# Patient Record
Sex: FEMALE | Race: WHITE | NOT HISPANIC OR LATINO | Employment: OTHER | URBAN - METROPOLITAN AREA
[De-identification: names, ages, dates, MRNs, and addresses within clinical notes are randomized per-mention and may not be internally consistent; named-entity substitution may affect disease eponyms.]

---

## 2017-01-31 ENCOUNTER — OFFICE VISIT - RIVER FALLS (OUTPATIENT)
Dept: FAMILY MEDICINE | Facility: CLINIC | Age: 62
End: 2017-01-31

## 2017-02-14 ENCOUNTER — OFFICE VISIT - RIVER FALLS (OUTPATIENT)
Dept: FAMILY MEDICINE | Facility: CLINIC | Age: 62
End: 2017-02-14

## 2017-04-12 ENCOUNTER — OFFICE VISIT - RIVER FALLS (OUTPATIENT)
Dept: FAMILY MEDICINE | Facility: CLINIC | Age: 62
End: 2017-04-12

## 2017-08-03 ENCOUNTER — OFFICE VISIT - RIVER FALLS (OUTPATIENT)
Dept: FAMILY MEDICINE | Facility: CLINIC | Age: 62
End: 2017-08-03

## 2017-08-03 ASSESSMENT — MIFFLIN-ST. JEOR: SCORE: 1408.18

## 2017-08-04 LAB — HBA1C MFR BLD: 5.7 %

## 2017-08-11 ENCOUNTER — OFFICE VISIT - RIVER FALLS (OUTPATIENT)
Dept: FAMILY MEDICINE | Facility: CLINIC | Age: 62
End: 2017-08-11

## 2017-08-30 ENCOUNTER — OFFICE VISIT - RIVER FALLS (OUTPATIENT)
Dept: FAMILY MEDICINE | Facility: CLINIC | Age: 62
End: 2017-08-30

## 2017-10-12 ENCOUNTER — OFFICE VISIT - RIVER FALLS (OUTPATIENT)
Dept: FAMILY MEDICINE | Facility: CLINIC | Age: 62
End: 2017-10-12

## 2017-10-12 ASSESSMENT — MIFFLIN-ST. JEOR: SCORE: 1394.57

## 2018-03-23 ENCOUNTER — OFFICE VISIT - RIVER FALLS (OUTPATIENT)
Dept: FAMILY MEDICINE | Facility: CLINIC | Age: 63
End: 2018-03-23

## 2018-03-23 ASSESSMENT — MIFFLIN-ST. JEOR: SCORE: 1344.67

## 2018-07-02 ENCOUNTER — OFFICE VISIT - RIVER FALLS (OUTPATIENT)
Dept: FAMILY MEDICINE | Facility: CLINIC | Age: 63
End: 2018-07-02

## 2018-07-09 ENCOUNTER — AMBULATORY - RIVER FALLS (OUTPATIENT)
Dept: FAMILY MEDICINE | Facility: CLINIC | Age: 63
End: 2018-07-09

## 2018-07-10 LAB
CHOLEST SERPL-MCNC: 155 MG/DL
CHOLEST/HDLC SERPL: 4 {RATIO}
CREAT SERPL-MCNC: 0.81 MG/DL (ref 0.5–0.99)
GLUCOSE BLD-MCNC: 110 MG/DL (ref 65–99)
HBA1C MFR BLD: 5.1 %
HDLC SERPL-MCNC: 39 MG/DL
LDLC SERPL CALC-MCNC: 94 MG/DL
NONHDLC SERPL-MCNC: 116 MG/DL
TRIGL SERPL-MCNC: 119 MG/DL

## 2018-08-15 ENCOUNTER — AMBULATORY - RIVER FALLS (OUTPATIENT)
Dept: FAMILY MEDICINE | Facility: CLINIC | Age: 63
End: 2018-08-15

## 2018-08-15 ENCOUNTER — OFFICE VISIT - RIVER FALLS (OUTPATIENT)
Dept: FAMILY MEDICINE | Facility: CLINIC | Age: 63
End: 2018-08-15

## 2018-08-15 ASSESSMENT — MIFFLIN-ST. JEOR: SCORE: 1312.92

## 2018-08-20 ENCOUNTER — TRANSFERRED RECORDS (OUTPATIENT)
Dept: HEALTH INFORMATION MANAGEMENT | Facility: CLINIC | Age: 63
End: 2018-08-20

## 2018-08-20 LAB — HPV ABSTRACT: NORMAL

## 2018-08-23 ENCOUNTER — OFFICE VISIT - RIVER FALLS (OUTPATIENT)
Dept: FAMILY MEDICINE | Facility: CLINIC | Age: 63
End: 2018-08-23

## 2018-10-22 ENCOUNTER — OFFICE VISIT - RIVER FALLS (OUTPATIENT)
Dept: FAMILY MEDICINE | Facility: CLINIC | Age: 63
End: 2018-10-22

## 2019-03-26 ENCOUNTER — AMBULATORY - RIVER FALLS (OUTPATIENT)
Dept: FAMILY MEDICINE | Facility: CLINIC | Age: 64
End: 2019-03-26

## 2019-08-06 ENCOUNTER — AMBULATORY - RIVER FALLS (OUTPATIENT)
Dept: FAMILY MEDICINE | Facility: CLINIC | Age: 64
End: 2019-08-06

## 2019-08-22 ENCOUNTER — OFFICE VISIT - RIVER FALLS (OUTPATIENT)
Dept: FAMILY MEDICINE | Facility: CLINIC | Age: 64
End: 2019-08-22

## 2019-08-22 ASSESSMENT — MIFFLIN-ST. JEOR: SCORE: 1360.1

## 2019-08-23 ENCOUNTER — COMMUNICATION - RIVER FALLS (OUTPATIENT)
Dept: FAMILY MEDICINE | Facility: CLINIC | Age: 64
End: 2019-08-23

## 2019-08-23 LAB
BUN SERPL-MCNC: 18 MG/DL (ref 7–25)
BUN/CREAT RATIO - HISTORICAL: ABNORMAL (ref 6–22)
CALCIUM SERPL-MCNC: 9.8 MG/DL (ref 8.6–10.4)
CHLORIDE BLD-SCNC: 103 MMOL/L (ref 98–110)
CHOLEST SERPL-MCNC: 179 MG/DL
CHOLEST/HDLC SERPL: 4.1 {RATIO}
CO2 SERPL-SCNC: 27 MMOL/L (ref 20–32)
CREAT SERPL-MCNC: 0.87 MG/DL (ref 0.5–0.99)
EGFRCR SERPLBLD CKD-EPI 2021: 72 ML/MIN/1.73M2
GLUCOSE BLD-MCNC: 106 MG/DL (ref 65–99)
HBA1C MFR BLD: 5.2 %
HDLC SERPL-MCNC: 44 MG/DL
LDLC SERPL CALC-MCNC: 105 MG/DL
NONHDLC SERPL-MCNC: 135 MG/DL
POTASSIUM BLD-SCNC: 4.5 MMOL/L (ref 3.5–5.3)
SODIUM SERPL-SCNC: 138 MMOL/L (ref 135–146)
TRIGL SERPL-MCNC: 180 MG/DL
TSH SERPL DL<=0.005 MIU/L-ACNC: 1.56 MIU/L (ref 0.4–4.5)

## 2019-08-27 ENCOUNTER — OFFICE VISIT - RIVER FALLS (OUTPATIENT)
Dept: FAMILY MEDICINE | Facility: CLINIC | Age: 64
End: 2019-08-27

## 2019-09-03 ENCOUNTER — OFFICE VISIT - RIVER FALLS (OUTPATIENT)
Dept: FAMILY MEDICINE | Facility: CLINIC | Age: 64
End: 2019-09-03

## 2019-10-19 ENCOUNTER — HOSPITAL ENCOUNTER (EMERGENCY)
Dept: HOSPITAL 39 - ER | Age: 64
Discharge: HOME | End: 2019-10-19
Payer: COMMERCIAL

## 2019-10-19 VITALS — OXYGEN SATURATION: 95 % | DIASTOLIC BLOOD PRESSURE: 79 MMHG | SYSTOLIC BLOOD PRESSURE: 111 MMHG

## 2019-10-19 VITALS — TEMPERATURE: 99.1 F

## 2019-10-19 DIAGNOSIS — I34.1: ICD-10-CM

## 2019-10-19 DIAGNOSIS — I10: ICD-10-CM

## 2019-10-19 DIAGNOSIS — J02.9: Primary | ICD-10-CM

## 2019-10-19 DIAGNOSIS — E07.9: ICD-10-CM

## 2019-10-19 NOTE — ED.PDOC
History of Present Illness





- General


Chief Complaint: ENT Problem


Stated Complaint: Sore throat


Time Seen by Provider: 10/19/19 20:23


Source: RN notes reviewed, Vital Signs reviewed





- History of Present Illness


Initial Comments: 





pt is a 62 yo F who presents to ED with daughter for 3 day h/o sore throat.  

Denies cough, fever, congestion or ear ache.  States she has sharp pain to 

throat when she tries to swallow liquids or food.  Has used OTC pain meds and 

decongestants w/o relief.


Allergies/Adverse Reactions: 


Allergies





NO KNOWN ALLERGY Allergy (Verified 10/19/19 20:18)


   





Home Medications: 


Ambulatory Orders





Acetamin W/Cod Elix 120/12 [Tylenol/Codiene Elixir 120/12] 10 ml PO Q6H PRN #150

ml 10/19/19 


Amoxicillin [Amoxil] 500 mg PO TID 10 Days  cap 10/19/19 











Review of Systems





- Review of Systems


Constitutional: Denies: chills, fever, malaise


EENTM: States: throat pain.  Denies: eye pain, blurred vision, ear pain, nose 

pain, nose congestion


Respiratory: Denies: cough, short of breath


Cardiology: Denies: chest pain, palpitations, syncope


Gastrointestinal/Abdominal: Denies: diarrhea, nausea, vomiting


Musculoskeletal: Denies: back pain


All other Systems: Reviewed and Negative





Past Medical History (General)





- Patient Medical History


Hx Asthma: No


Hx Cardiac Disorders: Yes - mitral valve prolapse


Hx Hypertension: Yes


Hx Thyroid Disease: Yes


Surgical History: cholecystectomy, Hysterectomy





- Vaccination History


Hx Tetanus, Diphtheria Vaccination: Yes


Hx Influenza Vaccination: Yes





- Social History


Hx Tobacco Use: No


Hx Alcohol Use: No





Family Medical History





- Family History


  ** Mother


Family History: Unknown


Hx Cardiac Disease: Yes





Physical Exam





- Physical Exam


General Appearance: Alert, Comfortable, No apparent distress


Ear Exam: bilateral ear: canal normal, TM normal


Nasal Exam: normal inspection


Throat Exam: other - There is pharyngeal erythema with exudates.  No trismus or 

uvular shift.  no peritonsilar mass


Cardiovascular/Respiratory: regular rate, rhythm, normal peripheral pulses, 

normal breath sounds, no respiratory distress


Abdominal Exam: non-tender


Neurologic: alert, normal mood/affect


Skin Exam: normal color





Progress





- Progress


Progress: 





10/19/19 20:30


Pt presents with 3 day h/o painful swallowing and sore throat.  exam shows 

pharyngeal erythema with exudates and anterior cervical lymphadenopathy.  No 

improvement with OTC meds.  Will treat with Decadron IM and Amoxil at home and 

f/u with pcp in 1-2 days for recheck.  srp given.





Departure





- Departure


Clinical Impression: 


Pharyngitis


Qualifiers:


 Pharyngitis/tonsillitis etiology: unspecified etiology Qualified Code(s): J02.9

 - Acute pharyngitis, unspecified





Time of Disposition: 20:32


Disposition: Discharge to Home or Self Care


Condition: Good


Departure Forms:  ED Discharge - Pt. Copy, Patient Portal Self Enrollment


Instructions:  Sore Throat in Adults


Diet: other - advance diet as tolerated


Activity: increase activity as tolerated


Prescriptions: 


Acetamin W/Cod Elix 120/12 [Tylenol/Codiene Elixir 120/12] 10 ml PO Q6H PRN #150

ml


 PRN Reason: Pain


Amoxicillin [Amoxil] 500 mg PO TID 10 Days  cap


Home Medications: 


Ambulatory Orders





Acetamin W/Cod Elix 120/12 [Tylenol/Codiene Elixir 120/12] 10 ml PO Q6H PRN #150

ml 10/19/19 


Amoxicillin [Amoxil] 500 mg PO TID 10 Days  cap 10/19/19

## 2019-11-18 ENCOUNTER — COMMUNICATION - RIVER FALLS (OUTPATIENT)
Dept: FAMILY MEDICINE | Facility: CLINIC | Age: 64
End: 2019-11-18

## 2019-11-20 ENCOUNTER — OFFICE VISIT - RIVER FALLS (OUTPATIENT)
Dept: FAMILY MEDICINE | Facility: CLINIC | Age: 64
End: 2019-11-20

## 2019-11-20 ASSESSMENT — MIFFLIN-ST. JEOR: SCORE: 1369.17

## 2020-08-11 ENCOUNTER — COMMUNICATION - RIVER FALLS (OUTPATIENT)
Dept: FAMILY MEDICINE | Facility: CLINIC | Age: 65
End: 2020-08-11

## 2020-08-14 ENCOUNTER — AMBULATORY - RIVER FALLS (OUTPATIENT)
Dept: FAMILY MEDICINE | Facility: CLINIC | Age: 65
End: 2020-08-14

## 2020-08-15 LAB
CHOLEST SERPL-MCNC: 193 MG/DL
CHOLEST/HDLC SERPL: 5.5 {RATIO}
GLUCOSE BLD-MCNC: 118 MG/DL (ref 65–99)
HDLC SERPL-MCNC: 35 MG/DL
LDLC SERPL CALC-MCNC: 111 MG/DL
NONHDLC SERPL-MCNC: 158 MG/DL
TRIGL SERPL-MCNC: 350 MG/DL
TSH SERPL DL<=0.005 MIU/L-ACNC: 1.73 MIU/L (ref 0.4–4.5)

## 2020-08-20 ENCOUNTER — COMMUNICATION - RIVER FALLS (OUTPATIENT)
Dept: FAMILY MEDICINE | Facility: CLINIC | Age: 65
End: 2020-08-20

## 2020-08-25 ENCOUNTER — OFFICE VISIT - RIVER FALLS (OUTPATIENT)
Dept: FAMILY MEDICINE | Facility: CLINIC | Age: 65
End: 2020-08-25

## 2020-08-25 ENCOUNTER — AMBULATORY - RIVER FALLS (OUTPATIENT)
Dept: FAMILY MEDICINE | Facility: CLINIC | Age: 65
End: 2020-08-25

## 2020-08-25 ASSESSMENT — MIFFLIN-ST. JEOR: SCORE: 1407.38

## 2020-08-26 ENCOUNTER — COMMUNICATION - RIVER FALLS (OUTPATIENT)
Dept: FAMILY MEDICINE | Facility: CLINIC | Age: 65
End: 2020-08-26

## 2020-08-26 LAB
25(OH)D3 SERPL-MCNC: 36 NG/ML (ref 30–100)
HAV AB SER QL IA: REACTIVE
HBV CORE AB SERPL QL IA: ABNORMAL
HBV SURFACE AB SERPL IA-ACNC: REACTIVE M[IU]/ML
HBV SURFACE AG SERPL QL IA: ABNORMAL
HCV AB SERPL QL IA: ABNORMAL
HEP C SIGNAL TO CUTOFF(HISTORICAL): 0.03

## 2020-12-29 ENCOUNTER — COMMUNICATION - RIVER FALLS (OUTPATIENT)
Dept: FAMILY MEDICINE | Facility: CLINIC | Age: 65
End: 2020-12-29

## 2021-04-01 ENCOUNTER — AMBULATORY - RIVER FALLS (OUTPATIENT)
Dept: FAMILY MEDICINE | Facility: CLINIC | Age: 66
End: 2021-04-01

## 2021-04-29 ENCOUNTER — COMMUNICATION - RIVER FALLS (OUTPATIENT)
Dept: FAMILY MEDICINE | Facility: CLINIC | Age: 66
End: 2021-04-29

## 2021-04-29 ENCOUNTER — AMBULATORY - RIVER FALLS (OUTPATIENT)
Dept: FAMILY MEDICINE | Facility: CLINIC | Age: 66
End: 2021-04-29

## 2021-06-29 ENCOUNTER — OFFICE VISIT - RIVER FALLS (OUTPATIENT)
Dept: FAMILY MEDICINE | Facility: CLINIC | Age: 66
End: 2021-06-29

## 2021-06-29 LAB
BUN SERPL-MCNC: 20 MG/DL (ref 7–25)
BUN/CREAT RATIO - HISTORICAL: ABNORMAL (ref 6–22)
CALCIUM SERPL-MCNC: 9.6 MG/DL (ref 8.6–10.4)
CHLORIDE BLD-SCNC: 101 MMOL/L (ref 98–110)
CO2 SERPL-SCNC: 26 MMOL/L (ref 20–32)
CREAT SERPL-MCNC: 0.93 MG/DL (ref 0.5–0.99)
EGFRCR SERPLBLD CKD-EPI 2021: 65 ML/MIN/1.73M2
ERYTHROCYTE [DISTWIDTH] IN BLOOD BY AUTOMATED COUNT: 14.3 % (ref 11–15)
GLUCOSE BLD-MCNC: 212 MG/DL (ref 65–99)
HCT VFR BLD AUTO: 36.3 % (ref 35–45)
HGB BLD-MCNC: 11.9 GM/DL (ref 11.7–15.5)
MAGNESIUM SERPL-MCNC: 2.1 MG/DL (ref 1.5–2.5)
MCH RBC QN AUTO: 30.1 PG (ref 27–33)
MCHC RBC AUTO-ENTMCNC: 32.8 GM/DL (ref 32–36)
MCV RBC AUTO: 91.7 FL (ref 80–100)
PLATELET # BLD AUTO: 207 10*3/UL (ref 140–400)
PMV BLD: 10.2 FL (ref 7.5–12.5)
POTASSIUM BLD-SCNC: 4.8 MMOL/L (ref 3.5–5.3)
RBC # BLD AUTO: 3.96 10*6/UL (ref 3.8–5.1)
SODIUM SERPL-SCNC: 135 MMOL/L (ref 135–146)
WBC # BLD AUTO: 5.3 10*3/UL (ref 3.8–10.8)

## 2021-06-29 ASSESSMENT — MIFFLIN-ST. JEOR: SCORE: 1434.49

## 2021-06-30 ENCOUNTER — COMMUNICATION - RIVER FALLS (OUTPATIENT)
Dept: FAMILY MEDICINE | Facility: CLINIC | Age: 66
End: 2021-06-30

## 2021-07-06 ENCOUNTER — AMBULATORY - RIVER FALLS (OUTPATIENT)
Dept: FAMILY MEDICINE | Facility: CLINIC | Age: 66
End: 2021-07-06

## 2021-07-07 ENCOUNTER — COMMUNICATION - RIVER FALLS (OUTPATIENT)
Dept: FAMILY MEDICINE | Facility: CLINIC | Age: 66
End: 2021-07-07

## 2021-07-07 LAB
CHOLEST SERPL-MCNC: 184 MG/DL
CHOLEST/HDLC SERPL: 5.3 {RATIO}
CREAT UR-MCNC: 83 MG/DL (ref 20–275)
GLUCOSE BLD-MCNC: 136 MG/DL (ref 65–99)
HDLC SERPL-MCNC: 35 MG/DL
LDLC SERPL CALC-MCNC: 110 MG/DL
MICROALBUMIN UR-MCNC: 0.2 MG/DL
MICROALBUMIN/CREAT UR: 2 MG/G{CREAT}
NONHDLC SERPL-MCNC: 149 MG/DL
TRIGL SERPL-MCNC: 271 MG/DL
TSH SERPL DL<=0.005 MIU/L-ACNC: 3.12 MIU/L (ref 0.4–4.5)

## 2021-07-08 ENCOUNTER — COMMUNICATION - RIVER FALLS (OUTPATIENT)
Dept: FAMILY MEDICINE | Facility: CLINIC | Age: 66
End: 2021-07-08

## 2021-07-16 ENCOUNTER — OFFICE VISIT - RIVER FALLS (OUTPATIENT)
Dept: FAMILY MEDICINE | Facility: CLINIC | Age: 66
End: 2021-07-16

## 2021-07-17 LAB — HBA1C MFR BLD: 6.4 %

## 2021-07-21 ENCOUNTER — COMMUNICATION - RIVER FALLS (OUTPATIENT)
Dept: FAMILY MEDICINE | Facility: CLINIC | Age: 66
End: 2021-07-21

## 2021-07-27 ENCOUNTER — COMMUNICATION - RIVER FALLS (OUTPATIENT)
Dept: FAMILY MEDICINE | Facility: CLINIC | Age: 66
End: 2021-07-27

## 2021-07-28 ENCOUNTER — COMMUNICATION - RIVER FALLS (OUTPATIENT)
Dept: FAMILY MEDICINE | Facility: CLINIC | Age: 66
End: 2021-07-28

## 2021-08-16 ENCOUNTER — OFFICE VISIT - RIVER FALLS (OUTPATIENT)
Dept: FAMILY MEDICINE | Facility: CLINIC | Age: 66
End: 2021-08-16

## 2021-08-16 ASSESSMENT — MIFFLIN-ST. JEOR: SCORE: 1435.85

## 2021-08-19 ENCOUNTER — OFFICE VISIT - RIVER FALLS (OUTPATIENT)
Dept: FAMILY MEDICINE | Facility: CLINIC | Age: 66
End: 2021-08-19

## 2021-08-24 ENCOUNTER — COMMUNICATION - RIVER FALLS (OUTPATIENT)
Dept: FAMILY MEDICINE | Facility: CLINIC | Age: 66
End: 2021-08-24

## 2021-09-03 ENCOUNTER — COMMUNICATION - RIVER FALLS (OUTPATIENT)
Dept: FAMILY MEDICINE | Facility: CLINIC | Age: 66
End: 2021-09-03

## 2021-09-03 ENCOUNTER — OFFICE VISIT - RIVER FALLS (OUTPATIENT)
Dept: FAMILY MEDICINE | Facility: CLINIC | Age: 66
End: 2021-09-03

## 2021-09-03 LAB
A/G RATIO - HISTORICAL: 1.7 (ref 1–2.5)
ALBUMIN SERPL-MCNC: 4.7 GM/DL (ref 3.6–5.1)
ALP SERPL-CCNC: 55 UNIT/L (ref 37–153)
ALT SERPL W P-5'-P-CCNC: 40 UNIT/L (ref 6–29)
AST SERPL W P-5'-P-CCNC: 53 UNIT/L (ref 10–35)
BILIRUB SERPL-MCNC: 0.6 MG/DL (ref 0.2–1.2)
BUN SERPL-MCNC: 21 MG/DL (ref 7–25)
BUN/CREAT RATIO - HISTORICAL: ABNORMAL (ref 6–22)
CALCIUM SERPL-MCNC: 9.8 MG/DL (ref 8.6–10.4)
CHLORIDE BLD-SCNC: 102 MMOL/L (ref 98–110)
CO2 SERPL-SCNC: 27 MMOL/L (ref 20–32)
CREAT SERPL-MCNC: 0.93 MG/DL (ref 0.5–0.99)
EGFRCR SERPLBLD CKD-EPI 2021: 65 ML/MIN/1.73M2
GLOBULIN: 2.8 (ref 1.9–3.7)
GLUCOSE BLD-MCNC: 120 MG/DL (ref 65–99)
POTASSIUM BLD-SCNC: 4.4 MMOL/L (ref 3.5–5.3)
PROT SERPL-MCNC: 7.5 GM/DL (ref 6.1–8.1)
SODIUM SERPL-SCNC: 138 MMOL/L (ref 135–146)

## 2021-09-03 ASSESSMENT — MIFFLIN-ST. JEOR: SCORE: 1397.29

## 2021-09-21 ENCOUNTER — OFFICE VISIT - RIVER FALLS (OUTPATIENT)
Dept: FAMILY MEDICINE | Facility: CLINIC | Age: 66
End: 2021-09-21

## 2021-09-21 ASSESSMENT — MIFFLIN-ST. JEOR: SCORE: 1398.65

## 2021-11-02 ENCOUNTER — OFFICE VISIT - RIVER FALLS (OUTPATIENT)
Dept: FAMILY MEDICINE | Facility: CLINIC | Age: 66
End: 2021-11-02

## 2021-11-02 ASSESSMENT — MIFFLIN-ST. JEOR: SCORE: 1387.31

## 2021-11-03 ENCOUNTER — COMMUNICATION - RIVER FALLS (OUTPATIENT)
Dept: FAMILY MEDICINE | Facility: CLINIC | Age: 66
End: 2021-11-03

## 2021-11-03 LAB
% RETIC: 1.8 %
A/G RATIO - HISTORICAL: 1.6 (ref 1–2.5)
ALBUMIN SERPL-MCNC: 4.5 GM/DL (ref 3.6–5.1)
ALP SERPL-CCNC: 72 UNIT/L (ref 37–153)
ALT SERPL W P-5'-P-CCNC: 62 UNIT/L (ref 6–29)
AST SERPL W P-5'-P-CCNC: 74 UNIT/L (ref 10–35)
BASOPHILS # BLD MANUAL: 73 10*3/UL (ref 0–200)
BASOPHILS NFR BLD MANUAL: 1 %
BILIRUB SERPL-MCNC: 0.6 MG/DL (ref 0.2–1.2)
BUN SERPL-MCNC: 24 MG/DL (ref 7–25)
BUN/CREAT RATIO - HISTORICAL: ABNORMAL (ref 6–22)
CALCIUM SERPL-MCNC: 9.9 MG/DL (ref 8.6–10.4)
CHLORIDE BLD-SCNC: 104 MMOL/L (ref 98–110)
CO2 SERPL-SCNC: 25 MMOL/L (ref 20–32)
CREAT SERPL-MCNC: 0.88 MG/DL (ref 0.5–0.99)
DAT POLY-SP REAG RBC QL: NEGATIVE
EGFRCR SERPLBLD CKD-EPI 2021: 69 ML/MIN/1.73M2
EOSINOPHIL # BLD MANUAL: 73 10*3/UL (ref 15–500)
EOSINOPHIL NFR BLD MANUAL: 1 %
ERYTHROCYTE [DISTWIDTH] IN BLOOD BY AUTOMATED COUNT: 14.4 % (ref 11–15)
FERRITIN SERPL-MCNC: 30 NG/ML (ref 16–288)
GLOBULIN: 2.8 (ref 1.9–3.7)
GLUCOSE BLD-MCNC: 149 MG/DL (ref 65–99)
HCT VFR BLD AUTO: 35.8 % (ref 35–45)
HGB BLD-MCNC: 11.9 GM/DL (ref 11.7–15.5)
IRON SATURATION: 19 (ref 16–45)
IRON SERPL-MCNC: 89 MCG/DL (ref 45–160)
LDH SERPL L TO P-CCNC: 176 UNIT/L (ref 120–250)
LYMPHOCYTES # BLD MANUAL: 1548 10*3/UL (ref 850–3900)
LYMPHOCYTES NFR BLD MANUAL: 21.2 %
MCH RBC QN AUTO: 30.4 PG (ref 27–33)
MCHC RBC AUTO-ENTMCNC: 33.2 GM/DL (ref 32–36)
MCV RBC AUTO: 91.3 FL (ref 80–100)
MONOCYTES # BLD MANUAL: 146 10*3/UL (ref 200–950)
MONOCYTES NFR BLD MANUAL: 2 %
NEUTROPHILS # BLD MANUAL: 5460 10*3/UL (ref 1500–7800)
NEUTROPHILS NFR BLD MANUAL: 74.8 %
PLATELET # BLD AUTO: 214 10*3/UL (ref 140–400)
PMV BLD: 11.1 FL (ref 7.5–12.5)
POTASSIUM BLD-SCNC: 4 MMOL/L (ref 3.5–5.3)
PROT SERPL-MCNC: 7.3 GM/DL (ref 6.1–8.1)
RBC # BLD AUTO: 3.92 10*6/UL (ref 3.8–5.1)
RETIC (ABSOLUTE) - HISTORICAL: NORMAL (ref 20000–80000)
SODIUM SERPL-SCNC: 140 MMOL/L (ref 135–146)
TIBC - QUEST: 466 (ref 250–450)
WBC # BLD AUTO: 7.3 10*3/UL (ref 3.8–10.8)

## 2021-11-05 ENCOUNTER — OFFICE VISIT - RIVER FALLS (OUTPATIENT)
Dept: FAMILY MEDICINE | Facility: CLINIC | Age: 66
End: 2021-11-05

## 2021-12-21 ENCOUNTER — OFFICE VISIT - RIVER FALLS (OUTPATIENT)
Dept: FAMILY MEDICINE | Facility: CLINIC | Age: 66
End: 2021-12-21

## 2021-12-21 ENCOUNTER — MEDICAL CORRESPONDENCE (OUTPATIENT)
Dept: HEALTH INFORMATION MANAGEMENT | Facility: CLINIC | Age: 66
End: 2021-12-21

## 2021-12-21 ASSESSMENT — MIFFLIN-ST. JEOR: SCORE: 1358.28

## 2022-01-07 ENCOUNTER — AMBULATORY - RIVER FALLS (OUTPATIENT)
Dept: FAMILY MEDICINE | Facility: CLINIC | Age: 67
End: 2022-01-07

## 2022-02-11 VITALS
BODY MASS INDEX: 29.92 KG/M2 | OXYGEN SATURATION: 98 % | HEIGHT: 66 IN | DIASTOLIC BLOOD PRESSURE: 60 MMHG | HEART RATE: 88 BPM | SYSTOLIC BLOOD PRESSURE: 110 MMHG | TEMPERATURE: 98.3 F | WEIGHT: 186.2 LBS

## 2022-02-11 VITALS
OXYGEN SATURATION: 98 % | TEMPERATURE: 98.1 F | HEART RATE: 81 BPM | BODY MASS INDEX: 29.72 KG/M2 | HEIGHT: 65 IN | WEIGHT: 178.4 LBS | DIASTOLIC BLOOD PRESSURE: 70 MMHG | SYSTOLIC BLOOD PRESSURE: 112 MMHG

## 2022-02-11 VITALS
DIASTOLIC BLOOD PRESSURE: 62 MMHG | OXYGEN SATURATION: 99 % | WEIGHT: 184.4 LBS | BODY MASS INDEX: 31.09 KG/M2 | BODY MASS INDEX: 32.51 KG/M2 | HEIGHT: 65 IN | TEMPERATURE: 99 F | WEIGHT: 186.9 LBS | HEIGHT: 65 IN | TEMPERATURE: 98.2 F | HEIGHT: 65 IN | BODY MASS INDEX: 30.69 KG/M2 | WEIGHT: 195.1 LBS | OXYGEN SATURATION: 97 % | DIASTOLIC BLOOD PRESSURE: 66 MMHG | HEIGHT: 65 IN | SYSTOLIC BLOOD PRESSURE: 120 MMHG | WEIGHT: 186.6 LBS | HEIGHT: 65 IN | BODY MASS INDEX: 30.72 KG/M2 | SYSTOLIC BLOOD PRESSURE: 118 MMHG | SYSTOLIC BLOOD PRESSURE: 114 MMHG | DIASTOLIC BLOOD PRESSURE: 62 MMHG | HEART RATE: 73 BPM | HEART RATE: 88 BPM | HEART RATE: 86 BPM | OXYGEN SATURATION: 98 % | TEMPERATURE: 97.4 F | BODY MASS INDEX: 31.14 KG/M2

## 2022-02-11 VITALS
BODY MASS INDEX: 32.46 KG/M2 | HEIGHT: 65 IN | WEIGHT: 197.3 LBS | DIASTOLIC BLOOD PRESSURE: 64 MMHG | OXYGEN SATURATION: 97 % | HEART RATE: 76 BPM | WEIGHT: 194.8 LBS | TEMPERATURE: 97.2 F | RESPIRATION RATE: 16 BRPM | OXYGEN SATURATION: 97 % | BODY MASS INDEX: 32.83 KG/M2 | HEART RATE: 65 BPM | SYSTOLIC BLOOD PRESSURE: 114 MMHG | DIASTOLIC BLOOD PRESSURE: 72 MMHG | SYSTOLIC BLOOD PRESSURE: 100 MMHG

## 2022-02-11 VITALS
SYSTOLIC BLOOD PRESSURE: 112 MMHG | OXYGEN SATURATION: 99 % | DIASTOLIC BLOOD PRESSURE: 64 MMHG | HEIGHT: 65 IN | HEART RATE: 76 BPM | HEART RATE: 66 BPM | TEMPERATURE: 98.1 F | DIASTOLIC BLOOD PRESSURE: 66 MMHG | SYSTOLIC BLOOD PRESSURE: 118 MMHG | WEIGHT: 180.4 LBS | BODY MASS INDEX: 30.06 KG/M2

## 2022-02-11 VITALS
DIASTOLIC BLOOD PRESSURE: 72 MMHG | TEMPERATURE: 98.5 F | SYSTOLIC BLOOD PRESSURE: 126 MMHG | HEART RATE: 69 BPM | TEMPERATURE: 98.6 F | HEART RATE: 78 BPM | BODY MASS INDEX: 32.7 KG/M2 | SYSTOLIC BLOOD PRESSURE: 118 MMHG | HEIGHT: 65 IN | DIASTOLIC BLOOD PRESSURE: 68 MMHG | OXYGEN SATURATION: 97 %

## 2022-02-11 VITALS
TEMPERATURE: 98.3 F | SYSTOLIC BLOOD PRESSURE: 124 MMHG | WEIGHT: 186 LBS | BODY MASS INDEX: 30.99 KG/M2 | HEART RATE: 64 BPM | RESPIRATION RATE: 16 BRPM | OXYGEN SATURATION: 99 % | HEIGHT: 65 IN | DIASTOLIC BLOOD PRESSURE: 72 MMHG

## 2022-02-11 VITALS
OXYGEN SATURATION: 97 % | SYSTOLIC BLOOD PRESSURE: 124 MMHG | DIASTOLIC BLOOD PRESSURE: 64 MMHG | RESPIRATION RATE: 16 BRPM | BODY MASS INDEX: 29.66 KG/M2 | WEIGHT: 178 LBS | HEART RATE: 88 BPM | HEIGHT: 65 IN

## 2022-02-11 VITALS
TEMPERATURE: 98.6 F | WEIGHT: 175 LBS | HEART RATE: 76 BPM | BODY MASS INDEX: 29.16 KG/M2 | DIASTOLIC BLOOD PRESSURE: 70 MMHG | HEIGHT: 65 IN | SYSTOLIC BLOOD PRESSURE: 108 MMHG

## 2022-02-11 VITALS
HEIGHT: 65 IN | HEART RATE: 72 BPM | DIASTOLIC BLOOD PRESSURE: 74 MMHG | SYSTOLIC BLOOD PRESSURE: 128 MMHG | TEMPERATURE: 98.5 F | DIASTOLIC BLOOD PRESSURE: 72 MMHG | WEIGHT: 189 LBS | SYSTOLIC BLOOD PRESSURE: 116 MMHG | BODY MASS INDEX: 31.49 KG/M2 | HEART RATE: 72 BPM

## 2022-02-11 VITALS
OXYGEN SATURATION: 98 % | TEMPERATURE: 98.4 F | BODY MASS INDEX: 28.46 KG/M2 | DIASTOLIC BLOOD PRESSURE: 74 MMHG | WEIGHT: 171 LBS | SYSTOLIC BLOOD PRESSURE: 120 MMHG | HEART RATE: 64 BPM

## 2022-02-12 VITALS
DIASTOLIC BLOOD PRESSURE: 64 MMHG | BODY MASS INDEX: 27.96 KG/M2 | WEIGHT: 168 LBS | HEART RATE: 72 BPM | SYSTOLIC BLOOD PRESSURE: 108 MMHG | WEIGHT: 168 LBS | BODY MASS INDEX: 27.99 KG/M2 | DIASTOLIC BLOOD PRESSURE: 70 MMHG | TEMPERATURE: 98.9 F | TEMPERATURE: 98.5 F | SYSTOLIC BLOOD PRESSURE: 112 MMHG | HEIGHT: 65 IN | HEART RATE: 72 BPM

## 2022-02-15 ENCOUNTER — OFFICE VISIT - RIVER FALLS (OUTPATIENT)
Dept: FAMILY MEDICINE | Facility: CLINIC | Age: 67
End: 2022-02-15

## 2022-02-15 ENCOUNTER — AMBULATORY - RIVER FALLS (OUTPATIENT)
Dept: FAMILY MEDICINE | Facility: CLINIC | Age: 67
End: 2022-02-15

## 2022-02-16 LAB — HBA1C MFR BLD: 5.4 %

## 2022-02-16 NOTE — LETTER
(Inserted Image. Unable to display)       August 23, 2019      ZAK EVERETT  [address], [place] [id number]        Dear ZAK,     Thank you for selecting [facility] for your healthcare needs. Below you will find the results of your recent test(s) done at our clinic.      Your diabetes screening shows you are developing some prediabetes.  We know this because your hemoglobin A1c  was higher than normal. Both your fasting blood sugar and your hemoglobin A1c show that you do not have diabetes at this time.  This means that if you can make some healthy lifestyle changes you could prevent yourself from getting diabetes!  The American Diabetes Association website has some great information on healthy lifestyle changes.  I also think the [place] My Plate is a great resource.    Your cholesterol is also higher than it should be.  Please take a look at the American Heart Association's website for some information on dietary and lifestyle changes your can do to improve your cholesterol.   You are also welcome to meet with our dietitian, or come in to discuss your cardiovascular disease risk with me.  We should recheck a fasting lipid panel in 6 months, then get together afterwards to revisit your cardiovascular disease risk and decide if you need to treat your high cholesterol.       Result Name Current Result Previous Result Reference Range   Sodium Level (mmol/L)  138 8/22/2019  141 7/9/2018 135 - 146   Potassium Level (mmol/L)  4.5 8/22/2019  4.4 7/9/2018 3.5 - 5.3   Chloride Level (mmol/L)  103 8/22/2019  108 7/9/2018 98 - 110   CO2 Level (mmol/L)  27 8/22/2019  26 7/9/2018 20 - 32   Glucose Level (mg/dL) ((H)) 106 8/22/2019 ((H)) 110 7/9/2018 65 - 99   BUN (mg/dL)  18 8/22/2019  24 7/9/2018 7 - 25   Creatinine Level (mg/dL)  0.87 8/22/2019  0.81 7/9/2018 0.50 - 0.99   BUN/Creat Ratio  NOT APPLICABLE 8/22/2019  NOT APPLICABLE 7/9/2018 6 - 22   eGFR (mL/min/1.73m2)  72 8/22/2019  79  7/9/2018 > OR = 60 -    eGFR  (mL/min/1.73m2)  83 8/22/2019  91 7/9/2018 > OR = 60 -    Calcium Level (mg/dL)  9.8 8/22/2019  9.3 7/9/2018 8.6 - 10.4   Hgb A1c  5.2 8/22/2019  5.1 7/9/2018  - <5.7   Cholesterol (mg/dL)  179 8/22/2019  155 7/9/2018  - <200   Non-HDL Cholesterol ((H)) 135 8/22/2019  116 7/9/2018  - <130   HDL (mg/dL) ((L)) 44 8/22/2019 ((L)) 39 7/9/2018 >50 -    Cholesterol/HDL Ratio  4.1 8/22/2019  4.0 7/9/2018  - <5.0   LDL ((H)) 105 8/22/2019  94 7/9/2018    Triglyceride (mg/dL) ((H)) 180 8/22/2019  119 7/9/2018  - <150   TSH (mIU/L)  1.56 8/22/2019  0.57 7/9/2018 0.40 - 4.50     Please contact my practice at [phone] if you have any questions or concerns.     Sincerely,        Irena Levi MD      What do your labs mean?  Below is a glossary of commonly ordered labs:  LDL   Bad Cholesterol   HDL   Good Cholesterol  AST/ALT   Liver Function   Cr/Creatinine   Kidney Function  Microalbumin   Kidney Function  BUN   Kidney Function  PSA   Prostate    TSH   Thyroid Hormone  HgbA1c   Diabetes Test   Hgb (Hemoglobin)   Red Blood Cells

## 2022-02-16 NOTE — NURSING NOTE
Comprehensive Intake Entered On:  6/29/2021 11:11 AM CDT    Performed On:  6/29/2021 11:06 AM CDT by Jocelyn Hutchins LPN               Summary   Chief Complaint :   pre-op exam; left eye surgery [facility] with [facility], fax# [phone]   Menstrual Status :   Postmenopausal   Weight Measured :   194.8 lb(Converted to: 194 lb 13 oz, 88.360 kg)    Height Measured :   65 in(Converted to: 5 ft 5 in, 165.10 cm)    Body Mass Index :   32.41 kg/m2 (HI)    Body Surface Area :   2.01 m2   Systolic Blood Pressure :   114 mmHg   Diastolic Blood Pressure :   72 mmHg   Mean Arterial Pressure :   86 mmHg   Peripheral Pulse Rate :   76 bpm   BP Site :   Right arm   BP Method :   Manual   Temperature Tympanic :   97.2 DegF(Converted to: 36.2 DegC)  (LOW)    Oxygen Saturation :   97 %   Languages :   English   Jocelyn Hutchins LPN - 6/29/2021 11:06 AM CDT   Health Status   Allergies Verified? :   Yes   Medication History Verified? :   Yes   Medical History Verified? :   No   Pre-Visit Planning Status :   Completed   Tobacco Use? :   Never smoker   Jocelyn Hutchins LPN - 6/29/2021 11:06 AM CDT   Meds / Allergies   (As Of: 6/29/2021 11:11:37 AM CDT)   Allergies (Active)   No Known Medication Allergies  Estimated Onset Date:   Unspecified ; Created By:   Yumiko Anderson CMA; Reaction Status:   Active ; Category:   Drug ; Substance:   No Known Medication Allergies ; Type:   Allergy ; Updated By:   Yumiko Anderson CMA; Reviewed Date:   8/22/2019 9:09 AM CDT        Medication List   (As Of: 6/29/2021 11:11:37 AM CDT)   Prescription/Discharge Order    estradiol topical  :   estradiol topical ; Status:   Prescribed ; Ordered As Mnemonic:   Estrace Vaginal 0.1 mg/g vaginal cream ; Simple Display Line:   See Instructions, INSERT 1 GRAM VAGINALLY THREE TIMES PER WEEK, 42.5 gm, 3 Refill(s) ; Ordering Provider:   Irena Levi MD; Catalog Code:   estradiol topical ; Order Dt/Tm:   8/22/2019  10:02:25 AM CDT          amitriptyline  :   amitriptyline ; Status:   Prescribed ; Ordered As Mnemonic:   amitriptyline 50 mg oral tablet ; Simple Display Line:   50 mg, 1 tab(s), Oral, hs, 90 tab(s), 3 Refill(s) ; Ordering Provider:   Irena Levi MD; Catalog Code:   amitriptyline ; Order Dt/Tm:   8/25/2020 1:51:53 PM CDT          levothyroxine  :   levothyroxine ; Status:   Prescribed ; Ordered As Mnemonic:   levothyroxine 150 mcg (0.15 mg) oral tablet ; Simple Display Line:   150 mcg, 1 tab(s), po, daily, 90 tab(s), 3 Refill(s) ; Ordering Provider:   Irena Levi MD; Catalog Code:   levothyroxine ; Order Dt/Tm:   8/25/2020 1:52:14 PM CDT          esomeprazole  :   esomeprazole ; Status:   Prescribed ; Ordered As Mnemonic:   esomeprazole 20 mg oral delayed release capsule ; Simple Display Line:   20 mg, 1 cap(s), Oral, daily, 90 cap(s), 3 Refill(s) ; Ordering Provider:   Irena Levi MD; Catalog Code:   esomeprazole ; Order Dt/Tm:   8/25/2020 1:51:08 PM CDT          celecoxib  :   celecoxib ; Status:   Prescribed ; Ordered As Mnemonic:   CeleBREX 100 mg oral capsule ; Simple Display Line:   100 mg, 1 cap(s), Oral, bid, PRN: pain, 180 cap(s), 0 Refill(s) ; Ordering Provider:   Irena Levi MD; Catalog Code:   celecoxib ; Order Dt/Tm:   12/29/2020 5:49:29 PM CST          metoprolol  :   metoprolol ; Status:   Prescribed ; Ordered As Mnemonic:   Metoprolol Tartrate 50 mg oral tablet ; Simple Display Line:   0.5 tab(s), Oral, daily, 45 tab(s), 2 Refill(s) ; Ordering Provider:   Irena Levi MD; Catalog Code:   metoprolol ; Order Dt/Tm:   1/20/2021 9:13:57 AM CST            Home Meds    biotin  :   biotin ; Status:   Documented ; Ordered As Mnemonic:   Hair, Skin & Nails ; Simple Display Line:   2 Gummies, 1x per day, 0 Refill(s) ; Catalog Code:   biotin ; Order Dt/Tm:   8/25/2020 11:45:22 AM CDT          omega-3 polyunsaturated fatty acids  :   omega-3 polyunsaturated fatty acids ; Status:    Documented ; Ordered As Mnemonic:   Fish Oil ; Simple Display Line:   1,000 mg, po, daily, 0 Refill(s) ; Catalog Code:   omega-3 polyunsaturated fatty acids ; Order Dt/Tm:   1/31/2017 7:02:34 PM CST            Social History   Social History   (As Of: 6/29/2021 11:11:37 AM CDT)   Alcohol:        Current, Wine (5 oz), 1-2 times per week, 1 drinks/episode average.  2 drinks/episode maximum.  Ready to change: No.   (Last Updated: 8/26/2019 10:27:17 AM CDT by Alyssa West)          Tobacco:        Never (less than 100 in lifetime)   (Last Updated: 6/29/2021 11:09:22 AM CDT by Jocelyn Hutchins LPN)          Electronic Cigarette/Vaping:        Electronic Cigarette Use: Never.   (Last Updated: 6/29/2021 11:09:28 AM CDT by Jocelyn Hutchins LPN)          Substance Abuse:        Never   (Last Updated: 3/2/2012 8:25:32 AM CST by Aniyah Almaguer CMA)          Employment/School:        Retired   (Last Updated: 8/4/2017 11:03:38 AM CDT by Therese Hein)          Home/Environment:        Marital status: .  Spouse/Partner name: Ricardo.  Risks in environment: Does not wear helmet, owns secured gun.   (Last Updated: 8/4/2017 11:03:55 AM CDT by Therese Hein)          Nutrition/Health:        Type of diet: Low carbohydrate.   (Last Updated: 8/25/2020 11:46:38 AM CDT by Yumiko Denis CMA)          Exercise:        Exercise frequency: 3-4 times/week.  Exercise type: garden in summer, health club.   (Last Updated: 8/26/2019 10:28:14 AM CDT by Alyssa West)          Sexual:        Sexually active: Yes.  Sexual orientation: Heterosexual.  Contraceptive Use Details: None.   (Last Updated: 8/4/2017 11:04:25 AM CDT by Therese Hein)

## 2022-02-16 NOTE — NURSING NOTE
Depression Screening Entered On:  6/29/2021 12:02 PM CDT    Performed On:  6/29/2021 12:02 PM CDT by Jocelyn Hutchins LPN               Depression Screening   Little Interest - Pleasure in Activities :   Not at all   Feeling Down, Depressed, Hopeless :   Not at all   Initial Depression Screen Score :   0 Score   Poor Appetite or Overeating :   Not at all   Trouble Falling or Staying Asleep :   Not at all   Feeling Tired or Little Energy :   Not at all   Feeling Bad About Yourself :   Not at all   Trouble Concentrating :   Not at all   Moving or Speaking Slowly :   Not at all   Thoughts Better Off Dead or Hurting Self :   Not at all   Detailed Depression Screen Score :   0    Total Depression Screen Score :   0    Jocelyn Hutchins LPN - 6/29/2021 12:02 PM CDT

## 2022-02-16 NOTE — TELEPHONE ENCOUNTER
---------------------  From: Judith Emery CMA (Phone Messages Pool ([id number]_[facility]))   To: [facility] Message Pool ([id number]_[facility]);     Sent: 2/18/2019 4:45:30 PM CST  Subject: FW: Recommendation           ---------------------  From: ZAK HITCHCOCK  To: [facility]  Sent: 02/18/2019 04:43 p.m. CST  Subject: Recommendation  Coleman,  Congratulations on your new position, but I m very sad to see you leave.  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  Can you give any recommendations as to who might handle Cheryl?  I m at a loss as to whom to choose for our next doctor.  Any advice you have would be most helpful.    Thank you,    Annabelle Hitchcock---------------------  From: Jodi Fiore CMA ([facility] Message Pool ([id number]))   To: Bessy Medina;     Sent: 2/18/2019 4:49:37 PM CST  Subject: FW: Recommendation---------------------  From: Bessy Medina   To: [facility] Message Pool ([id number]);     Sent: 2/21/2019 9:07:04 AM CST  Subject: RE: Recommendation     Dr Irena Levi would be a good choice for her.---------------------  From: Jodi Fiore CMA ([facility] Message Pool ([id number]))   To: ZAK HITCHCOCK    Sent: 2/21/2019 9:35:38 AM CST  Subject: FW: Recommendation     Klever Alanis,    I'll still be here :)    Coleman is recommending Dr. Levi for Cheryl.     Kind Regards,    GLENN Zapata

## 2022-02-16 NOTE — NURSING NOTE
Quick Intake Entered On:  8/16/2021 2:25 PM CDT    Performed On:  8/16/2021 2:25 PM CDT by Kamini Ramirez               Summary   Menstrual Status :   Postmenopausal   Weight Measured :   195.1 lb(Converted to: 195 lb 2 oz, 88.496 kg)    Height Measured :   65 in(Converted to: 5 ft 5 in, 165.10 cm)    Body Mass Index :   32.46 kg/m2 (HI)    Body Surface Area :   2.01 m2   Languages :   English   Kamini Ramirez - 8/16/2021 2:25 PM CDT

## 2022-02-16 NOTE — TELEPHONE ENCOUNTER
---------------------  From: ZAK HITCHCOCK  To: [facility]  Sent: 2021 05:07 p.m. CDT  Subject: RE: Donation  Thank you for the prompt reply!!  ---------------------  From: Yuli DUNCAN, Sandhya STORM (Phone Messages Pool ([id number]_[facility]))  To: ZAK HITCHCCOK  Cc: Chuy YBARRA, Abby;  Sent: 2021 5:05:44 PM CDT  Subject: RE: Donation  ???  Demetri Salas,  ???  According to [employer] [facility], as long as you are in relatively healthy condition, having diabetes would NOT stop you from donating blood.  ???  It would be best to ask them the next time you go to donate.  ???  Have a great day,  ???  Sandhya Roldan RN  ???  ???  ???  ---------------------  From: ZAK HITCHCOCK  To: [facility]  Sent: 2021 04:57 p.m. CDT  Subject: Donation  Dr Mora,  I have been donating blood. Now since I?ve been diagnosed diabetic, will that stop me from donating blood?  Thanks,  Zak Hitchcock   10/26/1855

## 2022-02-16 NOTE — LETTER
(Inserted Image. Unable to display)       August 26, 2020      ZAK EVERETT  [address], [place] [id number]        Dear ZAK,     Thank you for selecting [facility] for your healthcare needs. Below you will find the results of your recent test(s) done at our clinic.      You have immunity to Hepatitis A and B.  No evidence of infection with Hepatitis A, B or C.  Yor Vitamin D level looks ok.      Result Name Current Result Reference Range   Vitamin D 25 OH (ng/mL)  36 8/25/2020 30 - 100   Hep A Ab ((A)) REACTIVE 8/25/2020 NONREACTIVE - NONREACTIVE   Hep B Core Ab  NON-REACTIVE 8/25/2020 NONREACTIVE - NONREACTIVE   Hep Bs Ag  NON-REACTIVE 8/25/2020 NONREACTIVE - NONREACTIVE   Hep Bs Ab ((A)) REACTIVE 8/25/2020 NONREACTIVE - NONREACTIVE   Hep C Ab  NON-REACTIVE 8/25/2020 NONREACTIVE - NONREACTIVE   Hep C Signal to Cutoff  0.03 8/25/2020  - <1.00     Please contact my practice at [phone] if you have any questions or concerns.     Sincerely,        Irena Levi MD      What do your labs mean?  Below is a glossary of commonly ordered labs:  LDL   Bad Cholesterol   HDL   Good Cholesterol  AST/ALT   Liver Function   Cr/Creatinine   Kidney Function  Microalbumin   Kidney Function  BUN   Kidney Function  PSA   Prostate    TSH   Thyroid Hormone  HgbA1c   Diabetes Test   Hgb (Hemoglobin)   Red Blood Cells

## 2022-02-16 NOTE — TELEPHONE ENCOUNTER
Entered by Erin Benoit LPN on September 01, 2021 10:58:51 AM CDT  ---------------------  From: Erin Benoit LPN   To: [place] [address]    Sent: 9/1/2021 10:58:51 AM CDT  Subject: Medication Management     ** Submitted: **  Order:esomeprazole (esomeprazole 20 mg oral delayed release capsule)  1 cap(s)  Oral  daily  Qty:  30 cap(s)        Refills:  0          Substitutions Allowed     Route To Pharmacy - [place] [address]    Signed by Erin Benoit LPN  9/1/2021 3:58:00 PM [facility]    ** Submitted: **  Complete:esomeprazole (esomeprazole 20 mg oral delayed release capsule)   Signed by Erin Benoit LPN  9/1/2021 3:58:00 PM [facility]    ** Not Approved:  **  esomeprazole (Esomeprazole Magnesium 20 MG Oral Capsule Delayed Release)  Take 1 capsule by mouth once daily  Qty:  90 cap(s)        Days Supply:  90        Refills:  0          Substitutions Allowed     Route To Pharmacy - [place] [address]   Signed by Erin Benoit LPN          Med Refill      Date of last office visit and reason:  7/16/21 New onset DM with NCB      Date of last Med Check / Px:   8/25/20  Date of last labs pertaining to med:  n/a    RTC order in chart:  yes- reminder letter sent out 8/25- due for physical    For Protocol refill, has patient been contacted:  no- reminder letter sent and will send message to pharmacy        ------------------------------------------  From: [place] [id number]  To: Irena Levi MD  Sent: August 28, 2021 11:47:34 PM CDT  Subject: Medication Management  Due: August 20, 2021 11:16:59 AM CDT     ** On Hold Pending Signature **     Dispensed Drug: esomeprazole (esomeprazole 20 mg oral delayed release capsule), Take 1 capsule by mouth once daily  Quantity: 90 cap(s)  Days Supply: 90  Refills: 0  Substitutions Allowed  Notes from Pharmacy:  ------------------------------------------

## 2022-02-16 NOTE — TELEPHONE ENCOUNTER
---------------------  From: Erin Benoit LPN (Phone Messages Pool ([id number]_[facility]))   To: [facility] Message Pool ([facility]);     Sent: 2019 6:43:06 PM CST  Subject: CONSUMER MESSAGE FW: Re: Add medical information           ---------------------  From: ZAK HITCHCOCK  To: [facility]  Sent: 2019 06:34 p.m. CST  Subject: Re: Add medical information  To Dr Levi,  I wasn t sure if you received my first text. I would like to have some medical information added to my records. Can I do that?    Thank you,  Annabelle Hitchcock  : 1955---------------------  From: Yumiko Anderson CMA ([facility] Message Pool ([id number]))   To: Irena Levi MD;     Sent: 2019 9:09:49 AM CST  Subject: FW: CONSUMER MESSAGE FW: Re: Add medical information     Do you know what patient is referring to?---------------------  From: Irena Levi MD   To: [facility] Message Pool ([id number]);     Sent: 2019 4:15:40 PM CST  Subject: RE: CONSUMER MESSAGE FW: Re: Add medical information     no, can you get some more info?  It will be saved to her chart so then it would be recorded.Spoke to patient at 0838. She will bring records with her today at her 1000 appt.

## 2022-02-16 NOTE — NURSING NOTE
Comprehensive Intake Entered On:  12/21/2021 2:00 PM CST    Performed On:  12/21/2021 1:49 PM CST by Faviola Arreola               Summary   Chief Complaint :   Pre op, [facility], FELICIANO Meehan. 1/5/22   Menstrual Status :   Postmenopausal   Weight Measured :   178 lb(Converted to: 178 lb 0 oz, 80.739 kg)    Height Measured :   65 in(Converted to: 5 ft 5 in, 165.10 cm)    Body Mass Index :   29.62 kg/m2 (HI)    Body Surface Area :   1.92 m2   Systolic Blood Pressure :   124 mmHg   Diastolic Blood Pressure :   64 mmHg   Mean Arterial Pressure :   84 mmHg   Peripheral Pulse Rate :   88 bpm   BP Site :   Right arm   BP Method :   Manual   Respiratory Rate :   16 br/min   Oxygen Saturation :   97 %   Languages :   English   Faviola Arreola - 12/21/2021 1:49 PM CST   Health Status   Allergies Verified? :   Yes   Medication History Verified? :   Yes   Faviola Arreola - 12/21/2021 1:49 PM CST   Consents   Consent for Immunization Exchange :   Consent Granted   Consent for Immunizations to Providers :   Consent Granted   Faviola Arreola - 12/21/2021 1:49 PM CST   Meds / Allergies   (As Of: 12/21/2021 2:00:10 PM CST)   Allergies (Active)   No Known Medication Allergies  Estimated Onset Date:   Unspecified ; Created By:   Ymuiko Anderson CMA; Reaction Status:   Active ; Category:   Drug ; Substance:   No Known Medication Allergies ; Type:   Allergy ; Updated By:   Yumiko Anderson CMA; Reviewed Date:   12/21/2021 2:00 PM CST        Medication List   (As Of: 12/21/2021 2:00:10 PM CST)   Prescription/Discharge Order    levothyroxine  :   levothyroxine ; Status:   Prescribed ; Ordered As Mnemonic:   levothyroxine 150 mcg (0.15 mg) oral tablet ; Simple Display Line:   150 mcg, 1 tab(s), po, daily, 90 tab(s), 3 Refill(s) ; Ordering Provider:   Irena Levi MD; Catalog Code:   levothyroxine ; Order Dt/Tm:   9/3/2021 11:07:38 AM CDT          metFORMIN  :   metFORMIN ; Status:   Prescribed ; Ordered As Mnemonic:   metFORMIN 500 mg oral  tablet, extended release ; Simple Display Line:   1,000 mg, 2 tab(s), Oral, daily, with evening meal, 180 EA, 1 Refill(s) ; Ordering Provider:   Irena Levi MD; Catalog Code:   metFORMIN ; Order Dt/Tm:   9/3/2021 11:07:45 AM CDT          metoprolol  :   metoprolol ; Status:   Prescribed ; Ordered As Mnemonic:   Metoprolol Tartrate 50 mg oral tablet ; Simple Display Line:   0.5 tab(s), Oral, daily, 45 tab(s), 3 Refill(s) ; Ordering Provider:   Irena Levi MD; Catalog Code:   metoprolol ; Order Dt/Tm:   9/3/2021 11:07:48 AM CDT          esomeprazole  :   esomeprazole ; Status:   Prescribed ; Ordered As Mnemonic:   esomeprazole 20 mg oral delayed release capsule ; Simple Display Line:   1 cap(s), Oral, daily, 90 cap(s), 3 Refill(s) ; Ordering Provider:   Irena Levi MD; Catalog Code:   esomeprazole ; Order Dt/Tm:   9/3/2021 11:06:49 AM CDT          atorvastatin  :   atorvastatin ; Status:   Prescribed ; Ordered As Mnemonic:   Lipitor 40 mg oral tablet ; Simple Display Line:   40 mg, 1 tab(s), Oral, daily, 90 tab(s), 0 Refill(s) ; Ordering Provider:   Irena Levi MD; Catalog Code:   atorvastatin ; Order Dt/Tm:   9/3/2021 11:04:08 AM CDT          amitriptyline  :   amitriptyline ; Status:   Prescribed ; Ordered As Mnemonic:   amitriptyline 50 mg oral tablet ; Simple Display Line:   50 mg, 1 tab(s), Oral, hs, 90 tab(s), 3 Refill(s) ; Ordering Provider:   Irena Levi MD; Catalog Code:   amitriptyline ; Order Dt/Tm:   9/3/2021 11:03:59 AM CDT          Miscellaneous Rx Supply  :   Miscellaneous Rx Supply ; Status:   Prescribed ; Ordered As Mnemonic:   accu chek guide test strips ; Simple Display Line:   See Instructions, test 2x/ day, 200 EA, 1 Refill(s) ; Ordering Provider:   Abby Laws; Catalog Code:   Miscellaneous Rx Supply ; Order Dt/Tm:   8/16/2021 2:27:40 PM CDT          Miscellaneous Rx Supply  :   Miscellaneous Rx Supply ; Status:   Prescribed ; Ordered As Mnemonic:   Accu check  soft clix lancets ; Simple Display Line:   See Instructions, testing 2x/ day, 200 EA, 1 Refill(s) ; Ordering Provider:   Abby Laws; Catalog Code:   Miscellaneous Rx Supply ; Order Dt/Tm:   8/16/2021 2:25:50 PM CDT            Home Meds    ferrous sulfate  :   ferrous sulfate ; Status:   Documented ; Ordered As Mnemonic:   ferrous sulfate ; Simple Display Line:   Oral, 0 Refill(s) ; Catalog Code:   ferrous sulfate ; Order Dt/Tm:   12/21/2021 1:59:42 PM CST          calcium-vitamin D  :   calcium-vitamin D ; Status:   Documented ; Ordered As Mnemonic:   Citracal Maximum + D ; Simple Display Line:   Oral, daily, 0 Refill(s) ; Catalog Code:   calcium-vitamin D ; Order Dt/Tm:   9/3/2021 10:14:01 AM CDT          Miscellaneous Prescription  :   Miscellaneous Prescription ; Status:   Documented ; Ordered As Mnemonic:   OTC - Magnesium ; Simple Display Line:   Oral, daily, 0 Refill(s) ; Catalog Code:   Miscellaneous Prescription ; Order Dt/Tm:   6/29/2021 12:00:58 PM CDT          Miscellaneous Prescription  :   Miscellaneous Prescription ; Status:   Documented ; Ordered As Mnemonic:   OTC - Potassium ; Simple Display Line:   Oral, daily, 0 Refill(s) ; Catalog Code:   Miscellaneous Prescription ; Order Dt/Tm:   6/29/2021 12:00:19 PM CDT          multivitamin  :   multivitamin ; Status:   Documented ; Ordered As Mnemonic:   Multiple Vitamins oral tablet ; Simple Display Line:   Oral, daily, 0 Refill(s) ; Catalog Code:   multivitamin ; Order Dt/Tm:   6/29/2021 11:59:46 AM CDT          biotin  :   biotin ; Status:   Documented ; Ordered As Mnemonic:   Hair, Skin & Nails ; Simple Display Line:   2 Gummies, 1x per day, 0 Refill(s) ; Catalog Code:   biotin ; Order Dt/Tm:   8/25/2020 11:45:22 AM CDT          omega-3 polyunsaturated fatty acids  :   omega-3 polyunsaturated fatty acids ; Status:   Documented ; Ordered As Mnemonic:   Fish Oil ; Simple Display Line:   1,000 mg, po, daily, 0 Refill(s) ; Catalog Code:   omega-3  polyunsaturated fatty acids ; Order Dt/Tm:   1/31/2017 7:02:34 PM CST

## 2022-02-16 NOTE — PROGRESS NOTES
Chief Complaint    Pre op, [facility], Dr. Zhang, DOS. 1/5/22  History of Present Illness      patient present to clinic today for Preop,      recently was in [place], developed URI Sx, tested neg for Covid, flu, strep, given steroids and antibiotics and feeling much better.      no personal or family history of coagulopathy or problems with anesthesia      Review of systems 12 point review of systems negative except as per HPI      Exam:      General: alert and oriented ×3 no acute distress.      HEENT: pupils are equal round and reactive to light extraocular motion is intact. Normocephalic and atraumatic.       Hearing is grossly normal and there is no otorrhea.       Nares are patent there is no rhinorrhea.       Mucous membranes are moist and pink.      Chest: has bilateral rise with no increased work of breathing.  ctab no wheezes,  rhonchi or rales      Cardiovascular: normal perfusion and brisk capillary refill.  nml s1s2, no m/g/r      Musculoskeletal: no gross focal abnormalities and normal gait.      Neuro: no gross focal abnormalities and memory seems intact.      Psychiatric: speech is clear and coherent and fluent. Patient dressed appropriately for the weather. Mood is appropriate and affect is full.                     Discussed  ASA category 2                         no contraindication for procedure  Physical Exam   Vitals & Measurements    HR: 88 (Peripheral)  RR: 16  BP: 124/64  SpO2: 97%     HT: 65 in  WT: 178 lb  BMI: 29.62   Assessment/Plan       Cataract, left (H26.9)         no contraindication to surgery         Ordered:          70036 office o/p est mod 30-39 min (Charge), Quantity: 1, Diabetes mellitus, type II  Fatty liver  Cataract, left  Preop testing                Diabetes mellitus, type II (E11.9)         cont TLC, f/u with dietitian 02/2022, cont metformin, rechecking lipoids in 2 months         Ordered:          42167 office o/p est mod 30-39 min (Charge), Quantity: 1, Diabetes mellitus,  type II  Fatty liver  Cataract, left  Preop testing                Fatty liver (K76.0)         will recheck labs in 2 months, cont TLC         Ordered:          40920 office o/p est mod 30-39 min (Charge), Quantity: 1, Diabetes mellitus, type II  Fatty liver  Cataract, left  Preop testing          Return to Clinic (Request), RFV: lab only cmp, HgBA1c, lipid panel, Return in 8 weeks                Preop testing (Z01.818)         no contraindication to surgery         Ordered:          95771 office o/p est mod 30-39 min (Charge), Quantity: 1, Diabetes mellitus, type II  Fatty liver  Cataract, left  Preop testing                Orders:         atorvastatin, = 1 tab(s) ( 40 mg ), Oral, daily, # 90 tab(s), 1 Refill(s), Type: Maintenance, Pharmacy: [id number], 1 tab(s) Oral daily, 65, in, 12/21/21 13:49:00 CST, Height Measured, 178, lb, 12/21/21 13:49:00 CST, Weight Measured, (Ordered)         metFORMIN, = 2 tab(s) ( 1,000 mg ), Oral, daily, Instructions: with evening meal, # 180 EA, 1 Refill(s), Type: Maintenance, Pharmacy: [id number], 2 tab(s) Oral daily,Instr:with evening meal, 65, in, 12/21/21 13:49:00 CST, Height Measured, 178, lb,..., (Ordered)  Patient Information     Name:ZAK EVERETT      Address:      [address] [place] [phone]     Sex:Female     YOB: 1955     [phone]     Emergency Contact:JALEN MATA     MRN:[id number]     FIN:[id number]     Location:[facility]     Date of Service:12/21/2021      Primary Care Physician:       Irena Levi MD, [phone]      Attending Physician:       Irena Levi MD, [phone]  Problem List/Past Medical History    Ongoing     Dysfunctional uterine bleeding     Fatty liver     GERD (gastroesophageal reflux disease)     History of NSAID-associated gastropathy     Hypertriglyceridemia     Hypothyroid     MVP (mitral valve prolapse)     ROMERO (nonalcoholic  steatohepatitis)     New onset type 2 diabetes mellitus     Obese     Personal History of DVT (Deep Vein Thrombosis)       Comments: provoked from ankle fracture and wearing cam walking boot     S/P meniscectomy    Historical     Anticoagulated       Comments: heparin injections approx 2 weeks after DVT from immobility of ankle injury     Pregnancy     Pregnancy     Pregnancy  Procedure/Surgical History     Colonoscopy (08/30/2017)      Comments: Indication: screening      Sedation: F&V      Normal      Repeat in 10 years.     Arthroscopic partial medial meniscectomy (09/27/2016)      Comments: Right knee      Dr. Amin at [facility].     Laparoscopic supracervical hysterectomy (11/19/2010)      Comments: Pre- and post-op diagnosis is menorrhagia.     Hysteroscopy, D & C (04/23/2010)      Comments: Large pelvic mass, most consistent with uterine fibroids.     Colonoscopy (07/26/2007)     TL - Tubal ligation (04/26/1988)     Childbirth (04/25/1988)      Comments: Patient Comment: Male.     gall bladder (02/11/1987)      Comments: Patient Comment: Removed gall bladder stones , opened the clogged bile duct, wore an outward bag for six weeks to catch bile till everything was healed..     Childbirth (03/28/1986)      Comments: Patient Comment: In [place].     Childbirth (03/27/1986)      Comments: Female. G3, P2-0-1-2..     Cholecystectomy      Comments: Open.  Medications    Accu check soft clix lancets, See Instructions, 1 refills    accu chek guide test strips, See Instructions, 1 refills    amitriptyline 50 mg oral tablet, 50 mg= 1 tab(s), Oral, hs, 3 refills    Citracal Maximum + D, Oral, daily    esomeprazole 20 mg oral delayed release capsule, 1 cap(s), Oral, daily, 3 refills    ferrous sulfate, Oral    Fish Oil, 1000 mg, Oral, daily    Hair, Skin & Nails    levothyroxine 150 mcg (0.15 mg) oral tablet, 150 mcg= 1 tab(s), Oral, daily, 3 refills    Lipitor 40 mg oral tablet, 40 mg= 1 tab(s), Oral, daily    metFORMIN 500  mg oral tablet, extended release, 1000 mg= 2 tab(s), Oral, daily, 1 refills    Metoprolol Tartrate 50 mg oral tablet, 0.5 tab(s), Oral, daily, 3 refills    Multiple Vitamins oral tablet, Oral, daily    OTC - Magnesium, Oral, daily    OTC - Potassium, Oral, daily  Allergies    No Known Medication Allergies  Social History    Smoking Status     Former smoker     Alcohol      Current, Wine (5 oz), 3-5 times a month, 1 drinks/episode average. 2 drinks/episode maximum. Ready to change: No.     Electronic Cigarette/Vaping      Electronic Cigarette Use: Never.     Employment/School      Retired     Exercise      Exercise frequency: 3-4 times/week. Exercise type: garden in summer, health club.     Home/Environment      Marital status: . Spouse/Partner name: Ricardo. Living situation: Home/Independent. Feels unsafe at home: No. Family/Friends available for support: Yes. Risks in environment: Does not wear helmet, owns secured gun.     Nutrition/Health      Type of diet: Low carbohydrate.     Sexual      Sexually active: Yes. Sexual orientation: Heterosexual. Contraceptive Use Details: None.     Substance Abuse      Never     Tobacco      Never (less than 100 in lifetime)  Family History    Anxiety: Daughter and Son.    Arthritis: Mother.    Cataracts: Mother.    Chicken pox: Daughter and Son.    Goiter: Mother.    HBP - High blood pressure: Father.    Hearing loss: Mother.    Heart disease: Father and Grandmother (P).    Hypertension: Mother.    Obesity: Father.    Overweight: Father.    Rheumatoid arthritis: Mother.    Snoring: Father.    Wears glasses: Mother, Father, Daughter, Son and Grandmother (P).  Lab Results       Lab Results (Last 4 results within 90 days)        Sodium Level: 140 mmol/L [135 mmol/L - 146 mmol/L] (11/02/21 13:50:00)       Potassium Level: 4 mmol/L [3.5 mmol/L - 5.3 mmol/L] (11/02/21 13:50:00)       Chloride Level: 104 mmol/L [98 mmol/L - 110 mmol/L] (11/02/21 13:50:00)       CO2 Level: 25  mmol/L [20 mmol/L - 32 mmol/L] (11/02/21 13:50:00)       Glucose Level: 149 mg/dL High [65 mg/dL - 99 mg/dL] (11/02/21 13:50:00)       BUN: 24 mg/dL [7 mg/dL - 25 mg/dL] (11/02/21 13:50:00)       Creatinine Level: 0.88 mg/dL [0.5 mg/dL - 0.99 mg/dL] (11/02/21 13:50:00)       BUN/Creat Ratio: NOT APPLICABLE [6  - 22] (11/02/21 13:50:00)       eGFR: 69 mL/min/1.73m2 (11/02/21 13:50:00)       eGFR : 80 mL/min/1.73m2 (11/02/21 13:50:00)       Calcium Level: 9.9 mg/dL [8.6 mg/dL - 10.4 mg/dL] (11/02/21 13:50:00)       Bilirubin Total: 0.6 mg/dL [0.2 mg/dL - 1.2 mg/dL] (11/02/21 13:50:00)       Alkaline Phosphatase: 72 unit/L [37 unit/L - 153 unit/L] (11/02/21 13:50:00)       AST/SGOT: 74 unit/L High [10 unit/L - 35 unit/L] (11/02/21 13:50:00)       ALT/SGPT: 62 unit/L High [6 unit/L - 29 unit/L] (11/02/21 13:50:00)       LDH: 176 unit/L [120 unit/L - 250 unit/L] (11/02/21 13:43:00)       Protein Total: 7.3 gm/dL [6.1 gm/dL - 8.1 gm/dL] (11/02/21 13:50:00)       Albumin Level: 4.5 gm/dL [3.6 gm/dL - 5.1 gm/dL] (11/02/21 13:50:00)       Globulin: 2.8 [1.9  - 3.7] (11/02/21 13:50:00)       A/G Ratio: 1.6 [1  - 2.5] (11/02/21 13:50:00)       Iron Level: 89 mcg/dL [45 mcg/dL - 160 mcg/dL] (11/02/21 13:43:00)       TIBC: 466 High [250  - 450] (11/02/21 13:43:00)       Iron Saturation: 19 [16  - 45] (11/02/21 13:43:00)       Ferritin: 30 ng/mL [16 ng/mL - 288 ng/mL] (11/02/21 13:43:00)       WBC: 7.3 [3.8  - 10.8] (11/02/21 13:43:00)       RBC: 3.92 [3.8  - 5.1] (11/02/21 13:43:00)       Hgb: 11.9 gm/dL [11.7 gm/dL - 15.5 gm/dL] (11/02/21 13:43:00)       Hct: 35.8 % [35 % - 45 %] (11/02/21 13:43:00)       MCV: 91.3 fL [80 fL - 100 fL] (11/02/21 13:43:00)       MCH: 30.4 pg [27 pg - 33 pg] (11/02/21 13:43:00)       MCHC: 33.2 gm/dL [32 gm/dL - 36 gm/dL] (11/02/21 13:43:00)       RDW: 14.4 % [11 % - 15 %] (11/02/21 13:43:00)       Platelet: 214 [140  - 400] (11/02/21 13:43:00)       MPV: 11.1 fL [7.5 fL - 12.5 fL]  (11/02/21 13:43:00)       Lymphocytes: 21.2 % (11/02/21 13:43:00)       Abs Lymphocytes: 1548 [850  - 3900] (11/02/21 13:43:00)       Neutrophils: 74.8 % (11/02/21 13:43:00)       Abs Neutrophils: 5460 [1500  - 7800] (11/02/21 13:43:00)       Monocytes: 2 % (11/02/21 13:43:00)       Abs Monocytes: 146 Low [200  - 950] (11/02/21 13:43:00)       Eosinophils: 1 % (11/02/21 13:43:00)       Abs Eosinophils: 73 [15  - 500] (11/02/21 13:43:00)       Basophils: 1 % (11/02/21 13:43:00)       Abs Basophils: 73 [0  - 200] (11/02/21 13:43:00)       Platelet Estimate: ADEQUATE [ADEQUATE  - ADEQUATE] (11/02/21 13:43:00)       CBC Morphology: See comment (11/02/21 13:43:00)       Reticulocyte: 1.8 % (11/02/21 13:43:00)       Retic Abs#: [id number] [[id number]  - 63588] (11/02/21 13:43:00)       Direct Anya: NEGATIVE [NEGATIVE  - NEGATIVE] (11/02/21 13:43:00)  Immunizations       Scheduled Immunizations       Dose Date(s)       influenza virus vaccine, inactivated       11/19/2013, 10/23/2015, 10/14/2011, 10/10/2012, 12/01/2014, 11/03/2016, 10/03/2018, 10/08/2020, 09/20/2021       Other Immunizations               influenza virus vaccine, inactivated       02/16/2011, 09/20/2021       hepatitis A adult vaccine       08/25/2020, 11/05/2021       pneumococcal (PPSV23)       11/01/2021       Td       12/01/2003       tetanus/diphth/pertuss (Tdap) adult/adol       03/01/2013       influenza, H1N1, inactivated       02/12/2010       hepatitis B adult vaccine       08/25/2020, 11/05/2021       zoster vaccine, inactivated       03/26/2019, 08/06/2019       SARS-CoV-2 (COVID-19) Moderna-1273       04/01/2021, 04/29/2021

## 2022-02-16 NOTE — NURSING NOTE
Comprehensive Intake Entered On:  8/25/2020 1:12 PM CDT    Performed On:  8/25/2020 12:58 PM CDT by Yumiko Denis CMA               Summary   Chief Complaint :   Physical    Menstrual Status :   Postmenopausal   Weight Measured :   186.2 lb(Converted to: 186 lb 3 oz, 84.46 kg)    Height Measured :   65.75 in(Converted to: 5 ft 6 in, 167.00 cm)    Body Mass Index :   30.28 kg/m2 (HI)    Body Surface Area :   1.98 m2   Systolic Blood Pressure :   110 mmHg   Diastolic Blood Pressure :   60 mmHg   Mean Arterial Pressure :   77 mmHg   Peripheral Pulse Rate :   88 bpm   BP Site :   Right arm   BP Method :   Manual   HR Method :   Electronic   Temperature Tympanic :   98.3 DegF(Converted to: 36.8 DegC)    Oxygen Saturation :   98 %   Languages :   English   Yumiko Denis CMA - 8/25/2020 12:58 PM CDT   Health Status   Allergies Verified? :   Yes   Medication History Verified? :   Yes   Pre-Visit Planning Status :   N/A   Tobacco Use? :   Never smoker   Yumiko Denis CMA - 8/25/2020 12:58 PM CDT   Consents   Consent for Immunization Exchange :   Consent Granted   Consent for Immunizations to Providers :   Consent Granted   Yumiko Denis CMA - 8/25/2020 12:58 PM CDT   Meds / Allergies   (As Of: 8/25/2020 1:12:10 PM CDT)   Allergies (Active)   No Known Medication Allergies  Estimated Onset Date:   Unspecified ; Created By:   Yumiko Anderson CMA; Reaction Status:   Active ; Category:   Drug ; Substance:   No Known Medication Allergies ; Type:   Allergy ; Updated By:   Yumiko Anderson CMA; Reviewed Date:   8/22/2019 9:09 AM CDT        Medication List   (As Of: 8/25/2020 1:12:10 PM CDT)   Prescription/Discharge Order    amitriptyline  :   amitriptyline ; Status:   Prescribed ; Ordered As Mnemonic:   amitriptyline 50 mg oral tablet ; Simple Display Line:   50 mg, 1 tab(s), Oral, hs, 90 tab(s), 3 Refill(s) ; Ordering Provider:   Irena Levi MD; Catalog Code:   amitriptyline ; Order Dt/Tm:   8/22/2019 10:03:07 AM CDT           celecoxib  :   celecoxib ; Status:   Prescribed ; Ordered As Mnemonic:   CeleBREX 100 mg oral capsule ; Simple Display Line:   100 mg, 1 cap(s), Oral, bid, PRN: pain, 60 cap(s), 3 Refill(s) ; Ordering Provider:   Irena Levi MD; Catalog Code:   celecoxib ; Order Dt/Tm:   11/20/2019 11:20:46 AM CST          estradiol topical  :   estradiol topical ; Status:   Prescribed ; Ordered As Mnemonic:   Estrace Vaginal 0.1 mg/g vaginal cream ; Simple Display Line:   See Instructions, INSERT 1 GRAM VAGINALLY THREE TIMES PER WEEK, 42.5 gm, 3 Refill(s) ; Ordering Provider:   Irena Levi MD; Catalog Code:   estradiol topical ; Order Dt/Tm:   8/22/2019 10:02:25 AM CDT          levothyroxine  :   levothyroxine ; Status:   Prescribed ; Ordered As Mnemonic:   levothyroxine 150 mcg (0.15 mg) oral tablet ; Simple Display Line:   150 mcg, 1 tab(s), po, daily, 90 tab(s), 3 Refill(s) ; Ordering Provider:   Irena Levi MD; Catalog Code:   levothyroxine ; Order Dt/Tm:   8/27/2019 12:16:05 PM CDT          metoprolol  :   metoprolol ; Status:   Prescribed ; Ordered As Mnemonic:   Metoprolol Tartrate 50 mg oral tablet ; Simple Display Line:   25 mg, 0.5 tab(s), po, daily, 45 tab(s), 3 Refill(s) ; Ordering Provider:   Irena Levi MD; Catalog Code:   metoprolol ; Order Dt/Tm:   8/22/2019 10:03:50 AM CDT          raNITIdine  :   raNITIdine ; Status:   Prescribed ; Ordered As Mnemonic:   raNITIdine 300 mg oral tablet ; Simple Display Line:   300 mg, 1 tab(s), Oral, hs, 90 tab(s), 3 Refill(s) ; Ordering Provider:   Irena Levi MD; Catalog Code:   raNITIdine ; Order Dt/Tm:   8/22/2019 10:07:06 AM CDT          ranitidine  :   ranitidine ; Status:   Prescribed ; Ordered As Mnemonic:   Zantac 150 oral tablet ; Simple Display Line:   150 mg, 1 tab(s), PO, BID, 60 tab(s) ; Ordering Provider:   Sharon Arevalo MD; Catalog Code:   raNITIdine ; Order Dt/Tm:   11/8/2013 4:16:30 PM CST          triamcinolone topical  :    triamcinolone topical ; Status:   Prescribed ; Ordered As Mnemonic:   triamcinolone 0.5% topical cream ; Simple Display Line:   1 michelle, Topical, bid, under left ear tid x14 days, 15 gm, 0 Refill(s) ; Ordering Provider:   Abby Laws; Catalog Code:   triamcinolone topical ; Order Dt/Tm:   8/27/2019 3:03:18 PM CDT            Home Meds    biotin  :   biotin ; Status:   Documented ; Ordered As Mnemonic:   Hair, Skin & Nails ; Simple Display Line:   2 Gummies, 1x per day, 0 Refill(s) ; Catalog Code:   biotin ; Order Dt/Tm:   8/25/2020 11:45:22 AM CDT          esomeprazole  :   esomeprazole ; Status:   Documented ; Ordered As Mnemonic:   esomeprazole 20 mg oral delayed release capsule ; Simple Display Line:   20 mg, 1 cap(s), Oral, daily, 0 Refill(s) ; Catalog Code:   esomeprazole ; Order Dt/Tm:   8/25/2020 1:10:19 PM CDT          omega-3 polyunsaturated fatty acids  :   omega-3 polyunsaturated fatty acids ; Status:   Documented ; Ordered As Mnemonic:   Fish Oil ; Simple Display Line:   1,000 mg, po, daily, 0 Refill(s) ; Catalog Code:   omega-3 polyunsaturated fatty acids ; Order Dt/Tm:   1/31/2017 7:02:34 PM CST            ID Risk Screen   Recent Travel History :   No recent travel   Family Member Travel History :   No recent travel   Other Exposure to Infectious Disease :   Unknown   Yumiko Denis CMA - 8/25/2020 12:58 PM CDT

## 2022-02-16 NOTE — TELEPHONE ENCOUNTER
---------------------  From: Abby Laws   To: [employer] Message Pool ([id number]_[facility]);     Sent: 7/7/2021 2:44:23 PM CDT  Subject: General Message     pt had fasting labs yesterday, there should have been an A1C ordered. Please see if lab can add it on, she had DM, new dx, thanks.---------------------  From: Jocelyn Hutchins LPN ([employer] Message Pool ([id number]))   To: Abby Laws;     Sent: 7/7/2021 3:03:17 PM CDT  Subject: FW: General Message     Per Lavon, with lab this can NOT be added. Pt will need to be redrawn if still needed.noted

## 2022-02-16 NOTE — TELEPHONE ENCOUNTER
---------------------  From: Jodi Fiore CMA (Phone Messages Pool ([id number]_[facility]))   To: ZAK EVERETT    Sent: 2020 12:38:12 PM CDT  Subject: RE: appointment?     Klever Alanis!    It does look like it's that time of year. Please feel free to schedule your mammogram at a location convenient to you and have result sent to Dr. Levi. Also, it looks like you need some fasting lab work done (cholesterol and glucose). Most people like to review labs during their appointment so they will have their labs done about a week prior to their exam. Your other option would be to come to your annual exam fasting so the lab work could be completed before you left. It is totally up to you. Please schedule an appointment when you are ready.    Kind RegardsJodi CMA      ---------------------  From: ZAK EVERETT  To: [facility]  Sent: 2020 12:29 p.m. CDT  Subject: appointment?  Received a letter in the mail saying it was time for mammogram.  Do I need to schedule one?  Do I need to schedule a general visit appointment?    Please adviseAnnabelle  :  1955

## 2022-02-16 NOTE — NURSING NOTE
CAGE Assessment Entered On:  8/26/2019 10:28 AM CDT    Performed On:  8/22/2019 10:28 AM CDT by Alyssa West               Assessment   Have you ever felt you should cut down on your drinking :   No   Have people annoyed you by criticizing your drinking :   No   Have you ever felt bad or guilty about your drinking :   No   Have you ever taken a drink first thing in the morning to steady your nerves or get rid of a hangover (Eye-opener) :   No   CAGE Score :   0    Alyssa West - 8/26/2019 10:28 AM CDT

## 2022-02-16 NOTE — TELEPHONE ENCOUNTER
---------------------  From: Judith Emery CMA (Phone Messages Pool ([id number]_[facility]))   To: [facility] Message Pool ([id number]_[place]);     Sent: 2020 5:17:45 PM CST  Subject: FW: Addendum to prior email           ---------------------  From: ZAK EVERETT  To: [facility]  Sent: 2020 05:16 p.m. CST  Subject: Addendum to prior email  Dr Levi,  Is it possible to get a higher dose? I m still struggling some with pain.  Thanks, Annabelle BYRD-1955---------------------  From: Grace Galicia CMA ([facility] Message Pool ([id number]_[facility]))   To: Irena Levi MD;     Sent: 2020 5:39:05 PM CST  Subject: FW: Addendum to prior email---------------------  From: Irena Levi MD   To: [facility] Message Pool ([id number]_[place]);     Sent: 2020 5:48:07 PM CST  Subject: RE: Addendum to prior email     pl invite pt to schedule an appointment, thanksSee previous portal message.

## 2022-02-16 NOTE — NURSING NOTE
Comprehensive Intake Entered On:  8/27/2019 12:04 PM CDT    Performed On:  8/27/2019 12:01 PM CDT by Judith Sinha               Summary   Chief Complaint :   Skin check. Mole on back she needs checked.    Menstrual Status :   Postmenopausal   Ht/Wt Measurement Refused by Patient? :   Yes   Systolic Blood Pressure :   112 mmHg   Diastolic Blood Pressure :   66 mmHg   Mean Arterial Pressure :   81 mmHg   Peripheral Pulse Rate :   66 bpm   Languages :   English   Espino ATIFJudith - 8/27/2019 12:01 PM CDT   Health Status   Allergies Verified? :   Yes   Medication History Verified? :   Yes   Medical History Verified? :   Yes   Pre-Visit Planning Status :   Completed   Tobacco Use? :   Never smoker   Espino MARIEJudith GILL - 8/27/2019 12:01 PM CDT   Consents   Consent for Immunization Exchange :   Consent Granted   Consent for Immunizations to Providers :   Consent Granted   Judith Sinha - 8/27/2019 12:01 PM CDT   Meds / Allergies   (As Of: 8/27/2019 12:04:47 PM CDT)   Allergies (Active)   No Known Medication Allergies  Estimated Onset Date:   Unspecified ; Created By:   Yumiko Anderson CMA; Reaction Status:   Active ; Category:   Drug ; Substance:   No Known Medication Allergies ; Type:   Allergy ; Updated By:   Yumiko Anderson CMA; Reviewed Date:   8/22/2019 9:09 AM CDT        Medication List   (As Of: 8/27/2019 12:04:47 PM CDT)   Prescription/Discharge Order    ranitidine  :   ranitidine ; Status:   Prescribed ; Ordered As Mnemonic:   Zantac 150 oral tablet ; Simple Display Line:   150 mg, 1 tab(s), PO, BID, 60 tab(s) ; Ordering Provider:   Sharon Arevalo MD; Catalog Code:   raNITIdine ; Order Dt/Tm:   11/8/2013 4:16:30 PM          levothyroxine  :   levothyroxine ; Status:   Prescribed ; Ordered As Mnemonic:   levothyroxine 150 mcg (0.15 mg) oral tablet ; Simple Display Line:   150 mcg, 1 tab(s), po, daily, 90 tab(s), 3 Refill(s) ; Ordering Provider:   Bessy Medina; Catalog Code:    levothyroxine ; Order Dt/Tm:   8/15/2018 10:24:22 AM          estradiol topical  :   estradiol topical ; Status:   Prescribed ; Ordered As Mnemonic:   Estrace Vaginal 0.1 mg/g vaginal cream ; Simple Display Line:   See Instructions, INSERT 1 GRAM VAGINALLY THREE TIMES PER WEEK, 42.5 gm, 3 Refill(s) ; Ordering Provider:   Bessy Medina; Catalog Code:   estradiol topical ; Order Dt/Tm:   8/15/2018 10:34:13 AM          metoprolol  :   metoprolol ; Status:   Prescribed ; Ordered As Mnemonic:   Metoprolol Tartrate 50 mg oral tablet ; Simple Display Line:   25 mg, 0.5 tab(s), po, daily, 45 tab(s), 3 Refill(s) ; Ordering Provider:   Bessy Medina; Catalog Code:   metoprolol ; Order Dt/Tm:   10/22/2018 11:27:24 AM          amitriptyline  :   amitriptyline ; Status:   Prescribed ; Ordered As Mnemonic:   amitriptyline 25 mg oral tablet ; Simple Display Line:   50 mg, 2 tab(s), po, hs, 180 tab(s), 0 Refill(s) ; [facility]   [facility]; Catalog Code:   amitriptyline ; Order Dt/Tm:   8/13/2019 9:44:47 AM          amitriptyline  :   amitriptyline ; Status:   Prescribed ; Ordered As Mnemonic:   amitriptyline 50 mg oral tablet ; Simple Display Line:   50 mg, 1 tab(s), Oral, hs, 90 tab(s), 3 Refill(s) ; Ordering Provider:   Irena Levi MD; Catalog Code:   amitriptyline ; Order Dt/Tm:   8/22/2019 10:03:07 AM          estradiol topical  :   estradiol topical ; Status:   Prescribed ; Ordered As Mnemonic:   Estrace Vaginal 0.1 mg/g vaginal cream ; Simple Display Line:   See Instructions, INSERT 1 GRAM VAGINALLY THREE TIMES PER WEEK, 42.5 gm, 3 Refill(s) ; Ordering Provider:   Irena Levi MD; Catalog Code:   estradiol topical ; Order Dt/Tm:   8/22/2019 10:02:25 AM          metoprolol  :   metoprolol ; Status:   Prescribed ; Ordered As Mnemonic:   Metoprolol Tartrate 50 mg oral tablet ; Simple Display Line:   25 mg, 0.5 tab(s), po, daily, 45 tab(s), 3 Refill(s) ; Ordering Provider:   Gurmeet  Irena WISE; Catalog Code:   metoprolol ; Order Dt/Tm:   8/22/2019 10:03:50 AM          raNITIdine  :   raNITIdine ; Status:   Prescribed ; Ordered As Mnemonic:   raNITIdine 300 mg oral tablet ; Simple Display Line:   300 mg, 1 tab(s), Oral, hs, 90 tab(s), 3 Refill(s) ; Ordering Provider:   Irena Levi MD; Catalog Code:   raNITIdine ; Order Dt/Tm:   8/22/2019 10:07:06 AM            Home Meds    omega-3 polyunsaturated fatty acids  :   omega-3 polyunsaturated fatty acids ; Status:   Documented ; Ordered As Mnemonic:   Fish Oil ; Simple Display Line:   1,000 mg, po, daily, 0 Refill(s) ; Catalog Code:   omega-3 polyunsaturated fatty acids ; Order Dt/Tm:   1/31/2017 7:02:34 PM

## 2022-02-16 NOTE — NURSING NOTE
Comprehensive Intake Entered On:  11/2/2021 1:07 PM CDT    Performed On:  11/2/2021 1:05 PM CDT by Rosetta Mccormick CMA               Summary   Chief Complaint :   Pt here for orders for lab iron levels   Menstrual Status :   Postmenopausal   Weight Measured :   184.4 lb(Converted to: 184 lb 6 oz, 83.642 kg)    Height Measured :   65 in(Converted to: 5 ft 5 in, 165.10 cm)    Body Mass Index :   30.68 kg/m2 (HI)    Body Surface Area :   1.96 m2   Systolic Blood Pressure :   118 mmHg   Diastolic Blood Pressure :   62 mmHg   Mean Arterial Pressure :   81 mmHg   Peripheral Pulse Rate :   86 bpm   Temperature Tympanic :   98.2 DegF(Converted to: 36.8 DegC)    Oxygen Saturation :   98 %   Languages :   English   Rosetta Mccormick CMA - 11/2/2021 1:05 PM CDT   Health Status   Allergies Verified? :   Yes   Medication History Verified? :   Yes   Medical History Verified? :   Yes   Tobacco Use? :   Former smoker   Rosetta Mccormick CMA - 11/2/2021 1:05 PM CDT   Consents   Consent for Immunization Exchange :   Consent Granted   Consent for Immunizations to Providers :   Consent Granted   Rosetta Mccormick CMA - 11/2/2021 1:05 PM CDT   Meds / Allergies   (As Of: 11/2/2021 1:07:50 PM CDT)   Allergies (Active)   No Known Medication Allergies  Estimated Onset Date:   Unspecified ; Created By:   Yumiko Anderson CMA; Reaction Status:   Active ; Category:   Drug ; Substance:   No Known Medication Allergies ; Type:   Allergy ; Updated By:   Yumiko Anderson CMA; Reviewed Date:   11/2/2021 1:06 PM CDT        Medication List   (As Of: 11/2/2021 1:07:50 PM CDT)   Prescription/Discharge Order    amitriptyline  :   amitriptyline ; Status:   Prescribed ; Ordered As Mnemonic:   amitriptyline 50 mg oral tablet ; Simple Display Line:   50 mg, 1 tab(s), Oral, hs, 90 tab(s), 3 Refill(s) ; Ordering Provider:   Irena Levi MD; Catalog Code:   amitriptyline ; Order Dt/Tm:   9/3/2021 11:03:59 AM CDT          atorvastatin  :   atorvastatin ; Status:    Prescribed ; Ordered As Mnemonic:   Lipitor 40 mg oral tablet ; Simple Display Line:   40 mg, 1 tab(s), Oral, daily, 90 tab(s), 0 Refill(s) ; Ordering Provider:   Irena Levi MD; Catalog Code:   atorvastatin ; Order Dt/Tm:   9/3/2021 11:04:08 AM CDT          esomeprazole  :   esomeprazole ; Status:   Prescribed ; Ordered As Mnemonic:   esomeprazole 20 mg oral delayed release capsule ; Simple Display Line:   1 cap(s), Oral, daily, 90 cap(s), 3 Refill(s) ; Ordering Provider:   Irena Levi MD; Catalog Code:   esomeprazole ; Order Dt/Tm:   9/3/2021 11:06:49 AM CDT          levothyroxine  :   levothyroxine ; Status:   Prescribed ; Ordered As Mnemonic:   levothyroxine 150 mcg (0.15 mg) oral tablet ; Simple Display Line:   150 mcg, 1 tab(s), po, daily, 90 tab(s), 3 Refill(s) ; Ordering Provider:   Irena Levi MD; Catalog Code:   levothyroxine ; Order Dt/Tm:   9/3/2021 11:07:38 AM CDT          metFORMIN  :   metFORMIN ; Status:   Prescribed ; Ordered As Mnemonic:   metFORMIN 500 mg oral tablet, extended release ; Simple Display Line:   1,000 mg, 2 tab(s), Oral, daily, with evening meal, 180 EA, 1 Refill(s) ; Ordering Provider:   Irena Levi MD; Catalog Code:   metFORMIN ; Order Dt/Tm:   9/3/2021 11:07:45 AM CDT          metoprolol  :   metoprolol ; Status:   Prescribed ; Ordered As Mnemonic:   Metoprolol Tartrate 50 mg oral tablet ; Simple Display Line:   0.5 tab(s), Oral, daily, 45 tab(s), 3 Refill(s) ; Ordering Provider:   Irena Levi MD; Catalog Code:   metoprolol ; Order Dt/Tm:   9/3/2021 11:07:48 AM CDT          Miscellaneous Rx Supply  :   Miscellaneous Rx Supply ; Status:   Prescribed ; Ordered As Mnemonic:   Accu check soft clix lancets ; Simple Display Line:   See Instructions, testing 2x/ day, 200 EA, 1 Refill(s) ; Ordering Provider:   Abby Laws; Catalog Code:   Miscellaneous Rx Supply ; Order Dt/Tm:   8/16/2021 2:25:50 PM CDT          Miscellaneous Rx Supply  :    Miscellaneous Rx Supply ; Status:   Prescribed ; Ordered As Mnemonic:   accu chek guide test strips ; Simple Display Line:   See Instructions, test 2x/ day, 200 EA, 1 Refill(s) ; Ordering Provider:   Abby Laws; Catalog Code:   Miscellaneous Rx Supply ; Order Dt/Tm:   8/16/2021 2:27:40 PM CDT            Home Meds    biotin  :   biotin ; Status:   Documented ; Ordered As Mnemonic:   Hair, Skin & Nails ; Simple Display Line:   2 Gummies, 1x per day, 0 Refill(s) ; Catalog Code:   biotin ; Order Dt/Tm:   8/25/2020 11:45:22 AM CDT          calcium-vitamin D  :   calcium-vitamin D ; Status:   Documented ; Ordered As Mnemonic:   Citracal Maximum + D ; Simple Display Line:   Oral, daily, 0 Refill(s) ; Catalog Code:   calcium-vitamin D ; Order Dt/Tm:   9/3/2021 10:14:01 AM CDT          Miscellaneous Prescription  :   Miscellaneous Prescription ; Status:   Documented ; Ordered As Mnemonic:   OTC - Magnesium ; Simple Display Line:   Oral, daily, 0 Refill(s) ; Catalog Code:   Miscellaneous Prescription ; Order Dt/Tm:   6/29/2021 12:00:58 PM CDT          Miscellaneous Prescription  :   Miscellaneous Prescription ; Status:   Documented ; Ordered As Mnemonic:   OTC - Potassium ; Simple Display Line:   Oral, daily, 0 Refill(s) ; Catalog Code:   Miscellaneous Prescription ; Order Dt/Tm:   6/29/2021 12:00:19 PM CDT          multivitamin  :   multivitamin ; Status:   Documented ; Ordered As Mnemonic:   Multiple Vitamins oral tablet ; Simple Display Line:   Oral, daily, 0 Refill(s) ; Catalog Code:   multivitamin ; Order Dt/Tm:   6/29/2021 11:59:46 AM CDT          omega-3 polyunsaturated fatty acids  :   omega-3 polyunsaturated fatty acids ; Status:   Documented ; Ordered As Mnemonic:   Fish Oil ; Simple Display Line:   1,000 mg, po, daily, 0 Refill(s) ; Catalog Code:   omega-3 polyunsaturated fatty acids ; Order Dt/Tm:   1/31/2017 7:02:34 PM CST

## 2022-02-16 NOTE — PROGRESS NOTES
Patient:   ZAK EVERETT            MRN: [id number]            FIN: [id number]               Age:   61 years     Sex:  Female     :  1955   Associated Diagnoses:   None   Author:   Gamal Tolentino MD      Visit Information      Date of Service: 2017 05:49 pm  Performing Location: [facility]  Encounter#: [id number]      Primary Care Provider (PCP):  Sharon Arevalo MD    NPI# [id number]      Referring Provider:  No referring provider recorded for selected visit.      Chief Complaint   2017 6:06 PM CST    Patient presents with a swollen left eye going on 1 day. Patient states there is a burning feeling. No complaints of vision loss.  (Modified)       History of Present Illness   CC as above and reviewed w  patient   Pt with left eyelid upper lid swollen and painful on the medial aspect. Just started today. No vision loss, pain with eye movement, or fever.       Review of Systems            Health Status   Allergies:    Allergic Reactions (Selected)  No known allergies   Medications:  (Selected)   Prescriptions  Prescribed  Estrace Vaginal 0.1 mg/g vaginal cream: See Instructions, Instructions: INSERT 1 GRAM VAGINALLY THREE TIMES PER WEEK, # 42 gm, 2 Refill(s), Type: Maintenance, Pharmacy: [id number], INSERT 1 GRAM VAGINALLY THREE TIMES PER WEEK  Metoprolol Tartrate 50 mg oral tablet: 0.5 tab(s) ( 25 mg ), po, daily, # 45 tab(s), 3 Refill(s), Type: Maintenance, Pharmacy: [id number], 0.5 tab(s) po daily,x90 day(s)  Zantac 150 oral tablet: 1 tab(s) ( 150 mg ), PO, BID, # 60 tab(s), 2 Refill(s), Type: Maintenance, Pharmacy: [facility], 1 tab(s) po bid  amitriptyline 25 mg oral tablet: 2 tab(s) ( 50 mg ), po, hs, # 180 tab(s), 3 Refill(s), Type: Maintenance, Pharmacy: [facility], 2 tab(s) po hs,x90 day(s)  erythromycin 0.5% ophthalmic ointment: 0.5 in, left eye, QID, # 3.5 g, 0 Refill(s), Type: Maintenance, Pharmacy: [facility], 0.5 in left  eye qid  levothyroxine 150 mcg (0.15 mg) oral tablet: 1 tab(s) ( 150 mcg ), po, daily, # 90 tab(s), 1 Refill(s), Type: Maintenance, Pharmacy: [id number], 1 tab(s) po daily  Documented Medications  Documented  Fish Oil: po, daily, 0 Refill(s), Type: Maintenance   Problem list:    All Problems (Selected)  GERD (Gastroesophageal Reflux Disease) / ICD-9-.81 / Confirmed  S/P meniscectomy / SNOMED CT 7146208584 / Confirmed  Personal History of DVT (Deep Vein Thrombosis) / SNOMED CT 4201386035 / Confirmed  Hypertriglyceridemia / ICD-9-.1 / Confirmed  Hypothyroid / ICD-9-.9 / Confirmed  MVP (Mitral Valve Prolapse) / ICD-9-.0 / Confirmed  Obese / ICD-9-.00 / Probable  Anticoagulated / SNOMED CT 298393625 / Confirmed      Histories   Past Medical History:    Active  Hypothyroid (244.9)  GERD (Gastroesophageal Reflux Disease) (530.81)  MVP (Mitral Valve Prolapse) (424.0)  Resolved  Pregnancy (710617358):  Resolved in 1982 at 26 years.  Pregnancy (789539392):  Resolved in 1986 at 30 years.  Pregnancy (207555754):  Resolved in 1988 at 32 years.   Family History:    Hypertension  Mother  Heart disease  Father  Grandmother (P)  Goiter  Mother     Procedure history:    Arthroscopic partial medial meniscectomy ([id number]) on 9/27/2016 at 60 Years.  Comments:  11/10/2016 3:38 PM - Jodi Fiore CMA  Right knee  Dr. Amin at [facility]  Laparoscopic supracervical hysterectomy ([id number]) on 11/19/2010 at 55 Years.  Comments:  12/12/2010 2:39 PM - Janice Myers  Pre- and post-op diagnosis is menorrhagia  Hysteroscopy, D & C on 4/23/2010 at 54 Years.  Comments:  12/31/2010 1:46 PM - Janice Myers  Large pelvic mass, most consistent with uterine fibroids  Colonoscopy ([id number]) on 7/26/2007 at 51 Years.  Cholecystectomy (13544689).  Comments:  4/21/2010 4:33 PM - Irina WISE, Sharon  Open    Childbirth ([id number]).  Comments:  11/1/2011 7:27 PM - Jen Robertson, P2-0-1-2.   Social  History:        Alcohol Assessment            Current, 1-2 times per week, 1 drinks/episode average.      Tobacco Assessment            Never      Substance Abuse Assessment            Never      Employment and Education Assessment            Work/School description: homemaker.      Home and Environment Assessment            Marital status: .                     Comments:                      11/01/2011 - Jen Robertson                      - Ricardo.      Exercise and Physical Activity Assessment            Exercise type: gardening.        Physical Examination   Vital Signs   1/31/2017 6:06 PM CST Temperature Tympanic 98.5 DegF    Peripheral Pulse Rate 69 bpm    Systolic Blood Pressure 118 mmHg    Diastolic Blood Pressure 68 mmHg    Mean Arterial Pressure 85 mmHg    BP Site Right arm    BP Method Manual    Oxygen Saturation 97 %      Measurements from flowsheet : Measurements   1/31/2017 6:06 PM CST    Height Measured - Standard                64.75 in       L eye - tenderness and swelling and some firmness over medial aspect. PERRL. No proptosis. No pain with eye mvmt.       Impression and Plan   Stye - feel this is most likely, discussed signs of pre-orbital/orbital cellulitis that would reuqire further care  Warm compresses several x per day, erythromycin ointment. f/u if not improving 1 wk

## 2022-02-16 NOTE — TELEPHONE ENCOUNTER
---------------------  From: Irena Levi MD   To: [facility] Message Pool ([id number]_[place]); Appointment Pool ([id number]_[place]); ZAK EVERETT    Sent: 11/3/2021 3:45:10 PM CDT  Subject: General Message     Klever Alanis,    I'm going to include my  to reach out to you to schedule an appointment for us to review your results.  I think your iron is low and we need to follow up on your fatty liver.    [facility]      Results:  Date Result Name Ind Value Ref Range   11/2/2021 1:43 PM Reticulocyte  1.8 %    11/2/2021 1:43 PM Retic Abs#  [id number] (20,000 - 80,000)   11/2/2021 1:43 PM Iron Level  89 mcg/dL (45 - 160)   11/2/2021 1:43 PM TIBC ((H)) 466 (250 - 450)   11/2/2021 1:43 PM Iron Saturation  19 (16 - 45)   11/2/2021 1:43 PM Ferritin  30 ng/mL (16 - 288)   11/2/2021 1:43 PM LDH  176 unit/L (120 - 250)   11/2/2021 1:43 PM WBC  7.3 (3.8 - 10.8)   11/2/2021 1:43 PM RBC  3.92 (3.80 - 5.10)   11/2/2021 1:43 PM Hgb  11.9 gm/dL (11.7 - 15.5)   11/2/2021 1:43 PM Hct  35.8 % (35.0 - 45.0)   11/2/2021 1:43 PM MCV  91.3 fL (80.0 - 100.0)   11/2/2021 1:43 PM MCH  30.4 pg (27.0 - 33.0)   11/2/2021 1:43 PM MCHC  33.2 gm/dL (32.0 - 36.0)   11/2/2021 1:43 PM RDW  14.4 % (11.0 - 15.0)   11/2/2021 1:43 PM Platelet  214 (140 - 400)   11/2/2021 1:43 PM MPV  11.1 fL (7.5 - 12.5)   11/2/2021 1:43 PM Abs Neutrophils  5,460 (1,500 - 7,800)   11/2/2021 1:43 PM Abs Lymphocytes  1,548 (850 - 3,900)   11/2/2021 1:43 PM Abs Monocytes ((L)) 146 (200 - 950)   11/2/2021 1:43 PM Abs Eosinophils  73 (15 - 500)   11/2/2021 1:43 PM Abs Basophils  73 (0 - 200)   11/2/2021 1:43 PM Neutrophils  74.8 %    11/2/2021 1:43 PM Lymphocytes  21.2 %    11/2/2021 1:43 PM Monocytes  2.0 %    11/2/2021 1:43 PM Eosinophils  1.0 %    11/2/2021 1:43 PM Basophils  1.0 %    11/2/2021 1:43 PM Platelet Estimate  ADEQUATE (ADEQUATE - ADEQUATE)   11/2/2021 1:43 PM CBC Morphology  See comment (NORMAL - )   11/2/2021 1:43 PM Direct Anya  NEGATIVE (NEGATIVE - NEGATIVE)    11/2/2021 1:50 PM Glucose Level ((H)) 149 mg/dL (65 - 99)   11/2/2021 1:50 PM BUN  24 mg/dL (7 - 25)   11/2/2021 1:50 PM Creatinine Level  0.88 mg/dL (0.50 - 0.99)   11/2/2021 1:50 PM eGFR  69 mL/min/1.73m2 (> OR = 60 - )   11/2/2021 1:50 PM eGFR African American  80 mL/min/1.73m2 (> OR = 60 - )   11/2/2021 1:50 PM BUN/Creat Ratio  NOT APPLICABLE (6 - 22)   11/2/2021 1:50 PM Sodium Level  140 mmol/L (135 - 146)   11/2/2021 1:50 PM Potassium Level  4.0 mmol/L (3.5 - 5.3)   11/2/2021 1:50 PM Chloride Level  104 mmol/L (98 - 110)   11/2/2021 1:50 PM CO2 Level  25 mmol/L (20 - 32)   11/2/2021 1:50 PM Calcium Level  9.9 mg/dL (8.6 - 10.4)   11/2/2021 1:50 PM Protein Total  7.3 gm/dL (6.1 - 8.1)   11/2/2021 1:50 PM Albumin Level  4.5 gm/dL (3.6 - 5.1)   11/2/2021 1:50 PM Globulin  2.8 (1.9 - 3.7)   11/2/2021 1:50 PM A/G Ratio  1.6 (1.0 - 2.5)   11/2/2021 1:50 PM Bilirubin Total  0.6 mg/dL (0.2 - 1.2)   11/2/2021 1:50 PM Alkaline Phosphatase  72 unit/L (37 - 153)   11/2/2021 1:50 PM AST/SGOT ((H)) 74 unit/L (10 - 35)   11/2/2021 1:50 PM ALT/SGPT ((H)) 62 unit/L (6 - 29)---------------------  From: Judith Espino ([facility] Message Pool ([id number]_[facility]))   To: ZAK EVERETT    Sent: 11/4/2021 8:44:57 AM CDT  Subject: FW: General MessagePT SCHEDULED

## 2022-02-16 NOTE — TELEPHONE ENCOUNTER
---------------------  From: Christopher/Elvia CONDE (Phone Messages Pool ([id number]_[facility]))   To: [facility] Message Pool ([id number]_[place]);     Sent: 10/26/2021 3:54:20 PM CDT  Subject: General Message     Phone Message    PCP: [facility]    Time of Call: [id number]    Phone Number: [phone]    Returned call at: n/a    Note: Call from pt wondering if she can get an order from [facility] to get her Iron checked.    Please advise on order and call pt to schedule lab---------------------  From: Faviola Arreola ([facility] Message Pool ([id number]_[facility]))   To: Irena Levi MD;     Sent: 10/26/2021 5:14:32 PM CDT  Subject: FW: General Message---------------------  From: Irena Levi MD   To: [facility] Message Pool ([id number]);     Sent: 10/26/2021 7:45:55 PM CDT  Subject: RE: General Message     pls invite pt to schedule an appt telemed or in clinic is okPt notified and states understanding. Transferred to scheduling.

## 2022-02-16 NOTE — NURSING NOTE
Comprehensive Intake Entered On:  7/16/2021 9:48 AM CDT    Performed On:  7/16/2021 9:42 AM CDT by Faviola Arreola               Summary   Chief Complaint :   DM follow up   Menstrual Status :   Postmenopausal   Weight Measured :   197.3 lb(Converted to: 197 lb 5 oz, 89.494 kg)    Systolic Blood Pressure :   100 mmHg   Diastolic Blood Pressure :   64 mmHg   Mean Arterial Pressure :   76 mmHg   Peripheral Pulse Rate :   65 bpm   BP Site :   Right arm   BP Method :   Manual   Respiratory Rate :   16 br/min   Oxygen Saturation :   97 %   Languages :   English   Faviola Arreola - 7/16/2021 9:42 AM CDT   Health Status   Allergies Verified? :   Yes   Medication History Verified? :   Yes   Pre-Visit Planning Status :   Completed   Tobacco Use? :   Never smoker   Faviola Arreola - 7/16/2021 9:42 AM CDT   Consents   Consent for Immunization Exchange :   Consent Granted   Consent for Immunizations to Providers :   Consent Granted   Faviola Arreola - 7/16/2021 9:42 AM CDT   Meds / Allergies   (As Of: 7/16/2021 9:48:19 AM CDT)   Allergies (Active)   No Known Medication Allergies  Estimated Onset Date:   Unspecified ; Created By:   Yumiko Anderson CMA; Reaction Status:   Active ; Category:   Drug ; Substance:   No Known Medication Allergies ; Type:   Allergy ; Updated By:   Yumiko Anderson CMA; Reviewed Date:   7/16/2021 9:45 AM CDT        Medication List   (As Of: 7/16/2021 9:48:19 AM CDT)   Prescription/Discharge Order    amitriptyline  :   amitriptyline ; Status:   Prescribed ; Ordered As Mnemonic:   amitriptyline 50 mg oral tablet ; Simple Display Line:   50 mg, 1 tab(s), Oral, hs, 90 tab(s), 3 Refill(s) ; Ordering Provider:   Irena Levi MD; Catalog Code:   amitriptyline ; Order Dt/Tm:   8/25/2020 1:51:53 PM CDT          esomeprazole  :   esomeprazole ; Status:   Prescribed ; Ordered As Mnemonic:   esomeprazole 20 mg oral delayed release capsule ; Simple Display Line:   20 mg, 1 cap(s), Oral, daily, 90 cap(s), 3 Refill(s) ;  Ordering Provider:   Irena Levi MD; Catalog Code:   esomeprazole ; Order Dt/Tm:   8/25/2020 1:51:08 PM CDT          levothyroxine  :   levothyroxine ; Status:   Prescribed ; Ordered As Mnemonic:   levothyroxine 150 mcg (0.15 mg) oral tablet ; Simple Display Line:   150 mcg, 1 tab(s), po, daily, 90 tab(s), 3 Refill(s) ; Ordering Provider:   Irena Levi MD; Catalog Code:   levothyroxine ; Order Dt/Tm:   8/25/2020 1:52:14 PM CDT          metoprolol  :   metoprolol ; Status:   Prescribed ; Ordered As Mnemonic:   Metoprolol Tartrate 50 mg oral tablet ; Simple Display Line:   0.5 tab(s), Oral, daily, 45 tab(s), 2 Refill(s) ; Ordering Provider:   Irena Levi MD; Catalog Code:   metoprolol ; Order Dt/Tm:   1/20/2021 9:13:57 AM [facility]    biotin  :   biotin ; Status:   Documented ; Ordered As Mnemonic:   Hair, Skin & Nails ; Simple Display Line:   2 Gummies, 1x per day, 0 Refill(s) ; Catalog Code:   biotin ; Order Dt/Tm:   8/25/2020 11:45:22 AM CDT          Miscellaneous Prescription  :   Miscellaneous Prescription ; Status:   Documented ; Ordered As Mnemonic:   OTC - Magnesium ; Simple Display Line:   Oral, daily, 0 Refill(s) ; Catalog Code:   Miscellaneous Prescription ; Order Dt/Tm:   6/29/2021 12:00:58 PM CDT          Miscellaneous Prescription  :   Miscellaneous Prescription ; Status:   Documented ; Ordered As Mnemonic:   OTC - Potassium ; Simple Display Line:   Oral, daily, 0 Refill(s) ; Catalog Code:   Miscellaneous Prescription ; Order Dt/Tm:   6/29/2021 12:00:19 PM CDT          multivitamin  :   multivitamin ; Status:   Documented ; Ordered As Mnemonic:   Multiple Vitamins oral tablet ; Simple Display Line:   Oral, daily, 0 Refill(s) ; Catalog Code:   multivitamin ; Order Dt/Tm:   6/29/2021 11:59:46 AM CDT          omega-3 polyunsaturated fatty acids  :   omega-3 polyunsaturated fatty acids ; Status:   Documented ; Ordered As Mnemonic:   Fish Oil ; Simple Display Line:   1,000 mg,  po, daily, 0 Refill(s) ; Catalog Code:   omega-3 polyunsaturated fatty acids ; Order Dt/Tm:   1/31/2017 7:02:34 PM CST            Social History   Social History   (As Of: 7/16/2021 9:48:19 AM CDT)   Alcohol:        Current, Wine (5 oz), 1-2 times per week, 1 drinks/episode average.  2 drinks/episode maximum.  Ready to change: No.   (Last Updated: 8/26/2019 10:27:17 AM CDT by Alyssa West)          Tobacco:        Never (less than 100 in lifetime)   (Last Updated: 6/29/2021 11:09:22 AM CDT by Jocelyn Hutchins LPN)          Electronic Cigarette/Vaping:        Electronic Cigarette Use: Never.   (Last Updated: 6/29/2021 11:09:28 AM CDT by Jocelyn Hutchins LPN)          Substance Abuse:        Never   (Last Updated: 3/2/2012 8:25:32 AM CST by Aniyah Almaguer CMA)          Employment/School:        Retired   (Last Updated: 8/4/2017 11:03:38 AM CDT by Therese Hein)          Home/Environment:        Marital status: .  Spouse/Partner name: Ricardo.  Risks in environment: Does not wear helmet, owns secured gun.   (Last Updated: 8/4/2017 11:03:55 AM CDT by Therese Hein)          Nutrition/Health:        Type of diet: Low carbohydrate.   (Last Updated: 8/25/2020 11:46:38 AM CDT by Yumiko Denis CMA)          Exercise:        Exercise frequency: 3-4 times/week.  Exercise type: garden in summer, health club.   (Last Updated: 8/26/2019 10:28:14 AM CDT by Alyssa West)          Sexual:        Sexually active: Yes.  Sexual orientation: Heterosexual.  Contraceptive Use Details: None.   (Last Updated: 8/4/2017 11:04:25 AM CDT by Therese Hein)

## 2022-02-16 NOTE — PROGRESS NOTES
Chief Complaint    here for annual exam  History of Present Illness      Pt here today for annual exam      Review of systems is negative except as per HPI including no fevers, chills, sore throat, runny nose, nausea, vomiting, constipation, diarrhea, rash or new skin lesions, chest pain, palpitations, slurred speech, new paresthesia, shortness of breath or wheeze.             Exam:      see vitals listed below      General: alert and oriented ×3 no acute distress.      HEENT: Normocephalic and atraumatic.       Eyes pupils are equal round and reactive to light extraocular motion is intact. normal conjunctiva      Hearing is grossly normal and there is no otorrhea. Tympanic membranes are pearly grey with a normal light reflex.      Nares are patent there is no rhinorrhea.       Mucous membranes are moist and pink.      Chest: has bilateral rise with no increased work of breathing. clear to auscultation without wheezes, rhonchi, or rales.      Cardiovascular: normal perfusion and brisk capillary refill. S1S2 with regular rate and rhythm and no murmurs, gallops or rubs.      Musculoskeletal: no gross focal abnormalities and normal gait.      Neuro: no gross focal abnormalities and memory seems intact.  CN 2-12 are grossly intact.      Psychiatric: speech is clear and coherent and fluent. Patient dressed appropriately for the weather. Mood is appropriate and affect is full.      Abd: no rebound or guarding, normal BS      Skin: no rash or lesion identified      Discussed:      using sunscreen, protecting from sunburn,      taking folic acid 400 mcg daily      refer to usdachooseplate.gov, AHA and ADA for diet and exercise recommendations      consume 5303-6753 mg calcium daily      regular self skin checks      get 150min /week of aerobic exercise  Physical Exam   Vitals & Measurements    T: 99.0  F (Tympanic)  HR: 73 (Peripheral)  BP: 114/62  SpO2: 99%     HT: 65 in  WT: 186.6 lb  BMI: 31.05   Assessment/Plan        Diabetes mellitus (E11.9)         Ordered:          45894 office o/p est mod 30-39 min (Charge), Quantity: 1, Fatty liver  Diabetes mellitus  Hypothyroid                Fatty liver (K76.0)         Ordered:          38110 office o/p est mod 30-39 min (Charge), Quantity: 1, Fatty liver  Diabetes mellitus  Hypothyroid          Comprehensive Metabolic Panel* ([facility]), Specimen Type: Serum, Collection Date: 09/03/21 10:57:00 CDT                Hypothyroid (E03.9)         Ordered:          86532 office o/p est mod 30-39 min (Charge), Quantity: 1, Fatty liver  Diabetes mellitus  Hypothyroid                Well adult exam (Z00.00)         Ordered:          70684 periodic preventive med est patient 40-64yrs (Charge), Quantity: 1, Well adult exam                Orders:         amitriptyline, = 1 tab(s) ( 50 mg ), Oral, hs, # 90 tab(s), 3 Refill(s), Type: Maintenance, Pharmacy: [facility], 1 tab(s) Oral hs, 65, in, 09/03/21 10:11:00 CDT, Height Measured, 186.6, lb, 09/03/21 10:11:00 CDT, Weight Measured, (Ordered)         atorvastatin, = 1 tab(s) ( 40 mg ), Oral, daily, # 90 tab(s), 0 Refill(s), Type: Maintenance, Pharmacy: [facility], 1 tab(s) Oral daily, 65, in, 09/03/21 10:11:00 CDT, Height Measured, 186.6, lb, 09/03/21 10:11:00 CDT, Weight Measured, (Ordered)         esomeprazole, = 1 cap(s), Oral, daily, # 90 cap(s), 3 Refill(s), Type: Maintenance, Pharmacy: [facility], Due for appt, 1 cap(s) Oral daily, 65, in, 09/03/21 10:11:00 CDT, Height Measured, 186.6, lb, 09/03/21 10:11:00 CDT, Weight Measured, (Ordered)         levothyroxine, = 1 tab(s) ( 150 mcg ), po, daily, # 90 tab(s), 3 Refill(s), Type: Maintenance, Pharmacy: [id number], 1 tab(s) Oral daily, 65, in, 09/03/21 10:11:00 CDT, Height Measured, 186.6, lb, 09/03/21 10:11:00 CDT, Weight Measured, (Ordered)         metFORMIN, = 2 tab(s) ( 1,000 mg ), Oral, daily, Instructions: with evening meal, # 180 EA, 1  Refill(s), Type: Maintenance, Pharmacy: [facility], 2 tab(s) Oral daily,Instr:with evening meal, 65, in, 09/03/21 10:11:00 CDT, Height Measured, 186.6, lb..., (Ordered)         metoprolol, = 0.5 tab(s), Oral, daily, # 45 tab(s), 3 Refill(s), Type: Maintenance, Pharmacy: [facility], 0.5 tab(s) Oral daily, 65, in, 09/03/21 10:11:00 CDT, Height Measured, 186.6, lb, 09/03/21 10:11:00 CDT, Weight Measured, (Ordered)  Patient Information     Name:ZAK EVERETT      Address:      [address] [place] [id number]     Sex:Female     YOB: 1955     [phone]     Emergency Contact:JALEN MATA JUAN     MRN:[id number]     FIN:[id number]     Location:[facility]     Date of Service:09/03/2021      Primary Care Physician:       Irena Levi MD, [phone]      Attending Physician:       Irena Levi MD, [phone]  Problem List/Past Medical History    Ongoing     Dysfunctional uterine bleeding     Fatty liver     GERD (gastroesophageal reflux disease)     History of NSAID-associated gastropathy     Hypertriglyceridemia     Hypothyroid     MVP (mitral valve prolapse)     New onset type 2 diabetes mellitus     Obese     Personal History of DVT (Deep Vein Thrombosis)       Comments: provoked from ankle fracture and wearing cam walking boot     S/P meniscectomy    Historical     Anticoagulated       Comments: heparin injections approx 2 weeks after DVT from immobility of ankle injury     Pregnancy     Pregnancy     Pregnancy  Procedure/Surgical History     Colonoscopy (08/30/2017)            Comments: Indication: screening      Sedation: F&V      Normal      Repeat in 10 years.     Arthroscopic partial medial meniscectomy (09/27/2016)            Comments: Right knee      Dr. Amin at [facility].     Laparoscopic supracervical hysterectomy (11/19/2010)            Comments: Pre- and post-op diagnosis is menorrhagia.     Hysteroscopy, D & C (04/23/2010)             Comments: Large pelvic mass, most consistent with uterine fibroids.     Colonoscopy (07/26/2007)           TL - Tubal ligation (04/26/1988)           Childbirth (04/25/1988)            Comments: Patient Comment: Male.     gall bladder (02/11/1987)            Comments: Patient Comment: Removed gall bladder stones , opened the clogged bile duct, wore an outward bag for six weeks to catch bile till everything was healed..     Childbirth (03/28/1986)            Comments: Patient Comment: In [place].     Childbirth (03/27/1986)            Comments: Female. G3, P2-0-1-2..     Cholecystectomy            Comments: Open.  Medications    Accu check soft clix lancets, See Instructions, 1 refills    accu chek guide test strips, See Instructions, 1 refills    amitriptyline 50 mg oral tablet, 50 mg= 1 tab(s), Oral, hs, 3 refills    Citracal Maximum + D, Oral, daily    esomeprazole 20 mg oral delayed release capsule, 1 cap(s), Oral, daily, 3 refills    Fish Oil, 1000 mg, Oral, daily    Hair, Skin & Nails    levothyroxine 150 mcg (0.15 mg) oral tablet, 150 mcg= 1 tab(s), Oral, daily, 3 refills    Lipitor 40 mg oral tablet, 40 mg= 1 tab(s), Oral, daily    metFORMIN 500 mg oral tablet, extended release, 1000 mg= 2 tab(s), Oral, daily, 1 refills    Metoprolol Tartrate 50 mg oral tablet, 0.5 tab(s), Oral, daily, 3 refills    Multiple Vitamins oral tablet, Oral, daily    OTC - Magnesium, Oral, daily    OTC - Potassium, Oral, daily  Allergies    No Known Medication Allergies  Social History    Smoking Status     Former smoker     Alcohol      Current, Wine (5 oz), 1-2 times per week, 1 drinks/episode average. 2 drinks/episode maximum. Ready to change: No.     Electronic Cigarette/Vaping      Electronic Cigarette Use: Never.     Employment/School      Retired     Exercise      Exercise frequency: 3-4 times/week. Exercise type: garden in summer, health club.     Home/Environment      Marital status: . Spouse/Partner name:  Ricardo. Risks in environment: Does not wear helmet, owns secured gun.     Nutrition/Health      Type of diet: Low carbohydrate.     Sexual      Sexually active: Yes. Sexual orientation: Heterosexual. Contraceptive Use Details: None.     Substance Abuse      Never     Tobacco      Never (less than 100 in lifetime)  Family History    Anxiety: Daughter and Son.    Arthritis: Mother.    Cataracts: Mother.    Chicken pox: Daughter and Son.    Goiter: Mother.    HBP - High blood pressure: Father.    Hearing loss: Mother.    Heart disease: Father and Grandmother (P).    Hypertension: Mother.    Obesity: Father.    Overweight: Father.    Rheumatoid arthritis: Mother.    Snoring: Father.    Wears glasses: Mother, Father, Daughter, Son and Grandmother (P).  Lab Results       Lab Results (Last 4 results within 90 days)        Sodium Level: 138 mmol/L [135 mmol/L - 146 mmol/L] (09/03/21 11:19:00)       Sodium Level: 135 mmol/L [135 mmol/L - 146 mmol/L] (06/29/21 11:45:00)       Potassium Level: 4.4 mmol/L [3.5 mmol/L - 5.3 mmol/L] (09/03/21 11:19:00)       Potassium Level: 4.8 mmol/L [3.5 mmol/L - 5.3 mmol/L] (06/29/21 11:45:00)       Chloride Level: 102 mmol/L [98 mmol/L - 110 mmol/L] (09/03/21 11:19:00)       Chloride Level: 101 mmol/L [98 mmol/L - 110 mmol/L] (06/29/21 11:45:00)       CO2 Level: 27 mmol/L [20 mmol/L - 32 mmol/L] (09/03/21 11:19:00)       CO2 Level: 26 mmol/L [20 mmol/L - 32 mmol/L] (06/29/21 11:45:00)       Glucose Level: 120 mg/dL High [65 mg/dL - 99 mg/dL] (09/03/21 11:19:00)       Glucose Level: 136 mg/dL High [65 mg/dL - 99 mg/dL] (07/06/21 08:50:00)       Glucose Level: 212 mg/dL High [65 mg/dL - 99 mg/dL] (06/29/21 11:45:00)       BUN: 21 mg/dL [7 mg/dL - 25 mg/dL] (09/03/21 11:19:00)       BUN: 20 mg/dL [7 mg/dL - 25 mg/dL] (06/29/21 11:45:00)       Creatinine Level: 0.93 mg/dL [0.5 mg/dL - 0.99 mg/dL] (09/03/21 11:19:00)       Creatinine Level: 0.93 mg/dL [0.5 mg/dL - 0.99 mg/dL] (06/29/21  11:45:00)       BUN/Creat Ratio: NOT APPLICABLE [6  - 22] (09/03/21 11:19:00)       BUN/Creat Ratio: NOT APPLICABLE [6  - 22] (06/29/21 11:45:00)       eGFR: 65 mL/min/1.73m2 (09/03/21 11:19:00)       eGFR: 65 mL/min/1.73m2 (06/29/21 11:45:00)       eGFR : 76 mL/min/1.73m2 (09/03/21 11:19:00)       eGFR : 76 mL/min/1.73m2 (06/29/21 11:45:00)       Calcium Level: 9.8 mg/dL [8.6 mg/dL - 10.4 mg/dL] (09/03/21 11:19:00)       Calcium Level: 9.6 mg/dL [8.6 mg/dL - 10.4 mg/dL] (06/29/21 11:45:00)       Magnesium Level: 2.1 mg/dL [1.5 mg/dL - 2.5 mg/dL] (06/29/21 11:45:00)       Bilirubin Total: 0.6 mg/dL [0.2 mg/dL - 1.2 mg/dL] (09/03/21 11:19:00)       Alkaline Phosphatase: 55 unit/L [37 unit/L - 153 unit/L] (09/03/21 11:19:00)       AST/SGOT: 53 unit/L High [10 unit/L - 35 unit/L] (09/03/21 11:19:00)       ALT/SGPT: 40 unit/L High [6 unit/L - 29 unit/L] (09/03/21 11:19:00)       Protein Total: 7.5 gm/dL [6.1 gm/dL - 8.1 gm/dL] (09/03/21 11:19:00)       Albumin Level: 4.7 gm/dL [3.6 gm/dL - 5.1 gm/dL] (09/03/21 11:19:00)       Globulin: 2.8 [1.9  - 3.7] (09/03/21 11:19:00)       A/G Ratio: 1.7 [1  - 2.5] (09/03/21 11:19:00)       Hgb A1c: 6.4 High (07/16/21 10:23:00)       Cholesterol: 184 mg/dL (07/06/21 08:50:00)       Non-HDL Cholesterol: 149 High (07/06/21 08:50:00)       HDL: 35 mg/dL Low (07/06/21 08:50:00)       Cholesterol/HDL Ratio: 5.3 High (07/06/21 08:50:00)       LDL: 110 High (07/06/21 08:50:00)       Triglyceride: 271 mg/dL High (07/06/21 08:50:00)       TSH: 3.12 mIU/L [0.4 mIU/L - 4.5 mIU/L] (07/06/21 08:50:00)       U Creatinine: 83 mg/dL [20 mg/dL - 275 mg/dL] (07/06/21 08:50:00)       U Microalbumin: 0.2 mg/dL (07/06/21 08:50:00)       Ur Microalbumin/Creatinine Ratio: 2 (07/06/21 08:50:00)       WBC: 5.3 [3.8  - 10.8] (06/29/21 11:45:00)       RBC: 3.96 [3.8  - 5.1] (06/29/21 11:45:00)       Hgb: 11.9 gm/dL [11.7 gm/dL - 15.5 gm/dL] (06/29/21 11:45:00)       Hct: 36.3  % [35 % - 45 %] (06/29/21 11:45:00)       MCV: 91.7 fL [80 fL - 100 fL] (06/29/21 11:45:00)       MCH: 30.1 pg [27 pg - 33 pg] (06/29/21 11:45:00)       MCHC: 32.8 gm/dL [32 gm/dL - 36 gm/dL] (06/29/21 11:45:00)       RDW: 14.3 % [11 % - 15 %] (06/29/21 11:45:00)       Platelet: 207 [140  - 400] (06/29/21 11:45:00)       MPV: 10.2 fL [7.5 fL - 12.5 fL] (06/29/21 11:45:00)  Immunizations       Scheduled Immunizations       Dose Date(s)       influenza virus vaccine, inactivated       11/19/2013, 10/23/2015, 10/14/2011, 10/10/2012, 12/01/2014, 11/03/2016, 10/03/2018, 10/08/2020       Other Immunizations               influenza virus vaccine, inactivated       02/16/2011       hepatitis A adult vaccine       08/25/2020       Td       12/01/2003       tetanus/diphth/pertuss (Tdap) adult/adol       03/01/2013       influenza, H1N1, inactivated       02/12/2010       hepatitis B adult vaccine       08/25/2020       zoster vaccine, inactivated       03/26/2019, 08/06/2019       SARS-CoV-2 (COVID-19) Moderna-1273       04/01/2021, 04/29/2021

## 2022-02-16 NOTE — LETTER
(Inserted Image. Unable to display)     July 29, 2019      ZAK EVERETT  [address] [place], [place] [id number]          Dear ZAK,      Thank you for selecting [facility] (previously [facility]) for your healthcare needs.      Our records indicate you are due for the following services:     Immunizations: Shingrix #2.      To schedule an appointment or if you have further questions, please contact your primary clinic:   [facility]       [phone]   [facility]       [phone]              [facility]     [phone]      Powered by [employer] and [employer]    Sincerely,    Gerry Crooks MD

## 2022-02-16 NOTE — TELEPHONE ENCOUNTER
---------------------  From: Erin Benoit LPN (Phone Messages Pool ([id number]_[facility]))   To: Advanced Practice Provider [place] ([id number]_[facility]);     Sent: 7/23/2021 4:42:12 PM CDT  Subject: metformin concern     Phone Message    PCP:   none      Time of Call:  4:38pm       Person Calling:  pt  Phone number:  [phone]    Note:   Pt calling stating she was recently started on metformin 500mg 2 tabs daily with evening meal.    Pt says she was doing some reading online about metformin and it says do not take 2 500mg tabs at one time.    Pt wanting some reassurance that this does is correct since this is so new to her.     Pt has been doing 2 tabs in evening and tolerating well. She says the only thing she has noticed is that her urine flow has increased. She also has had a couple looser stools but no diarrhea.    Last office visit and reason:  7/16/21 new onset DM with NCB---------------------  From: Kobe Meneses PA-C (Advanced Practice Provider Pool ([id number]_[facility]))   To: Phone Messages Pool ([id number]_[place] [facility] [place]);     Sent: 7/26/2021 11:28:33 AM CDT  Subject: RE: metformin concern     It is certainly fine to take 2 tabs of 500mg metformin at the same time, just monitor for increased GI sxspt notified and message left via secure voicemail

## 2022-02-16 NOTE — TELEPHONE ENCOUNTER
---------------------  From: Jessi Rodriguez RN   To: Abby Laws;     Sent: 6/30/2021 2:11:08 PM CDT  Subject: General Message     Patient states she received call from [facility]  asking for call back.  Do you have time to call her or would you like message center to relay any information?---------------------  From: Abby Laws   To: Jessi Rodriguez RN;     Sent: 6/30/2021 3:11:19 PM CDT  Subject: RE: General Message     I spoke with her and informed her of elevated glucose and likely DM and I put orders in for fasting labs and transfered her to scheduling and spoke with her surgeon. Thanks.

## 2022-02-16 NOTE — TELEPHONE ENCOUNTER
---------------------  From: ZAK EVERETT  To: [facility]  Sent: 2020 11:15 p.m. CDT  Subject: RE: appointment?  ThanksJodi!!  ---------------------  From: Jodi Fiore CMA (Phone Messages Pool ([id number]_[facility]))  To: ZAK EVERETT  Sent: 2020 12:38:12 PM CDT  Subject: RE: appointment?  ???  Klever Alanis!  ???  It does look like it's that time of year. Please feel free to schedule your mammogram at a location convenient to you and have result sent to Dr. Levi. Also, it looks like you need some fasting lab work done (cholesterol and glucose). Most people like to review labs during their appointment so they will have their labs done about a week prior to their exam. Your other option would be to come to your annual exam fasting so the lab work could be completed before you left. It is totally up to you. Please schedule an appointment when you are ready.  ???  Kind Regards,  ???  GLENN Zapata  ???  ???  ---------------------  From: ZAK EVERETT  To: [facility]  Sent: 2020 12:29 p.m. CDT  Subject: appointment?  Received a letter in the mail saying it was time for mammogram.  Do I need to schedule one?  Do I need to schedule a general visit appointment?  Please adviseAnnabelle  :  [phone]

## 2022-02-16 NOTE — PROGRESS NOTES
Patient:   ZAK EVERETT            MRN: [id number]            FIN: [id number]               Age:   62 years     Sex:  Female     :  1955   Associated Diagnoses:   Hypothyroid; MVP (mitral valve prolapse); Well woman exam   Author:   Bessy Medina      Visit Information      Date of Service: 08/15/2018 10:00 am  Performing Location: [facility]  Encounter#: [id number]      Primary Care Provider (PCP):  Bessy Medina    NPI# [id number]      Chief Complaint   8/15/2018 9:54 AM CDT    Annual exam.      Well Adult History   PPC for her annual exam, last annual 2017  MVP diagnosed about 20 yrs ago, has been on metoprolol since then for rate control and has worked well  hx of hypothyroidism, normal TSH 2018  hx of hypertriglyceridemia, using fish oil, good success, has associated AST /ALT elevation prior to fish oil but this was normal on repeat hepatics  past 5-6 fasting glucose levels have been elevated: will check hgba1c,   mammogram done 2018  NILM pap 2015: monogamous relationship  normal colonoscopy 2017 due again   has been under stress while caring for aging mother, discussed using SSRI but did not want to proceed with this (18)  estrace cream for vaginal dryness  PHQ9=not filled out  amitriptyline used for on hx abdominal pain without noen etiology      Review of Systems   Constitutional:  Negative.    Eye:  Negative.    Ear/Nose/Mouth/Throat:  Negative.    Respiratory:  Negative.    Cardiovascular:  Negative except as documented in history of present illness.    Breast:  Negative.    Gastrointestinal:  Negative except as documented in history of present illness.    Genitourinary:  Negative.    Gynecologic:  Negative except as documented in history of present illness.    Hematology/Lymphatics:  Negative.    Endocrine:  Negative except as documented in history of present illness.    Immunologic:  Negative.    Musculoskeletal:  Negative.    Integumentary:   Negative.    Neurologic:  Negative.    Psychiatric:  Negative.       Health Status   Allergies:    Allergic Reactions (Selected)  No known allergies   Medications:  (Selected)   Prescriptions  Prescribed  Estrace Vaginal 0.1 mg/g vaginal cream: See Instructions, Instructions: INSERT 1 GRAM VAGINALLY THREE TIMES PER WEEK, # 42 gm, 2 Refill(s), Type: Maintenance, Pharmacy: [id number], INSERT 1 GRAM VAGINALLY THREE TIMES PER WEEK  Metoprolol Tartrate 50 mg oral tablet: 0.5 tab(s) ( 25 mg ), po, daily, # 45 tab(s), 3 Refill(s), Type: Maintenance, Pharmacy: [facility], 0.5 tab(s) po daily  Zantac 150 oral tablet: 1 tab(s) ( 150 mg ), PO, BID, # 60 tab(s), 2 Refill(s), Type: Maintenance, Pharmacy: [facility], 1 tab(s) po bid  amitriptyline 25 mg oral tablet: 2 tab(s) ( 50 mg ), po, hs, # 180 tab(s), 3 Refill(s), Type: Maintenance, Pharmacy: [facility], 2 tab(s) po hs,x90 day(s)  levothyroxine 150 mcg (0.15 mg) oral tablet: 1 tab(s) ( 150 mcg ), po, daily, # 90 tab(s), 3 Refill(s), Type: Maintenance, Pharmacy: [id number], 1 tab(s) Oral daily  Documented Medications  Documented  Fish Oil: ( 1,000 mg ), po, daily, 0 Refill(s), Type: Maintenance   Problem list:    All Problems (Selected)  S/P meniscectomy / SNOMED CT 2319070681 / Confirmed  Personal History of DVT (Deep Vein Thrombosis) / SNOMED CT 5805473899 / Confirmed  Obese / SNOMED CT 6495180771 / Probable  MVP (mitral valve prolapse) / SNOMED CT 9260478188 / Confirmed  Hypothyroid / SNOMED CT 23063772 / Confirmed  Hypertriglyceridemia / SNOMED CT 601540022 / Confirmed  GERD (gastroesophageal reflux disease) / SNOMED CT 422943641 / Confirmed  Anticoagulated / SNOMED CT 723905029 / Confirmed      Histories   Family History:    Anxiety  Daughter (Jessi)  Son (Clarke)  Hypertension  Mother  Heart disease  Father  Grandmother (P)  Goiter  Mother     Procedure history:    Colonoscopy ([id number]) on 8/30/2017 at 61  Years.  Comments:  8/13/2018 7:26 PM - Jodi Fiore CMA  Indication: screening  Sedation: F&V  Normal   Repeat in 10 years  Arthroscopic partial medial meniscectomy ([id number]) on 9/27/2016 at 60 Years.  Comments:  11/10/2016 3:38 PM - Jodi Fiore CMA  Right knee  Dr. Amin at [facility]  Laparoscopic supracervical hysterectomy ([id number]) on 11/19/2010 at 55 Years.  Comments:  12/12/2010 2:39 PM - Janice Myers  Pre- and post-op diagnosis is menorrhagia  Hysteroscopy, D & C on 4/23/2010 at 54 Years.  Comments:  12/31/2010 1:46 PM - Janice Myers  Large pelvic mass, most consistent with uterine fibroids  Colonoscopy ([id number]) on 7/26/2007 at 51 Years.  Cholecystectomy ([id number]).  Comments:  4/21/2010 4:33 PM - Irina WISE, Sharon  Open    Childbirth ([id number]).  Comments:  11/1/2011 7:27 PM - Jen Robertson  G3, P2-0-1-2.   Social History:        Alcohol Assessment            Current, 1-2 times per week, 1 drinks/episode average.      Tobacco Assessment            Never      Substance Abuse Assessment            Never      Employment and Education Assessment            Retired      Home and Environment Assessment            Marital status: .  Spouse/Partner name: Ricardo.  Risks in environment: Does not wear helmet, owns               secured gun.      Nutrition and Health Assessment            Type of diet: Regular.      Exercise and Physical Activity Assessment            Exercise frequency: 1-2 times/week.  Exercise type: back, general.      Sexual Assessment            Sexually active: Yes.  Sexual orientation: Heterosexual.  Contraceptive Use Details: None.        Physical Examination   Vital Signs   8/15/2018 9:54 AM CDT Temperature Tympanic 98.5 DegF    Peripheral Pulse Rate 72 bpm    Pulse Site Radial artery    HR Method Manual    Systolic Blood Pressure 108 mmHg    Diastolic Blood Pressure 64 mmHg    Mean Arterial Pressure 79 mmHg    BP Site Right arm    BP Method Manual      General:   Alert and oriented, No acute distress.    Eye:  Pupils are equal, round and reactive to light, Intact accommodation, Normal conjunctiva, Vision unchanged.         Periorbital area: Within normal limits.    HENT:  Normocephalic, Tympanic membranes are clear, Normal hearing, Oral mucosa is moist, No pharyngeal erythema, No sinus tenderness.    Neck:  Supple, Non-tender, No lymphadenopathy, No thyromegaly.    Respiratory:  Lungs are clear to auscultation, Respirations are non-labored, No chest wall tenderness.    Cardiovascular:  Normal rate, Regular rhythm, No murmur, No edema.    Breast:  No mass, No tenderness.    Gastrointestinal:  Soft, Non-tender, Non-distended, Normal bowel sounds, No organomegaly.    Genitourinary:  No costovertebral angle tenderness, vaginal atrophy.         Labia: Bilateral, Within normal limits.         Mons pubis: WNL.         Perineum: Within normal limits.         Vulva: Within normal limits.         Urethra: Within normal limits.         Cervix: Within normal limits.         Uterus: Within normal limits.         Adnexa: Bilateral, Within normal limits.    Lymphatics:  No lymphadenopathy neck, axilla, groin.    Musculoskeletal:  Normal range of motion, Normal strength, No swelling.    Integumentary:  Warm, Dry, Pink.    Neurologic:  Alert, Oriented, Normal sensory, Normal motor function.    Psychiatric:  Cooperative, Appropriate mood & affect, Normal judgment.       Review / Management   Results review:  Lab results   7/9/2018 9:09 AM CDT Sodium Level 141 mmol/L    Potassium Level 4.4 mmol/L    Chloride Level 108 mmol/L    CO2 Level 26 mmol/L    Glucose Level 110 mg/dL  HI    BUN 24 mg/dL    Creatinine Level 0.81 mg/dL    BUN/Creat Ratio NOT APPLICABLE    eGFR 79 mL/min/1.73m2    eGFR African American 91 mL/min/1.73m2    Calcium Level 9.3 mg/dL    Hgb A1c 5.1    Cholesterol 155 mg/dL    Non-HDL Cholesterol 116    HDL 39 mg/dL  LOW    Cholesterol/HDL Ratio 4.0    LDL 94    Triglyceride 119  mg/dL    TSH 0.57 mIU/L   .       Impression and Plan   Diagnosis     Hypothyroid (JJU95-HK E03.9).     MVP (mitral valve prolapse) (WGC18-FC I34.1).     Well woman exam (ZHV89-TJ Z01.419).     Patient Instructions:       Counseled: Patient, Regarding diagnosis, Regarding medications, Verbalized understanding.    Summary:  pap obtained if normal will likely not need repeat  mammogram normal and up to date  colonoscopy not due until 2027  dexa ordered      MVP  metoprolol    hypothyroidism  levothyroxine    vaginal atrophy  estrace     hx of abd pain  amitriptyline    discussed on going stress and need to reduce or control this, may be better now that  is also available throughout the day.    Orders     Orders (Selected)   Prescriptions  Prescribed  Estrace Vaginal 0.1 mg/g vaginal cream: See Instructions, Instructions: INSERT 1 GRAM VAGINALLY THREE TIMES PER WEEK, # 42.5 gm, 3 Refill(s), Type: Maintenance, Pharmacy: [facility], INSERT 1 GRAM VAGINALLY THREE TIMES PER WEEK  amitriptyline 25 mg oral tablet: 2 tab(s) ( 50 mg ), po, hs, # 180 tab(s), 3 Refill(s), Type: Maintenance, Pharmacy: [facility], 2 tab(s) Oral hs  levothyroxine 150 mcg (0.15 mg) oral tablet: 1 tab(s) ( 150 mcg ), po, daily, # 90 tab(s), 3 Refill(s), Type: Maintenance, Pharmacy: [id number], 1 tab(s) Oral daily  metoprolol succinate 50 mg oral tablet, extended release: See Instructions, Instructions: take one and a half, # 135 tab(s), 3 Refill(s), Type: Maintenance, Pharmacy: [facility], take one and a half.

## 2022-02-16 NOTE — TELEPHONE ENCOUNTER
"---------------------  From: oRsetta Mccormick CMA (Phone Messages Pool ([id number]_[facility]))   To: ZAK HITCHCOCK    Sent: 7/29/2021 7:59:20 AM CDT  Subject: RE: Recent diagnosis     Zak    This message will have to wait for Abby to address.  Like I said, she is back in the clinic next week.  Take care and stay cool!    GLENN Vela       ---------------------  From: ZAK HITCHCOCK  To: [facility]  Sent: 07/28/2021 11:16 p.m. CDT  Subject: RE: Recent diagnosis  Thank you.  ---------------------  From: Rosetta Mccormick CMA (Phone Messages Pool ([id number]_[facility])  To: ZAK HITCHCOCK  Sent: 7/28/2021 8:13:21 AM CDT  Subject: RE: Recent diagnosis       Zak Mora is out of the clinic this week so I will forward your message to another provider to see if we can get your questions answered.  Otherwise Abby is back in the clinic next week.       Have a great day  GLENN Vela       ---------------------  From: ZAK HITCHCOCK  To: [facility]  Sent: 07/27/2021 11:52 p.m. CDT  Subject: Recent diagnosis  Dear Abby,  I don t quite understand what you mean about the \"cut point\". I m tolerating the meds well. I have now added the Atorvastatin to the Metforman and no problems. I m sorry but I don t remember if you said I needed to make another appointment with you right away or not. If so, how soon?  I have an appointment scheduled with Vane Ramirez on Aug 16. That was the soonest I could get in to see her.  Please advise what I should do next.  Thank you, Annabelle Hitchcock  "

## 2022-02-16 NOTE — TELEPHONE ENCOUNTER
"---------------------  From: Rosetta Mccormick CMA (Phone Messages Pool ([id number]_[facility]))   To: Abby Laws;     Sent: 7/28/2021 11:42:44 AM CDT  Subject: FW: Recent diagnosis           ---------------------  From: Kobe Meneses PA-C (Advanced Practice Provider Pool ([id number]_[facility]))   To: Phone Messages Pool ([address] [place] [address]);     Sent: 7/28/2021 11:40:12 AM CDT  Subject: RE: Recent diagnosis     please send to [facility] to address      ---------------------  From: Rosetta Mccormick CMA (Phone Messages Pool ([id number]))   To: Advanced Practice Provider Pool ([address]);     Sent: 7/28/2021 8:13:32 AM CDT  Subject: FW: Recent diagnosis           ---------------------  From: ZAK HITCHCOCK  To: [facility]  Sent: 07/27/2021 11:52 p.m. CDT  Subject: Recent diagnosis  Dear Abby,  I don t quite understand what you mean about the \"cut point\". I m tolerating the meds well. I have now added the Atorvastatin to the Metforman and no problems. I m sorry but I don t remember if you said I needed to make another appointment with you right away or not. If so, how soon?  I have an appointment scheduled with Vane Ramirez on Aug 16. That was the soonest I could get in to see her.  Please advise what I should do next.    Thank you, Annabelle Hitchcock---------------------  From: Abby Laws   To: ZAK HITCHCOCK    Sent: 7/30/2021 11:44:11 AM CDT  Subject: RE: Recent diagnosis     Klever Alanis,  I am glad you are set up with Vane Ramirez RD.  Diabetes is diagnosed when 2 of 3 measures indicate diabetes. You meet the diagnosis of diabetes with a NON fasting glucose of 212 and a fasting glucose of 136.  Continue the medications, see Vane Ramirez RD and see me in 3 months please, sooner if additional questions after seeing Vane. Take care,    Abby Vera, MIRANDA-NP  "

## 2022-02-16 NOTE — PROGRESS NOTES
Patient:   ZAK EVERETT            MRN: [id number]            FIN: [id number]               Age:   61 years     Sex:  Female     :  1955   Associated Diagnoses:   Elevated AST (SGOT)   Author:   Bessy Medina      Chief Complaint   2017 1:37 PM CDT    f/u labs done 8/3.        History of Present Illness   PPC per my request to discuss her labs from 8/3/17 specifically her rising AST and ALT  no known hx of hepatitis C or A infections: no risky behaviors  drinks socially one mixed drink 1-2x/week   no known liver disease in fy  has metabolic syndrome with high triglycerides (brought down with fish oil), low HDL, prediabetes hgba1c 5.7  exercises routinely  no gallbladder, no hx of pancreatitis      Review of Systems   Constitutional:  Negative.    Cardiovascular:  Negative.    Gastrointestinal:  Negative except as documented in history of present illness.    Genitourinary:  Negative.    Gynecologic:  Negative.    Hematology/Lymphatics:  Negative.    Neurologic:  Negative.    Psychiatric:  Negative.       Health Status   Allergies:    Allergic Reactions (Selected)  No known allergies   Medications:  (Selected)   Prescriptions  Prescribed  Estrace Vaginal 0.1 mg/g vaginal cream: See Instructions, Instructions: INSERT 1 GRAM VAGINALLY THREE TIMES PER WEEK, # 42 gm, 2 Refill(s), Type: Maintenance, Pharmacy: [facility], INSERT 1 GRAM VAGINALLY THREE TIMES PER WEEK  Metoprolol Tartrate 50 mg oral tablet: 0.5 tab(s) ( 25 mg ), po, daily, # 45 tab(s), 3 Refill(s), Type: Maintenance, Pharmacy: [facility], 0.5 tab(s) po daily  Zantac 150 oral tablet: 1 tab(s) ( 150 mg ), PO, BID, # 60 tab(s), 2 Refill(s), Type: Maintenance, Pharmacy: [facility], 1 tab(s) po bid  amitriptyline 25 mg oral tablet: 2 tab(s) ( 50 mg ), po, hs, # 180 tab(s), 3 Refill(s), Type: Maintenance, Pharmacy: [facility], 2 tab(s) po hs,x90 day(s)  levothyroxine 150 mcg (0.15 mg) oral tablet: 1  tab(s) ( 150 mcg ), po, daily, # 90 tab(s), 3 Refill(s), Type: Maintenance, Pharmacy: [facility], 1 tab(s) po daily  Documented Medications  Documented  Fish Oil: po, daily, 0 Refill(s), Type: Maintenance   Problem list:    All Problems (Selected)  Anticoagulated / SNOMED CT 707646385 / Confirmed  GERD (gastroesophageal reflux disease) / SNOMED CT 722289953 / Confirmed  Hypertriglyceridemia / SNOMED CT 117022118 / Confirmed  Hypothyroid / SNOMED CT 43184420 / Confirmed  MVP (mitral valve prolapse) / SNOMED CT 9935347200 / Confirmed  Obese / SNOMED CT 0005459482 / Probable  Personal History of DVT (Deep Vein Thrombosis) / SNOMED CT 2102864327 / Confirmed  S/P meniscectomy / SNOMED CT 9294736241 / Confirmed      Histories   Past Medical History:    Active  Hypertriglyceridemia (691179468): Onset on 3/10/2011 at 55 years.  Comments:  3/10/2011 CST 4:44 PM CST -     Hypothyroid (61540620)  GERD (gastroesophageal reflux disease) (000412153)  MVP (mitral valve prolapse) (8390057457)  Obese (2209588240)  Resolved  Pregnancy (065503295):  Resolved in 1982 at 26 years.  Pregnancy (024029520):  Resolved in 1986 at 30 years.  Pregnancy (861066507):  Resolved in 1988 at 32 years.      Physical Examination   Vital Signs   8/11/2017 1:37 PM CDT Peripheral Pulse Rate 72 bpm    Pulse Site Radial artery    HR Method Manual    Systolic Blood Pressure 128 mmHg    Diastolic Blood Pressure 74 mmHg    Mean Arterial Pressure 92 mmHg    BP Site Right arm    BP Method Manual      Measurements from flowsheet : Measurements   8/11/2017 1:37 PM CDT    Ht/Wt Measurement Refused by Patient?     Yes     General:  Alert and oriented, No acute distress.    Eye:  Pupils are equal, round and reactive to light.    HENT:  Normocephalic.    Respiratory:  Respirations are non-labored.    Cardiovascular:  Regular rhythm, No edema.    Musculoskeletal:  Normal range of motion, Normal gait.    Integumentary:  Warm, Dry, Pink.    Neurologic:   Alert, Oriented, Normal sensory, No focal deficits.    Psychiatric:  Cooperative, Appropriate mood & affect, Normal judgment.       Impression and Plan   Diagnosis     Elevated AST (SGOT) (LJM61-KW R74.0).     Plan:  discussed concern or rising AST and ALT now 2x/ normal.  There is no big ratio difference between AST and ALT  normal alk phos  had mild elevation 2013  will obtain ultrasound of liver to look for NAFLD  explained this imaging has limitations and may be normal while the disease process can be present  if normal will follow enzymes in another 6 months, avoid high fat food diet, limit alcohol, discussed how certain medications are metoblized in liver  .    Orders     Orders (Selected)   Outpatient Orders  Ordered  US Abdomen (Request): Instructions: RUQ: rule out non alcoholic fatty liver disease, Elevated AST (SGOT).

## 2022-02-16 NOTE — PROGRESS NOTES
Chief Complaint    c/o bilateral shoulder pain present for a few weeks. No known injury.  History of Present Illness      pt presents to clinic with shoulder pain      bilateral  shoulder, started about 2 month ago, no specific injury, worse when laying down on either side or if she reaches up overhead.  She feels some tightness in the area and it is also painful when she touches over her shoulder.  She has been using some ice which seems to be helpful. she does not tolerate alieve or ibuprofen due to causing gerd and gastritis symptoms      Seems to be slowly worsening, now there are fewer movements that she can do without pain.  The pain is also occurring more often and has worsened  in intensity.      She is also concerned about her mom.  Her mom has a history of dementia and came to live with her after the death of her stepfather.  Patient has an estranged relationship with her mother.  Her mom and dad were  and she grown up with her dad.  She felt a familial obligation however her mom hates being in [place] and is rather hostile verbally to her daughter.  We did discuss that this may be 1 of the patient's mother's defense mechanisms for dealing with her dementia or it may just be her mother's personality or either an atypical depression.  Regardless patient affect certainly becomes visibly different when discussing her relationship with her mother.  She is feeling rather burned out and angry towards her mother.      Review of systems is negative except as per HPI including:  no fevers, chills, sore throat, runny nose, nausea, vomiting, constipation, diarrhea, rash or new skin lesions, chest pain, palpitations, slurred speech, new paresthesia, shortness of breath or wheeze.      Exam:      General: alert and oriented ×3 no acute distress.      HEENT: pupils are equal round and reactive to light extraocular motion is intact. Normocephalic and atraumatic.       Hearing is grossly normal and there is no  otorrhea.       Nares are patent there is no rhinorrhea.       Mucous membranes are moist and pink.      Chest: has bilateral rise with no increased work of breathing.      Cardiovascular: normal perfusion and brisk capillary refill.      Musculoskeletal: no gross focal abnormalities and normal gait.  neg bilateral dropped arm and empty can, + griffin left, ttp at left AC bursa, no decreased ROM, nontender at biceps tendon      Neuro: no gross focal abnormalities and memory seems intact.      Psychiatric: speech is clear and coherent and fluent. Patient dressed appropriately for the weather. Mood is irritated and mad and affect is full.  she becomes more angry as she discusses her mom insight is good judgment is fair.                     Discussed with patient to return to clinic if symptoms worsen or do not improve       Encouraged her to do home exercises including making circles with her arm while her arm goes down and to walk her fingers up the wall.  Would like her to do [place] cup ice massages 3-4 times daily.  If her symptoms do not improve within 1 month would recommend that she return to clinic to consider injection.    Physical Exam   Vitals & Measurements    T: 98.1   F (Tympanic)  HR: 76(Peripheral)  BP: 118/64  SpO2: 99%     HT: 65 in  WT: 180.4 lb  BMI: 30.02   Assessment/Plan       Biceps tendonitis of both shoulders (M75.21)         Ordered:          30480 office outpatient visit 25 minutes (Charge), Quantity: 1, Impingement syndrome of both shoulders  Biceps tendonitis of both shoulders  Bilateral shoulder pain                Bilateral shoulder pain (M25.511)         Ordered:          17880 office outpatient visit 25 minutes (Charge), Quantity: 1, Impingement syndrome of both shoulders  Biceps tendonitis of both shoulders  Bilateral shoulder pain          Physical Therapy (Request), Bilateral shoulder pain                Caregiver burden (Z63.6)                GERD (gastroesophageal reflux  disease) (K21.9)                History of NSAID-associated gastropathy (Z87.19)                Impingement syndrome of both shoulders (M75.41)         Ordered:          33404 office outpatient visit 25 minutes (Charge), Quantity: 1, Impingement syndrome of both shoulders  Biceps tendonitis of both shoulders  Bilateral shoulder pain                Orders:         celecoxib, = 1 cap(s) ( 100 mg ), Oral, bid, PRN: pain, # 60 cap(s), 3 Refill(s), Type: Maintenance, Pharmacy: [facility], 1 cap(s) Oral bid,PRN:pain, (Ordered)      Would like patient to do physical therapy, [place] cup ice massages, and we can try using Celebrex or Voltaren gel as needed to help with the inflammation and pain.  Will prescribe a prescription for both however patient was clearly instructed not to use both of these on the same day.  If her condition worsens or does not improve we could consider getting some imaging and proceeding with some subacromial and biceps tendinitis injections.       In regards to her caregiver burden I suggested she consider working with a counselor to develop some tools to use to deal with her mom.  She may also want to consider that if her mom expresses that she would be happier in [place] than perhaps they should work on getting her into an assisted living facility in [place].  She may also want to consider helping her mom a get established with an assisted living facility closer to her.  Patient is concerned that her mom would opposed to moving into an assisted living facility or memory care unit however I did point out that at some point patient's dementia would likely progress to a point where it was no longer safe for her to be left home alone and that having her mom in her house does not seem to make her mom happy either.  It does however contribute to the patient's caregiver burnout which is certainly detrimental to the patient's mental health.  Patient had hoped that living together would allow them to  establish a better relationship and get to know each other however with her mom's dementia it seems unlikely that she will develop meaningful new relationships at this point in her life.  She certainly welcome to follow-up with me to discuss this further and if she feels that she needs some medication to help her with her mood we could explore that further.  45 minutes was spent with patient in direct face-to-face contact of which greater than 50% of the time was spent counseling patient and coordinating care.  Patient Information     Name:ZAK EVERETT      Address:      [address] [place] [id number]     Sex:Female     YOB: 1955     [phone]     Emergency Contact:JALEN MATA     MRN:[id number]     FIN:[id number]     Location:[facility]     Date of Service:11/20/2019      Primary Care Physician:       Bessy Medina, [phone]      Attending Physician:       Irena Levi MD, [phone]  Problem List/Past Medical History    Ongoing     Dysfunctional uterine bleeding     GERD (gastroesophageal reflux disease)     Hypertriglyceridemia     Hypothyroid     MVP (mitral valve prolapse)     Obese     Personal History of DVT (Deep Vein Thrombosis)       Comments: provoked from ankle fracture and wearing cam walking boot     S/P meniscectomy    Historical     Pregnancy     Pregnancy     Pregnancy  Procedure/Surgical History     Colonoscopy (08/30/2017)            Comments: Indication: screening      Sedation: F&V      Normal      Repeat in 10 years.     Arthroscopic partial medial meniscectomy (09/27/2016)            Comments: Right knee      Dr. Amin at [facility].     Laparoscopic supracervical hysterectomy (11/19/2010)            Comments: Pre- and post-op diagnosis is menorrhagia.     Hysteroscopy, D & C (04/23/2010)            Comments: Large pelvic mass, most consistent with uterine fibroids.     Colonoscopy (07/26/2007)            Childbirth (04/25/1988)            Comments: Patient Comment: Male.     Childbirth (03/27/1986)            Comments: Female. G3, P2-0-1-2..     Cholecystectomy            Comments: Open.  Medications    amitriptyline 50 mg oral tablet, 50 mg= 1 tab(s), Oral, hs, 3 refills    CeleBREX 100 mg oral capsule, 100 mg= 1 cap(s), Oral, bid, PRN, 3 refills    Estrace Vaginal 0.1 mg/g vaginal cream, See Instructions, 3 refills    Estrace Vaginal 0.1 mg/g vaginal cream, See Instructions, 2 refills    Fish Oil, 1000 mg, Oral, daily    levothyroxine 150 mcg (0.15 mg) oral tablet, 150 mcg= 1 tab(s), Oral, daily, 3 refills    Metoprolol Tartrate 50 mg oral tablet, 25 mg= 0.5 tab(s), Oral, daily, 3 refills    Metoprolol Tartrate 50 mg oral tablet, 25 mg= 0.5 tab(s), Oral, daily, 3 refills    raNITIdine 300 mg oral tablet, 300 mg= 1 tab(s), Oral, hs, 3 refills    triamcinolone 0.5% topical cream, 1 michelle, Topical, bid    Zantac 150 oral tablet, 150 mg= 1 tab(s), Oral, bid, 2 refills  Allergies    No Known Medication Allergies  Social History    Smoking Status - 11/20/2019     Never smoker     Alcohol      Current, Wine (5 oz), 1-2 times per week, 1 drinks/episode average. 2 drinks/episode maximum. Ready to change: No., 08/26/2019     Employment/School      Retired, 08/04/2017     Exercise      Exercise frequency: 3-4 times/week. Exercise type: garden in summer, health club., 08/26/2019     Home/Environment      Marital status: . Spouse/Partner name: Ricardo. Risks in environment: Does not wear helmet, owns secured gun., 08/04/2017     Nutrition/Health      Diet: Low Carb. Type of diet: Low carbohydrate. Wants to lose weight: Yes. Sleeping concerns: No. Feels highly stressed: No., 08/26/2019     Sexual      Sexually active: Yes. Sexual orientation: Heterosexual. Contraceptive Use Details: None., 08/04/2017     Substance Abuse      Never, 03/02/2012     Tobacco      Never, 03/02/2012  Family History    Anxiety: Daughter and  Son.    Arthritis: Mother.    Cataracts: Mother.    Chicken pox: Daughter and Son.    Goiter: Mother.    HBP - High blood pressure: Father.    Hearing loss: Mother.    Heart disease: Father and Grandmother (P).    Hypertension: Mother.    Obesity: Father.    Overweight: Father.    Rheumatoid arthritis: Mother.    Snoring: Father.    Wears glasses: Mother, Father, Daughter, Son and Grandmother (P).  Immunizations      Vaccine Date Status Comments      zoster vaccine, inactivated 08/06/2019 Given      zoster vaccine, inactivated 03/26/2019 Given      influenza virus vaccine, inactivated 10/03/2018 Recorded      influenza virus vaccine, inactivated 11/03/2016 Recorded      influenza virus vaccine, inactivated 10/23/2015 Recorded      influenza virus vaccine, inactivated 12/01/2014 Recorded      influenza virus vaccine, inactivated 11/19/2013 Recorded [11/21/2013] given at [facility]      tetanus/diphth/pertuss (Tdap) adult/adol 02/28/2013 Given      influenza virus vaccine, inactivated 10/10/2012 Recorded      influenza virus vaccine, inactivated 10/14/2011 Recorded      influenza virus vaccine, inactivated 02/16/2011 Given      influenza, H1N1, inactivated 02/12/2010 Recorded      Td 12/01/2003 Recorded

## 2022-02-16 NOTE — TELEPHONE ENCOUNTER
---------------------  From: Abby Laws   To: ZAK EVERETT    Sent: 6/30/2021 5:41:05 PM CDT  Subject: General Message     Lab work  results as discussed on the phone, return for additional labs, thank you.    KAYDEN Fritz      Results:  Date Result Name Ind Value Ref Range   6/29/2021 11:45 AM Sodium Level  135 mmol/L (135 - 146)   6/29/2021 11:45 AM Potassium Level  4.8 mmol/L (3.5 - 5.3)   6/29/2021 11:45 AM Chloride Level  101 mmol/L (98 - 110)   6/29/2021 11:45 AM CO2 Level  26 mmol/L (20 - 32)   6/29/2021 11:45 AM Glucose Level ((H)) 212 mg/dL (65 - 99)   6/29/2021 11:45 AM BUN  20 mg/dL (7 - 25)   6/29/2021 11:45 AM Creatinine Level  0.93 mg/dL (0.50 - 0.99)   6/29/2021 11:45 AM BUN/Creat Ratio  NOT APPLICABLE (6 - 22)   6/29/2021 11:45 AM eGFR  65 mL/min/1.73m2 (> OR = 60 - )   6/29/2021 11:45 AM eGFR African American  76 mL/min/1.73m2 (> OR = 60 - )   6/29/2021 11:45 AM Calcium Level  9.6 mg/dL (8.6 - 10.4)   6/29/2021 11:45 AM Magnesium Level  2.1 mg/dL (1.5 - 2.5)   6/29/2021 11:45 AM WBC  5.3 (3.8 - 10.8)   6/29/2021 11:45 AM RBC  3.96 (3.80 - 5.10)   6/29/2021 11:45 AM Hgb  11.9 gm/dL (11.7 - 15.5)   6/29/2021 11:45 AM Hct  36.3 % (35.0 - 45.0)   6/29/2021 11:45 AM MCV  91.7 fL (80.0 - 100.0)   6/29/2021 11:45 AM MCH  30.1 pg (27.0 - 33.0)   6/29/2021 11:45 AM MCHC  32.8 gm/dL (32.0 - 36.0)   6/29/2021 11:45 AM RDW  14.3 % (11.0 - 15.0)   6/29/2021 11:45 AM Platelet  207 (140 - 400)   6/29/2021 11:45 AM MPV  10.2 fL (7.5 - 12.5)

## 2022-02-16 NOTE — PROGRESS NOTES
Patient:   ZAK EVERETT            MRN: [id number]            FIN: [id number]               Age:   65 years     Sex:  Female     :  1955   Associated Diagnoses:   Hypertriglyceridemia; New onset type 2 diabetes mellitus; Obese   Author:   Abby Laws      Visit Information      Date of Service: 2021 09:40 am  Performing Location: [facility]  Encounter#: [id number]      Primary Care Provider (PCP):  NONE ,       Referring Provider:  Abby Laws    NPI# [id number]      Chief Complaint   2021 9:42 AM CDT    DM follow up      History of Present Illness   here for first DM visit  DM picked up on pre op labs with random glucose of 212, confirmed with fasting glucose 136. She has been pre-diabetic for a few years. She returned after her surgery for additional lab work but A1 C was not drawn, will do that today to confirm. Many questions regarding dx, here with , eager to reduce risks that come with DM  non smoker  eye exam up to day  has gained #10 during pandemic, hopes to start going back to the [facility]      Health Status   Allergies:    Allergic Reactions (Selected)  No Known Medication Allergies   Medications:  (Selected)   Prescriptions  Prescribed  Metoprolol Tartrate 50 mg oral tablet: = 0.5 tab(s), Oral, daily, # 45 tab(s), 2 Refill(s), Type: Maintenance, Pharmacy: [facility], 0.5 tab(s) Oral daily, 65.75, in, 20 12:58:00 CDT, Height Measured, 186.2, lb, 20 12:58:00 CDT, Weight Measured  amitriptyline 50 mg oral tablet: = 1 tab(s) ( 50 mg ), Oral, hs, # 90 tab(s), 3 Refill(s), Type: Maintenance, Pharmacy: [facility], 1 tab(s) Oral hs, 65.75, in, 20 12:58:00 CDT, Height Measured, 186.2, lb, 20 12:58:00 CDT, Weight Measured  esomeprazole 20 mg oral delayed release capsule: = 1 cap(s) ( 20 mg ), Oral, daily, # 90 cap(s), 3 Refill(s), Type: Maintenance, Pharmacy: [facility], 1 cap(s) Oral  daily, 65.75, in, 08/25/20 12:58:00 CDT, Height Measured, 186.2, lb, 08/25/20 12:58:00 CDT, Weight Measured  levothyroxine 150 mcg (0.15 mg) oral tablet: = 1 tab(s) ( 150 mcg ), po, daily, # 90 tab(s), 3 Refill(s), Type: Maintenance, Pharmacy: [facility], 1 tab(s) Oral daily, 65.75, in, 08/25/20 12:58:00 CDT, Height Measured, 186.2, lb, 08/25/20 12:58:00 CDT, Weight Measured  Documented Medications  Documented  Fish Oil: ( 1,000 mg ), po, daily, 0 Refill(s), Type: Maintenance  Hair, Skin & Nails: Instructions: 2 Gummies, 1x per day, 0 Refill(s), Type: Soft Stop  Multiple Vitamins oral tablet: Oral, daily, 0 Refill(s), Type: Maintenance  OTC - Magnesium: OTC - Magnesium, Oral, daily, 0 Refill(s), Type: Maintenance  OTC - Potassium: OTC - Potassium, Oral, daily, 0 Refill(s), Type: Maintenance,    Medications          *denotes recorded medication          *OTC - Potassium: Oral, daily, 0 Refill(s).          *OTC - Magnesium: Oral, daily, 0 Refill(s).          amitriptyline 50 mg oral tablet: 50 mg, 1 tab(s), Oral, hs, 90 tab(s), 3 Refill(s).          *Hair, Skin & Nails: 2 Gummies, 1x per day, 0 Refill(s).          esomeprazole 20 mg oral delayed release capsule: 20 mg, 1 cap(s), Oral, daily, 90 cap(s), 3 Refill(s).          levothyroxine 150 mcg (0.15 mg) oral tablet: 150 mcg, 1 tab(s), po, daily, 90 tab(s), 3 Refill(s).          Metoprolol Tartrate 50 mg oral tablet: 0.5 tab(s), Oral, daily, 45 tab(s), 2 Refill(s).          *Multiple Vitamins oral tablet: Oral, daily, 0 Refill(s).          *Fish Oil: 1,000 mg, po, daily, 0 Refill(s).       Problem list:    All Problems  GERD (gastroesophageal reflux disease) / SNOMED CT 425197507 / Confirmed  History of NSAID-associated gastropathy / SNOMED CT 746542971 / Confirmed  S/P meniscectomy / SNOMED CT 8115920404 / Confirmed  Personal History of DVT (Deep Vein Thrombosis) / SNOMED CT 1320353374 / Confirmed  provoked from ankle fracture and wearing cam walking  boot  Hypertriglyceridemia / SNOMED CT 897956173 / Confirmed  Hypothyroid / SNOMED CT 60714985 / Confirmed  MVP (mitral valve prolapse) / SNOMED CT 4978489938 / Confirmed  Obese / SNOMED CT 5988864219 / Probable  Inactive: Dysfunctional uterine bleeding / SNOMED CT 94442605  Resolved: Pregnancy / SNOMED CT 130765440  Resolved: Pregnancy / SNOMED CT 797287247  Resolved: Pregnancy / SNOMED CT 604485994  Resolved: Anticoagulated / SNOMED CT 666870621  heparin injections approx 2 weeks after DVT from immobility of ankle injury      Histories   Past Medical History:    Active  Hypertriglyceridemia (729535447): Onset on 3/10/2011 at 55 years.  Comments:  3/10/2011 CST 4:44 PM CST -     Hypothyroid (66094012)  GERD (gastroesophageal reflux disease) (962598498)  MVP (mitral valve prolapse) (1106844703)  Obese (8790188265)  Resolved  Anticoagulated (394402552):  Resolved.  Comments:  2021 CDT 11:28 AM CDT - Abby Laws  heparin injections approx 2 weeks after DVT from immobility of ankle injury  Pregnancy (826968694):  Resolved in  at 26 years.  Pregnancy (300487061):  Resolved in  at 30 years.  Pregnancy (605364970):  Resolved in  at 32 years.   Family History:    Chicken pox  Daughter (Jessi Hodges)  Comments:  8/15/2018 6:42 AM CDT - Jodi Fiore CMA  age 5  Son (Clarke Hitchcock)  Comments:  8/15/2018 6:42 AM CDT - [place]  [place] 3  Wears glasses  Mother (Cheryl Marr)  Comments:  8/15/2018 6:42 AM CDT - [place] Jodi ELIZABETH  Diagnosed in High School  Daughter (Jessi Hodges)  Comments:  8/15/2018 6:42 AM CDT - Jodi Fiore CMA  reading only early 20&#39;s  Father (Rene Cheema, )  Comments:  8/15/2018 6:42 AM CDT - [place] Jodi ELIZABETH  don&#39;t know  Grandmother (P) (Allyn Deni, )  Comments:  8/15/2018 6:42 AM Jodi Sandy CMA  don&#39;t know  Son (Clarke Hitchcock)  Comments:  8/15/2018 6:42 AM Jodi Sandy CMA  since middle  school  Anxiety  Daughter (Jessi Hodges)  Comments:  8/15/2018 6:42 AM CDT - [place] Jodi ELIZABETH  age 20  Son (Clarke Hitchcock)  Rheumatoid arthritis  Mother (Cheryl Marr)  Comments:  8/15/2018 6:42 AM CDT - [place] GLENN Jodi  years ago  Hypertension  Mother (Cheryl Marr)  Heart disease  Father (Rene Cheema, )  Comments:  8/15/2018 6:42 AM CDT - [place] GLENN Jodi  don&#39;t know age  Grandmother (P) (Allyn Cheema, )  HBP - High blood pressure  Father (Rene Cheema, )  Comments:  8/15/2018 6:42 AM CDT - [place] GLENN Jodi  in his 40&#39;s  Arthritis  Mother (Cheryl Marr)  Comments:  8/15/2018 6:42 AM CDT - [place] Jodi ELIZABETH  years  Hearing loss  Mother (Cheryl Marr)  Comments:  8/15/2018 6:42 AM CDT - [place] Jodi ELIZABETH  years ago  Snoring  Father (Rene Cheema, )  Comments:  8/15/2018 6:42 AM CDT - [place] GLENN Jodi  don&#39;t know  Overweight  Father (Rene Cheema, )  Comments:  8/15/2018 6:42 AM CDT - [place] GLENN Jodi  in his 40&#39;s  Obesity  Father (Rene Cheema, )  Comments:  8/15/2018 6:42 AM CDT - [place] GLENN Jodi  in his 40&#39;s  Goiter  Mother (Cheryl Marr)  Cataracts  Mother (Cheryl Marr)  Comments:  8/15/2018 6:42 AM CDT - [place] GLENN Jodi  Both eyes operated on 18 &amp; 18     Procedure history:    Colonoscopy (SNOMED CT 458432557) performed by Collin Almeida MD on 2017 at 61 Years.  Comments:  2018 7:26 PM CDT - Jodi Fiore CMA  Indication: screening  Sedation: F&V  Normal   Repeat in 10 years  Arthroscopic partial medial meniscectomy (SNOMED CT 146529414) on 2016 at 60 Years.  Comments:  11/10/2016 3:38 PM CST - Jodi Fiore CMA  Right knee  Dr. Amin at [facility]  Laparoscopic supracervical hysterectomy (SNOMED CT 530468771) performed by Markus Escalante MD on 2010 at 55 Years.  Comments:  2010 2:39 PM CST - Janice Myers  Pre- and post-op diagnosis is  menorrhagia  Hysteroscopy, D & C performed by Markus Escalante MD on 4/23/2010 at 54 Years.  Comments:  12/31/2010 1:46 PM CST - Janice Myers  Large pelvic mass, most consistent with uterine fibroids  Colonoscopy (SNOMED CT 288773749) on 7/26/2007 at 51 Years.  TL - Tubal ligation (SNOMED CT 799327732) on 4/26/1988 at 32 Years.  Childbirth (SNOMED CT 7620584131) on 4/25/1988 at 32 Years.  Comments:  8/15/2018 11:42 AM CDT Jodi Chaidez CMA  Patient Comment: Male  gall bladder (SNOMED CT 4852330126) on 2/11/1987 at 31 Years.  Comments:  8/25/2020 11:46 AM Yumiko Murray CMA  Patient Comment: Removed gall bladder stones , opened the clogged bile duct, wore an outward bag for six weeks to catch bile till everything was healed.  Childbirth (SNOMED CT 4284157974) on 3/28/1986 at 30 Years.  Comments:  8/25/2020 11:46 AM Yumiko Murray CMA  Patient Comment: In [place]  Childbirth (SNOMED CT 0413053552) on 3/27/1986 at 30 Years.  Comments:  8/15/2018 11:42 AM Jodi Sandy CMA  Female    11/1/2011 7:27 PM ART - Jen Robertson  G3, P2-0-1-2.  Cholecystectomy (SNOMED CT 97242358).  Comments:  4/21/2010 4:33 PM CDT - Sharon Arevalo MD     Social History:        Electronic Cigarette/Vaping Assessment            Electronic Cigarette Use: Never.      Alcohol Assessment            Current, Wine (5 oz), 1-2 times per week, 1 drinks/episode average.  2 drinks/episode maximum.  Ready to               change: No.      Tobacco Assessment            Never (less than 100 in lifetime)      Substance Abuse Assessment            Never      Employment and Education Assessment            Retired      Home and Environment Assessment            Marital status: .  Spouse/Partner name: Ricardo.  Risks in environment: Does not wear helmet, owns               secured gun.      Nutrition and Health Assessment            Type of diet: Low carbohydrate.      Exercise and Physical Activity Assessment             Exercise frequency: 3-4 times/week.  Exercise type: garden in summer, health club.      Sexual Assessment            Sexually active: Yes.  Sexual orientation: Heterosexual.  Contraceptive Use Details: None.        Physical Examination   VS/Measurements   General:  Alert and oriented, No acute distress.    Respiratory:  Lungs are clear to auscultation, Respirations are non-labored.    Cardiovascular:  Normal rate, Regular rhythm, No murmur.    Musculoskeletal:  Normal range of motion, Normal gait.    Integumentary:  Warm, Dry, Pink.    Neurologic:  Alert, Oriented, Normal sensory, Normal motor function, normal monofillament testing.       Review / Management   Results review:  Lab results   7/6/2021 8:50 AM CDT Glucose Level 136 mg/dL  HI    Cholesterol 184 mg/dL    Non-HDL Cholesterol 149  HI    HDL 35 mg/dL  LOW    Cholesterol/HDL Ratio 5.3  HI      HI    Triglyceride 271 mg/dL  HI    TSH 3.12 mIU/L    U Creatinine 83 mg/dL    U Microalbumin 0.2 mg/dL    Ur Microalbumin/Creatinine Ratio 2   6/29/2021 11:45 AM CDT Sodium Level 135 mmol/L    Potassium Level 4.8 mmol/L    Chloride Level 101 mmol/L    CO2 Level 26 mmol/L    Glucose Level 212 mg/dL  HI    BUN 20 mg/dL    Creatinine Level 0.93 mg/dL    BUN/Creat Ratio NOT APPLICABLE    eGFR 65 mL/min/1.73m2    eGFR African American 76 mL/min/1.73m2    Calcium Level 9.6 mg/dL    Magnesium Level 2.1 mg/dL    WBC 5.3    RBC 3.96    Hgb 11.9 gm/dL    Hct 36.3 %    MCV 91.7 fL    MCH 30.1 pg    MCHC 32.8 gm/dL    RDW 14.3 %    Platelet 207    MPV 10.2 fL   .       Impression and Plan   Diagnosis     Hypertriglyceridemia (ZHL51-OU E78.1).     New onset type 2 diabetes mellitus (CZV63-LT E11.9).     Obese (XJC47-UI E66.9).     Plan:  counseled regarding expectations and desired outcomes of DM treatment/management  advised see Vane Ramirez RD for education set up with glucose monitoring  Advised every 6 month visits if A1C under 8.0  Advised dental/eye exams and  monofillament testing and self feet checks  educated re use/risk/benefits meds.    Patient Instructions:       Counseled: Patient, Regarding diagnosis, Regarding treatment, Regarding medications, Verbalized understanding.    Orders     Orders (Selected)   Outpatient Orders  Ordered (In Transit)  Hemoglobin A1c* ([facility]): Specimen Type: Blood, Collection Date: 07/16/21 9:52:00 CDT  Prescriptions  Prescribed  Lipitor 20 mg oral tablet: = 1 tab(s) ( 20 mg ), Oral, daily, # 90 tab(s), 1 Refill(s), Type: Maintenance, Pharmacy: [facility], 1 tab(s) Oral daily, 65, in, 06/29/21 11:06:00 CDT, Height Measured, 197.3, lb, 07/16/21 9:42:00 CDT, Weight Measured  metFORMIN 500 mg oral tablet, extended release: = 2 tab(s) ( 1,000 mg ), Oral, daily, Instructions: with evening meal, # 180 EA, 1 Refill(s), Type: Maintenance, Pharmacy: [facility], 2 tab(s) Oral daily,Instr:with evening meal, 65, in, 06/29/21 11:06:00 CDT, Height Measured, 197.3, lb....

## 2022-02-16 NOTE — LETTER
(Inserted Image. Unable to display)   August 25, 2021    ZAK EVERETT  [address] [place] [address]            Dear ZAK,      Thank you for selecting [facility] for your healthcare needs.    Our records indicate you are due for the following services:     Annual Physical.    (FYI   Regarding office visits: In some instances, a video visit or telephone visit may be offered as an option.)      To schedule an appointment or if you have further questions, please contact your clinic at [phone].      Powered by [employer]    Sincerely,    Irena Levi MD

## 2022-02-16 NOTE — PROGRESS NOTES
Patient:   ZAK EVERETT            MRN: [id number]            FIN: [id number]               Age:   61 years     Sex:  Female     :  1955   Associated Diagnoses:   Cough; Sore throat; Eczema   Author:   Bessy Medina      Chief Complaint   10/12/2017 1:11 PM CDT   Pt here for productive cough and sore throat on and off x month.      History of Present Illness   PPC for evaluation of sore throat which has been present for 4 weeks but worse over past 24 hours, has had productive cough for one week  [place] 2 week old granddaughter  no reflux, no HA, no severe congestion, no hx of allergies, no asthma      Review of Systems   Constitutional:  Negative.    Eye:  Negative.    Ear/Nose/Mouth/Throat:  Sore throat, No nasal congestion.    Respiratory:  Cough.    Cardiovascular:  Negative.    Gastrointestinal:  Negative.    Genitourinary:  Negative.    Hematology/Lymphatics:  Negative.    Immunologic:  Negative.    Musculoskeletal:  Negative.    Integumentary:  Negative, eczema on thumbs bilaterally.    Neurologic:  Negative.    Psychiatric:  Negative.             Health Status   Allergies:    Allergic Reactions (Selected)  No known allergies   Medications:  (Selected)   Prescriptions  Prescribed  Estrace Vaginal 0.1 mg/g vaginal cream: See Instructions, Instructions: INSERT 1 GRAM VAGINALLY THREE TIMES PER WEEK, # 42 gm, 2 Refill(s), Type: Maintenance, Pharmacy: [facility], INSERT 1 GRAM VAGINALLY THREE TIMES PER WEEK  Metoprolol Tartrate 50 mg oral tablet: 0.5 tab(s) ( 25 mg ), po, daily, # 45 tab(s), 3 Refill(s), Type: Maintenance, Pharmacy: [facility], 0.5 tab(s) po daily  Zantac 150 oral tablet: 1 tab(s) ( 150 mg ), PO, BID, # 60 tab(s), 2 Refill(s), Type: Maintenance, Pharmacy: [facility], 1 tab(s) po bid  Zithromax 250 mg oral tablet: 1 packet(s), PO, Once, Instructions: as directed on package labeling, # 6 tab(s), 0 Refill(s), Type: Soft Stop,  Pharmacy: [facility], 1 packet(s) po once,Instr:as directed on package labeling  amitriptyline 25 mg oral tablet: 2 tab(s) ( 50 mg ), po, hs, # 180 tab(s), 3 Refill(s), Type: Maintenance, Pharmacy: [facility], 2 tab(s) po hs,x90 day(s)  levothyroxine 150 mcg (0.15 mg) oral tablet: 1 tab(s) ( 150 mcg ), po, daily, # 90 tab(s), 3 Refill(s), Type: Maintenance, Pharmacy: [facility], 1 tab(s) po daily  Documented Medications  Documented  Fish Oil: po, daily, 0 Refill(s), Type: Maintenance      Histories   Past Medical History:    Active  Hypertriglyceridemia (970865894): Onset on 3/10/2011 at 55 years.  Comments:  3/10/2011 CST 4:44 PM CST -     Hypothyroid (43323629)  GERD (gastroesophageal reflux disease) (873888858)  MVP (mitral valve prolapse) (4598211892)  Obese (6029445545)  Resolved  Pregnancy (285253152):  Resolved in 1982 at 26 years.  Pregnancy (450625218):  Resolved in 1986 at 30 years.  Pregnancy (582237615):  Resolved in 1988 at 32 years.      Physical Examination   Vital Signs   10/12/2017 1:11 PM CDT Temperature Tympanic 98.3 DegF    Peripheral Pulse Rate 64 bpm    Pulse Site Radial artery    Respiratory Rate 16 br/min    Systolic Blood Pressure 124 mmHg    Diastolic Blood Pressure 72 mmHg    Mean Arterial Pressure 89 mmHg    BP Site Right arm    Oxygen Saturation 99 %      Measurements from flowsheet : Measurements   10/12/2017 1:11 PM CDT Height Measured - Standard 65 in    Weight Measured - Standard 186 lb    BSA 1.97 m2    Body Mass Index 30.95 kg/m2      General:  Alert and oriented, No acute distress.    Eye:  Pupils are equal, round and reactive to light.    HENT:  Normocephalic.         Head: normocephalic.         Ear: Both ears, Middle ear, Tympanic membrane ( Fluid in middle ear, bilaterally ).         Nose: Both nostrils, erythematous, clear discharge.         Sinus: Bilateral, Maxillary sinus, Within normal limits.         Mouth: Within normal limits.          Throat: Pharynx ( Erythematous, moderate amount clear post nasal discharge ).         Glands: Bilateral, anterior cervical chain, shotty bilaterally.    Neck:  Supple.    Respiratory:  Lungs are clear to auscultation.    Cardiovascular:  Normal rate, Regular rhythm.    Gastrointestinal:  Soft, Non-tender.    Integumentary:  Warm, Dry, Pink, dry flaky non infected skin on right thumb pad.    Neurologic:  Alert, Oriented, Normal sensory.    Psychiatric:  Cooperative, Appropriate mood & affect.       Impression and Plan   Diagnosis     Cough (AZY30-YB R05).     Eczema (FPL11-PK L30.9).     Sore throat (ZSH79-FD J02.9).     Plan:  will treat with zithromax without strep culture based on length of symptoms and concern with presentation around 2 week old  if not improving please let me know, consider allergies and may not be bad idea to try OTC antihistamine  vaseline to thumb, if flaking is worsening can use steroids.    Orders     Orders (Selected)   Prescriptions  Prescribed  Zithromax 250 mg oral tablet: 1 packet(s), PO, Once, Instructions: as directed on package labeling, # 6 tab(s), 0 Refill(s), Type: Soft Stop, Pharmacy: [facility], 1 packet(s) po once,Instr:as directed on package labeling.

## 2022-02-16 NOTE — NURSING NOTE
Quick Intake Entered On:  9/24/2021 12:47 PM CDT    Performed On:  9/21/2021 12:47 PM CDT by Kamini Ramirez               Summary   Menstrual Status :   Postmenopausal   Weight Measured :   186.9 lb(Converted to: 186 lb 14 oz, 84.776 kg)    Height Measured :   65 in(Converted to: 5 ft 5 in, 165.10 cm)    Body Mass Index :   31.1 kg/m2 (HI)    Body Surface Area :   1.97 m2   Languages :   English   Kamini Ramirez - 9/24/2021 12:47 PM CDT

## 2022-02-16 NOTE — PROGRESS NOTES
Patient:   ZAK EVERETT            MRN: [id number]            FIN: [id number]               Age:   63 years     Sex:  Female     :  1955   Associated Diagnoses:   Common wart; Skin cancer screening   Author:   Abby Laws      Visit Information      Date of Service: 2019 11:59 am  Performing Location: [facility]  Encounter#: [id number]      Primary Care Provider (PCP):  Bessy Medina    NPI# [id number]      Referring Provider:  Abby Laws    NPI# [id number]   Visit type:  General concerns.    Source of history:  Self.       Chief Complaint   Here for a skin examination for cancer/suspicious moles   here at the referral of Dr Levi for 2 worrisome lesions mid back  Zak has never had a skin cancer but  mom with SCC    she has a rough area under left ear for a few years, denies hx of eczema  she has a pink raised rough spot right wrist she treated with home freezing therapy as a wart      History of Present Illness      Health Status   Allergies:    Allergic Reactions (Selected)  No Known Medication Allergies   Medications:  (Selected)   Prescriptions  Prescribed  Estrace Vaginal 0.1 mg/g vaginal cream: See Instructions, Instructions: INSERT 1 GRAM VAGINALLY THREE TIMES PER WEEK, # 42.5 gm, 3 Refill(s), Type: Maintenance, Pharmacy: [facility], INSERT 1 GRAM VAGINALLY THREE TIMES PER WEEK  Estrace Vaginal 0.1 mg/g vaginal cream: See Instructions, Instructions: INSERT 1 GRAM VAGINALLY THREE TIMES PER WEEK, # 42.5 gm, 3 Refill(s), Type: Maintenance, Pharmacy: [facility], INSERT 1 GRAM VAGINALLY THREE TIMES PER WEEK  Metoprolol Tartrate 50 mg oral tablet: = 0.5 tab(s) ( 25 mg ), po, daily, # 45 tab(s), 3 Refill(s), Type: Maintenance, Pharmacy: [facility], Please disregard fill on 8/15/18 of metoprolol succinate. Pt is to remain on tartrate. Thank you., 0.5 tab(s) Oral daily  Metoprolol Tartrate 50 mg oral tablet: = 0.5 tab(s) ( 25 mg  ), po, daily, # 45 tab(s), 3 Refill(s), Type: Maintenance, Pharmacy: [facility], Please disregard fill on 8/15/18 of metoprolol succinate. Pt is to remain on tartrate. Thank you., 0.5 tab(s) Oral daily  Zantac 150 oral tablet: 1 tab(s) ( 150 mg ), PO, BID, # 60 tab(s), 2 Refill(s), Type: Maintenance, Pharmacy: [facility], 1 tab(s) po bid  amitriptyline 25 mg oral tablet: = 2 tab(s) ( 50 mg ), po, hs, # 180 tab(s), 0 Refill(s), Type: Maintenance, Pharmacy: [facility], 2 tab(s) Oral hs  amitriptyline 50 mg oral tablet: = 1 tab(s) ( 50 mg ), Oral, hs, # 90 tab(s), 3 Refill(s), Type: Maintenance, Pharmacy: [facility], 1 tab(s) Oral hs  levothyroxine 150 mcg (0.15 mg) oral tablet: = 1 tab(s) ( 150 mcg ), po, daily, # 90 tab(s), 3 Refill(s), Type: Maintenance, Pharmacy: [facility], 1 tab(s) Oral daily  raNITIdine 300 mg oral tablet: = 1 tab(s) ( 300 mg ), Oral, hs, # 90 tab(s), 3 Refill(s), Type: Maintenance, Pharmacy: [facility], 1 tab(s) Oral hs  Documented Medications  Documented  Fish Oil: ( 1,000 mg ), po, daily, 0 Refill(s), Type: Maintenance   Problem list:    All Problems  Anticoagulated / SNOMED CT 623492302 / Confirmed  GERD (gastroesophageal reflux disease) / SNOMED CT 196322829 / Confirmed  Hypertriglyceridemia / SNOMED CT 697381535 / Confirmed  Hypothyroid / SNOMED CT 37140849 / Confirmed  MVP (mitral valve prolapse) / SNOMED CT 3137636803 / Confirmed  Obese / SNOMED CT 5903467861 / Probable  Personal History of DVT (Deep Vein Thrombosis) / SNOMED CT 1620174593 / Confirmed  provoked from ankle fracture and wearing cam walking boot  S/P meniscectomy / SNOMED CT 3618533645 / Confirmed  Inactive: Dysfunctional uterine bleeding / SNOMED CT 93327926  Resolved: Pregnancy / SNOMED CT 809516012  Resolved: Pregnancy / SNOMED CT 287356075  Resolved: Pregnancy / SNOMED CT 487639988      Histories   Past Medical History:     Active  Hypertriglyceridemia (822772564): Onset on 3/10/2011 at 55 years.  Comments:  3/10/2011 CST 4:44 PM CST -     Hypothyroid (27838622)  GERD (gastroesophageal reflux disease) (051948364)  MVP (mitral valve prolapse) (8148947799)  Obese (2249280883)  Resolved  Pregnancy (048215215):  Resolved in  at 26 years.  Pregnancy (650956910):  Resolved in  at 30 years.  Pregnancy (508361162):  Resolved in  at 32 years.   Family History:    Chicken pox  Daughter (Jessi Hodges)  Comments:  8/15/2018 6:42 AM CDT - [place] Jodi ELIZABETH  age 5  Son (Clarke Hitchcock)  Comments:  8/15/2018 6:42 AM CDT - [place] Jodi ELIZABETH  age 3  Wears glasses  Mother (Cheryl Marr)  Comments:  8/15/2018 6:42 AM CDT - [place] posada CMA  Diagnosed in High School  Daughter (Jessi Hodges)  Comments:  8/15/2018 6:42 AM CDT - [place] Jodi ELIZABETH  reading only early 20&#39;s  Father (Rene Cheema, )  Comments:  8/15/2018 6:42 AM CDT - [place] Jodi ELIZABETH  don&#39;t know  Grandmother (P) (Allyn Cheema, )  Comments:  8/15/2018 6:42 AM CDT - [place] Jodi ELIZABETH  don&#39;t know  Son (Clarke Hitchcock)  Comments:  8/15/2018 6:42 AM CDT - [place] Jodi ELIZABETH  since middle school  Anxiety  Daughter (Jessi Hodges)  Comments:  8/15/2018 6:42 AM CDT - [place] Jodi ELIZABETH  age 20  Son (Clarke Hitchcock)  Rheumatoid arthritis  Mother (Cheryl Marr)  Comments:  8/15/2018 6:42 AM CDT - Jodi Fiore CMA  years ago  Hypertension  Mother (Cheryl Marr)  Heart disease  Father (Rene Lastanthony, )  Comments:  8/15/2018 6:42 AM CDT - [place] Jodi ELIZABETH  don&#39;t know age  Grandmother (P) (Allyn Cheema, )  HBP - High blood pressure  Father (Rene Cheema, )  Comments:  8/15/2018 6:42 AM CDT - Jodi Fiore CMA  in his 40&#39;s  Arthritis  Mother (Cheryl Srinivasanteodora)  Comments:  8/15/2018 6:42 AM CDT - Jodi Fiore CMA  years  Hearing loss  Mother (Cheryl Marr)  Comments:  8/15/2018 6:42 AM CDT -  Adebayo ELIZABETHJodi  years ago  Snoring  Father (Rene Cheema, )  Comments:  8/15/2018 6:42 AM CDT - [place] Jodi ELIZABETH  don&#39;t know  Overweight  Father (Rene Cheema, )  Comments:  8/15/2018 6:42 AM CDT - [place] Jodi ELIZABETH  in his 40&#39;s  Obesity  Father (Rene Cheema, )  Comments:  8/15/2018 6:42 AM CDT - [place] Jodi ELIZABETH  in his 40&#39;s  Goiter  Mother (Cheryl Marr)  Cataracts  Mother (Cheryl Marr)  Comments:  8/15/2018 6:42 AM CDT - [place] GLENNJodi  Both eyes operated on 18 &amp; 18     Procedure history:    Colonoscopy (SNOMED CT 235922124) performed by Collin Almeida MD on 2017 at 61 Years.  Comments:  2018 7:26 PM CDT - Adebayo ELIZABETH Jodi  Indication: screening  Sedation: F&V  Normal   Repeat in 10 years  Arthroscopic partial medial meniscectomy (SNOMED CT 529763631) on 2016 at 60 Years.  Comments:  11/10/2016 3:38 PM CST - Jodi Fiore CMA  Right knee  Dr. Amin at [facility]  Laparoscopic supracervical hysterectomy (SNOMED CT 677208775) performed by Markus Escalante MD on 2010 at 55 Years.  Comments:  2010 2:39 PM Janice James  Pre- and post-op diagnosis is menorrhagia  Hysteroscopy, D & C performed by Markus Escalante MD on 2010 at 54 Years.  Comments:  2010 1:46 PM Janice James  Large pelvic mass, most consistent with uterine fibroids  Colonoscopy (SNOMED CT 561583253) on 2007 at 51 Years.  Childbirth (SNOMED CT 1839141170) on 1988 at 32 Years.  Comments:  8/15/2018 11:42 AM CDT - Jodi Fiore CMA  Patient Comment: Male  Childbirth (SNOMED CT 6024176025) on 3/27/1986 at 30 Years.  Comments:  8/15/2018 11:42 AM CDT - [place] Jodi ELIZABETH  Female    2011 7:27 PM CDT - Jen Robertson  G3, P2-0-1-2.  Cholecystectomy (SNOMED CT 27467343).  Comments:  2010 4:33 PM CDT - Irina WISE, Sharon Kulkarni     Social History:        Alcohol Assessment            Current, Wine (5 oz), 1-2  times per week, 1 drinks/episode average.  2 drinks/episode maximum.  Ready to               change: No.      Tobacco Assessment            Never      Substance Abuse Assessment            Never      Employment and Education Assessment            Retired      Home and Environment Assessment            Marital status: .  Spouse/Partner name: Ricardo.  Risks in environment: Does not wear helmet, owns               secured gun.      Nutrition and Health Assessment            Diet: Low Carb.  Type of diet: Low carbohydrate.  Wants to lose weight: Yes.  Sleeping concerns: No.  Feels               highly stressed: No.      Exercise and Physical Activity Assessment            Exercise frequency: 3-4 times/week.  Exercise type: garden in summer, health club.      Sexual Assessment            Sexually active: Yes.  Sexual orientation: Heterosexual.  Contraceptive Use Details: None.        Physical Examination   Vital Signs   8/27/2019 12:01 PM CDT Peripheral Pulse Rate 66 bpm    Systolic Blood Pressure 112 mmHg    Diastolic Blood Pressure 66 mmHg    Mean Arterial Pressure 81 mmHg      Measurements from flowsheet : Measurements   8/27/2019 12:01 PM CDT   Ht/Wt Measurement Refused by Patient?     Yes     General:  Alert and oriented, Skin.         Appearance: Within normal limits.    Integumentary:  Warm, Dry, Intact.         Skin phototype: Skin phototype- II. Usually burns, sometimes tans. Fair skin.         Integumentary exam: General appearance of patient is well developed, alert and attentive, well nourished  Pt is interested in having full skin exam     Skin is examined and specific lesions evaluated with dermoscope.     Conjunctiva and eye lids--no lesions of concern  scalp and face-rough 3 mm patch under left ear lobe, appears waxy like SK but some possible focal glomerular vessels also  will treat with steriod cream but if does resolve should consider sampling  Neck--no lesions of concern  Chest--no lesions of  concern  Abdomen--no lesions of concern  back--no lesions of concern except two prominent flat nevi. There are evaluated with dermascope and photos take. Both reticular with no worrisome features, will monitor  right upper extremity-rough [id number] x2mm patch at right wrist consistent with wart, treated with 3 freeze thaw cycles of liquid nitrogen 10 seconds each, tolerated well and covered  left upper extremity--no lesions of concern  right lower extremity--no lesions of concern  left lower extremity--no lesions of concern  Feet and hands--no lesions of concern.      (Inserted Image. Unable to display)   2019-08-27 L mid back magnified (Inserted Image. Unable to display)   2019-08-27 Right mid back magnified         Impression and Plan   Diagnosis     Common wart (JMR08-VX B07.8).     Skin cancer screening (CZB89-FX Z12.83).     Plan:  discussed sun protection, ABCDE of moles, follow up as needed, wart treated, rtc for second treatment 3 weeks if needed  use steroid cream to left ear and rtc if not resolving.    Patient Instructions:       Counseled: Patient, Verbalized understanding.    Orders     Orders (Selected)   Prescriptions  Prescribed  triamcinolone 0.5% topical cream: 1 michelle, Topical, bid, Instructions: under left ear tid x14 days, # 15 gm, 0 Refill(s), Type: Maintenance, Pharmacy: [facility], 1 michelle Topical bid,Instr:under left ear tid x14 days.

## 2022-02-16 NOTE — NURSING NOTE
Depression Screening Entered On:  10/21/2021 10:08 AM CDT    Performed On:  9/3/2021 10:08 AM CDT by Therese Apodaca               Depression Screening   Little Interest - Pleasure in Activities :   Not at all   Feeling Down, Depressed, Hopeless :   Not at all   Initial Depression Screen Score :   0 Score   Poor Appetite or Overeating :   Not at all   Trouble Falling or Staying Asleep :   Not at all   Feeling Tired or Little Energy :   Not at all   Feeling Bad About Yourself :   Not at all   Trouble Concentrating :   Not at all   Moving or Speaking Slowly :   Not at all   Thoughts Better Off Dead or Hurting Self :   Not at all   Detailed Depression Screen Score :   0    Total Depression Screen Score :   0    Therese Apodaca - 10/21/2021 10:08 AM CDT

## 2022-02-16 NOTE — TELEPHONE ENCOUNTER
---------------------  From: Racquel Aceves   To: Gurmeet WISE, [facility];     Sent: 9/5/2019 2:17:04 PM CDT  Subject: Hearing Evaluation Results     Dr. Levi,  I had the pleasure of seeing your patient for a hearing evaluation and her detailed results are listed below.    Hx: Patient reports fluid buildup concerns, she feels she is hearing well. 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 Family history of hearing loss with her mother. Denied tinnitus, pain, pressure, drainage, dizziness.    Results: Otoscopy: clear canals bilaterally. Left normal hearing from 250-3000 Hz sloping to moderate SNHL at 8000 Hz. Right normal from 250-4000 Hz sloping to mild SNHL.   Word Recognition Score: 100% left, 100% right    Rec: Not a hearing aid candidate at this time. Hearing protection when around loud sounds. Annual hearing test or sooner if concerns arise.    Thank you for your referral and please let me know if any questions arise,  Mauricio Middleton, CCC-A

## 2022-02-16 NOTE — TELEPHONE ENCOUNTER
---------------------  From: Jodi Fiore CMA ([facility] Message Pool ([id number]_[place]))   To: ZAK EVERETT    Sent: 2/21/2019 3:07:00 PM CST  Subject: RE: FW: Recommendation     I would say her or KAYDEN Fritz.     ;)      ---------------------  From: ZAK EVERETT  To: [facility] Message [facility] ([id number])  Sent: 02/21/2019 02:37 p.m. CST  Subject: RE: FW: Recommendation  Would she be a good choice for me too? I forgot to ask about myself!  So glad you will still be there Jodi!!    Annabelle Falk       Addendum by Jodi Fiore CMA on February 21, 2019 9:35:38 AM CST  ---------------------  From: Jodi Fiore CMA ([facility] Message Pool ([id number]_[facility]))  To: ZAK EVERETT  Sent: 2/21/2019 9:35:38 AM CST  Subject: FW: Recommendation       Klever Alanis,       I ll still be here :)       Coleman is recommending Dr. Levi for Cheryl.       Jodi Bonilla CMA       Addendum by Bessy Medina on February 21, 2019 9:07:04 AM CST  ---------------------  From: Bessy Medina  To: [facility] Message Pool ([id number]);  Sent: 2/21/2019 9:07:04 AM CST  Subject: RE: Recommendation       Dr Irena Levi would be a good choice for her.       Addendum by Jodi Fiore CMA on February 18, 2019 4:49:37 PM CST  ---------------------  From: Jodi Fiore CMA ([facility] Message Pool ([id number]))  To: Bessy Medina;  Sent: 2/18/2019 4:49:37 PM CST  Subject: FW: Recommendation  ---------------------  From: Judith Emery CMA (Phone Messages Pool ([id number]))  To: [facility] Message Pool ([id number]);  Sent: 2/18/2019 4:45:30 PM CST  Subject: FW: Recommendation                      ---------------------  From: ZAK EVERETT  To: [facility]  Sent: 02/18/2019 04:43 p.m. CST  Subject: Recommendation  Coleman,  Congratulations on your new position, but I m very sad to see you leave.  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  Can you give any recommendations as to who might handle Cheryl?  I m at a loss as to whom to choose for our next doctor.  Any advice you have would be most helpful.  Thank you,  Annabelle Hitchcock

## 2022-02-16 NOTE — TELEPHONE ENCOUNTER
---------------------  From: Abby Laws   To: Antonette Potts;     Sent: 6/30/2021 1:29:42 PM CDT  Subject: General Message     Please re-fax the pre op from yesterday as I needed to addend the note after I spoke with her surgeon, thank you.---------------------  From: Antonette Potts   To: Abby Laws;     Sent: 6/30/2021 3:00:29 PM CDT  Subject: RE: General Message     Will do - thank you

## 2022-02-16 NOTE — PROCEDURES
Accession Number:       [id number]  CLINICAL INFORMATION::     None given  LMP::     POSTMENOPAUSAL  PREV. PAP::     2015  PREV. BX::     NONE GIVEN  SOURCE::     Cervix  STATEMENT OF ADEQUACY::     Satisfactory for evaluation. Endocervical/transformation zone component present.  INTERPRETATION/RESULT::     Negative for intraepithelial lesion or malignancy.  COMMENT::     This Pap test has been evaluated with computer assisted technology.  CYTOTECHNOLOGIST::     LYN SHIRLEY(ASCP) CT Screening location: [place] [id number]  COMMENT:     See comment       EXPLANATORY NOTE:         The Pap is a screening test for cervical cancer. It is       not a diagnostic test and is subject to false negative       and false positive results. It is most reliable when a       satisfactory sample, regularly obtained, is submitted       with relevant clinical findings and history, and when       the Pap result is evaluated along with historic and       current clinical information.  HPV mRNA E6/E7:     Not Detected       This test was performed using the APTIMA HPV Assay ([employer]       This assay detects E6/E7 viral messenger RNA (mRNA) from 14       high-risk HPV types (16,18,31,33,35,39,45,51,52,56,58,59,66,68).         The analytical performance characteristics of       this assay have been determined by [employer]. The modifications have not been       cleared or approved by the FDA. This assay has       been validated pursuant to the CLIA regulations       and is used for clinical purposes.

## 2022-02-16 NOTE — PROGRESS NOTES
Patient:   ZAK EVERETT            MRN: [id number]            FIN: [id number]               Age:   62 years     Sex:  Female     :  1955   Associated Diagnoses:   Caregiver burden; Stress   Author:   Bessy Medina      Chief Complaint   2018 4:00 PM CDT     Pt c/o pain along R side rib cage - gardens a lot. Also a couple other concerns.      History of Present Illness   PPC originally for her annual exam but when she got here we explained it was too early for this type of exam  pt stated she wanted to be seen regardless because she has a few concerns    1.  right midaxillary pain for past 1-2 weeks, has been gardening more and thinks she may have pulled a muscle, no chest pain, no cough, no breast pain and annual mammgoram is schedule for tomrorow  2.  pain with intercourse during deep penetration, inconsistent, has had hysterectomy, monogamous marriage  3.  biggest concern is pressure and stress of caregiving for ailing elderly mother who has moved in with her, she was not raised by her mother, she has guilt that she is angry with her mother;s behavior, she is worried her mother has early dementia and untreated depression,          Review of Systems   Constitutional:  Negative.    Ear/Nose/Mouth/Throat:  Negative.    Respiratory:  Negative.    Cardiovascular:  Negative.    Gastrointestinal:  Negative.    Genitourinary:  Negative.    Gynecologic:  Negative except as documented in history of present illness.    Hematology/Lymphatics:  Negative.    Endocrine:  Negative.    Immunologic:  Negative.    Musculoskeletal:  Negative except as documented in history of present illness.    Integumentary:  Negative.    Neurologic:  Negative.    Psychiatric:  Negative except as documented in history of present illness.       Health Status   Allergies:    Allergic Reactions (Selected)  No known allergies   Medications:  (Selected)   Prescriptions  Prescribed  Estrace Vaginal 0.1 mg/g vaginal cream: See  Instructions, Instructions: INSERT 1 GRAM VAGINALLY THREE TIMES PER WEEK, # 42 gm, 2 Refill(s), Type: Maintenance, Pharmacy: [facility], INSERT 1 GRAM VAGINALLY THREE TIMES PER WEEK  Metoprolol Tartrate 50 mg oral tablet: 0.5 tab(s) ( 25 mg ), po, daily, # 45 tab(s), 3 Refill(s), Type: Maintenance, Pharmacy: [facility], 0.5 tab(s) po daily  Zantac 150 oral tablet: 1 tab(s) ( 150 mg ), PO, BID, # 60 tab(s), 2 Refill(s), Type: Maintenance, Pharmacy: [facility], 1 tab(s) po bid  amitriptyline 25 mg oral tablet: 2 tab(s) ( 50 mg ), po, hs, # 180 tab(s), 3 Refill(s), Type: Maintenance, Pharmacy: [facility], 2 tab(s) po hs,x90 day(s)  levothyroxine 150 mcg (0.15 mg) oral tablet: 1 tab(s) ( 150 mcg ), po, daily, # 90 tab(s), 3 Refill(s), Type: Maintenance, Pharmacy: [facility], 1 tab(s) po daily  Documented Medications  Documented  Fish Oil: ( 1,000 mg ), po, daily, 0 Refill(s), Type: Maintenance   Problem list:    All Problems (Selected)  Anticoagulated / SNOMED CT 518263012 / Confirmed  GERD (gastroesophageal reflux disease) / SNOMED CT 618188395 / Confirmed  Hypertriglyceridemia / SNOMED CT 240451538 / Confirmed  Hypothyroid / SNOMED CT 97281678 / Confirmed  MVP (mitral valve prolapse) / SNOMED CT 4957755646 / Confirmed  Obese / SNOMED CT 8851941007 / Probable  Personal History of DVT (Deep Vein Thrombosis) / SNOMED CT 0502239700 / Confirmed  S/P meniscectomy / SNOMED CT 6032637248 / Confirmed      Physical Examination   Vital Signs   7/2/2018 4:00 PM CDT Temperature Tympanic 98.9 DegF    Peripheral Pulse Rate 72 bpm    Pulse Site Radial artery    HR Method Manual    Systolic Blood Pressure 112 mmHg    Diastolic Blood Pressure 70 mmHg    Mean Arterial Pressure 84 mmHg    BP Site Right arm    BP Method Manual      General:  Alert and oriented, No acute distress.    Eye:  Pupils are equal, round and reactive to light.    HENT:  Normocephalic.    Neck:  Supple.     Respiratory:  Lungs are clear to auscultation, Respirations are non-labored, there is pain with palpation on intercostal spaces 5-6,and 6-7  inferior to right axilla  right breast exam did not reveal mass or reproducible pain.    Genitourinary:  offered Gyn exam to ensure no trauma lesion but pt would like to wait until annual exam next month.    Musculoskeletal:  Normal range of motion.    Integumentary:  Warm, Dry, Pink.    Neurologic:  Alert, Oriented, Normal sensory, No focal deficits.    Psychiatric:  Cooperative, Appropriate mood & affect, Normal judgment, Non-suicidal.       Impression and Plan   Diagnosis     Caregiver burden (PWH08-RG Z63.6).     Stress (BJM20-VL F43.9).     vaginal pain  costochondritis.     Patient Instructions:       Counseled: Patient, Verbalized understanding.    Summary:  rib pain: no imaging today, will ensure no breast involvement with mammogram which is already scheduled, aleve or ibuprofen for muscle pain, feel pt is correct and this is likely from repetitive gardening and pulling of weeds, rest, if not improving can image    vaginal pain: wanted to do genital exam today but pt declined, it is hard to know cause of pain without exam, aleve and ibuprofen may be beneficial for this but she can also change position in intercourse by being on top and allow more control over penetration, if there is fever, vaginal bleeding or increase pain please RTC    stress:  this is her biggest concern, we spent well over 25 minutes talking about ways to approach mother for possible MMSE appt, or even to approach mother about antidepressant therapy  however, Maritza has to resign herself to the fact that her mother may not allow us to treat her and we may have no right to intervene unless she is proven incapable of making decisions for herself which even Maritza does not feel is an issue now.  I would like Maritza to go to therapy, she may feel better talking with someone about her stress level  and working through her guilt with her feelings  I have encouraged her to consider a low dose SSRI if counseling not beneficial  she denies thoughts of injurying mother .

## 2022-02-16 NOTE — TELEPHONE ENCOUNTER
---------------------  From: Serenity Franklin MA   Sent: 3/19/2019 2:38:55 PM CDT  Subject: Shingrix vaccine      Called to patient and let her know that we have first dose of Shingrix available for her, she was trans to scheduling to set up shot appt.     Time of call: 2:38pm    SCOT ELIZABETH

## 2022-02-16 NOTE — TELEPHONE ENCOUNTER
---------------------  From: Faviola Arreola ([employer] Message Pool ([id number]_[facility]))   To: ZAK HITCHCOCK    Sent: 2021 10:08:04 AM CDT  Subject: FW: Celebrex meds     You would need an appointment. Please call [phone] to schedule an appointment at your convenience.         ---------------------  From: Christopher/Elvia CONDE (Phone Messages Pool ([id number]_[facility]))   To: [employer] Message Pool ([id number]_[place] [facility] [place]);     Sent: 2021 8:17:27 AM CDT  Subject: FW: Celebrex meds           ---------------------  From: ZAK HITCHCOCK  To: [facility]  Sent: 2021 12:11 a.m. CDT  Subject: Celebrex meds  Dr Levi,  I would like to go back on Celebrex meds if I may. I didn t realize how much they were helping until now. I have run out of my prescription and would like to start back on them again. Do I need to make an appointment to see you or can I get a refill? Please let me know.    Thank you,  Annabelle Hitchcock  : 1955

## 2022-02-16 NOTE — NURSING NOTE
CAGE Assessment Entered On:  6/29/2021 12:02 PM CDT    Performed On:  6/29/2021 12:02 PM CDT by Jocelyn Hutchins LPN               Assessment   Have you ever felt you should cut down on your drinking :   No   Have people annoyed you by criticizing your drinking :   No   Have you ever felt bad or guilty about your drinking :   No   Have you ever taken a drink first thing in the morning to steady your nerves or get rid of a hangover (Eye-opener) :   No   CAGE Score :   0    Jocelyn Hutchins LPN - 6/29/2021 12:02 PM CDT

## 2022-02-16 NOTE — TELEPHONE ENCOUNTER
---------------------  From: Judith Emery CMA (Phone Messages Pool ([id number]_[facility]))   To: [facility] Message Pool ([id number]_[place] [facility] [place]);     Sent: 12/29/2020 5:05:15 PM CST  Subject: FW: New prescription           ---------------------  From: ZAK HITCHCOCK  To: [facility]  Sent: 12/29/2020 05:03 p.m. CST  Subject: New prescription  Dr. Levi,  May I please have a new prescription for Celebrex? I m undergoing PT and Chiropractic treatments on both feet for Plantar s Fasciitis. I have been taking the Celebrex to help with pain and swelling and I only have a weeks worth left. My normal pharmacy is [facility] in [place].  Thank you, Annabelle Hitchcock---------------------  From: Grace Galicia CMA ([employer] Message Pool ([id number]_[facility]))   To: Gurmeet WISE Irena;     Sent: 12/29/2020 5:14:19 PM CST  Subject: FW: New prescriptionDr. Gurmeet Rehman did send in a refill for you on your Celebrex, but if your pain is not being controlled with it then   she invites you to schedule an apt with her.     Thanks,   GLENN Edwards---------------------  From: Grace Galicia CMA ([employer] Message Pool ([id number]_[facility]))   To: ZAK HITCHCOCK    Sent: 12/29/2020 5:52:25 PM CST  Subject: FW: New prescription

## 2022-02-16 NOTE — TELEPHONE ENCOUNTER
"---------------------  From: Adebayo ELIZABETH Jodi   Sent: 8/13/2019 9:49:53 AM CDT  Subject: amitriptyline refill     fax from [facility] pharmacy for amitriptyline. Spoke to pt to see if needed fill prior to annual exam with ANA M on 8/22. She said she can \"stretch it out\" to last till then. I told her we will just send in 3 mo (per her preference) for her so she doesn't have to do that. She was appreciative and will keep appt to re-establish care.  "

## 2022-02-16 NOTE — PROGRESS NOTES
Patient:   ZAK EVERETT            MRN: [id number]            FIN: [id number]               Age:   62 years     Sex:  Female     :  1955   Associated Diagnoses:   Sinusitis   Author:   Bessy Medina      Chief Complaint   10/22/2018 11:08 AM CDT  Pt c/o productive cough with green sputum. Sinus HAs last weekend. Pain in both ears. Did take one amoxicillin this weekend. Leaving for [place] next week to see grandkids.      History of Present Illness   PPC with URI symptoms for one week including sore throat.  She is leaving to go to [place] this weekend to see her grandchildren.  She became concerned with bacterial infection asn used one dose of 's amoxicillin.  uisn guafenesin, antihistamine.  has dry cough today.  laryngitis.  coughing up green phlegm  total of 7 days duration      Review of Systems   Constitutional:  Fatigue, No fever.    Eye:  Negative.    Ear/Nose/Mouth/Throat:  Ear pain, Nasal congestion, Sore throat, mild sore throat and ear fullness.    Respiratory:  Negative.    Cardiovascular:  Negative.    Gastrointestinal:  Negative.    Hematology/Lymphatics:  Negative.    Immunologic:  Negative.    Musculoskeletal:  Negative.    Integumentary:  Negative.    Neurologic:  Negative.    Psychiatric:  Negative.       Health Status   Allergies:    Allergic Reactions (Selected)  No known allergies   Medications:  (Selected)   Prescriptions  Prescribed  Estrace Vaginal 0.1 mg/g vaginal cream: See Instructions, Instructions: INSERT 1 GRAM VAGINALLY THREE TIMES PER WEEK, # 42.5 gm, 3 Refill(s), Type: Maintenance, Pharmacy: [id number], INSERT 1 GRAM VAGINALLY THREE TIMES PER WEEK  Metoprolol Tartrate 50 mg oral tablet: 0.5 tab(s) ( 25 mg ), po, daily, # 45 tab(s), 3 Refill(s), Type: Maintenance, Pharmacy: [id number], 0.5 tab(s) po daily  Zantac 150 oral tablet: 1 tab(s) ( 150 mg ), PO, BID, # 60 tab(s), 2 Refill(s), Type: Maintenance, Pharmacy: [facility], 1 tab(s) po  bid  amitriptyline 25 mg oral tablet: 2 tab(s) ( 50 mg ), po, hs, # 180 tab(s), 3 Refill(s), Type: Maintenance, Pharmacy: [facility], 2 tab(s) Oral hs  levothyroxine 150 mcg (0.15 mg) oral tablet: 1 tab(s) ( 150 mcg ), po, daily, # 90 tab(s), 3 Refill(s), Type: Maintenance, Pharmacy: [facility], 1 tab(s) Oral daily  metoprolol succinate 50 mg oral tablet, extended release: See Instructions, Instructions: take one and a half, # 135 tab(s), 3 Refill(s), Type: Maintenance, Pharmacy: [facility], take one and a half  Documented Medications  Documented  Fish Oil: ( 1,000 mg ), po, daily, 0 Refill(s), Type: Maintenance      Histories   Past Medical History:    Active  Hypertriglyceridemia (961213951): Onset on 3/10/2011 at 55 years.  Comments:  3/10/2011 CST 4:44 PM CST -     Hypothyroid (89571767)  GERD (gastroesophageal reflux disease) (453338041)  MVP (mitral valve prolapse) (3512660885)  Obese (0384341050)  Resolved  Pregnancy (949848149):  Resolved in 1982 at 26 years.  Pregnancy (530776625):  Resolved in 1986 at 30 years.  Pregnancy (827325021):  Resolved in 1988 at 32 years.      Physical Examination   Vital Signs   10/22/2018 11:08 AM CDT Temperature Tympanic 98.4 DegF    Peripheral Pulse Rate 64 bpm    Pulse Site Radial artery    HR Method Manual    Systolic Blood Pressure 120 mmHg    Diastolic Blood Pressure 74 mmHg    Mean Arterial Pressure 89 mmHg    BP Site Right arm    BP Method Manual    Oxygen Saturation 98 %      Measurements from flowsheet : Measurements   10/22/2018 11:08 AM CDT  Weight Measured - Standard                171 lb     General:  Alert and oriented, No acute distress.    Eye:  Pupils are equal, round and reactive to light.    HENT:  Normocephalic.         Head: normocephalic.         Ear: Both ears, Middle ear, Tympanic membrane ( Fluid in middle ear, bilaterally ).         Nose: Both nostrils, erythematous, clear discharge.         Sinus: Bilateral,  Maxillary sinus, Tenderness, Discharge ( Yellow ), Transillumination ( Decreased glow ).         Mouth: Within normal limits.         Throat: Pharynx ( Erythematous, moderate amount clear post nasal discharge ).         Glands: Bilateral, anterior cervical chain, shotty bilaterally.    Neck:  Supple.    Respiratory:  Lungs are clear to auscultation.    Cardiovascular:  Normal rate, Regular rhythm.    Gastrointestinal:  Soft, Non-tender.    Integumentary:  Warm, Dry, Pink.    Neurologic:  Alert, Oriented, Normal sensory.    Psychiatric:  Cooperative, Appropriate mood & affect.       Impression and Plan   Diagnosis     Sinusitis (WAX55-OP J32.9).     Plan:  I am not convinced this is bacterial at 7d.  I have written a Rx for zithromax for her to use if not starting to improve in 48 hours.  Explained, with associated laryngitis, this is likely viral and she should be turning the corner in next few days.  I understand fear of traveling to [place] if she also has sinusitis as the flight can be miserable.  If not improving in 48 hours she can start antibitoic which should give her a few days to improve before making her trip.    Orders     Orders (Selected)   Prescriptions  Prescribed  Zithromax 250 mg oral tablet: = 1 packet(s), PO, Once, Instructions: as directed on package labeling, # 6 tab(s), 0 Refill(s), Type: Soft Stop, Pharmacy: [facility], please hold until pt calls; waiting to see if this is viral first, 1 packet(s) Oral once,Instr:as directed on pac....

## 2022-02-16 NOTE — NURSING NOTE
CAGE Assessment Entered On:  10/21/2021 10:08 AM CDT    Performed On:  9/3/2021 10:08 AM CDT by Therese Apodaca               Assessment   Have you ever felt you should cut down on your drinking :   No   Have people annoyed you by criticizing your drinking :   No   Have you ever felt bad or guilty about your drinking :   No   Have you ever taken a drink first thing in the morning to steady your nerves or get rid of a hangover (Eye-opener) :   No   CAGE Score :   0    Therese Apodaca - 10/21/2021 10:08 AM CDT

## 2022-02-16 NOTE — TELEPHONE ENCOUNTER
---------------------  From: Abby Laws   To: ZAK EVERETT    Sent: 7/27/2021 2:12:43 PM CDT  Subject: General Message     Your A1c is nearly to the official 'diabetes' cut point of 6.5.  We can discuss this further when I see you. Continue the medications prescribed as long as you are tolerating them, thank you.    KAYDEN Fritz      Results:  Date Result Name Ind Value Ref Range   7/16/2021 10:23 AM Hgb A1c ((H)) 6.4 ( - <5.7)

## 2022-02-16 NOTE — PROGRESS NOTES
Patient:   ZAK EVERETT            MRN: [id number]            FIN: [id number]               Age:   65 years     Sex:  Female     :  1955   Associated Diagnoses:   New onset type 2 diabetes mellitus; Obese; Hypertriglyceridemia   Author:   Kamini Ramirez      Visit Information   Visit type:  Diabetes Self Management Education .    Accompanied by:  Spouse.    Referral source:  Irena Levi MD.       Chief Complaint   DM Type II, Obesity, Hypertriglyceridemia       History of Present Illness   2021  Pt here with known hx of prediabetes without intervention.  Pt admits to eating more and less activity through the pandemic.    New dx of DM with nonfasting glucose >200 and fasting glucose of 136mg/dL.      2021 Follow up.  Pt is down 8.2# since last consult.  Pt brings in BG readings, asking appropriate questions.      Discussed nutrition, diabetes, and lifestyle management with pt  Nutrition:  Reading labels and trying to follow carb controlled meal plan.  Meal replacement drink am occ, or yogurt or omelet/ toast/ apple slices   Physical Activity:  no routine  Stress:   low   Sleep: good  Support: family   BG Testing: as directed pre/ 2 hr pp   pre - 94 -137 range; post 116 - 184 (pt learning how foods affect readings)  DM related medication: metformin ER 500mg ii tabs daily - taking as directed and tolerating well   Routine diabetes care:  will discuss during follow up consults    Dilated Retinal Eye Exam:    Skin:   Dental:   Feet:   Immunizations:  Hgb A1c: 6.4% = 137 mg/dL estimated average glucose      Health Status   Allergies:    Allergic Reactions (Selected)  No Known Medication Allergies   Medications:  (Selected)   Prescriptions  Prescribed  Accu check soft clix lancets: Accu check soft clix lancets, See Instructions, Instructions: testing 2x/ day, Supply, # 200 EA, 1 Refill(s), Type: Maintenance, Pharmacy: [id number], testing 2x/ day, 65, in, 21 14:25:00 CDT, Height  Measured, 195.1, lb, 08/16/21 1...  Lipitor 40 mg oral tablet: = 1 tab(s) ( 40 mg ), Oral, daily, # 90 tab(s), 0 Refill(s), Type: Maintenance, Pharmacy: [facility], 1 tab(s) Oral daily, 65, in, 09/03/21 10:11:00 CDT, Height Measured, 186.6, lb, 09/03/21 10:11:00 CDT, Weight Measured  Metoprolol Tartrate 50 mg oral tablet: = 0.5 tab(s), Oral, daily, # 45 tab(s), 3 Refill(s), Type: Maintenance, Pharmacy: [facility], 0.5 tab(s) Oral daily, 65, in, 09/03/21 10:11:00 CDT, Height Measured, 186.6, lb, 09/03/21 10:11:00 CDT, Weight Measured  accu chek guide test strips: accu chek guide test strips, See Instructions, Instructions: test 2x/ day, Supply, # 200 EA, 1 Refill(s), Type: Maintenance, Pharmacy: [facility], test 2x/ day, 65, in, 08/16/21 14:25:00 CDT, Height Measured, 195.1, lb, 08/16/21 14:25:00...  amitriptyline 50 mg oral tablet: = 1 tab(s) ( 50 mg ), Oral, hs, # 90 tab(s), 3 Refill(s), Type: Maintenance, Pharmacy: [facility], 1 tab(s) Oral hs, 65, in, 09/03/21 10:11:00 CDT, Height Measured, 186.6, lb, 09/03/21 10:11:00 CDT, Weight Measured  esomeprazole 20 mg oral delayed release capsule: = 1 cap(s), Oral, daily, # 90 cap(s), 3 Refill(s), Type: Maintenance, Pharmacy: [facility], Due for appt, 1 cap(s) Oral daily, 65, in, 09/03/21 10:11:00 CDT, Height Measured, 186.6, lb, 09/03/21 10:11:00 CDT, Weight Measured  levothyroxine 150 mcg (0.15 mg) oral tablet: = 1 tab(s) ( 150 mcg ), po, daily, # 90 tab(s), 3 Refill(s), Type: Maintenance, Pharmacy: [id number], 1 tab(s) Oral daily, 65, in, 09/03/21 10:11:00 CDT, Height Measured, 186.6, lb, 09/03/21 10:11:00 CDT, Weight Measured  metFORMIN 500 mg oral tablet, extended release: = 2 tab(s) ( 1,000 mg ), Oral, daily, Instructions: with evening meal, # 180 EA, 1 Refill(s), Type: Maintenance, Pharmacy: [id number], 2 tab(s) Oral daily,Instr:with evening meal, 65, in, 09/03/21  10:11:00 CDT, Height Measured, 186.6, lb...  Documented Medications  Documented  Citracal Maximum + D: Oral, daily, 0 Refill(s), Type: Maintenance  Fish Oil: ( 1,000 mg ), po, daily, 0 Refill(s), Type: Maintenance  Hair, Skin & Nails: Instructions: 2 Gummies, 1x per day, 0 Refill(s), Type: Soft Stop  Multiple Vitamins oral tablet: Oral, daily, 0 Refill(s), Type: Maintenance  OTC - Magnesium: OTC - Magnesium, Oral, daily, 0 Refill(s), Type: Maintenance  OTC - Potassium: OTC - Potassium, Oral, daily, 0 Refill(s), Type: Maintenance,    Medications          *denotes recorded medication          *OTC - Potassium: Oral, daily, 0 Refill(s).          *OTC - Magnesium: Oral, daily, 0 Refill(s).          Accu check soft clix lancets: See Instructions, testing 2x/ day, 200 EA, 1 Refill(s).          accu chek guide test strips: See Instructions, test 2x/ day, 200 EA, 1 Refill(s).          amitriptyline 50 mg oral tablet: 50 mg, 1 tab(s), Oral, hs, 90 tab(s), 3 Refill(s).          Lipitor 40 mg oral tablet: 40 mg, 1 tab(s), Oral, daily, 90 tab(s), 0 Refill(s).          *Hair, Skin & Nails: 2 Gummies, 1x per day, 0 Refill(s).          *Citracal Maximum + D: Oral, daily, 0 Refill(s).          esomeprazole 20 mg oral delayed release capsule: 1 cap(s), Oral, daily, 90 cap(s), 3 Refill(s).          levothyroxine 150 mcg (0.15 mg) oral tablet: 150 mcg, 1 tab(s), po, daily, 90 tab(s), 3 Refill(s).          metFORMIN 500 mg oral tablet, extended release: 1,000 mg, 2 tab(s), Oral, daily, with evening meal, 180 EA, 1 Refill(s).          Metoprolol Tartrate 50 mg oral tablet: 0.5 tab(s), Oral, daily, 45 tab(s), 3 Refill(s).          *Multiple Vitamins oral tablet: Oral, daily, 0 Refill(s).          *Fish Oil: 1,000 mg, po, daily, 0 Refill(s).       Problem list:    All Problems  S/P meniscectomy / SNOMED CT 2020033404 / Confirmed  Personal History of DVT (Deep Vein Thrombosis) / SNOMED CT 9239211206 / Confirmed  Obese / SNOMED CT  5926521788 / Probable  New onset type 2 diabetes mellitus / SNOMED CT 109418078 / Confirmed  MVP (mitral valve prolapse) / SNOMED CT 4828161997 / Confirmed  Hypothyroid / SNOMED CT 02735755 / Confirmed  Hypertriglyceridemia / SNOMED CT 314710800 / Confirmed  History of NSAID-associated gastropathy / SNOMED CT 082786804 / Confirmed  GERD (gastroesophageal reflux disease) / SNOMED CT 899771746 / Confirmed  Fatty liver / SNOMED CT 719076195 / Confirmed  Inactive: Dysfunctional uterine bleeding / SNOMED CT 48411880  Resolved: Pregnancy / SNOMED CT 019355242  Resolved: Pregnancy / SNOMED CT 736315725  Resolved: Pregnancy / SNOMED CT 455834305  Resolved: Anticoagulated / SNOMED CT 625578874      Histories   Past Medical History:    Active  Hypertriglyceridemia (682404240): Onset on 3/10/2011 at 55 years.  Comments:  3/10/2011 CST 4:44 PM CST -     Hypothyroid (20253703)  GERD (gastroesophageal reflux disease) (903657162)  MVP (mitral valve prolapse) (2513670717)  Obese (6690031797)  Resolved  Anticoagulated (717615595):  Resolved.  Comments:  2021 CDT 11:28 AM CDT - Abby Laws  heparin injections approx 2 weeks after DVT from immobility of ankle injury  Pregnancy (986479739):  Resolved in  at 26 years.  Pregnancy (681777226):  Resolved in  at 30 years.  Pregnancy (137895777):  Resolved in  at 32 years.   Family History:    Chicken pox  Daughter (Jessi Hodges)  Comments:  8/15/2018 6:42 AM CDT - Jodi Fiore CMA  age 5  Son (Clarke Hitchcock)  Comments:  8/15/2018 6:42 AM CDT - Jodi Fiore CMA  age 3  Wears glasses  Mother (Cheryl Marr)  Comments:  8/15/2018 6:42 AM CDT Jodi Chaidez CMA  Diagnosed in High School  Daughter (Jessi Hodges)  Comments:  8/15/2018 6:42 AM CDT - Jodi Fiore CMA  reading only early 20&#39;s  Father (Rene Cheema, )  Comments:  8/15/2018 6:42 AM CDT - Jodi Fiore CMA&#39;t know  Grandmother (P) (Allyn Cheema,  )  Comments:  8/15/2018 6:42 AM CDT - [place] CMA, Jodi  don&#39;t know  Son (Clarke Hitchcock)  Comments:  8/15/2018 6:42 AM CDT - [place] Jodi ELIZABETH  since middle school  Anxiety  Daughter (Jessi Hodges)  Comments:  8/15/2018 6:42 AM CDT - [place] GLENNJaunJodi  age 20  Son (Clarke Hitchcock)  Rheumatoid arthritis  Mother (Cheryl Marr)  Comments:  8/15/2018 6:42 AM CDT - [place] Jodi ELIZABETH  years ago  Hypertension  Mother (Cheryl Marr)  Heart disease  Father (Rene Cheema, )  Comments:  8/15/2018 6:42 AM CDT - [place] CMA, Jodi  don&#39;t know age  Grandmother (P) (Allyn Cheema, )  HBP - High blood pressure  Father (Rene Cheema, )  Comments:  8/15/2018 6:42 AM CDT - [place] GLENNJodi  in his 40&#39;s  Arthritis  Mother (Cheryl Marr)  Comments:  8/15/2018 6:42 AM CDT - [place] Jodi ELIZABETH  years  Hearing loss  Mother (Cheryl Marr)  Comments:  8/15/2018 6:42 AM CDT - [place] Jodi ELIZABETH  years ago  Snoring  Father (Rene Cheema, )  Comments:  8/15/2018 6:42 AM CDT - [place] CMA Jodi  don&#39;t know  Overweight  Father (Rene Cheema, )  Comments:  8/15/2018 6:42 AM CDT - [place] GLENNJodi  in his 40&#39;s  Obesity  Father (Rene Cheema, )  Comments:  8/15/2018 6:42 AM CDT - [place] CMAJaunJodi  in his 40&#39;s  Goiter  Mother (Cheryl Marr)  Cataracts  Mother (Cheryl Marr)  Comments:  8/15/2018 6:42 AM CDT - [place] GLENNJaunJodi  Both eyes operated on 18 &amp; 18     Procedure history:    Colonoscopy (SNOMED CT 327960812) performed by Collin Almeida MD on 2017 at 61 Years.  Comments:  2018 7:26 PM Jodi Sandy CMA  Indication: screening  Sedation: F&V  Normal   Repeat in 10 years  Arthroscopic partial medial meniscectomy (SNOMED CT 243410337) on 2016 at 60 Years.  Comments:  11/10/2016 3:38 PM Jodi Sorto CMA  Right knee  Dr. Amin at [facility]  Laparoscopic supracervical hysterectomy  (SNOMED CT 706995485) performed by Markus Escalante MD on 11/19/2010 at 55 Years.  Comments:  12/12/2010 2:39 PM DAIANA - Janice Myers  Pre- and post-op diagnosis is menorrhagia  Hysteroscopy, D & C performed by Markus Escalante MD on 4/23/2010 at 54 Years.  Comments:  12/31/2010 1:46 PM Janice James  Large pelvic mass, most consistent with uterine fibroids  Colonoscopy (SNOMED CT 878218468) on 7/26/2007 at 51 Years.  TL - Tubal ligation (SNOMED CT 515558021) on 4/26/1988 at 32 Years.  Childbirth (SNOMED CT 9384889949) on 4/25/1988 at 32 Years.  Comments:  8/15/2018 11:42 AM CDT - Jodi Fiore CMA  Patient Comment: Male  gall bladder (SNOMED CT 6493497179) on 2/11/1987 at 31 Years.  Comments:  8/25/2020 11:46 AM Yumiko Murray CMA  Patient Comment: Removed gall bladder stones , opened the clogged bile duct, wore an outward bag for six weeks to catch bile till everything was healed.  Childbirth (SNOMED CT 5364702517) on 3/28/1986 at 30 Years.  Comments:  8/25/2020 11:46 AM Yumiko Murray CMA  Patient Comment: In [place]  Childbirth (SNOMED CT 2625157393) on 3/27/1986 at 30 Years.  Comments:  8/15/2018 11:42 AM Jodi Sandy CMA  Female    11/1/2011 7:27 PM CDT - Jen Robertson  G3, P2-0-1-2.  Cholecystectomy (SNOMED CT 63307387).  Comments:  4/21/2010 4:33 PM CDT - Sharon Arevalo MD     Social History:        Electronic Cigarette/Vaping Assessment            Electronic Cigarette Use: Never.      Alcohol Assessment            Current, Wine (5 oz), 1-2 times per week, 1 drinks/episode average.  2 drinks/episode maximum.  Ready to               change: No.      Tobacco Assessment            Never (less than 100 in lifetime)      Substance Abuse Assessment            Never      Employment and Education Assessment            Retired      Home and Environment Assessment            Marital status: .  Spouse/Partner name: Ricardo.  Risks in environment: Does not wear helmet, owns                secured gun.      Nutrition and Health Assessment            Type of diet: Low carbohydrate.      Exercise and Physical Activity Assessment            Exercise frequency: 3-4 times/week.  Exercise type: garden in summer, health club.      Sexual Assessment            Sexually active: Yes.  Sexual orientation: Heterosexual.  Contraceptive Use Details: None.        Physical Examination   VS/Measurements      Review / Management   Results review:  Lab results   9/3/2021 11:19 AM CDT Sodium Level 138 mmol/L    Potassium Level 4.4 mmol/L    Chloride Level 102 mmol/L    CO2 Level 27 mmol/L    Glucose Level 120 mg/dL  HI    BUN 21 mg/dL    Creatinine 0.93 mg/dL    BUN/Creat Ratio NOT APPLICABLE    eGFR 65 mL/min/1.73m2    eGFR African American 76 mL/min/1.73m2    Calcium Level 9.8 mg/dL    Bili Total 0.6 mg/dL    Alk Phos 55 unit/L    AST/SGOT 53 unit/L  HI    ALT/SGPT 40 unit/L  HI    Protein Total 7.5 gm/dL    Albumin Level 4.7 gm/dL    Globulin 2.8    A/G Ratio 1.7   7/16/2021 10:23 AM CDT Hgb A1c 6.4  HI   7/6/2021 8:50 AM CDT Glucose Level 136 mg/dL  HI    Cholesterol 184 mg/dL    Non-  HI    HDL 35 mg/dL  LOW    Chol/HDL Ratio 5.3  HI      HI    Triglyceride 271 mg/dL  HI    TSH 3.12 mIU/L    U Creatinine 83 mg/dL    U Microalbumin 0.2 mg/dL    Ur Microalb/Creat Ratio 2   6/29/2021 11:45 AM CDT Sodium Level 135 mmol/L    Potassium Level 4.8 mmol/L    Chloride Level 101 mmol/L    CO2 Level 26 mmol/L    Glucose Level 212 mg/dL  HI    BUN 20 mg/dL    Creatinine 0.93 mg/dL    BUN/Creat Ratio NOT APPLICABLE    eGFR 65 mL/min/1.73m2    eGFR African American 76 mL/min/1.73m2    Calcium Level 9.6 mg/dL    Magnesium Level 2.1 mg/dL    WBC 5.3    RBC 3.96    Hgb 11.9 gm/dL    Hct 36.3 %    MCV 91.7 fL    MCH 30.1 pg    MCHC 32.8 gm/dL    RDW 14.3 %    Platelet 207    MPV 10.2 fL   .       Impression and Plan   Diagnosis     New onset type 2 diabetes mellitus (IAT90-AX E11.9).     Obese (HBX78-NZ E66.9).      Hypertriglyceridemia (PFM57-MV E78.1).       Counseled: Patient, AADE7 Self Care Behaviors   Today the patient was provided with a review and further instruction on the progression of diabetes mellitus, prevention of heart disease, prevention of complications, and lifestyle guidelines.  Healthy Eating - review of carbohydrate counting, reading food labels, appropriate portion sizes, well balanced meals, diabetic plate method as a general rule.  Patient is provided with additional ideas to incorporate dietary recommendations, increase meal planning and preparation.  Mindful eating, finding other coping mechanisms besides food  Instructed on Therapeutic Lifestyle Changes (TLC) nutrition plan for heart healthy eating.     Low cholesterol (<200mg), low fat, low saturated fat, zero trans fat   Low sodium (2000mg)   High fiber diet (20 - 30gm); Soluble fiber 10+ gm/ day    Eat more omega 3 fats  Vegetarian at least 1 day per week  Being Active - Education on how exercise helps with :    - lowering BG by increasing the muscles ability to take up and use glucose   - weight loss   - healthier heart (improve lipid profile)   - improve sleep, mood, energy   - decrease stress  Monitoring -  Reviewed recommended testing schedule and blood glucose target levels.    Taking Medication - Education on the 8 core defects of type II diabetes and how the different classes of medications have different primary physiological actions.  Discussed gradual progression of diabetes and potential to increase medication in the future.      Problem Solving - medical support team assistance, resources provided (written and apps, internet); good control of stress  Healthy Coping - support of friends, family, and medical support team   Reducing Risks - Detailed education on potential complications associated with uncontrolled diabetes and prevention recommendations.  Recommendations include: annual eye exam, good oral cares with brushing BID and  flossing daily, routine dental appointments, good foot care tips and shoe wear - discussed monofilament test yearly.  Importance of adequate sleep and good control of BG to prevent developing complications related to uncontrolled DM including nephropathy, neuropathy, retinopathy, and cardiovascular disease.  Weight loss goal of 7 - 10% of current body weight pounds as a reasonable goal to help increase insulin sensitivity         Goals:  1.  A1c <6.5%  2.  BG testing 2x/ day on 3+ days/ week before eating 80 - 130 target; two hours after eating 80 - 150mg/dL  3.  Physical active 150 min/ week   4.  Carb controlled heart healthy meal plan <130gm CHO/ day; <200mg Cholesterol/ day   5.  Take medications as directed continue metformin  mg ii tabs daily    follow up in 3 mo       Professional Services   Time spent with pt 60min   cc Dr. Levi

## 2022-02-16 NOTE — TELEPHONE ENCOUNTER
---------------------  From: Abby Laws   To: ZAK EVERETT    Sent: 7/8/2021 12:04:22 PM CDT  Subject: General Message     We spoke on the phone today, Zak.  The lab results confirm diabetes.  I will see you in clinic Friday July 16 at 9:40 a.m. (come about 15 min early please) to discuss specifics of diabetes care. Thanks so much.    KAYDEN Fritz      Results:  Date Result Name Ind Value Ref Range   7/6/2021 8:50 AM Glucose Level ((H)) 136 mg/dL (65 - 99)   7/6/2021 8:50 AM Cholesterol  184 mg/dL ( - <200)   7/6/2021 8:50 AM Non-HDL Cholesterol ((H)) 149 ( - <130)   7/6/2021 8:50 AM HDL ((L)) 35 mg/dL (> OR = 50 - )   7/6/2021 8:50 AM Cholesterol/HDL Ratio ((H)) 5.3 ( - <5.0)   7/6/2021 8:50 AM LDL ((H)) 110    7/6/2021 8:50 AM Triglyceride ((H)) 271 mg/dL ( - <150)   7/6/2021 8:50 AM TSH  3.12 mIU/L (0.40 - 4.50)   7/6/2021 8:50 AM U Creatinine  83 mg/dL (20 - 275)   7/6/2021 8:50 AM U Microalbumin  0.2 mg/dL (See Note: - )   7/6/2021 8:50 AM Ur Microalbumin/Creatinine Ratio  2 ( - <30)

## 2022-02-16 NOTE — TELEPHONE ENCOUNTER
Entered by Jocelyn Hutchins LPN on June 30, 2021 12:24:37 PM CDT  Dr Vinicius Hills  [phone] ([facility])      ---------------------  From: Abby Laws   To: [employer] Pool ([id number]_[facility]);     Sent: 6/30/2021 11:53:56 AM CDT  Subject: General Message     COuld you please drum up the phone number for her surgeon DR Saeid Hills at [facility]. It looks like she has diabetes as a new diagnosis according to her lab tests and I would like to talk to him. Thanks---------------------  From: Jocelyn Hutchins LPN ([employer] Message Pool ([id number]_[facility]))   To: Abby Laws;     Sent: 6/30/2021 12:24:45 PM CDT  Subject: FW: General Message

## 2022-02-16 NOTE — NURSING NOTE
Comprehensive Intake Entered On:  11/20/2019 10:04 AM CST    Performed On:  11/20/2019 9:55 AM CST by Yumiko Anderson CMA               Summary   Chief Complaint :   c/o bilateral shoulder pain present for a few weeks. No known injury.    Menstrual Status :   Postmenopausal   Weight Measured :   180.4 lb(Converted to: 180 lb 6 oz, 81.83 kg)    Height Measured :   65 in(Converted to: 5 ft 5 in, 165.10 cm)    Body Mass Index :   30.02 kg/m2 (HI)    Body Surface Area :   1.94 m2   Systolic Blood Pressure :   118 mmHg   Diastolic Blood Pressure :   64 mmHg   Mean Arterial Pressure :   82 mmHg   Peripheral Pulse Rate :   76 bpm   BP Site :   Right arm   BP Method :   Manual   HR Method :   Electronic   Temperature Tympanic :   98.1 DegF(Converted to: 36.7 DegC)    Oxygen Saturation :   99 %   Languages :   English   Yumiko Anderson CMA - 11/20/2019 9:55 AM CST   Health Status   Allergies Verified? :   Yes   Medication History Verified? :   Yes   Pre-Visit Planning Status :   Completed   Tobacco Use? :   Never smoker   Yumiko Anderson CMA - 11/20/2019 9:55 AM CST   Consents   Consent for Immunization Exchange :   Consent Granted   Consent for Immunizations to Providers :   Consent Granted   Yumiko Anderson CMA - 11/20/2019 9:55 AM CST   Meds / Allergies   (As Of: 11/20/2019 10:04:04 AM CST)   Allergies (Active)   No Known Medication Allergies  Estimated Onset Date:   Unspecified ; Created By:   Yumiko Anderson CMA; Reaction Status:   Active ; Category:   Drug ; Substance:   No Known Medication Allergies ; Type:   Allergy ; Updated By:   Yumiko Anderson CMA; Reviewed Date:   8/22/2019 9:09 AM CDT        Medication List   (As Of: 11/20/2019 10:04:04 AM CST)   Prescription/Discharge Order    amitriptyline  :   amitriptyline ; Status:   Processing ; Ordered As Mnemonic:   amitriptyline 25 mg oral tablet ; Ordering Provider:   Rebecca Baker MD; Action Display:   Complete ; Catalog Code:   amitriptyline ; Order Dt/Tm:   11/20/2019 10:02:14 AM  CST          amitriptyline  :   amitriptyline ; Status:   Prescribed ; Ordered As Mnemonic:   amitriptyline 50 mg oral tablet ; Simple Display Line:   50 mg, 1 tab(s), Oral, hs, 90 tab(s), 3 Refill(s) ; Ordering Provider:   Irena Levi MD; Catalog Code:   amitriptyline ; Order Dt/Tm:   8/22/2019 10:03:07 AM CDT          estradiol topical  :   estradiol topical ; Status:   Prescribed ; Ordered As Mnemonic:   Estrace Vaginal 0.1 mg/g vaginal cream ; Simple Display Line:   See Instructions, INSERT 1 GRAM VAGINALLY THREE TIMES PER WEEK, 42.5 gm, 3 Refill(s) ; Ordering Provider:   Irena Levi MD; Catalog Code:   estradiol topical ; Order Dt/Tm:   8/22/2019 10:02:25 AM CDT          estradiol topical  :   estradiol topical ; Status:   Prescribed ; Ordered As Mnemonic:   Estrace Vaginal 0.1 mg/g vaginal cream ; Simple Display Line:   See Instructions, INSERT 1 GRAM VAGINALLY THREE TIMES PER WEEK, 42.5 gm, 3 Refill(s) ; Ordering Provider:   Bessy Medina; Catalog Code:   estradiol topical ; Order Dt/Tm:   8/15/2018 10:34:13 AM CDT          levothyroxine  :   levothyroxine ; Status:   Prescribed ; Ordered As Mnemonic:   levothyroxine 150 mcg (0.15 mg) oral tablet ; Simple Display Line:   150 mcg, 1 tab(s), po, daily, 90 tab(s), 3 Refill(s) ; Ordering Provider:   Irena Levi MD; Catalog Code:   levothyroxine ; Order Dt/Tm:   8/27/2019 12:16:05 PM CDT          metoprolol  :   metoprolol ; Status:   Prescribed ; Ordered As Mnemonic:   Metoprolol Tartrate 50 mg oral tablet ; Simple Display Line:   25 mg, 0.5 tab(s), po, daily, 45 tab(s), 3 Refill(s) ; Ordering Provider:   Irena Levi MD; Catalog Code:   metoprolol ; Order Dt/Tm:   8/22/2019 10:03:50 AM CDT          metoprolol  :   metoprolol ; Status:   Prescribed ; Ordered As Mnemonic:   Metoprolol Tartrate 50 mg oral tablet ; Simple Display Line:   25 mg, 0.5 tab(s), po, daily, 45 tab(s), 3 Refill(s) ; Ordering Provider:   Bessy Medina;  Catalog Code:   metoprolol ; Order Dt/Tm:   10/22/2018 11:27:24 AM CDT          raNITIdine  :   raNITIdine ; Status:   Prescribed ; Ordered As Mnemonic:   raNITIdine 300 mg oral tablet ; Simple Display Line:   300 mg, 1 tab(s), Oral, hs, 90 tab(s), 3 Refill(s) ; Ordering Provider:   Irena Levi MD; Catalog Code:   raNITIdine ; Order Dt/Tm:   8/22/2019 10:07:06 AM CDT          ranitidine  :   ranitidine ; Status:   Prescribed ; Ordered As Mnemonic:   Zantac 150 oral tablet ; Simple Display Line:   150 mg, 1 tab(s), PO, BID, 60 tab(s) ; Ordering Provider:   Sharon Arevalo MD; Catalog Code:   raNITIdine ; Order Dt/Tm:   11/8/2013 4:16:30 PM CST          triamcinolone topical  :   triamcinolone topical ; Status:   Prescribed ; Ordered As Mnemonic:   triamcinolone 0.5% topical cream ; Simple Display Line:   1 michelle, Topical, bid, under left ear tid x14 days, 15 gm, 0 Refill(s) ; Ordering Provider:   Abby Laws; Catalog Code:   triamcinolone topical ; Order Dt/Tm:   8/27/2019 3:03:18 PM CDT            Home Meds    omega-3 polyunsaturated fatty acids  :   omega-3 polyunsaturated fatty acids ; Status:   Documented ; Ordered As Mnemonic:   Fish Oil ; Simple Display Line:   1,000 mg, po, daily, 0 Refill(s) ; Catalog Code:   omega-3 polyunsaturated fatty acids ; Order Dt/Tm:   1/31/2017 7:02:34 PM CST

## 2022-02-16 NOTE — NURSING NOTE
Comprehensive Intake Entered On:  9/3/2021 10:16 AM CDT    Performed On:  9/3/2021 10:11 AM CDT by Jocelyn Hutchins LPN               Summary   Chief Complaint :   here for annual exam   Menstrual Status :   Postmenopausal   Weight Measured :   186.6 lb(Converted to: 186 lb 10 oz, 84.640 kg)    Height Measured :   65 in(Converted to: 5 ft 5 in, 165.10 cm)    Body Mass Index :   31.05 kg/m2 (HI)    Body Surface Area :   1.97 m2   Systolic Blood Pressure :   114 mmHg   Diastolic Blood Pressure :   62 mmHg   Mean Arterial Pressure :   79 mmHg   Peripheral Pulse Rate :   73 bpm   BP Site :   Right arm   BP Method :   Manual   Temperature Tympanic :   99.0 DegF(Converted to: 37.2 DegC)    Oxygen Saturation :   99 %   Languages :   English   Jocelyn Hutchins LPN - 9/3/2021 10:11 AM CDT   Health Status   Allergies Verified? :   Yes   Medication History Verified? :   Yes   Medical History Verified? :   No   Pre-Visit Planning Status :   Completed   Tobacco Use? :   Former smoker   Jocelyn Hutchins LPN - 9/3/2021 10:11 AM CDT   Meds / Allergies   (As Of: 9/3/2021 10:16:26 AM CDT)   Allergies (Active)   No Known Medication Allergies  Estimated Onset Date:   Unspecified ; Created By:   Yumiko Anderson CMA; Reaction Status:   Active ; Category:   Drug ; Substance:   No Known Medication Allergies ; Type:   Allergy ; Updated By:   Yumiko Anderson CMA; Reviewed Date:   8/17/2021 9:21 AM CDT        Medication List   (As Of: 9/3/2021 10:16:26 AM CDT)   Prescription/Discharge Order    Miscellaneous Rx Supply  :   Miscellaneous Rx Supply ; Status:   Prescribed ; Ordered As Mnemonic:   accu chek guide test strips ; Simple Display Line:   See Instructions, test 2x/ day, 200 EA, 1 Refill(s) ; Ordering Provider:   Abby Laws; Catalog Code:   Miscellaneous Rx Supply ; Order Dt/Tm:   8/16/2021 2:27:40 PM CDT          Miscellaneous Rx Supply  :   Miscellaneous Rx Supply ; Status:   Prescribed ; Ordered As Mnemonic:   Accu check soft  clix lancets ; Simple Display Line:   See Instructions, testing 2x/ day, 200 EA, 1 Refill(s) ; Ordering Provider:   Abby Laws; Catalog Code:   Miscellaneous Rx Supply ; Order Dt/Tm:   8/16/2021 2:25:50 PM CDT          metFORMIN  :   metFORMIN ; Status:   Prescribed ; Ordered As Mnemonic:   metFORMIN 500 mg oral tablet, extended release ; Simple Display Line:   1,000 mg, 2 tab(s), Oral, daily, with evening meal, 180 EA, 1 Refill(s) ; Ordering Provider:   Abby Laws; Catalog Code:   metFORMIN ; Order Dt/Tm:   7/16/2021 10:11:49 AM CDT          amitriptyline  :   amitriptyline ; Status:   Prescribed ; Ordered As Mnemonic:   amitriptyline 50 mg oral tablet ; Simple Display Line:   50 mg, 1 tab(s), Oral, hs, 90 tab(s), 3 Refill(s) ; Ordering Provider:   Irena Levi MD; Catalog Code:   amitriptyline ; Order Dt/Tm:   8/25/2020 1:51:53 PM CDT          atorvastatin  :   atorvastatin ; Status:   Prescribed ; Ordered As Mnemonic:   Lipitor 20 mg oral tablet ; Simple Display Line:   20 mg, 1 tab(s), Oral, daily, 90 tab(s), 1 Refill(s) ; Ordering Provider:   Abby Laws; Catalog Code:   atorvastatin ; Order Dt/Tm:   7/16/2021 10:11:14 AM CDT          levothyroxine  :   levothyroxine ; Status:   Prescribed ; Ordered As Mnemonic:   levothyroxine 150 mcg (0.15 mg) oral tablet ; Simple Display Line:   150 mcg, 1 tab(s), po, daily, 90 tab(s), 3 Refill(s) ; Ordering Provider:   Irena Levi MD; Catalog Code:   levothyroxine ; Order Dt/Tm:   8/25/2020 1:52:14 PM CDT          esomeprazole  :   esomeprazole ; Status:   Prescribed ; Ordered As Mnemonic:   esomeprazole 20 mg oral delayed release capsule ; Simple Display Line:   1 cap(s), Oral, daily, 30 cap(s), 0 Refill(s) ; Ordering Provider:   Irena Levi MD; Catalog Code:   esomeprazole ; Order Dt/Tm:   9/1/2021 10:58:22 AM CDT          metoprolol  :   metoprolol ; Status:   Prescribed ; Ordered As Mnemonic:   Metoprolol Tartrate 50 mg oral  tablet ; Simple Display Line:   0.5 tab(s), Oral, daily, 45 tab(s), 2 Refill(s) ; Ordering Provider:   Irena Levi MD; Catalog Code:   metoprolol ; Order Dt/Tm:   1/20/2021 9:13:57 AM CST            Home Meds    Miscellaneous Prescription  :   Miscellaneous Prescription ; Status:   Documented ; Ordered As Mnemonic:   OTC - Potassium ; Simple Display Line:   Oral, daily, 0 Refill(s) ; Catalog Code:   Miscellaneous Prescription ; Order Dt/Tm:   6/29/2021 12:00:19 PM CDT          Miscellaneous Prescription  :   Miscellaneous Prescription ; Status:   Documented ; Ordered As Mnemonic:   OTC - Magnesium ; Simple Display Line:   Oral, daily, 0 Refill(s) ; Catalog Code:   Miscellaneous Prescription ; Order Dt/Tm:   6/29/2021 12:00:58 PM CDT          calcium-vitamin D  :   calcium-vitamin D ; Status:   Documented ; Ordered As Mnemonic:   Citracal Maximum + D ; Simple Display Line:   Oral, daily, 0 Refill(s) ; Catalog Code:   calcium-vitamin D ; Order Dt/Tm:   9/3/2021 10:14:01 AM CDT          multivitamin  :   multivitamin ; Status:   Documented ; Ordered As Mnemonic:   Multiple Vitamins oral tablet ; Simple Display Line:   Oral, daily, 0 Refill(s) ; Catalog Code:   multivitamin ; Order Dt/Tm:   6/29/2021 11:59:46 AM CDT          biotin  :   biotin ; Status:   Documented ; Ordered As Mnemonic:   Hair, Skin & Nails ; Simple Display Line:   2 Gummies, 1x per day, 0 Refill(s) ; Catalog Code:   biotin ; Order Dt/Tm:   8/25/2020 11:45:22 AM CDT          omega-3 polyunsaturated fatty acids  :   omega-3 polyunsaturated fatty acids ; Status:   Documented ; Ordered As Mnemonic:   Fish Oil ; Simple Display Line:   1,000 mg, po, daily, 0 Refill(s) ; Catalog Code:   omega-3 polyunsaturated fatty acids ; Order Dt/Tm:   1/31/2017 7:02:34 PM CST

## 2022-02-16 NOTE — NURSING NOTE
Depression Screening Entered On:  8/26/2019 10:29 AM CDT    Performed On:  8/22/2019 10:28 AM CDT by Alyssa West               Depression Screening   Little Interest - Pleasure in Activities :   Not at all   Feeling Down, Depressed, Hopeless :   Not at all   Initial Depression Screen Score :   0    Trouble Falling or Staying Asleep :   Not at all   Feeling Tired or Little Energy :   Not at all   Poor Appetite or Overeating :   Not at all   Feeling Bad About Yourself :   Not at all   Trouble Concentrating :   Not at all   Moving or Speaking Slowly :   Not at all   Thoughts Better Off Dead or Hurting Self :   Not at all   Detailed Depression Screen Score :   0    Total Depression Screen Score :   0    Alyssa West - 8/26/2019 10:28 AM CDT

## 2022-02-16 NOTE — NURSING NOTE
Comprehensive Intake Entered On:  11/5/2021 9:34 AM CDT    Performed On:  11/5/2021 9:31 AM CDT by Grace Galicia CMA               Summary   Chief Complaint :   DM check and follow up labs    Menstrual Status :   Postmenopausal   Height Measured :   65 in(Converted to: 5 ft 5 in, 165.10 cm)    Ht/Wt Measurement Refused by Patient? :   Yes   Systolic Blood Pressure :   120 mmHg   Diastolic Blood Pressure :   66 mmHg   Mean Arterial Pressure :   84 mmHg   Peripheral Pulse Rate :   88 bpm   BP Site :   Right arm   Pulse Site :   Radial artery   BP Method :   Manual   HR Method :   Electronic   Temperature Tympanic :   97.4 DegF(Converted to: 36.3 DegC)  (LOW)    Oxygen Saturation :   97 %   Languages :   English   Grace Galicia CMA - 11/5/2021 9:31 AM CDT   Health Status   Allergies Verified? :   Yes   Medication History Verified? :   Yes   Medical History Verified? :   No   Pre-Visit Planning Status :   Completed   Tobacco Use? :   Former smoker   Grace Galicia CMA - 11/5/2021 9:31 AM CDT   Consents   Consent for Immunization Exchange :   Consent Granted   Consent for Immunizations to Providers :   Consent Granted   Grace Galicia CMA - 11/5/2021 9:31 AM CDT   Meds / Allergies   (As Of: 11/5/2021 9:34:58 AM CDT)   Allergies (Active)   No Known Medication Allergies  Estimated Onset Date:   Unspecified ; Created By:   Yumiko Anderson CMA; Reaction Status:   Active ; Category:   Drug ; Substance:   No Known Medication Allergies ; Type:   Allergy ; Updated By:   Yumiko Anderson CMA; Reviewed Date:   11/5/2021 9:32 AM CDT        Medication List   (As Of: 11/5/2021 9:34:58 AM CDT)   Prescription/Discharge Order    amitriptyline  :   amitriptyline ; Status:   Prescribed ; Ordered As Mnemonic:   amitriptyline 50 mg oral tablet ; Simple Display Line:   50 mg, 1 tab(s), Oral, hs, 90 tab(s), 3 Refill(s) ; Ordering Provider:   Irena Levi MD; Catalog Code:   amitriptyline ; Order Dt/Tm:   9/3/2021 11:03:59 AM CDT           atorvastatin  :   atorvastatin ; Status:   Prescribed ; Ordered As Mnemonic:   Lipitor 40 mg oral tablet ; Simple Display Line:   40 mg, 1 tab(s), Oral, daily, 90 tab(s), 0 Refill(s) ; Ordering Provider:   Irena Levi MD; Catalog Code:   atorvastatin ; Order Dt/Tm:   9/3/2021 11:04:08 AM CDT          esomeprazole  :   esomeprazole ; Status:   Prescribed ; Ordered As Mnemonic:   esomeprazole 20 mg oral delayed release capsule ; Simple Display Line:   1 cap(s), Oral, daily, 90 cap(s), 3 Refill(s) ; Ordering Provider:   Irena Levi MD; Catalog Code:   esomeprazole ; Order Dt/Tm:   9/3/2021 11:06:49 AM CDT          levothyroxine  :   levothyroxine ; Status:   Prescribed ; Ordered As Mnemonic:   levothyroxine 150 mcg (0.15 mg) oral tablet ; Simple Display Line:   150 mcg, 1 tab(s), po, daily, 90 tab(s), 3 Refill(s) ; Ordering Provider:   Irena Levi MD; Catalog Code:   levothyroxine ; Order Dt/Tm:   9/3/2021 11:07:38 AM CDT          metFORMIN  :   metFORMIN ; Status:   Prescribed ; Ordered As Mnemonic:   metFORMIN 500 mg oral tablet, extended release ; Simple Display Line:   1,000 mg, 2 tab(s), Oral, daily, with evening meal, 180 EA, 1 Refill(s) ; Ordering Provider:   Irena Levi MD; Catalog Code:   metFORMIN ; Order Dt/Tm:   9/3/2021 11:07:45 AM CDT          metoprolol  :   metoprolol ; Status:   Prescribed ; Ordered As Mnemonic:   Metoprolol Tartrate 50 mg oral tablet ; Simple Display Line:   0.5 tab(s), Oral, daily, 45 tab(s), 3 Refill(s) ; Ordering Provider:   Irena Levi MD; Catalog Code:   metoprolol ; Order Dt/Tm:   9/3/2021 11:07:48 AM CDT          Miscellaneous Rx Supply  :   Miscellaneous Rx Supply ; Status:   Prescribed ; Ordered As Mnemonic:   Accu check soft clix lancets ; Simple Display Line:   See Instructions, testing 2x/ day, 200 EA, 1 Refill(s) ; Ordering Provider:   Abby Laws; Catalog Code:   Miscellaneous Rx Supply ; Order Dt/Tm:   8/16/2021 2:25:50 PM CDT           Miscellaneous Rx Supply  :   Miscellaneous Rx Supply ; Status:   Prescribed ; Ordered As Mnemonic:   accu chek guide test strips ; Simple Display Line:   See Instructions, test 2x/ day, 200 EA, 1 Refill(s) ; Ordering Provider:   Abby Laws; Catalog Code:   Miscellaneous Rx Supply ; Order Dt/Tm:   8/16/2021 2:27:40 PM CDT            Home Meds    biotin  :   biotin ; Status:   Documented ; Ordered As Mnemonic:   Hair, Skin & Nails ; Simple Display Line:   2 Gummies, 1x per day, 0 Refill(s) ; Catalog Code:   biotin ; Order Dt/Tm:   8/25/2020 11:45:22 AM CDT          calcium-vitamin D  :   calcium-vitamin D ; Status:   Documented ; Ordered As Mnemonic:   Citracal Maximum + D ; Simple Display Line:   Oral, daily, 0 Refill(s) ; Catalog Code:   calcium-vitamin D ; Order Dt/Tm:   9/3/2021 10:14:01 AM CDT          Miscellaneous Prescription  :   Miscellaneous Prescription ; Status:   Documented ; Ordered As Mnemonic:   OTC - Magnesium ; Simple Display Line:   Oral, daily, 0 Refill(s) ; Catalog Code:   Miscellaneous Prescription ; Order Dt/Tm:   6/29/2021 12:00:58 PM CDT          Miscellaneous Prescription  :   Miscellaneous Prescription ; Status:   Documented ; Ordered As Mnemonic:   OTC - Potassium ; Simple Display Line:   Oral, daily, 0 Refill(s) ; Catalog Code:   Miscellaneous Prescription ; Order Dt/Tm:   6/29/2021 12:00:19 PM CDT          multivitamin  :   multivitamin ; Status:   Documented ; Ordered As Mnemonic:   Multiple Vitamins oral tablet ; Simple Display Line:   Oral, daily, 0 Refill(s) ; Catalog Code:   multivitamin ; Order Dt/Tm:   6/29/2021 11:59:46 AM CDT          omega-3 polyunsaturated fatty acids  :   omega-3 polyunsaturated fatty acids ; Status:   Documented ; Ordered As Mnemonic:   Fish Oil ; Simple Display Line:   1,000 mg, po, daily, 0 Refill(s) ; Catalog Code:   omega-3 polyunsaturated fatty acids ; Order Dt/Tm:   1/31/2017 7:02:34 PM CST            Social History   Social History   (As  Of: 11/5/2021 9:34:58 AM CDT)   Alcohol:        Current, Wine (5 oz), 3-5 times a month, 1 drinks/episode average.  2 drinks/episode maximum.  Ready to change: No.   (Last Updated: 10/21/2021 10:09:59 AM CDT by Therese Apodaca)          Tobacco:        Never (less than 100 in lifetime)   (Last Updated: 6/29/2021 11:09:22 AM CDT by Jocelyn Hutchins LPN)          Electronic Cigarette/Vaping:        Electronic Cigarette Use: Never.   (Last Updated: 6/29/2021 11:09:28 AM CDT by Jocelyn Hutchins LPN)          Substance Abuse:        Never   (Last Updated: 3/2/2012 8:25:32 AM CST by Aniyah Almaguer CMA)          Employment/School:        Retired   (Last Updated: 8/4/2017 11:03:38 AM CDT by Therese Hein)          Home/Environment:        Marital status: .  Spouse/Partner name: Ricardo.  Living situation: Home/Independent.  Feels unsafe at home: No.  Family/Friends available for support: Yes.  Risks in environment: Does not wear helmet, owns secured gun.   (Last Updated: 10/21/2021 10:10:20 AM CDT by Therese Apodaca)          Nutrition/Health:        Type of diet: Low carbohydrate.   (Last Updated: 8/25/2020 11:46:38 AM CDT by Yumiko Denis CMA)          Exercise:        Exercise frequency: 3-4 times/week.  Exercise type: garden in summer, health club.   (Last Updated: 8/26/2019 10:28:14 AM CDT by Alyssa West)          Sexual:        Sexually active: Yes.  Sexual orientation: Heterosexual.  Contraceptive Use Details: None.   (Last Updated: 8/4/2017 11:04:25 AM CDT by Therese Hein)

## 2022-02-16 NOTE — PROGRESS NOTES
Chief Complaint    Physical  History of Present Illness      Pt here today for annual exam      Review of systems is negative except as per HPI including no fevers, chills, sore throat, runny nose, nausea, vomiting, constipation, diarrhea, rash or new skin lesions, chest pain, palpitations, slurred speech, new paresthesia, shortness of breath or wheeze.             Exam:      see vitals listed below      General: alert and oriented ×3 no acute distress.      HEENT: Normocephalic and atraumatic.       Eyes pupils are equal round and reactive to light extraocular motion is intact. normal conjunctiva      Hearing is grossly normal and there is no otorrhea. Tympanic membranes are pearly grey with a normal light reflex.      Nares are patent there is no rhinorrhea.       Mucous membranes are moist and pink.      Chest: has bilateral rise with no increased work of breathing. clear to auscultation without wheezes, rhonchi, or rales.      Cardiovascular: normal perfusion and brisk capillary refill. S1S2 with regular rate and rhythm and no murmurs, gallops or rubs.      Musculoskeletal: no gross focal abnormalities and normal gait.      Neuro: no gross focal abnormalities and memory seems intact.  CN 2-12 are grossly intact.      Psychiatric: speech is clear and coherent and fluent. Patient dressed appropriately for the weather. Mood is appropriate and affect is full.      Abd: no rebound or guarding, normal BS      Skin: no rash or lesion identified      Breast:  Pt declined             : atrophic external female genitalia. Vagina has no lesions,  there is normal physiological secretions present,  there is no odor, no foreign body noticed.       Vulva has no lesions. Mucosa appears atrophic. Cervix appears normal. Specimens obtained as noted in orders. Clitoral roy appears normal and there are mild left labial adhesions.             Discussed:      using sunscreen, protecting from sunburn, regular self skin checks       refer to usdachoosemyplate.gov, AHA and ADA for diet and exercise recommendations      consume [id number] mg calcium daily      do weight bearing exercises      can get mammo at hospital by calling up and scheduling, do not need an order from me      get 120min /week of aerobic exercise      may try to get shingrix from local pharmacy, she is a candidate but there is a national shortage      up to date on colon cancer screening      may use vegetable oil for vaginal lubrication if intercourse is painful or may consider R/B of HRT systemic or local  Physical Exam   Vitals & Measurements    T: 98.1   F (Tympanic)  HR: 81(Peripheral)  BP: 112/70  SpO2: 98%     HT: 65 in  WT: 178.4 lb  BMI: 29.68   Assessment/Plan       Hypothyroid (E03.9)                MVP (mitral valve prolapse) (I34.1)         Ordered:          Cardiac Echocardiogram Transthoracic (Request), MVP (mitral valve prolapse)                Personal History of DVT (Deep Vein Thrombosis) (Z86.718)                Well adult exam (Z00.00)                Orders:         amitriptyline, = 1 tab(s) ( 50 mg ), Oral, hs, # 90 tab(s), 3 Refill(s), Type: Maintenance, Pharmacy: [id number], 1 tab(s) Oral hs, (Ordered)         estradiol topical, See Instructions, Instructions: INSERT 1 GRAM VAGINALLY THREE TIMES PER WEEK, # 42.5 gm, 3 Refill(s), Type: Maintenance, Pharmacy: [facility], INSERT 1 GRAM VAGINALLY THREE TIMES PER WEEK, (Ordered)         metoprolol, = 0.5 tab(s) ( 25 mg ), po, daily, # 45 tab(s), 3 Refill(s), Type: Maintenance, Pharmacy: [id number], Please disregard fill on 8/15/18 of metoprolol succinate. Pt is to remain on tartrate. Thank you., 0.5 tab(s) Oral daily, (Ordered)         raNITIdine, = 1 tab(s) ( 300 mg ), Oral, hs, # 90 tab(s), 3 Refill(s), Type: Maintenance, Pharmacy: [id number], 1 tab(s) Oral hs, (Ordered)         Basic Metabolic Panel* ([facility]), Specimen Type: Serum, Collection Date: 08/22/19  9:51:00 CDT         Hemoglobin A1c* ([facility]), Specimen Type: Blood, Collection Date: 08/22/19 9:51:00 CDT         Lipid panel with reflex to direct ldl* ([facility]), Specimen Type: Serum, Collection Date: 08/22/19 9:51:00 CDT         TSH* ([facility]), Specimen Type: Serum, Collection Date: 08/22/19 9:51:00 CDT  Patient Information     Name:ZAK EVERETT      Address:      [address] [place] [id number]     Sex:Female     YOB: 1955     [phone]     Emergency Contact:ALEC EVERETT     MRN:[id number]     FIN:[id number]     Location:[facility]     Date of Service:08/22/2019      Primary Care Physician:       Bessy Medina, [phone]      Attending Physician:       Irena Levi MD, [phone]  Problem List/Past Medical History    Ongoing     Anticoagulated     Dysfunctional uterine bleeding     GERD (gastroesophageal reflux disease)     Hypertriglyceridemia     Hypothyroid     MVP (mitral valve prolapse)     Obese     Personal History of DVT (Deep Vein Thrombosis)       Comments: provoked from ankle fracture and wearing cam walking boot     S/P meniscectomy    Historical     Pregnancy     Pregnancy     Pregnancy  Procedure/Surgical History     Colonoscopy (08/30/2017)            Comments: Indication: screening      Sedation: F&V      Normal      Repeat in 10 years.     Arthroscopic partial medial meniscectomy (09/27/2016)            Comments: Right knee      Dr. Amin at [facility].     Laparoscopic supracervical hysterectomy (11/19/2010)            Comments: Pre- and post-op diagnosis is menorrhagia.     Hysteroscopy, D & C (04/23/2010)            Comments: Large pelvic mass, most consistent with uterine fibroids.     Colonoscopy (07/26/2007)           Childbirth (04/25/1988)            Comments: Patient Comment: Male.     Childbirth (03/27/1986)            Comments: Female. G3, P2-0-1-2..     Cholecystectomy            Comments:  Open.  Medications    amitriptyline 25 mg oral tablet, 50 mg= 2 tab(s), Oral, hs    amitriptyline 50 mg oral tablet, 50 mg= 1 tab(s), Oral, hs, 3 refills    Estrace Vaginal 0.1 mg/g vaginal cream, See Instructions, 3 refills    Estrace Vaginal 0.1 mg/g vaginal cream, See Instructions, 2 refills    Fish Oil, 1000 mg, Oral, daily    levothyroxine 150 mcg (0.15 mg) oral tablet, 150 mcg= 1 tab(s), Oral, daily, 3 refills    Metoprolol Tartrate 50 mg oral tablet, 25 mg= 0.5 tab(s), Oral, daily, 3 refills    Metoprolol Tartrate 50 mg oral tablet, 25 mg= 0.5 tab(s), Oral, daily, 3 refills    raNITIdine 300 mg oral tablet, 300 mg= 1 tab(s), Oral, hs, 3 refills    Zantac 150 oral tablet, 150 mg= 1 tab(s), Oral, bid, 2 refills  Allergies    No Known Medication Allergies  Social History    Smoking Status - 08/22/2019     Never smoker     Alcohol      Current, 1-2 times per week, 1 drinks/episode average., 03/02/2012     Employment/School      Retired, 08/04/2017     Exercise      Exercise frequency: 1-2 times/week. Exercise type: back, general., 08/04/2017     Home/Environment      Marital status: . Spouse/Partner name: Ricardo. Risks in environment: Does not wear helmet, owns secured gun., 08/04/2017     Nutrition/Health      Low Carb Diet:. Low carbohydrate Type of diet:. Yes Sleeping concerns:. Yes Feels highly stressed:., 08/15/2018     Sexual      Sexually active: Yes. Sexual orientation: Heterosexual. Contraceptive Use Details: None., 08/04/2017     Substance Abuse      Never, 03/02/2012     Tobacco      Never, 03/02/2012  Family History    Anxiety: Daughter and Son.    Arthritis: Mother.    Cataracts: Mother.    Chicken pox: Daughter and Son.    Goiter: Mother.    HBP - High blood pressure: Father.    Hearing loss: Mother.    Heart disease: Father and Grandmother (P).    Hypertension: Mother.    Obesity: Father.    Overweight: Father.    Rheumatoid arthritis: Mother.    Snoring: Father.    Wears glasses: Mother,  Father, Daughter, Son and Grandmother (P).  Immunizations      Vaccine Date Status Comments      zoster vaccine, inactivated 08/06/2019 Given      zoster vaccine, inactivated 03/26/2019 Given      influenza virus vaccine, inactivated 10/23/2015 Recorded      influenza virus vaccine, inactivated 11/19/2013 Recorded [11/21/2013] given at [facility]      tetanus/diphth/pertuss (Tdap) adult/adol 02/28/2013 Given      influenza virus vaccine, inactivated 02/16/2011 Given      influenza, H1N1, inactivated 02/12/2010 Recorded      Td 12/01/2003 Recorded

## 2022-02-16 NOTE — TELEPHONE ENCOUNTER
Entered by Jodi Fiore CMA on January 20, 2021 9:14:31 AM CST  ---------------------  From: Jodi Fiore CMA   To: [place] [id number]    Sent: 1/20/2021 9:14:31 AM CST  Subject: Medication Management     ** Submitted: **  Order:metoprolol (Metoprolol Tartrate 50 mg oral tablet)  0.5 tab(s)  Oral  daily  Qty:  45 tab(s)        Days Supply:  90        Refills:  2          Substitutions Allowed     Route To Pharmacy - [place] [address]    Signed by Jodi Fiore CMA  1/20/2021 3:13:00 PM UT    ** Submitted: **  Complete:metoprolol (Metoprolol Tartrate 50 mg oral tablet)   Signed by Jodi Fiore CMA  1/20/2021 3:14:00 PM UT    ** Not Approved:  **  metoprolol (Metoprolol Tartrate 50 MG Oral Tablet)  Take 1/2 (one-half) tablet by mouth once daily  Qty:  45 tab(s)        Days Supply:  90        Refills:  0          Substitutions Allowed     Route To Pharmacy - [place] [address]   Signed by Jodi Fiore CMA            ------------------------------------------  From: [place] [id number]  To: Irena Levi MD  Sent: January 16, 2021 10:19:44 PM CST  Subject: Medication Management  Due: January 15, 2021 8:18:39 PM CST     ** On Hold Pending Signature **     Dispensed Drug: metoprolol (Metoprolol Tartrate 50 mg oral tablet), Take 1/2 (one-half) tablet by mouth once daily  Quantity: 45 tab(s)  Days Supply: 90  Refills: 0  Substitutions Allowed  Notes from Pharmacy:  ------------------------------------------Pt UTD on annual. RTC placed - due August for annual.

## 2022-02-16 NOTE — LETTER
(Inserted Image. Unable to display)            September 03, 2021        ZAK EVERETT  [address] [place] [address]        Dear ZAK,     Thank you for selecting [facility] for your healthcare needs. Below you will find the results of your recent test(s) done at our clinic.      Your glucose is just fine for you not being fasting.  Your liver enzymes are elevated but better than the last time we checked them.  The best thing you can do for fatty liver is to get yourself to a healthier weight.   As your uncle says, eat less, exercise more...!      Result Name Current Result Previous Result Reference Range   Sodium Level (mmol/L)  138 9/3/2021  135 6/29/2021 135 - 146   Potassium Level (mmol/L)  4.4 9/3/2021  4.8 6/29/2021 3.5 - 5.3   Chloride Level (mmol/L)  102 9/3/2021  101 6/29/2021 98 - 110   CO2 Level (mmol/L)  27 9/3/2021  26 6/29/2021 20 - 32   Glucose Level (mg/dL) ((H)) 120 9/3/2021 ((H)) 136 7/6/2021 65 - 99   BUN (mg/dL)  21 9/3/2021  20 6/29/2021 7 - 25   Creatinine Level (mg/dL)  0.93 9/3/2021  0.93 6/29/2021 0.50 - 0.99   BUN/Creat Ratio  NOT APPLICABLE 9/3/2021  NOT APPLICABLE 6/29/2021 6 - 22   eGFR (mL/min/1.73m2)  65 9/3/2021  65 6/29/2021 > OR = 60 -    eGFR  (mL/min/1.73m2)  76 9/3/2021  76 6/29/2021 > OR = 60 -    Calcium Level (mg/dL)  9.8 9/3/2021  9.6 6/29/2021 8.6 - 10.4   Bilirubin Total (mg/dL)  0.6 9/3/2021  0.2 - 1.2   Alkaline Phosphatase (unit/L)  55 9/3/2021  37 - 153   AST/SGOT (unit/L) ((H)) 53 9/3/2021  10 - 35   ALT/SGPT (unit/L) ((H)) 40 9/3/2021  6 - 29   Protein Total (gm/dL)  7.5 9/3/2021  6.1 - 8.1   Albumin Level (gm/dL)  4.7 9/3/2021  3.6 - 5.1   Globulin  2.8 9/3/2021  1.9 - 3.7   A/G Ratio  1.7 9/3/2021  1.0 - 2.5     Please contact my practice at [phone] if you have any questions or concerns.     Sincerely,        Irena Levi MD      What do your labs mean?  Below is a glossary of commonly ordered  labs:  LDL   Bad Cholesterol   HDL   Good Cholesterol  AST/ALT   Liver Function   Cr/Creatinine   Kidney Function  Microalbumin   Kidney Function  BUN   Kidney Function  PSA   Prostate    TSH   Thyroid Hormone  HgbA1c   Diabetes Test   Hgb (Hemoglobin)   Red Blood Cells

## 2022-02-16 NOTE — TELEPHONE ENCOUNTER
---------------------  From: Yuli DUNCAN, Sandhya STORM (Phone Messages Pool ([id number]_[facility]))   To: ZAK HITCHCOCK    Cc: Abby Laws;      Sent: 2021 5:05:44 PM CDT  Subject: RE: Donation     Demetri Salas,    According to [employer], as long as you are in relatively healthy condition, having diabetes would NOT stop you from donating blood.    It would be best to ask them the next time you go to donate.    Have a great day,    Sandhya Roldan RN        ---------------------  From: ZAK HITCHCOKC  To: [facility]  Sent: 2021 04:57 p.m. CDT  Subject: Donation  Dr Mora,  I have been donating blood. Now since I ve been diagnosed diabetic, will that stop me from donating blood?    Thanks,    Zak Hitchcock   10/26/1855---------------------  From: Abby Laws   To: ZAK HITCHCOCK    Sent: 2021 1:42:36 PM CDT  Subject: RE: Donation     Zak,  Good for you! Keep donating if the center where you donate allows it!    KAYDEN Fritz

## 2022-02-16 NOTE — TELEPHONE ENCOUNTER
---------------------  From: Irena Levi MD   To: [employer] Pool ([id number]_[place] [facility] [place]);     Sent: 8/27/2019 12:17:03 PM CDT  Subject: RE: General Message     please let pt know her TSH was 1.5, I sent a 3 month supply with 3 refills of her levothyroxine, to her [facility] pharmacy.        ---------------------  From: Yumiko Anderson CMA ([employer] Message Pool ([id number]_[facility]))   To: Irena Levi MD;     Sent: 8/27/2019 11:57:21 AM CDT  Subject: RE: General Message     TSH results are in chart.       ---------------------  From: Irena Levi MD   To: [employer] Pool ([id number]_[place] [facility] [place]);     Sent: 8/22/2019 9:50:34 AM CDT  Subject: General Message     please help watch for TSH results, I need to renew levo based on results,---------------------  From: Yumiko Anderson CMA ([employer] Pool ([address]_[facility]))   To: Irena Levi MD;     Sent: 8/27/2019 12:39:58 PM CDT  Subject: RE: General Message     Please advise on patients other message sent through portal.faxed forms to number below. Confirmation received.ignore last addendum- wrong patient.

## 2022-02-16 NOTE — PROGRESS NOTES
Chief Complaint    DM check and follow up labs  History of Present Illness      pt is here today with her       recent labs show her lfts are elevated, she has ROMERO and chart review shows that if they become elvated she should follow up with GI, she had managed to get her lfts back to normal with TLC, but had stopped following the low carb diet and now has resumed it as a means to treat her DM also      HGB looks ok but it does look like she has low ferritin,      osteopenia, taking 1 citrcal per day, unsure if she is getting enough in her diet to only need 1 per day  Physical Exam   Vitals & Measurements    T: 97.4  F (Tympanic)  HR: 88 (Peripheral)  BP: 120/66  SpO2: 97%     HT: 65 in   Assessment/Plan       Encounter for immunization (Z23)        given second dose of Hep a and Hep B vaccines, will need 1 more Hep B vaccine at least 4 months from now         Ordered:          hepatitis A adult vaccine, 1 mL, im, once, (Completed)          hepatitis B adult vaccine, 1 mL, im, once, (Completed)          02531 imadm prq id subq/im njxs 1 vaccine (Charge), Quantity: 1, Encounter for immunization          60560 imadm prq id subq/im njxs ea vaccine (Charge), Quantity: 1, Encounter for immunization          25010 hepatitis a vaccine adult for intramuscular use (Charge), Quantity: 1, Encounter for immunization          19372 hepatitis b vaccine adult dosage intramuscular (Charge), Quantity: 1, Encounter for immunization                Fatty liver (K76.0)         noted and taken into account for medical decision making         Ordered:          65824 office o/p est mod 30-39 min (Charge), Quantity: 1, GERD (gastroesophageal reflux disease)  Fatty liver  ROMERO (nonalcoholic steatohepatitis)  New onset type 2 diabetes mellitus                GERD (gastroesophageal reflux disease) (K21.9)         Ordered:          50327 office o/p est mod 30-39 min (Charge), Quantity: 1, GERD (gastroesophageal reflux disease)  Fatty  liver  ROMERO (nonalcoholic steatohepatitis)  New onset type 2 diabetes mellitus                ROMERO (nonalcoholic steatohepatitis) (K75.81)         noted and taken into account for medical decision making         Ordered:          43328 office o/p est mod 30-39 min (Charge), Quantity: 1, GERD (gastroesophageal reflux disease)  Fatty liver  ROMERO (nonalcoholic steatohepatitis)  New onset type 2 diabetes mellitus                New onset type 2 diabetes mellitus (E11.9)         noted and taken into account for medical decision making         Ordered:          33284 office o/p est mod 30-39 min (Charge), Quantity: 1, GERD (gastroesophageal reflux disease)  Fatty liver  ROMERO (nonalcoholic steatohepatitis)  New onset type 2 diabetes mellitus                Orders:         98846 office o/p est mod 30-39 min (Charge), Quantity: 1, Fatty liver  New onset type 2 diabetes mellitus  ROMERO (nonalcoholic steatohepatitis)  Anemia         Return to Clinic (Request), RFV: lab only CMP, CBC, HgBA1c and iron studies, Return in 6 months      repeat labs in 6 month, will do iron supplement until then and if LFTs are back to normal, cont lifestyle changes, if not then refer back to GI for ROMERO, and she will start taking a second citrical  Patient Information     Name:ZAK EVERETT      Address:      [address] [place] [id number]     Sex:Female     YOB: 1955     [phone]     Emergency Contact:JALEN MATA     MRN:[id number]     FIN:[id number]     Location:[facility]     Date of Service:11/05/2021      Primary Care Physician:       Irena Levi MD, [phone]      Attending Physician:       Irena Levi MD, [phone]  Problem List/Past Medical History    Ongoing     Dysfunctional uterine bleeding     Fatty liver     GERD (gastroesophageal reflux disease)     History of NSAID-associated gastropathy     Hypertriglyceridemia     Hypothyroid     MVP (mitral valve  prolapse)     ROMERO (nonalcoholic steatohepatitis)     New onset type 2 diabetes mellitus     Obese     Personal History of DVT (Deep Vein Thrombosis)       Comments: provoked from ankle fracture and wearing cam walking boot     S/P meniscectomy    Historical     Anticoagulated       Comments: heparin injections approx 2 weeks after DVT from immobility of ankle injury     Pregnancy     Pregnancy     Pregnancy  Procedure/Surgical History     Colonoscopy (08/30/2017)      Comments: Indication: screening      Sedation: F&V      Normal      Repeat in 10 years.     Arthroscopic partial medial meniscectomy (09/27/2016)      Comments: Right knee      Dr. Amin at [facility].     Laparoscopic supracervical hysterectomy (11/19/2010)      Comments: Pre- and post-op diagnosis is menorrhagia.     Hysteroscopy, D & C (04/23/2010)      Comments: Large pelvic mass, most consistent with uterine fibroids.     Colonoscopy (07/26/2007)     TL - Tubal ligation (04/26/1988)     Childbirth (04/25/1988)      Comments: Patient Comment: Male.     gall bladder (02/11/1987)      Comments: Patient Comment: Removed gall bladder stones , opened the clogged bile duct, wore an outward bag for six weeks to catch bile till everything was healed..     Childbirth (03/28/1986)      Comments: Patient Comment: In [place].     Childbirth (03/27/1986)      Comments: Female. G3, P2-0-1-2..     Cholecystectomy      Comments: Open.  Medications    Accu check soft clix lancets, See Instructions, 1 refills    accu chek guide test strips, See Instructions, 1 refills    amitriptyline 50 mg oral tablet, 50 mg= 1 tab(s), Oral, hs, 3 refills    Citracal Maximum + D, Oral, daily    esomeprazole 20 mg oral delayed release capsule, 1 cap(s), Oral, daily, 3 refills    Fish Oil, 1000 mg, Oral, daily    Hair, Skin & Nails    levothyroxine 150 mcg (0.15 mg) oral tablet, 150 mcg= 1 tab(s), Oral, daily, 3 refills    Lipitor 40 mg oral tablet, 40 mg= 1 tab(s), Oral, daily     metFORMIN 500 mg oral tablet, extended release, 1000 mg= 2 tab(s), Oral, daily, 1 refills    Metoprolol Tartrate 50 mg oral tablet, 0.5 tab(s), Oral, daily, 3 refills    Multiple Vitamins oral tablet, Oral, daily    OTC - Magnesium, Oral, daily    OTC - Potassium, Oral, daily  Allergies    No Known Medication Allergies  Social History    Smoking Status     Former smoker     Alcohol      Current, Wine (5 oz), 3-5 times a month, 1 drinks/episode average. 2 drinks/episode maximum. Ready to change: No.     Electronic Cigarette/Vaping      Electronic Cigarette Use: Never.     Employment/School      Retired     Exercise      Exercise frequency: 3-4 times/week. Exercise type: garden in summer, health club.     Home/Environment      Marital status: . Spouse/Partner name: Ricardo. Living situation: Home/Independent. Feels unsafe at home: No. Family/Friends available for support: Yes. Risks in environment: Does not wear helmet, owns secured gun.     Nutrition/Health      Type of diet: Low carbohydrate.     Sexual      Sexually active: Yes. Sexual orientation: Heterosexual. Contraceptive Use Details: None.     Substance Abuse      Never     Tobacco      Never (less than 100 in lifetime)  Family History    Anxiety: Daughter and Son.    Arthritis: Mother.    Cataracts: Mother.    Chicken pox: Daughter and Son.    Goiter: Mother.    HBP - High blood pressure: Father.    Hearing loss: Mother.    Heart disease: Father and Grandmother (P).    Hypertension: Mother.    Obesity: Father.    Overweight: Father.    Rheumatoid arthritis: Mother.    Snoring: Father.    Wears glasses: Mother, Father, Daughter, Son and Grandmother (P).  Lab Results       Lab Results (Last 4 results within 90 days)        Sodium Level: 140 mmol/L [135 mmol/L - 146 mmol/L] (11/02/21 13:50:00)       Sodium Level: 138 mmol/L [135 mmol/L - 146 mmol/L] (09/03/21 11:19:00)       Potassium Level: 4 mmol/L [3.5 mmol/L - 5.3 mmol/L] (11/02/21 13:50:00)        Potassium Level: 4.4 mmol/L [3.5 mmol/L - 5.3 mmol/L] (09/03/21 11:19:00)       Chloride Level: 104 mmol/L [98 mmol/L - 110 mmol/L] (11/02/21 13:50:00)       Chloride Level: 102 mmol/L [98 mmol/L - 110 mmol/L] (09/03/21 11:19:00)       CO2 Level: 25 mmol/L [20 mmol/L - 32 mmol/L] (11/02/21 13:50:00)       CO2 Level: 27 mmol/L [20 mmol/L - 32 mmol/L] (09/03/21 11:19:00)       Glucose Level: 149 mg/dL High [65 mg/dL - 99 mg/dL] (11/02/21 13:50:00)       Glucose Level: 120 mg/dL High [65 mg/dL - 99 mg/dL] (09/03/21 11:19:00)       BUN: 24 mg/dL [7 mg/dL - 25 mg/dL] (11/02/21 13:50:00)       BUN: 21 mg/dL [7 mg/dL - 25 mg/dL] (09/03/21 11:19:00)       Creatinine Level: 0.88 mg/dL [0.5 mg/dL - 0.99 mg/dL] (11/02/21 13:50:00)       Creatinine Level: 0.93 mg/dL [0.5 mg/dL - 0.99 mg/dL] (09/03/21 11:19:00)       BUN/Creat Ratio: NOT APPLICABLE [6  - 22] (11/02/21 13:50:00)       BUN/Creat Ratio: NOT APPLICABLE [6  - 22] (09/03/21 11:19:00)       eGFR: 69 mL/min/1.73m2 (11/02/21 13:50:00)       eGFR: 65 mL/min/1.73m2 (09/03/21 11:19:00)       eGFR : 80 mL/min/1.73m2 (11/02/21 13:50:00)       eGFR : 76 mL/min/1.73m2 (09/03/21 11:19:00)       Calcium Level: 9.9 mg/dL [8.6 mg/dL - 10.4 mg/dL] (11/02/21 13:50:00)       Calcium Level: 9.8 mg/dL [8.6 mg/dL - 10.4 mg/dL] (09/03/21 11:19:00)       Bilirubin Total: 0.6 mg/dL [0.2 mg/dL - 1.2 mg/dL] (11/02/21 13:50:00)       Bilirubin Total: 0.6 mg/dL [0.2 mg/dL - 1.2 mg/dL] (09/03/21 11:19:00)       Alkaline Phosphatase: 72 unit/L [37 unit/L - 153 unit/L] (11/02/21 13:50:00)       Alkaline Phosphatase: 55 unit/L [37 unit/L - 153 unit/L] (09/03/21 11:19:00)       AST/SGOT: 74 unit/L High [10 unit/L - 35 unit/L] (11/02/21 13:50:00)       AST/SGOT: 53 unit/L High [10 unit/L - 35 unit/L] (09/03/21 11:19:00)       ALT/SGPT: 62 unit/L High [6 unit/L - 29 unit/L] (11/02/21 13:50:00)       ALT/SGPT: 40 unit/L High [6 unit/L - 29 unit/L] (09/03/21 11:19:00)        LDH: 176 unit/L [120 unit/L - 250 unit/L] (11/02/21 13:43:00)       Protein Total: 7.3 gm/dL [6.1 gm/dL - 8.1 gm/dL] (11/02/21 13:50:00)       Protein Total: 7.5 gm/dL [6.1 gm/dL - 8.1 gm/dL] (09/03/21 11:19:00)       Albumin Level: 4.5 gm/dL [3.6 gm/dL - 5.1 gm/dL] (11/02/21 13:50:00)       Albumin Level: 4.7 gm/dL [3.6 gm/dL - 5.1 gm/dL] (09/03/21 11:19:00)       Globulin: 2.8 [1.9  - 3.7] (11/02/21 13:50:00)       Globulin: 2.8 [1.9  - 3.7] (09/03/21 11:19:00)       A/G Ratio: 1.6 [1  - 2.5] (11/02/21 13:50:00)       A/G Ratio: 1.7 [1  - 2.5] (09/03/21 11:19:00)       Iron Level: 89 mcg/dL [45 mcg/dL - 160 mcg/dL] (11/02/21 13:43:00)       TIBC: 466 High [250  - 450] (11/02/21 13:43:00)       Iron Saturation: 19 [16  - 45] (11/02/21 13:43:00)       Ferritin: 30 ng/mL [16 ng/mL - 288 ng/mL] (11/02/21 13:43:00)       WBC: 7.3 [3.8  - 10.8] (11/02/21 13:43:00)       RBC: 3.92 [3.8  - 5.1] (11/02/21 13:43:00)       Hgb: 11.9 gm/dL [11.7 gm/dL - 15.5 gm/dL] (11/02/21 13:43:00)       Hct: 35.8 % [35 % - 45 %] (11/02/21 13:43:00)       MCV: 91.3 fL [80 fL - 100 fL] (11/02/21 13:43:00)       MCH: 30.4 pg [27 pg - 33 pg] (11/02/21 13:43:00)       MCHC: 33.2 gm/dL [32 gm/dL - 36 gm/dL] (11/02/21 13:43:00)       RDW: 14.4 % [11 % - 15 %] (11/02/21 13:43:00)       Platelet: 214 [140  - 400] (11/02/21 13:43:00)       MPV: 11.1 fL [7.5 fL - 12.5 fL] (11/02/21 13:43:00)       Lymphocytes: 21.2 % (11/02/21 13:43:00)       Abs Lymphocytes: 1548 [850  - 3900] (11/02/21 13:43:00)       Neutrophils: 74.8 % (11/02/21 13:43:00)       Abs Neutrophils: 5460 [1500  - 7800] (11/02/21 13:43:00)       Monocytes: 2 % (11/02/21 13:43:00)       Abs Monocytes: 146 Low [200  - 950] (11/02/21 13:43:00)       Eosinophils: 1 % (11/02/21 13:43:00)       Abs Eosinophils: 73 [15  - 500] (11/02/21 13:43:00)       Basophils: 1 % (11/02/21 13:43:00)       Abs Basophils: 73 [0  - 200] (11/02/21 13:43:00)       Platelet Estimate: ADEQUATE [ADEQUATE  -  ADEQUATE] (11/02/21 13:43:00)       CBC Morphology: See comment (11/02/21 13:43:00)       Reticulocyte: 1.8 % (11/02/21 13:43:00)       Retic Abs#: [id number] [57352  - 01506] (11/02/21 13:43:00)       Direct Anya: NEGATIVE [NEGATIVE  - NEGATIVE] (11/02/21 13:43:00)  Immunizations       Scheduled Immunizations       Dose Date(s)       influenza virus vaccine, inactivated       11/19/2013, 10/23/2015, 10/14/2011, 10/10/2012, 12/01/2014, 11/03/2016, 10/03/2018, 10/08/2020       Other Immunizations               influenza virus vaccine, inactivated       02/16/2011       hepatitis A adult vaccine       08/25/2020       Td       12/01/2003       tetanus/diphth/pertuss (Tdap) adult/adol       03/01/2013       influenza, H1N1, inactivated       02/12/2010       hepatitis B adult vaccine       08/25/2020       zoster vaccine, inactivated       03/26/2019, 08/06/2019       SARS-CoV-2 (COVID-19) Moderna-1273       04/01/2021, 04/29/2021

## 2022-02-16 NOTE — PROGRESS NOTES
Patient:   ZAK EVERETT            MRN: [id number]            FIN: [id number]               Age:   65 years     Sex:  Female     :  1955   Associated Diagnoses:   New onset type 2 diabetes mellitus; Obese; Hypertriglyceridemia   Author:   Kamini Ramirez      Visit Information   Visit type:  Diabetes Self Management Education .    Accompanied by:  Spouse.    Referral source:  Abby Laws.       Chief Complaint   DM Type II, Obesity, Hypertriglyceridemia       History of Present Illness   Pt here with known hx of prediabetes without intervention.  Pt admits to eating more and less activity through the pandemic.    New dx of DM with nonfasting glucose >200 and fasting glucose of 136mg/dL.      Discussed nutrition, diabetes, and lifestyle management with pt  Nutrition:  Vague with dietary recalls, knows she could be eating better.    Physical Activity:  no routine  Stress:   low   Sleep: good  Support: family   BG Testing: education today   DM related medication: metformin ER 500mg ii tabs daily   Routine diabetes care:  will discuss during follow up consults    Dilated Retinal Eye Exam:    Skin:   Dental:   Feet:   Immunizations:  Hgb A1c: 6.4% = 137 mg/dL estimated average glucose      Health Status   Allergies:    Allergic Reactions (Selected)  No Known Medication Allergies   Medications:  (Selected)   Prescriptions  Prescribed  Accu check soft clix lancets: Accu check soft clix lancets, See Instructions, Instructions: testing 2x/ day, Supply, # 200 EA, 1 Refill(s), Type: Maintenance, Pharmacy: [id number], testing 2x/ day, 65, in, 21 14:25:00 CDT, Height Measured, 195.1, lb, 21 1...  Lipitor 20 mg oral tablet: = 1 tab(s) ( 20 mg ), Oral, daily, # 90 tab(s), 1 Refill(s), Type: Maintenance, Pharmacy: [id number], 1 tab(s) Oral daily, 65, in, 21 11:06:00 CDT, Height Measured, 197.3, lb, 21 9:42:00 CDT, Weight Measured  Metoprolol Tartrate 50 mg oral  tablet: = 0.5 tab(s), Oral, daily, # 45 tab(s), 2 Refill(s), Type: Maintenance, Pharmacy: [facility], 0.5 tab(s) Oral daily, 65.75, in, 08/25/20 12:58:00 CDT, Height Measured, 186.2, lb, 08/25/20 12:58:00 CDT, Weight Measured  accu chek guide test strips: accu chek guide test strips, See Instructions, Instructions: test 2x/ day, Supply, # 200 EA, 1 Refill(s), Type: Maintenance, Pharmacy: [facility], test 2x/ day, 65, in, 08/16/21 14:25:00 CDT, Height Measured, 195.1, lb, 08/16/21 14:25:00...  amitriptyline 50 mg oral tablet: = 1 tab(s) ( 50 mg ), Oral, hs, # 90 tab(s), 3 Refill(s), Type: Maintenance, Pharmacy: [facility], 1 tab(s) Oral hs, 65.75, in, 08/25/20 12:58:00 CDT, Height Measured, 186.2, lb, 08/25/20 12:58:00 CDT, Weight Measured  esomeprazole 20 mg oral delayed release capsule: = 1 cap(s) ( 20 mg ), Oral, daily, # 90 cap(s), 3 Refill(s), Type: Maintenance, Pharmacy: [facility], 1 cap(s) Oral daily, 65.75, in, 08/25/20 12:58:00 CDT, Height Measured, 186.2, lb, 08/25/20 12:58:00 CDT, Weight Measured  levothyroxine 150 mcg (0.15 mg) oral tablet: = 1 tab(s) ( 150 mcg ), po, daily, # 90 tab(s), 3 Refill(s), Type: Maintenance, Pharmacy: [facility], 1 tab(s) Oral daily, 65.75, in, 08/25/20 12:58:00 CDT, Height Measured, 186.2, lb, 08/25/20 12:58:00 CDT, Weight Measured  metFORMIN 500 mg oral tablet, extended release: = 2 tab(s) ( 1,000 mg ), Oral, daily, Instructions: with evening meal, # 180 EA, 1 Refill(s), Type: Maintenance, Pharmacy: [id number], 2 tab(s) Oral daily,Instr:with evening meal, 65, in, 06/29/21 11:06:00 CDT, Height Measured, 197.3, lb...  Documented Medications  Documented  Fish Oil: ( 1,000 mg ), po, daily, 0 Refill(s), Type: Maintenance  Hair, Skin & Nails: Instructions: 2 Gummies, 1x per day, 0 Refill(s), Type: Soft Stop  Multiple Vitamins oral tablet: Oral, daily, 0 Refill(s), Type: Maintenance  OTC -  Magnesium: OTC - Magnesium, Oral, daily, 0 Refill(s), Type: Maintenance  OTC - Potassium: OTC - Potassium, Oral, daily, 0 Refill(s), Type: Maintenance,    Medications          *denotes recorded medication          *OTC - Potassium: Oral, daily, 0 Refill(s).          *OTC - Magnesium: Oral, daily, 0 Refill(s).          Accu check soft clix lancets: See Instructions, testing 2x/ day, 200 EA, 1 Refill(s).          accu chek guide test strips: See Instructions, test 2x/ day, 200 EA, 1 Refill(s).          amitriptyline 50 mg oral tablet: 50 mg, 1 tab(s), Oral, hs, 90 tab(s), 3 Refill(s).          Lipitor 20 mg oral tablet: 20 mg, 1 tab(s), Oral, daily, 90 tab(s), 1 Refill(s).          *Hair, Skin & Nails: 2 Gummies, 1x per day, 0 Refill(s).          esomeprazole 20 mg oral delayed release capsule: 20 mg, 1 cap(s), Oral, daily, 90 cap(s), 3 Refill(s).          levothyroxine 150 mcg (0.15 mg) oral tablet: 150 mcg, 1 tab(s), po, daily, 90 tab(s), 3 Refill(s).          metFORMIN 500 mg oral tablet, extended release: 1,000 mg, 2 tab(s), Oral, daily, with evening meal, 180 EA, 1 Refill(s).          Metoprolol Tartrate 50 mg oral tablet: 0.5 tab(s), Oral, daily, 45 tab(s), 2 Refill(s).          *Multiple Vitamins oral tablet: Oral, daily, 0 Refill(s).          *Fish Oil: 1,000 mg, po, daily, 0 Refill(s).       Problem list:    All Problems (Selected)  S/P meniscectomy / SNOMED CT 8660974520 / Confirmed  Personal History of DVT (Deep Vein Thrombosis) / SNOMED CT 8951404425 / Confirmed  Obese / SNOMED CT 3325512880 / Probable  New onset type 2 diabetes mellitus / SNOMED CT 239697144 / Confirmed  MVP (mitral valve prolapse) / SNOMED CT 4119275780 / Confirmed  Hypothyroid / SNOMED CT 83337492 / Confirmed  Hypertriglyceridemia / SNOMED CT 106897300 / Confirmed  History of NSAID-associated gastropathy / SNOMED CT 469210491 / Confirmed  GERD (gastroesophageal reflux disease) / SNOMED CT 280071122 / Confirmed      Histories   Past  Medical History:    Active  Hypertriglyceridemia (429303902): Onset on 3/10/2011 at 55 years.  Comments:  3/10/2011 CST 4:44 PM CST -     Hypothyroid (65052662)  GERD (gastroesophageal reflux disease) (289446783)  MVP (mitral valve prolapse) (0946990212)  Obese (7209746433)  Resolved  Anticoagulated (642760904):  Resolved.  Comments:  2021 CDT 11:28 AM ART - Abby Laws  heparin injections approx 2 weeks after DVT from immobility of ankle injury  Pregnancy (011364127):  Resolved in  at 26 years.  Pregnancy (980958379):  Resolved in  at 30 years.  Pregnancy (354163274):  Resolved in  at 32 years.   Family History:    Chicken pox  Daughter (Jessi Hodges)  Comments:  8/15/2018 6:42 AM CDT - [place] ELIZABETH  age 5  Son (Clarke Hitchcock)  Comments:  8/15/2018 6:42 AM CDT - [place] ELIZABETH  age 3  Wears glasses  Mother (Cheryl Marr)  Comments:  8/15/2018 6:42 AM CDT - [place] ELIZABETH  Diagnosed in High School  Daughter (Jessi Hodges)  Comments:  8/15/2018 6:42 AM CDT - J[place] Fiore CMA  reading only early 20&#39;s  Father (Rene Cheema, )  Comments:  8/15/2018 6:42 AM CDT - [place] ELIZABETH  don&#39;t know  Grandmother (P) (Allyn Deni, )  Comments:  8/15/2018 6:42 AM CDT - [place] ELIZABETH  don&#39;t know  Son (Clarke Hitchcock)  Comments:  8/15/2018 6:42 AM CDT - [place] ELIZABETH  since middle school  Anxiety  Daughter (Jessi Hodges)  Comments:  8/15/2018 6:42 AM CDT - [place] ELIZABETH  age 20  Son (Clarke Hitchcock)  Rheumatoid arthritis  Mother (Cheryl Marr)  Comments:  8/15/2018 6:42 AM CDT - [place] Fiore CMA  years ago  Hypertension  Mother (Cheryl Marr)  Heart disease  Father (Rene Cheema, )  Comments:  8/15/2018 6:42 AM [place] Sandy CMA&#39;t know age  Grandmother (P) (Allyn Cheema, )  HBP - High blood pressure  Father (Rene Cheema, )  Comments:  8/15/2018 6:42 AM SOBEIDA Fiore CMA  Jodi  in his 40&#39;s  Arthritis  Mother (Cheryl Marr)  Comments:  8/15/2018 6:42 AM CDT - [place] Jodi ELIZABETH  years  Hearing loss  Mother (Cheryl Marr)  Comments:  8/15/2018 6:42 AM CDT - [place] J[place] ELIZABETH  years ago  Snoring  Father (Rene Cheema, )  Comments:  8/15/2018 6:42 AM CDT - [place] Jodi ELIZABETH  don&#39;t know  Overweight  Father (Rene Cheema, )  Comments:  8/15/2018 6:42 AM CDT - [place] Jodi ELIZABETH  in his 40&#39;s  Obesity  Father (Rene Cheema, )  Comments:  8/15/2018 6:42 AM CDT - [place] Jodi ELIZABETH  in his 40&#39;s  Goiter  Mother (Cheryl Marr)  Cataracts  Mother (Cheryl Marr)  Comments:  8/15/2018 6:42 AM CDT - [place] Jodi ELIZABETH  Both eyes operated on 18 &amp; 18     Procedure history:    Colonoscopy (SNOMED CT 502100429) performed by Collin Almeida MD on 2017 at 61 Years.  Comments:  2018 7:26 PM CDT - Adebayo ELIZABETHJaunJodi  Indication: screening  Sedation: F&V  Normal   Repeat in 10 years  Arthroscopic partial medial meniscectomy (SNOMED CT 576726964) on 2016 at 60 Years.  Comments:  11/10/2016 3:38 PM CST - [place] Fiore CMA  Right knee  Dr. Amin at [facility]  Laparoscopic supracervical hysterectomy (SNOMED CT 776294401) performed by Markus Escalante MD on 2010 at 55 Years.  Comments:  2010 2:39 PM Janice James  Pre- and post-op diagnosis is menorrhagia  Hysteroscopy, D & C performed by Markus Escalante MD on 2010 at 54 Years.  Comments:  2010 1:46 PM Janice James  Large pelvic mass, most consistent with uterine fibroids  Colonoscopy (SNOMED CT 272218782) on 2007 at 51 Years.  TL - Tubal ligation (SNOMED CT 477220784) on 1988 at 32 Years.  Childbirth (SNOMED CT 4448424703) on 1988 at 32 Years.  Comments:  8/15/2018 11:42 AM CDT - Jodi Fiore CMA  Patient Comment: Male  gall bladder (SNOMED CT 7049056910) on 1987 at 31 Years.  Comments:  2020 11:46 AM CDT  - Cira Yumiko ELIZABETH  Patient Comment: Removed gall bladder stones , opened the clogged bile duct, wore an outward bag for six weeks to catch bile till everything was healed.  Childbirth (SNOMED CT 3555410246) on 3/28/1986 at 30 Years.  Comments:  8/25/2020 11:46 AM CDT - Yumiko Denis CMA  Patient Comment: In [place]  Childbirth (SNOMED CT 7828174704) on 3/27/1986 at 30 Years.  Comments:  8/15/2018 11:42 AM CDT - Jodi Fiore CMA  Female    11/1/2011 7:27 PM CDT - Marcelo eJn  G3, P2-0-1-2.  Cholecystectomy (SNOMED CT 91257602).  Comments:  4/21/2010 4:33 PM CDT - Irina WISE, Sharon Kulkarni     Social History:        Electronic Cigarette/Vaping Assessment            Electronic Cigarette Use: Never.      Alcohol Assessment            Current, Wine (5 oz), 1-2 times per week, 1 drinks/episode average.  2 drinks/episode maximum.  Ready to               change: No.      Tobacco Assessment            Never (less than 100 in lifetime)      Substance Abuse Assessment            Never      Employment and Education Assessment            Retired      Home and Environment Assessment            Marital status: .  Spouse/Partner name: Ricardo.  Risks in environment: Does not wear helmet, owns               secured gun.      Nutrition and Health Assessment            Type of diet: Low carbohydrate.      Exercise and Physical Activity Assessment            Exercise frequency: 3-4 times/week.  Exercise type: garden in summer, health club.      Sexual Assessment            Sexually active: Yes.  Sexual orientation: Heterosexual.  Contraceptive Use Details: None.        Physical Examination   Measurements from flowsheet : Measurements   8/16/2021 2:25 PM CDT Height Measured - Standard 65 in    Weight Measured - Standard 195.1 lb    BSA 2.01 m2    Body Mass Index 32.46 kg/m2  HI         Review / Management   Results review:  Lab results   7/16/2021 10:23 AM CDT Hgb A1c 6.4  HI   7/6/2021 8:50 AM CDT Glucose Level 136 mg/dL  HI     Cholesterol 184 mg/dL    Non-  HI    HDL 35 mg/dL  LOW    Chol/HDL Ratio 5.3  HI      HI    Triglyceride 271 mg/dL  HI    TSH 3.12 mIU/L    U Creatinine 83 mg/dL    U Microalbumin 0.2 mg/dL    Ur Microalb/Creat Ratio 2   6/29/2021 11:45 AM CDT Sodium Level 135 mmol/L    Potassium Level 4.8 mmol/L    Chloride Level 101 mmol/L    CO2 Level 26 mmol/L    Glucose Level 212 mg/dL  HI    BUN 20 mg/dL    Creatinine 0.93 mg/dL    BUN/Creat Ratio NOT APPLICABLE    eGFR 65 mL/min/1.73m2    eGFR African American 76 mL/min/1.73m2    Calcium Level 9.6 mg/dL    Magnesium Level 2.1 mg/dL    WBC 5.3    RBC 3.96    Hgb 11.9 gm/dL    Hct 36.3 %    MCV 91.7 fL    MCH 30.1 pg    MCHC 32.8 gm/dL    RDW 14.3 %    Platelet 207    MPV 10.2 fL   .       Impression and Plan   Diagnosis     New onset type 2 diabetes mellitus (AQX56-NK E11.9).     Obese (EUP23-YI E66.9).     Hypertriglyceridemia (XRS05-OB E78.1).       Counseled: Patient, ARVIND Self Care Behaviors   Today the patient was instructed on the basic physiology of diabetes mellitus, BG testing, and lifestyle guidelines.  Healthy Eating - Education on what foods are primary sources of carbohydrates and how intake affects BG readings, edu on carbohydrate counting, reading food labels, appropriate portion sizes, well balanced meals, diabetic plate method as a general rule.  Patient is provided with numerous ideas to incorporate dietary recommendations, meal planning and preparation, mindful eating techniques and weight loss tips.    Being Active - Education on how exercise helps with :    - lowering BG by increasing the muscles ability to take up and use glucose   - weight loss   - healthier heart (improve lipid profile)   - improve sleep, mood, energy   - decrease stress      Monitoring - Today the patient is instructed on home blood glucose monitoring.  Pt is provided with a meter.  Pt is instructed on the proper use of the meter, recommended testing schedule and  blood glucose target levels.  The patient is able to return appropriate demonstration of the use of the meter.  Pt is instructed on proper disposal of lancets.    Taking Medication -on Metformin - education on the mechanism of action.    Problem Solving - medical support team assistance, resources provided (written and apps, internet); good control of stress  Healthy Coping - support of friends, family, and medical support team   Reducing Risks - Will discuss at f/u apts.  Weight loss goal of 7 - 10% of current body weight pounds as a reasonable goal to help increase insulin sensitivity   .      Goals:  1.  A1c <6.5%  2.  BG testing 2x/ day on 3+ days/ week before eating 80 - 130 target; two hours after eating 80 - 150mg/dL  3.  Physical active 150 min/ week   4.  Carb controlled heart healthy meal plan <130gm CHO/ day; <200mg Cholesterol/ day   5.  Take medications as directed continue metformin  mg ii tabs daily      Professional Services   Time spent with pt 60min   cc Abby Vera - TATE

## 2022-02-16 NOTE — TELEPHONE ENCOUNTER
---------------------  From: Yumiko Anderson CMA   Sent: 9/3/2019 12:07:11 PM CDT  Subject: echo results     Per [facility] at 1206. Please advise patient that echocardiogram looks great. If patient has further questions or concerns she may come in to discuss with [facility].12:28pm Pt returns call to Yumiko. Can be reached at [phone].    12:30pm Called and spoke with pt - informed her of results of echocardiogram. No further questions/concerns at this time.

## 2022-02-16 NOTE — PROGRESS NOTES
Chief Complaint    Pt c/o pain in right low leg. Had had blood clot in the past in about the same area.  History of Present Illness      Chief complaint as above reviewed and confirmed with patient.  Pt presents to the clinic with concerns re: RLE calf pain.  has a hx of DVT on the R.  States it felt the same at that time.  denies redness and swelling.  Has been active. no recent immobility, long car rides, surgeries or hospitalization.  With last DVT had known fibular fracture and treated in a cam boot. DVT tx with 3 months of coumadin.  no personal hx of cancer. no sob or difficulty breathing.  Review of Systems      Review of systems is negative with the exception of those noted in HPI          Physical Exam   Vitals & Measurements    T: 98.6(Tympanic)  HR: 76(Peripheral)  BP: 108/70     HT: 65 in  WT: 175 lb  BMI: 29.12       Exam of the BLE reveals no erythema, no swelling.  peripheral pulses intact, cap refill brisk. There is calf pain with palpation mid calf.  no popliteal masses or tenderness.       duplex US of RLE: negative for DVT or popliteal cyst.  Assessment/Plan       Calf pain         reassured pt no sign of DVT on LE ultrasound.  recommend stretches of the calf. Likely calf strain.  ibuprofen or tylenol. instructed to return for redness, swelling. worsening sx.         Ordered:          03839 office outpatient visit 15 minutes (Charge), Quantity: 1, Calf pain  Hx of deep venous thrombosis          US Lower Extremity Venous Doppler (Request), Priority: STAT, Instructions: RLE, Calf pain  Hx of deep venous thrombosis                Hx of deep venous thrombosis         Ordered:          65702 office outpatient visit 15 minutes (Charge), Quantity: 1, Calf pain  Hx of deep venous thrombosis          US Lower Extremity Venous Doppler (Request), Priority: STAT, Instructions: RLE, Calf pain  Hx of deep venous thrombosis           Patient Information     Name:ZAK EVERETT      Address:      [address]  [place] [address]     Sex:Female     YOB: 1955     [phone]     MRN:[id number]     FIN:[id number]     Location:[facility]     Date of Service:03/23/2018      Primary Care Physician:       Bessy Medina, [phone]  Problem List/Past Medical History    Ongoing     Anticoagulated     Dysfunctional uterine bleeding     GERD (gastroesophageal reflux disease)     Hypertriglyceridemia     Hypothyroid     MVP (mitral valve prolapse)     Obese     Personal History of DVT (Deep Vein Thrombosis)     S/P meniscectomy    Historical     Pregnancy     Pregnancy     Pregnancy  Procedure/Surgical History     Arthroscopic partial medial meniscectomy (09/27/2016)     Laparoscopic supracervical hysterectomy (11/19/2010)     Hysteroscopy, D & C (04/23/2010)     Colonoscopy (07/26/2007)     Childbirth     Cholecystectomy  Medications     Zantac 150 oral tablet: 150 mg, 1 tab(s), PO, BID, 60 tab(s).     Fish Oil: po, daily, 0 Refill(s).     Metoprolol Tartrate 50 mg oral tablet: 25 mg, 0.5 tab(s), po, daily, 45 tab(s), 3 Refill(s).     amitriptyline 25 mg oral tablet: 50 mg, 2 tab(s), po, hs, for 90 day(s), 180 tab(s), 3 Refill(s).     Estrace Vaginal 0.1 mg/g vaginal cream: See Instructions, INSERT 1 GRAM VAGINALLY THREE TIMES PER WEEK, 42 gm, 2 Refill(s).     levothyroxine 150 mcg (0.15 mg) oral tablet: 150 mcg, 1 tab(s), po, daily, 90 tab(s), 3 Refill(s).          Allergies    No known allergies  Social History    Smoking Status - 03/23/2018     Never smoker     Alcohol - 08/04/2017      Current, 1-2 times per week, 1 drinks/episode average.     Employment and Education - 08/04/2017      Retired     Exercise and Physical Activity - 08/04/2017      Exercise frequency: 1-2 times/week. Exercise type: back, general.     Home and Environment - 08/04/2017      Marital status: . Spouse/Partner name: Ricardo. Risks in environment: Does not wear helmet, owns  secured gun.     Nutrition and Health - 08/04/2017      Type of diet: Regular.     Sexual - 08/04/2017      Sexually active: Yes. Sexual orientation: Heterosexual. Contraceptive Use Details: None.     Substance Abuse - 08/04/2017      Never     Tobacco - 08/04/2017      Never  Family History    Anxiety: Daughter and Son.    Goiter: Mother.    Heart disease: Father and Grandmother (P).    Hypertension: Mother.  Immunizations      Vaccine Date Status Comments      influenza virus vaccine, inactivated 10/23/2015 Recorded      influenza virus vaccine, inactivated 11/19/2013 Recorded [11/21/2013] given at [facility]      tetanus/diphth/pertuss (Tdap) adult/adol 02/28/2013 Given      influenza virus vaccine, inactivated 02/16/2011 Given      influenza, H1N1, inactivated 02/12/2010 Recorded      Td 12/01/2003 Recorded  Lab Results   Results (Last 90 days)   No results located.       Unclear etiology of the fall. Patient denies loss of consciousness. Low suspicion of a cardiogenic cause with unremarkable trops and EKG. Denies mechanical causes. Patient said that he was "out of it" when he fell but did not lose consciousness. He also said that he urinated on the floor but claims this was intentional. Neurologic cause?   - Neurology following  - Orthostatic blood pressures were positive for orthostatic hypotension  - TTE unremarkable  - Telemetry  - Infectious work up: Sputum culture, urine culture, blood cultures so far NGTD  - Pt consult

## 2022-02-16 NOTE — TELEPHONE ENCOUNTER
Entered by Faviola Arreola on August 24, 2021 4:41:39 PM CDT  Yes please schedule your annual physical at your convenience. You can do so by calling [phone].      ---------------------  From: Christopher/Elvia CONDE (Phone Messages Pool ([id number]_[facility]))   To: [facility] Message Pool ([id number]_[place]);     Sent: 8/24/2021 4:32:46 PM CDT  Subject: FW: Yearly checkup           ---------------------  From: ZAK HITCHCOCK  To: [facility]  Sent: 08/24/2021 04:13 p.m. CDT  Subject: Yearly checkup  Dr Levi, I received a reminder that it s time for me to get a mammogram. Do I also need to schedule a complete physical with you or just a general checkup and medication new prescriptions?    Thank you,  Zak Hitchcock  1955

## 2022-02-16 NOTE — PROGRESS NOTES
Patient:   ZAK EVERETT            MRN: [id number]            FIN: [id number]               Age:   61 years     Sex:  Female     :  1955   Associated Diagnoses:   Sinusitis   Author:   Gerry Crooks MD      Chief Complaint   2017 6:16 PM CST    c/o cough, congestion, bilaterall ear pain, sinus pressure , headaches. Started Sat.      History of Present Illness   see chief complaint as noted above and confirmed with the patient                The patient presents with sinus problem.  The sinus problem is located in the maxillary sinus.  The sinus problem is characterized by nasal congestion, rhinorrhea, headache and facial pain.  The severity of the sinus problem is mild.  The sinus problem is worsening.  The sinus problem has lasted for 3 week(s).  The context of the sinus problem: occurred in association with illness.  Exacerbating factors consist of movement and turning head.  Relieving factors consist of analgesics and decongestant.  Associated symptoms consist of cough, ear pain, sore throat, denies fever and denies dizziness.        Review of Systems   Constitutional:  No fever.    Ear/Nose/Mouth/Throat:  Hoarse voice, Nasal congestion, Sinus pain, Sore throat.         Ear pain: Bilateral.    Respiratory:  Cough, No shortness of breath.    Cardiovascular:  No chest pain.    Gastrointestinal:  No nausea, No vomiting.    Musculoskeletal:  No back pain.    Integumentary:  No rash.    Neurologic:  Headache.             Health Status   Allergies:    Allergic Reactions (Selected)  No known allergies   Medications:  (Selected)   Prescriptions  Prescribed  Estrace Vaginal 0.1 mg/g vaginal cream: See Instructions, Instructions: INSERT 1 GRAM VAGINALLY THREE TIMES PER WEEK, # 42 gm, 2 Refill(s), Type: Maintenance, Pharmacy: [facility], INSERT 1 GRAM VAGINALLY THREE TIMES PER WEEK  Metoprolol Tartrate 50 mg oral tablet: 0.5 tab(s) ( 25 mg ), po, daily, # 45 tab(s), 3 Refill(s), Type:  Maintenance, Pharmacy: [id number], 0.5 tab(s) po daily,x90 day(s)  Zantac 150 oral tablet: 1 tab(s) ( 150 mg ), PO, BID, # 60 tab(s), 2 Refill(s), Type: Maintenance, Pharmacy: [facility], 1 tab(s) po bid  amitriptyline 25 mg oral tablet: 2 tab(s) ( 50 mg ), po, hs, # 180 tab(s), 3 Refill(s), Type: Maintenance, Pharmacy: [id number], 2 tab(s) po hs,x90 day(s)  erythromycin 0.5% ophthalmic ointment: 0.5 in, left eye, QID, # 3.5 g, 0 Refill(s), Type: Maintenance, Pharmacy: [facility], 0.5 in left eye qid  levothyroxine 150 mcg (0.15 mg) oral tablet: 1 tab(s) ( 150 mcg ), po, daily, # 90 tab(s), 1 Refill(s), Type: Maintenance, Pharmacy: [id number], 1 tab(s) po daily  Documented Medications  Documented  Fish Oil: po, daily, 0 Refill(s), Type: Maintenance   Problem list:    All Problems  Anticoagulated / SNOMED CT 255399136 / Confirmed  Obese / ICD-9-.00 / Probable  MVP (Mitral Valve Prolapse) / ICD-9-.0 / Confirmed  Hypothyroid / ICD-9-.9 / Confirmed  Hypertriglyceridemia / ICD-9-.1 / Confirmed  Personal History of DVT (Deep Vein Thrombosis) / SNOMED CT 3056467226 / Confirmed  S/P meniscectomy / SNOMED CT 2749870298 / Confirmed  GERD (Gastroesophageal Reflux Disease) / ICD-9-.81 / Confirmed  Inactive: Dysfunctional Uterine Bleeding / ICD-9-.8  Resolved: Pregnancy / SNOMED CT 318275192  Resolved: Pregnancy / SNOMED CT 143463382  Resolved: Pregnancy / SNOMED CT 764323804      Histories   Past Medical History:    Active  Hypothyroid (244.9)  GERD (Gastroesophageal Reflux Disease) (530.81)  MVP (Mitral Valve Prolapse) (424.0)  Resolved  Pregnancy (240676894):  Resolved in 1982 at 26 years.  Pregnancy (991761649):  Resolved in 1986 at 30 years.  Pregnancy (850822809):  Resolved in 1988 at 32 years.   Family History:    Hypertension  Mother  Heart disease  Father  Grandmother (P)  Goiter  Mother     Procedure history:    Arthroscopic partial  medial meniscectomy ([id number]) on 9/27/2016 at 60 Years.  Comments:  11/10/2016 3:38 PM - Adebayo ELIZABETH Jodi  Right knee  Dr. Amin at [facility]  Laparoscopic supracervical hysterectomy (402302729) on 11/19/2010 at 55 Years.  Comments:  12/12/2010 2:39 PM - Yonatan Myersi  Pre- and post-op diagnosis is menorrhagia  Hysteroscopy, D & C on 4/23/2010 at 54 Years.  Comments:  12/31/2010 1:46 PM - Lucia  Janice  Large pelvic mass, most consistent with uterine fibroids  Colonoscopy ([id number]) on 7/26/2007 at 51 Years.  Cholecystectomy (18425910).  Comments:  4/21/2010 4:33 PM - Irina WISE, Sharon  Open    Childbirth ([id number]).  Comments:  11/1/2011 7:27 PM - Jen Robertson  G3, P2-0-1-2.   Social History:        Alcohol Assessment            Current, 1-2 times per week, 1 drinks/episode average.      Tobacco Assessment            Never      Substance Abuse Assessment            Never      Employment and Education Assessment            Work/School description: homemaker.      Home and Environment Assessment            Marital status: .                     Comments:                      11/01/2011 - Jen Robertson                      - [place].      Exercise and Physical Activity Assessment            Exercise type: gardening.        Physical Examination   Vital Signs   2/14/2017 6:16 PM CST Temperature Tympanic 98.6 DegF    Peripheral Pulse Rate 78 bpm    Systolic Blood Pressure 126 mmHg    Diastolic Blood Pressure 72 mmHg    Mean Arterial Pressure 90 mmHg      Measurements from flowsheet : Measurements   2/14/2017 6:16 PM CST    Ht/Wt Measurement Refused by Patient?     Yes     General:  Alert and oriented, No acute distress.    Eye:  Pupils are equal, round and reactive to light, Normal conjunctiva.    HENT:  Oral mucosa is moist.    Neck:  Supple.    Respiratory:  Respirations are non-labored.    Cardiovascular:  Normal rate, Regular rhythm, No edema.    Gastrointestinal:  Non-distended.     Musculoskeletal:  Normal gait.    Integumentary:  Warm, No rash.    Psychiatric:  Cooperative, Appropriate mood & affect, Normal judgment.       Review / Management   Results review      Impression and Plan   Diagnosis     Sinusitis (YPC49-MO J32.9).     Plan:  Sinusitis responds well to moist air and irrigation--consider trying saltwater nasal sprays or a NETI pot twice daily.  Showers and hot soups can help also.  Afrin nasal spray ( over-the-counter medication) can provide symptom relief as it clears the nose. After 3 days of use it must be discontinued for at least 2 weeks or complications can happen.  Sudafed can relieve pressure but might make you feel jittery or your heart race.  Your should begin to improve after several days but it may take a couple of weeks to completely resolve your symptoms.  Return if you are not improving or if your symptoms become worse..

## 2022-02-16 NOTE — NURSING NOTE
Comprehensive Intake Entered On:  8/22/2019 9:12 AM CDT    Performed On:  8/22/2019 9:06 AM CDT by Yumiko Anderson CMA               Summary   Chief Complaint :   Physical    Menstrual Status :   Postmenopausal   Weight Measured :   178.4 lb(Converted to: 178 lb 6 oz, 80.92 kg)    Height Measured :   65 in(Converted to: 5 ft 5 in, 165.10 cm)    Body Mass Index :   29.68 kg/m2 (HI)    Body Surface Area :   1.92 m2   Systolic Blood Pressure :   112 mmHg   Diastolic Blood Pressure :   70 mmHg   Mean Arterial Pressure :   84 mmHg   Peripheral Pulse Rate :   81 bpm   BP Site :   Right arm   BP Method :   Manual   HR Method :   Electronic   Temperature Tympanic :   98.1 DegF(Converted to: 36.7 DegC)    Oxygen Saturation :   98 %   Languages :   English   Yumiko Anderson CMA - 8/22/2019 9:06 AM CDT   Health Status   Allergies Verified? :   Yes   Medication History Verified? :   Yes   Pre-Visit Planning Status :   Completed   Tobacco Use? :   Never smoker   Yumiko Anderson CMA - 8/22/2019 9:06 AM CDT   Consents   Consent for Immunization Exchange :   Consent Granted   Consent for Immunizations to Providers :   Consent Granted   Yumiko Anderson CMA - 8/22/2019 9:06 AM CDT   Meds / Allergies   (As Of: 8/22/2019 9:12:26 AM CDT)   Allergies (Active)   No Known Medication Allergies  Estimated Onset Date:   Unspecified ; Created By:   Yumiko Anderson CMA; Reaction Status:   Active ; Category:   Drug ; Substance:   No Known Medication Allergies ; Type:   Allergy ; Updated By:   Yumiko Anderson CMA; Reviewed Date:   8/22/2019 9:09 AM CDT        Medication List   (As Of: 8/22/2019 9:12:26 AM CDT)   Prescription/Discharge Order    amitriptyline  :   amitriptyline ; Status:   Prescribed ; Ordered As Mnemonic:   amitriptyline 25 mg oral tablet ; Simple Display Line:   50 mg, 2 tab(s), po, hs, 180 tab(s), 0 Refill(s) ; Ordering Provider:   Rebecca Baker MD; Catalog Code:   amitriptyline ; Order Dt/Tm:   8/13/2019 9:44:47 AM          estradiol topical   :   estradiol topical ; Status:   Prescribed ; Ordered As Mnemonic:   Estrace Vaginal 0.1 mg/g vaginal cream ; Simple Display Line:   See Instructions, INSERT 1 GRAM VAGINALLY THREE TIMES PER WEEK, 42.5 gm, 3 Refill(s) ; Ordering Provider:   Bessy Medina; Catalog Code:   estradiol topical ; Order Dt/Tm:   8/15/2018 10:34:13 AM          levothyroxine  :   levothyroxine ; Status:   Prescribed ; Ordered As Mnemonic:   levothyroxine 150 mcg (0.15 mg) oral tablet ; Simple Display Line:   150 mcg, 1 tab(s), po, daily, 90 tab(s), 3 Refill(s) ; Ordering Provider:   Bessy Medina; Catalog Code:   levothyroxine ; Order Dt/Tm:   8/15/2018 10:24:22 AM          metoprolol  :   metoprolol ; Status:   Prescribed ; Ordered As Mnemonic:   Metoprolol Tartrate 50 mg oral tablet ; Simple Display Line:   25 mg, 0.5 tab(s), po, daily, 45 tab(s), 3 Refill(s) ; Ordering Provider:   Bessy Medina; Catalog Code:   metoprolol ; Order Dt/Tm:   10/22/2018 11:27:24 AM          ranitidine  :   ranitidine ; Status:   Prescribed ; Ordered As Mnemonic:   Zantac 150 oral tablet ; Simple Display Line:   150 mg, 1 tab(s), PO, BID, 60 tab(s) ; Ordering Provider:   Sharon Arevalo MD; Catalog Code:   raNITIdine ; Order Dt/Tm:   11/8/2013 4:16:30 PM            Home Meds    omega-3 polyunsaturated fatty acids  :   omega-3 polyunsaturated fatty acids ; Status:   Documented ; Ordered As Mnemonic:   Fish Oil ; Simple Display Line:   1,000 mg, po, daily, 0 Refill(s) ; Catalog Code:   omega-3 polyunsaturated fatty acids ; Order Dt/Tm:   1/31/2017 7:02:34 PM

## 2022-02-16 NOTE — TELEPHONE ENCOUNTER
---------------------  From: Irena Levi MD   To: ZAK EVERETT    Sent: 8/20/2020 9:47:42 PM CDT  Subject: Patient Message - Results Notification     Your cardiovascuar disease risk of a=having an event in the next 10 years is greater than 5%.   We should start a statin. Please let me know if this is okay with you and which pharmacy you would like it sent to.   You are welcome to schedule a follow up appointment with me if you have additional questions.    Results:  Date Result Name Ind Value Ref Range   8/14/2020 10:50 AM Glucose Level ((H)) 118 mg/dL (65 - 99)   8/14/2020 10:50 AM Cholesterol  193 mg/dL ( - <200)   8/14/2020 10:50 AM Non-HDL Cholesterol ((H)) 158 ( - <130)   8/14/2020 10:50 AM HDL ((L)) 35 mg/dL (> OR = 50 - )   8/14/2020 10:50 AM Cholesterol/HDL Ratio ((H)) 5.5 ( - <5.0)   8/14/2020 10:50 AM LDL ((H)) 111    8/14/2020 10:50 AM Triglyceride ((H)) 350 mg/dL ( - <150)   8/14/2020 10:50 AM TSH  1.73 mIU/L (0.40 - 4.50)

## 2022-02-16 NOTE — PROGRESS NOTES
Chief Complaint    Physical  History of Present Illness      Pt here today for annual exam      Chart review shows that she had a previous ultrasound showing an enlarged fatty liver.  Discussed with patient that getting to a healthier weight will be the best way to avoid complications of fatty liver.  Would also like to check a hepatic panel today and discussed the role of hepatitis a and B vaccines as ways to reduce risk of further insults to her liver.  Would also like her to limit her use of Tylenol ibuprofen and alcohol.      Colon cancer screening is up-to-date.      Patient reports she had her mammogram done yesterday some breast cancer screening is up-to-date      The Celebrex does work well for her joint pain when needed.  She tolerates this much better than ibuprofen.  She would like to have a prescription sent to [facility].      She has a history of hypothyroidism.  Recent labs reveal that her TSH is appropriate.  We can continue with the current dose.  She has a history of osteopenia.  Her last bone density study was just over 2 years ago.  We will plan to repeat this.  Patient is also using calcium citrate instead of calcium carbonate for her calcium supplementation.  We can also check a vitamin D level for her.  Suggested she look at the National osteoporosis foundation website for additional information regarding weightbearing exercises and tools to treat osteoporosis I also discussed lifestyle modification into osteoporosis.      She is still using her amitriptyline to help with pain and insomnia.  She does not need a refill of her triamcinolone.      Review of systems is negative except as per HPI including no fevers, chills, sore throat, runny nose, nausea, vomiting, constipation, diarrhea, rash or new skin lesions, chest pain, palpitations, slurred speech, new paresthesia, shortness of breath or wheeze.             Exam:      see vitals listed below      General: alert and oriented ×3 no acute  distress.      HEENT: Normocephalic and atraumatic.       Eyes pupils are equal round and reactive to light extraocular motion is intact. normal conjunctiva      Hearing is grossly normal and there is no otorrhea. Tympanic membranes are pearly grey with a normal light reflex.      Nares are patent there is no rhinorrhea.       Mucous membranes are moist and pink.      Chest: has bilateral rise with no increased work of breathing. clear to auscultation without wheezes, rhonchi, or rales.      Cardiovascular: normal perfusion and brisk capillary refill. S1S2 with regular rate and rhythm and no murmurs, gallops or rubs.      Musculoskeletal: no gross focal abnormalities and normal gait.      Neuro: no gross focal abnormalities and memory seems intact.  CN 2-12 are grossly intact.      Psychiatric: speech is clear and coherent and fluent. Patient dressed appropriately for the weather. Mood is appropriate and affect is full.      Abd: no rebound or guarding, normal BS      Skin: no rash or lesion identified      Breast:  Pt declined             : declined             Discussed:      using sunscreen, protecting from sunburn, regular self skin checks      refer to usdachoosemyplate.gov, AHA and ADA for diet and exercise recommendations      consume 3313-8313 mg calcium daily      do weight bearing exercises      can get mammo at hospital by calling up and scheduling, do not need an order from me      get 120min /week of aerobic exercise      may try to get shingrix from local pharmacy, she is a candidate but there is a national shortage      up to date on colon cancer screening      may use vegetable oil for vaginal lubrication if intercourse is painful or may consider R/B of HRT systemic or local      Ways to get/keep healthy:             1.  BMI (Body Mass Index) is a measurement based on your height and weight.  Try to keep your BMI between 18 and 25.  You can do this by eating 6-10 servings of fruits/vegetables  daily, eat 3 servings of low fat dairy daily, drink 6-8 glasses of water daily, eat a baked, broiled or grilled fish twice a week, avoid fatty and fried foods, substitute canola or olive oil for butter.  Increase your  insoluble fiber intake to 25 grams daily (you can add Fibersure or Metamucil  Clear & Natural to your diet if needed)  Avoid foods that have  'trans fatty acids' and that have high fructose corn syrup.   Also try and do both some aerobic exercise (that makes you breath a little hard) and weight bearing exercise (like walking and lifting light weights) 30-40 min at least 3 times per week. Some evidence suggests 30 min 5 days per week.  Know your cholesterol numbers and blood sugar numbers (make an appointment for fasting blood work if you need to check your Lipids).             2.  Consider taking a generic multivitamin daily.  Research supports dietary intake of about 1,500 mg /day of calcium.  Dietary intake is preferred over a supplement. Recent studies show an increased risk of cardiovascular events in people who take large doses of calcium from a supplement.  It is probably safe to take a 600mg supplement daily if dietary intake is difficult.  Also consider taking a Vitamin D supplement in the winter months (1,000 international units daily).              3.  Don't smoke!   Call the Tobacco Quit Line number if needed 0-665-QUIT-NOW (424-8707) and/or make an appointment to talk about ways we can help you.             4.  Be Safe: Wear your seat belt! Change batteries in smoke and CO2 detectors, get help if you are not in a safe relationship ([place] [facility] [employer] # [phone])             5.  Keep up with important screening tests for certain cancers:  Mammograms for breast cancer (start at age 40 years), Pap smears for cervical cancer (as directed by your health care provider), Colonoscopy for colon cancer (start at age 50 years).   To schedule at mammogram at the [facility]  [facility] Radiology Department you       should call [phone].             6.  Keep up to date with important Immunizations--Tetanus, Influenza, Shingles, Meningitis, Cervical Cancer Prevention (Gardasil), Pneumonia             7.  If you suffer from Anxiety or Depression make an appointment to  talk about with me about treatment options.  If you are in crisis call the Crisis line at [phone] or [phone].TALK              8.  Prevent Skin Cancer:  There has been an 800 percent increase in women age 18-40 with skin cancer over the last 40 years. This supports the fact the tanning beds add to skin cancer.  One out of 35 men and 1 our of 55 women will develop melanoma.  A regular user of tanning beds has a 75% increase in melanoma , if you tan 1x/year you have 20% increase of melanoma and an increase risk of 2% for each session.  To decrease your risk, Do not use tanning beds AND use a sunscreen daily of SPF 30.  Apply it every 2 hours while in the sun and put it on liberally (2ml per cm squared of skin surface are  Physical Exam   Vitals & Measurements    T: 98.3  F (Tympanic)  HR: 88 (Peripheral)  BP: 110/60  SpO2: 98%     HT: 65.75 in  WT: 186.2 lb  BMI: 30.28   Assessment/Plan       Encounter for immunization (Z23)         Ordered:          hepatitis A adult vaccine, 1 mL, im, once, (Completed)          hepatitis B adult vaccine, 1 mL, im, once, (Completed)          44560 imadm prq id subq/im njxs 1 vaccine (Charge), Quantity: 1, Encounter for immunization          34374 hepatitis a vaccine adult for intramuscular use (Charge), Quantity: 1, Encounter for immunization          03754 hepatitis b vaccine adult dosage intramuscular (Charge), Quantity: 1, Encounter for immunization                GERD (gastroesophageal reflux disease) (K21.9)                ROMERO (nonalcoholic steatohepatitis) (K75.81)         Ordered:          98809 office outpatient visit 15 minutes (Charge), Quantity: 1, ROMERO (nonalcoholic  steatohepatitis)  Osteopenia          Hepatitis Panel* ([facility]), Specimen Type: Serum, Collection Date: 08/25/20 13:45:00 CDT                Osteopenia (M85.80)         Ordered:          82789 office outpatient visit 15 minutes (Charge), Quantity: 1, ROMERO (nonalcoholic steatohepatitis)  Osteopenia          DEXA Scan (Request), Osteopenia          Vitamin D, 25-Hydroxy, LC/MS/MS* ([facility]), Specimen Type: Serum, Collection Date: 08/25/20 13:46:00 CDT                Well adult exam (Z00.00)         Ordered:          33832 periodic preventive med est patient 40-64yrs (Charge), Quantity: 1, Well adult exam                Orders:         amitriptyline, = 1 tab(s) ( 50 mg ), Oral, hs, # 90 tab(s), 3 Refill(s), Type: Maintenance, Pharmacy: [facility], 1 tab(s) Oral hs, 65.75, in, 08/25/20 12:58:00 CDT, Height Measured, 186.2, lb, 08/25/20 12:58:00 CDT, Weight Measured, (Ordered)         amitriptyline, = 1 tab(s) ( 50 mg ), Oral, hs, # 90 tab(s), 3 Refill(s), Type: Hard Stop, Pharmacy: [facility], (Completed)         celecoxib, = 1 cap(s) ( 100 mg ), Oral, bid, PRN: pain, # 180 cap(s), 0 Refill(s), Type: Maintenance, Pharmacy: [facility], 1 cap(s) Oral bid,PRN:pain, 65.75, in, 08/25/20 12:58:00 CDT, Height Measured, 186.2, lb, 08/25/20 12:58:00 CDT, Weight Kassi..., (Ordered)         celecoxib, = 1 cap(s) ( 100 mg ), Oral, bid, PRN: pain, # 60 cap(s), 3 Refill(s), Type: Hard Stop, Pharmacy: [facility], (Completed)         esomeprazole, = 1 cap(s) ( 20 mg ), Oral, daily, # 90 cap(s), 3 Refill(s), Type: Maintenance, Pharmacy: [facility], 1 cap(s) Oral daily, 65.75, in, 08/25/20 12:58:00 CDT, Height Measured, 186.2, lb, 08/25/20 12:58:00 CDT, Weight Measured, (Ordered)         levothyroxine, = 1 tab(s) ( 150 mcg ), po, daily, # 90 tab(s), 3 Refill(s), Type: Maintenance, Pharmacy: [facility], 1 tab(s) Oral daily, 65.75, in, 08/25/20 12:58:00 CDT, Height  Measured, 186.2, lb, 08/25/20 12:58:00 CDT, Weight Measured, (Ordered)         levothyroxine, = 1 tab(s) ( 150 mcg ), po, daily, # 90 tab(s), 3 Refill(s), Type: Hard Stop, Pharmacy: [facility], (Completed)         raNITIdine, = 1 tab(s) ( 300 mg ), Oral, hs, # 90 tab(s), 3 Refill(s), Type: Maintenance, Pharmacy: [facility], 1 tab(s) Oral hs, (Completed)         ranitidine, 1 tab(s) ( 150 mg ), PO, BID, # 60 tab(s), 2 Refill(s), Type: Maintenance, Pharmacy: [facility], 1 tab(s) po bid, (Completed)         Physical Therapy (Request), Bilateral shoulder pain      Also see HPI.  We are checking patient's vitamin D level, bone density level, continue to calcium supplement.  Continue to vitamin D supplement.  She will return to clinic to complete her hepatitis A and B series.  Patient Information     Name:ZAK EVERETT      Address:      [address] [place] [phone]     Sex:Female     YOB: 1955     [phone]     Emergency Contact:JALEN MATA     MRN:[id number]     FIN:[id number]     Location:[facility]     Date of Service:08/25/2020      Primary Care Physician:       Bessy Medina, [phone]      Attending Physician:       Irena Levi MD, [phone]  Problem List/Past Medical History    Ongoing     Anticoagulated     Dysfunctional uterine bleeding     GERD (gastroesophageal reflux disease)     History of NSAID-associated gastropathy     Hypertriglyceridemia     Hypothyroid     MVP (mitral valve prolapse)     Obese     Personal History of DVT (Deep Vein Thrombosis)       Comments: provoked from ankle fracture and wearing cam walking boot     S/P meniscectomy    Historical     Pregnancy     Pregnancy     Pregnancy  Procedure/Surgical History     Colonoscopy (08/30/2017)      Comments: Indication: screening      Sedation: F&V      Normal      Repeat in 10 years.     Arthroscopic partial medial meniscectomy  (09/27/2016)      Comments: Right knee      Dr. Amin at [facility].     Laparoscopic supracervical hysterectomy (11/19/2010)      Comments: Pre- and post-op diagnosis is menorrhagia.     Hysteroscopy, D & C (04/23/2010)      Comments: Large pelvic mass, most consistent with uterine fibroids.     Colonoscopy (07/26/2007)     TL - Tubal ligation (04/26/1988)     Childbirth (04/25/1988)      Comments: Patient Comment: Male.     gall bladder (02/11/1987)      Comments: Patient Comment: Removed gall bladder stones , opened the clogged bile duct, wore an outward bag for six weeks to catch bile till everything was healed..     Childbirth (03/28/1986)      Comments: Patient Comment: In [place].     Childbirth (03/27/1986)      Comments: Female. G3, P2-0-1-2..     Cholecystectomy      Comments: Open.  Medications    amitriptyline 50 mg oral tablet, 50 mg= 1 tab(s), Oral, hs, 3 refills    CeleBREX 100 mg oral capsule, 100 mg= 1 cap(s), Oral, bid, PRN    esomeprazole 20 mg oral delayed release capsule, 20 mg= 1 cap(s), Oral, daily, 3 refills    Estrace Vaginal 0.1 mg/g vaginal cream, See Instructions, 3 refills    Fish Oil, 1000 mg, Oral, daily    Hair, Skin & Nails    levothyroxine 150 mcg (0.15 mg) oral tablet, 150 mcg= 1 tab(s), Oral, daily, 3 refills    Metoprolol Tartrate 50 mg oral tablet, 25 mg= 0.5 tab(s), Oral, daily, 3 refills    triamcinolone 0.5% topical cream, 1 michelle, Topical, bid  Allergies    No Known Medication Allergies  Social History    Smoking Status     Never smoker     Alcohol      Current, Wine (5 oz), 1-2 times per week, 1 drinks/episode average. 2 drinks/episode maximum. Ready to change: No.     Employment/School      Retired     Exercise      Exercise frequency: 3-4 times/week. Exercise type: garden in summer, health club.     Home/Environment      Marital status: . Spouse/Partner name: Ricardo. Risks in environment: Does not wear helmet, owns secured gun.     Nutrition/Health      Type of diet:  Low carbohydrate.     Sexual      Sexually active: Yes. Sexual orientation: Heterosexual. Contraceptive Use Details: None.     Substance Abuse      Never     Tobacco      Never  Family History    Anxiety: Daughter and Son.    Arthritis: Mother.    Cataracts: Mother.    Chicken pox: Daughter and Son.    Goiter: Mother.    HBP - High blood pressure: Father.    Hearing loss: Mother.    Heart disease: Father and Grandmother (P).    Hypertension: Mother.    Obesity: Father.    Overweight: Father.    Rheumatoid arthritis: Mother.    Snoring: Father.    Wears glasses: Mother, Father, Daughter, Son and Grandmother (P).  Immunizations      Vaccine Date Status          zoster vaccine, inactivated 08/06/2019 Given          zoster vaccine, inactivated 03/26/2019 Given          influenza virus vaccine, inactivated 10/03/2018 Recorded          influenza virus vaccine, inactivated 11/03/2016 Recorded          influenza virus vaccine, inactivated 10/23/2015 Recorded          influenza virus vaccine, inactivated 12/01/2014 Recorded          influenza virus vaccine, inactivated 11/19/2013 Recorded              Comments : [11/21/2013] given at [facility]          tetanus/diphth/pertuss (Tdap) adult/adol 02/28/2013 Given          influenza virus vaccine, inactivated 10/10/2012 Recorded          influenza virus vaccine, inactivated 10/14/2011 Recorded          influenza virus vaccine, inactivated 02/16/2011 Given          influenza, H1N1, inactivated 02/12/2010 Recorded          Td 12/01/2003 Recorded  Lab Results       Lab Results (Last 4 results within 90 days)        Glucose Level: 118 mg/dL High [65 mg/dL - 99 mg/dL] (08/14/20 10:50:00)       Cholesterol: 193 mg/dL (08/14/20 10:50:00)       Non-HDL Cholesterol: 158 High (08/14/20 10:50:00)       HDL: 35 mg/dL Low (08/14/20 10:50:00)       Cholesterol/HDL Ratio: 5.5 High (08/14/20 10:50:00)       LDL: 111 High (08/14/20 10:50:00)       Triglyceride: 350 mg/dL High (08/14/20  10:50:00)       TSH: 1.73 mIU/L [0.4 mIU/L - 4.5 mIU/L] (08/14/20 10:50:00)

## 2022-02-16 NOTE — PROGRESS NOTES
Chief Complaint    Pt here for orders for lab iron levels  History of Present Illness      pt likes to donate her blood approx every 8-12 weeks and has been told her HgB is too low to donate      mom is living with her which causes stress and has resulteed in changes to diet      ROMERO  Review of Systems      neg except as per hpi  Physical Exam   Vitals & Measurements    T: 98.2  F (Tympanic)  HR: 86 (Peripheral)  BP: 118/62  SpO2: 98%     HT: 65 in  WT: 184.4 lb  BMI: 30.68       Review of systems is negative except as per HPI including:  no fevers, chills, sore throat, runny nose, nausea, vomiting, constipation, diarrhea, rash or new skin lesions, chest pain, palpitations, slurred speech, new paresthesia, shortness of breath or wheeze.      Exam:      General: alert and oriented ×3 no acute distress.      HEENT: pupils are equal round and reactive to light extraocular motion is intact. Normocephalic and atraumatic.       Hearing is grossly normal and there is no otorrhea.       Nares are patent there is no rhinorrhea.       Mucous membranes are moist and pink.      Chest: has bilateral rise with no increased work of breathing.      Cardiovascular: normal perfusion and brisk capillary refill.      Musculoskeletal: no gross focal abnormalities and normal gait.      Neuro: no gross focal abnormalities and memory seems intact.      Psychiatric: speech is clear and coherent and fluent. Patient dressed appropriately for the weather. Mood is appropriate and affect is full.                     Discussed with patient to return to clinic if symptoms worsen or do not improve  Assessment/Plan       Anemia (D64.9)         Ordered:          Cbc (includes diff/plt) with smear review* ([facility]), Specimen Type: Blood, Collection Date: 11/02/21 13:23:00 CDT          Direct Antiglobulin Test (Jonathan)* ([facility]), Specimen Type: Blood, Collection Date: 11/02/21 13:23:00 CDT          Iron, TIBC and Ferritin Panel * ([facility]), Specimen Type:  Serum, Collection Date: 11/02/21 13:23:00 CDT          Lactate Dehydrogenase (LD)* ([facility]), Specimen Type: Serum, Collection Date: 11/02/21 13:23:00 CDT          Reticulocyte count* ([facility]), Specimen Type: Blood, Collection Date: 11/02/21 13:25:00 CDT                ROMERO (nonalcoholic steatohepatitis) (K75.81)        if LFT are elevated pt should followup with GI         Ordered:          Comprehensive Metabolic Panel* ([facility]), Specimen Type: Serum, Collection Date: 11/02/21 13:30:00 CDT           Patient Information     Name:ZAK EVERETT      Address:      [address] [place] [phone]     Sex:Female     YOB: 1955     [phone]     Emergency Contact:JALEN MATA JUAN     MRN:[id number]     FIN:[id number]     Location:[facility]     Date of Service:11/02/2021      Primary Care Physician:       Irena Levi MD, [phone]      Attending Physician:       Irena Levi MD, [phone]  Problem List/Past Medical History    Ongoing     Dysfunctional uterine bleeding     Fatty liver     GERD (gastroesophageal reflux disease)     History of NSAID-associated gastropathy     Hypertriglyceridemia     Hypothyroid     MVP (mitral valve prolapse)     New onset type 2 diabetes mellitus     Obese     Personal History of DVT (Deep Vein Thrombosis)       Comments: provoked from ankle fracture and wearing cam walking boot     S/P meniscectomy    Historical     Anticoagulated       Comments: heparin injections approx 2 weeks after DVT from immobility of ankle injury     Pregnancy     Pregnancy     Pregnancy  Procedure/Surgical History     Colonoscopy (08/30/2017)      Comments: Indication: screening      Sedation: F&V      Normal      Repeat in 10 years.     Arthroscopic partial medial meniscectomy (09/27/2016)      Comments: Right knee      Dr. Amin at [facility].     Laparoscopic supracervical hysterectomy (11/19/2010)      Comments: Pre- and post-op diagnosis is  menorrhagia.     Hysteroscopy, D & C (04/23/2010)      Comments: Large pelvic mass, most consistent with uterine fibroids.     Colonoscopy (07/26/2007)     TL - Tubal ligation (04/26/1988)     Childbirth (04/25/1988)      Comments: Patient Comment: Male.     gall bladder (02/11/1987)      Comments: Patient Comment: Removed gall bladder stones , opened the clogged bile duct, wore an outward bag for six weeks to catch bile till everything was healed..     Childbirth (03/28/1986)      Comments: Patient Comment: In [place].     Childbirth (03/27/1986)      Comments: Female. G3, P2-0-1-2..     Cholecystectomy      Comments: Open.  Medications    Accu check soft clix lancets, See Instructions, 1 refills    accu chek guide test strips, See Instructions, 1 refills    amitriptyline 50 mg oral tablet, 50 mg= 1 tab(s), Oral, hs, 3 refills    Citracal Maximum + D, Oral, daily    esomeprazole 20 mg oral delayed release capsule, 1 cap(s), Oral, daily, 3 refills    Fish Oil, 1000 mg, Oral, daily    Hair, Skin & Nails    levothyroxine 150 mcg (0.15 mg) oral tablet, 150 mcg= 1 tab(s), Oral, daily, 3 refills    Lipitor 40 mg oral tablet, 40 mg= 1 tab(s), Oral, daily    metFORMIN 500 mg oral tablet, extended release, 1000 mg= 2 tab(s), Oral, daily, 1 refills    Metoprolol Tartrate 50 mg oral tablet, 0.5 tab(s), Oral, daily, 3 refills    Multiple Vitamins oral tablet, Oral, daily    OTC - Magnesium, Oral, daily    OTC - Potassium, Oral, daily  Allergies    No Known Medication Allergies  Social History    Smoking Status     Former smoker     Alcohol      Current, Wine (5 oz), 3-5 times a month, 1 drinks/episode average. 2 drinks/episode maximum. Ready to change: No.     Electronic Cigarette/Vaping      Electronic Cigarette Use: Never.     Employment/School      Retired     Exercise      Exercise frequency: 3-4 times/week. Exercise type: garden in summer, health club.     Home/Environment      Marital status: . Spouse/Partner name:  Ricardo. Living situation: Home/Independent. Feels unsafe at home: No. Family/Friends available for support: Yes. Risks in environment: Does not wear helmet, owns secured gun.     Nutrition/Health      Type of diet: Low carbohydrate.     Sexual      Sexually active: Yes. Sexual orientation: Heterosexual. Contraceptive Use Details: None.     Substance Abuse      Never     Tobacco      Never (less than 100 in lifetime)  Family History    Anxiety: Daughter and Son.    Arthritis: Mother.    Cataracts: Mother.    Chicken pox: Daughter and Son.    Goiter: Mother.    HBP - High blood pressure: Father.    Hearing loss: Mother.    Heart disease: Father and Grandmother (P).    Hypertension: Mother.    Obesity: Father.    Overweight: Father.    Rheumatoid arthritis: Mother.    Snoring: Father.    Wears glasses: Mother, Father, Daughter, Son and Grandmother (P).  Lab Results       Lab Results (Last 4 results within 90 days)        Sodium Level: 138 mmol/L [135 mmol/L - 146 mmol/L] (09/03/21 11:19:00)       Potassium Level: 4.4 mmol/L [3.5 mmol/L - 5.3 mmol/L] (09/03/21 11:19:00)       Chloride Level: 102 mmol/L [98 mmol/L - 110 mmol/L] (09/03/21 11:19:00)       CO2 Level: 27 mmol/L [20 mmol/L - 32 mmol/L] (09/03/21 11:19:00)       Glucose Level: 120 mg/dL High [65 mg/dL - 99 mg/dL] (09/03/21 11:19:00)       BUN: 21 mg/dL [7 mg/dL - 25 mg/dL] (09/03/21 11:19:00)       Creatinine Level: 0.93 mg/dL [0.5 mg/dL - 0.99 mg/dL] (09/03/21 11:19:00)       BUN/Creat Ratio: NOT APPLICABLE [6  - 22] (09/03/21 11:19:00)       eGFR: 65 mL/min/1.73m2 (09/03/21 11:19:00)       eGFR : 76 mL/min/1.73m2 (09/03/21 11:19:00)       Calcium Level: 9.8 mg/dL [8.6 mg/dL - 10.4 mg/dL] (09/03/21 11:19:00)       Bilirubin Total: 0.6 mg/dL [0.2 mg/dL - 1.2 mg/dL] (09/03/21 11:19:00)       Alkaline Phosphatase: 55 unit/L [37 unit/L - 153 unit/L] (09/03/21 11:19:00)       AST/SGOT: 53 unit/L High [10 unit/L - 35 unit/L] (09/03/21 11:19:00)        ALT/SGPT: 40 unit/L High [6 unit/L - 29 unit/L] (09/03/21 11:19:00)       Protein Total: 7.5 gm/dL [6.1 gm/dL - 8.1 gm/dL] (09/03/21 11:19:00)       Albumin Level: 4.7 gm/dL [3.6 gm/dL - 5.1 gm/dL] (09/03/21 11:19:00)       Globulin: 2.8 [1.9  - 3.7] (09/03/21 11:19:00)       A/G Ratio: 1.7 [1  - 2.5] (09/03/21 11:19:00)  Immunizations       Scheduled Immunizations       Dose Date(s)       influenza virus vaccine, inactivated       11/19/2013, 10/23/2015, 10/14/2011, 10/10/2012, 12/01/2014, 11/03/2016, 10/03/2018, 10/08/2020       Other Immunizations               influenza virus vaccine, inactivated       02/16/2011       hepatitis A adult vaccine       08/25/2020       Td       12/01/2003       tetanus/diphth/pertuss (Tdap) adult/adol       03/01/2013       influenza, H1N1, inactivated       02/12/2010       hepatitis B adult vaccine       08/25/2020       zoster vaccine, inactivated       03/26/2019, 08/06/2019       SARS-CoV-2 (COVID-19) Moderna-1273       04/01/2021, 04/29/2021

## 2022-02-16 NOTE — PROGRESS NOTES
"   Patient:   ZAK EVERETT            MRN: [id number]            FIN: [id number]               Age:   61 years     Sex:  Female     :  1955   Associated Diagnoses:   Elevated AST (SGOT); Elevated glucose; Hypothyroid; MVP (Mitral Valve Prolapse); Obese; Well woman exam   Author:   Bessy Medina      Visit Information      Date of Service: 2017 11:51 am  Performing Location: [facility]  Encounter#: [id number]      Primary Care Provider (PCP):  Bessy Medina    NPI# [id number]      Chief Complaint   8/3/2017 11:57 AM CDT    Annual exam.      Well Adult History   PPC for her annual exam, last annual 2016  MVP diagnosed about 20 yrs ago, has been on metoprolol since then for rate control and has worked well  lately in the evening has had \"funny heartbeats\" no dizziness with this, feels like the palpitations she had prior to MVP diagnosis  sensation has been present for past 3 months but not aware of it during activity throughout the day, has had echo stress test in 2013 for atypical chest pain with no evidence of ischemic disease, today's reported sensation is not associated with chest pain, arm pain, jaw pain, dyspnea, or nausea  hx of hypothyroidism, wants TSH rechecked because she has been drinking grapefruit juice with thyroid medicaiton almost daily and didn't realize this may interfere with results  hx of hypertriglyceridemia, using fish oil, good success, has associated AST /ALT elevation prior to fish oil, should recheck this today  past 5-6 fasting glucose levels have been elevated: will check hgba1c, has cut out soda throughout the day, uses glucosamine for generalized joint pain  mammogram done 2017  NILM pap 2015: monogamous relationship  colonoscopy normal , needs repeat  PHQ9=0      Review of Systems   Constitutional:  Negative.    Eye:  Negative.    Ear/Nose/Mouth/Throat:  Negative.    Respiratory:  Negative.    Cardiovascular:  Negative except as " documented in history of present illness.    Breast:  Negative.    Gastrointestinal:  Negative.    Genitourinary:  Negative.    Gynecologic:  uses estradiol to help with painful intercourse and is beneficial for this.    Hematology/Lymphatics:  Negative.    Endocrine:  Negative except as documented in history of present illness.    Immunologic:  Negative.    Musculoskeletal:  Negative, right knee meniscus repair 9/2016, still in PT for this.    Integumentary:  Negative.    Neurologic:  Negative.    Psychiatric:  Negative.             Health Status   Allergies:    Allergic Reactions (Selected)  No known allergies   Medications:  (Selected)   Prescriptions  Prescribed  Estrace Vaginal 0.1 mg/g vaginal cream: See Instructions, Instructions: INSERT 1 GRAM VAGINALLY THREE TIMES PER WEEK, # 42 gm, 2 Refill(s), Type: Maintenance, Pharmacy: [facility], INSERT 1 GRAM VAGINALLY THREE TIMES PER WEEK  Metoprolol Tartrate 50 mg oral tablet: 0.5 tab(s) ( 25 mg ), po, daily, # 15 tab(s), 0 Refill(s), Type: Maintenance, Pharmacy: [facility]  Zantac 150 oral tablet: 1 tab(s) ( 150 mg ), PO, BID, # 60 tab(s), 2 Refill(s), Type: Maintenance, Pharmacy: [facility], 1 tab(s) po bid  amitriptyline 25 mg oral tablet: 2 tab(s) ( 50 mg ), po, hs, # 180 tab(s), 3 Refill(s), Type: Maintenance, Pharmacy: [id number], 2 tab(s) po hs,x90 day(s)  levothyroxine 150 mcg (0.15 mg) oral tablet: 1 tab(s) ( 150 mcg ), po, daily, # 90 tab(s), 1 Refill(s), Type: Maintenance, Pharmacy: [facility], 1 tab(s) po daily  Documented Medications  Documented  Fish Oil: po, daily, 0 Refill(s), Type: Maintenance   Problem list:    All Problems (Selected)  Anticoagulated / SNOMED CT 516029825 / Confirmed  GERD (Gastroesophageal Reflux Disease) / ICD-9-.81 / Confirmed  Hypertriglyceridemia / ICD-9-.1 / Confirmed  Hypothyroid / ICD-9-.9 / Confirmed  MVP (Mitral Valve Prolapse) / ICD-9-.0 /  Confirmed  Obese / ICD-9-.00 / Probable  Personal History of DVT (Deep Vein Thrombosis) / SNOMED CT 5157578667 / Confirmed  S/P meniscectomy / SNOMED CT 9347084279 / Confirmed      Histories   Family History:    Hypertension  Mother  Heart disease  Father  Grandmother (P)  Goiter  Mother     Procedure history:    Arthroscopic partial medial meniscectomy ([id number]) on 9/27/2016 at 60 Years.  Comments:  11/10/2016 3:38 PM - Jodi Fiore CMA  Right knee  Dr[facility] Amin at [facility]  Laparoscopic supracervical hysterectomy ([id number]) on 11/19/2010 at 55 Years.  Comments:  12/12/2010 2:39 PM - Janice Myers  Pre- and post-op diagnosis is menorrhagia  Hysteroscopy, D & C on 4/23/2010 at 54 Years.  Comments:  12/31/2010 1:46 PM - Janice Myers  Large pelvic mass, most consistent with uterine fibroids  Colonoscopy ([id number]) on 7/26/2007 at 51 Years.  Cholecystectomy ([id number]).  Comments:  4/21/2010 4:33 PM - Irina WISE, Sharon  Open    Childbirth ([id number]).  Comments:  11/1/2011 7:27 PM - Jen Robertson  G3, P2-0-1-2.   Social History:        Alcohol Assessment            Current, 1-2 times per week, 1 drinks/episode average.      Tobacco Assessment            Never      Substance Abuse Assessment            Never      Employment and Education Assessment            Work/School description: homemaker.      Home and Environment Assessment            Marital status: .                     Comments:                      11/01/2011 - Jen Robertson                      - Ricardo.      Exercise and Physical Activity Assessment            Exercise type: gardening.        Physical Examination   Vital Signs   8/3/2017 11:57 AM CDT Temperature Tympanic 98.5 DegF    Peripheral Pulse Rate 72 bpm    Pulse Site Radial artery    HR Method Manual    Systolic Blood Pressure 116 mmHg    Diastolic Blood Pressure 72 mmHg    Mean Arterial Pressure 87 mmHg    BP Site Right arm    BP Method Manual      Measurements from  flowsheet : Measurements   8/3/2017 11:57 AM CDT Height Measured - Standard 65 in    Weight Measured - Standard 189 lb    BSA 1.98 m2    Body Mass Index 31.45 kg/m2      General:  Alert and oriented, No acute distress.    Eye:  Pupils are equal, round and reactive to light, Intact accommodation, Normal conjunctiva, Vision unchanged.         Periorbital area: Within normal limits.    HENT:  Normocephalic, Tympanic membranes are clear, Normal hearing, Oral mucosa is moist, No pharyngeal erythema, No sinus tenderness.    Neck:  Supple, Non-tender, No lymphadenopathy, No thyromegaly.    Respiratory:  Lungs are clear to auscultation, Respirations are non-labored, No chest wall tenderness.    Cardiovascular:  Normal rate, Regular rhythm, No murmur, No edema.    Breast:  No mass, No tenderness, very dense breast tissue.    Gastrointestinal:  Soft, Non-tender, Non-distended, Normal bowel sounds, No organomegaly.    Genitourinary:  No costovertebral angle tenderness.    Lymphatics:  No lymphadenopathy neck, axilla, groin.    Musculoskeletal:  Normal range of motion, Normal strength, No swelling.    Integumentary:  Warm, Dry, Pink.    Neurologic:  Alert, Oriented.    Psychiatric:  Cooperative, Appropriate mood & affect.       Review / Management   Results review:  see labs today.       Impression and Plan   Diagnosis     Elevated AST (SGOT) (IMP68-PD R74.0).     Elevated glucose (WLR11-RH R73.09).     Hypothyroid (NMA12-CF E03.9).     MVP (Mitral Valve Prolapse) (QYL53-NS I34.1).     Obese (WVI42-IJ E66.9).     Well woman exam (IAB49-VL Z01.419).     Patient Instructions:       Counseled: Patient, Regarding diagnosis, Regarding medications, Verbalized understanding.    Summary:  monitor palpitations for now, recheck TSH, if palpitations worsen, chest pain occurs there is dyspnea, will proceed with repeat echo and compare to 2013  will check hgba1c, if prediabetic, discuss metformin, if diabetic, needs intervention  will  recheck TSH now that she is not consuming high doses of grapefruit  will recheck AST and ALT because I anticipate improvement with improved triglycerides  will recheck triglycerides in early winter   will monitor weight, calories, portions, if desires meeting with EMETERIO Ramirez will let me know  if hgba1c elevated stop glucosamine  mammogram up to date, pap in 2018, will need screening colonoscopy.    Orders     Orders (Selected)   Prescriptions  Prescribed  Estrace Vaginal 0.1 mg/g vaginal cream: See Instructions, Instructions: INSERT 1 GRAM VAGINALLY THREE TIMES PER WEEK, # 42 gm, 2 Refill(s), Type: Maintenance, Pharmacy: [id number], INSERT 1 GRAM VAGINALLY THREE TIMES PER WEEK  Metoprolol Tartrate 50 mg oral tablet: 0.5 tab(s) ( 25 mg ), po, daily, # 45 tab(s), 3 Refill(s), Type: Maintenance, Pharmacy: [id number], 0.5 tab(s) po daily  amitriptyline 25 mg oral tablet: 2 tab(s) ( 50 mg ), po, hs, # 180 tab(s), 3 Refill(s), Type: Maintenance, Pharmacy: [id number], 2 tab(s) po hs,x90 day(s).

## 2022-02-16 NOTE — PROGRESS NOTES
Patient:   ZAK EVERETT            MRN: [id number]            FIN: [id number]               Age:   65 years     Sex:  Female     :  1955   Associated Diagnoses:   Pre-op exam; Eye problem; Leg cramps; Elevated serum glucose   Author:   Abby Laws      Preoperative Information   Here for preop history and physical      Chief Complaint   2021 11:06 AM CDT   pre-op exam; left eye surgery scheduled for 21 at [facility] with Saeid Hills, fax# [phone]   She is not sure of the type of anesthesia but believes it will not be a general      Review of Systems   Constitutional:  Negative.    Weight has been stable, no nutritional concerns  No Allergies to latex, iodine, contrast dye, shell fish or local anesthetics   Ear/Nose/Mouth/Throat:  Negative.    Respiratory:  Negative.    No history of sleep apnea   Cardiovascular:  she has hx of valve disease (states mild) and that is why she is on metoprolol for many years.    Gastrointestinal:  Negative.    Genitourinary:  Negative.    Pregnancy ruled out-NA   Endocrine:  Negative.    Immunologic:  Negative.    Musculoskeletal:  has arthritis, did stop celebrex and using ibuprofen.    Integumentary:  Negative.    Neurologic:  having significant muscle cramps, started potassium and magnesium recently.    Psychiatric:  Negative.    All other systems reviewed and negative   Has no history of anemia.  DOES HAVE history of DVT  'a few years ago' after immobility caused by ankle sprain. She was on heparin 2 weeks then no other blood thinners and no blood clots since then  Has  no personal history of bleeding problems.   Has  no personal or family history of anesthesia reactions.  Patient does not have active tuberculosis.    S/he  has not taken aspirin or aspirin containing products in the last week.     S/he  has not taken Plavix (Clopidogrel) in the last 2 weeks.    S/he  has not taken warfarin in the past week.    S/he  has not been on  corticosteroids for more than 2 weeks recently.      S/he  is not DNR before, during or after surgery.    Chest pain / SOB walking up 2 flights of steps? No  Pain in neck or jaw? No  CAD MI?  NO  Afib? NO  Heart Failure? No  Asthma  or Bronchitis? NO  Diabetes? NO, she has been pre diabetic but number improved       Seizure Disorder? No  CKD? No  Thyroid Disease? No  Liver Disease--Fatty liver disease  CVA? No           Health Status   Allergies:    Allergic Reactions (Selected)  No Known Medication Allergies   Problem list:    All Problems  GERD (gastroesophageal reflux disease) / SNOMED CT 706507277 / Confirmed  History of NSAID-associated gastropathy / SNOMED CT 932537676 / Confirmed  S/P meniscectomy / SNOMED CT 7970286253 / Confirmed  Personal History of DVT (Deep Vein Thrombosis) / SNOMED CT 6232702387 / Confirmed  provoked from ankle fracture and wearing cam walking boot  Hypertriglyceridemia / SNOMED CT 738307318 / Confirmed  Hypothyroid / SNOMED CT 45366199 / Confirmed  MVP (mitral valve prolapse) / SNOMED CT 5358834906 / Confirmed  Obese / SNOMED CT 3145431952 / Probable  Inactive: Dysfunctional uterine bleeding / SNOMED CT 11187480  Resolved: Pregnancy / SNOMED CT 354998371  Resolved: Pregnancy / SNOMED CT 327529158  Resolved: Pregnancy / SNOMED CT 205356334  Resolved: Anticoagulated / SNOMED CT 680322928  heparin injections approx 2 weeks after DVT from immobility of ankle injury   Medications:  (Selected)   Prescriptions  Prescribed  Metoprolol Tartrate 50 mg oral tablet: = 0.5 tab(s), Oral, daily, # 45 tab(s), 2 Refill(s), Type: Maintenance, Pharmacy: [id number], 0.5 tab(s) Oral daily, 65.75, in, 08/25/20 12:58:00 CDT, Height Measured, 186.2, lb, 08/25/20 12:58:00 CDT, Weight Measured  amitriptyline 50 mg oral tablet: = 1 tab(s) ( 50 mg ), Oral, hs, # 90 tab(s), 3 Refill(s), Type: Maintenance, Pharmacy: [id number], 1 tab(s) Oral hs, 65.75, in, 08/25/20 12:58:00 CDT, Height  Measured, 186.2, lb, 08/25/20 12:58:00 CDT, Weight Measured  esomeprazole 20 mg oral delayed release capsule: = 1 cap(s) ( 20 mg ), Oral, daily, # 90 cap(s), 3 Refill(s), Type: Maintenance, Pharmacy: [id number], 1 cap(s) Oral daily, 65.75, in, 08/25/20 12:58:00 CDT, Height Measured, 186.2, lb, 08/25/20 12:58:00 CDT, Weight Measured  levothyroxine 150 mcg (0.15 mg) oral tablet: = 1 tab(s) ( 150 mcg ), po, daily, # 90 tab(s), 3 Refill(s), Type: Maintenance, Pharmacy: [id number], 1 tab(s) Oral daily, 65.75, in, 08/25/20 12:58:00 CDT, Height Measured, 186.2, lb, 08/25/20 12:58:00 CDT, Weight Measured  Documented Medications  Documented  Fish Oil: ( 1,000 mg ), po, daily, 0 Refill(s), Type: Maintenance  Hair, Skin & Nails: Instructions: 2 Gummies, 1x per day, 0 Refill(s), Type: Soft Stop        Histories   Past Medical History:    Active  Hypertriglyceridemia (025401974): Onset on 3/10/2011 at 55 years.  Comments:  3/10/2011 CST 4:44 PM CST -     Hypothyroid (70885079)  GERD (gastroesophageal reflux disease) (959487178)  MVP (mitral valve prolapse) (9878781187)  Obese (6925067278)  Resolved  Anticoagulated (753582337):  Resolved.  Comments:  6/29/2021 CDT 11:28 AM CDT - Abby Laws  heparin injections approx 2 weeks after DVT from immobility of ankle injury  Pregnancy (682454895):  Resolved in 1982 at 26 years.  Pregnancy (273283976):  Resolved in 1986 at 30 years.  Pregnancy (347817933):  Resolved in 1988 at 32 years.   Family History:    Chicken pox  Daughter (Jessi Hodges)  Comments:  8/15/2018 6:42 AM CDT - Jodi Fiore CMA  age 5  Son (Clarke Hitchcock)  Comments:  8/15/2018 6:42 AM CDT - [place] Jodi ELIZABETH  age 3  Wears glasses  Mother (Cheryl Zarinateodora)  Comments:  8/15/2018 6:42 AM CDT - [place] Jodi ELIZABETH  Diagnosed in High School  Daughter (Jessi Hodges)  Comments:  8/15/2018 6:42 AM CDT - [place] Jodi ELIZABETH  reading only early 20&#39;s  Father (Rene Cheema,  )  Comments:  8/15/2018 6:42 AM CDT - [place] CMA, Jodi  don&#39;t know  Grandmother (P) (Allyn Cheema, )  Comments:  8/15/2018 6:42 AM CDT - [place] CMA, Jodi  don&#39;t know  Son (Clarke Hitchcock)  Comments:  8/15/2018 6:42 AM CDT - [place] CMA, Jodi  since middle school  Anxiety  Daughter (Jessi Hodges)  Comments:  8/15/2018 6:42 AM CDT - [place] CMA, Jodi  age 20  Son (Clarke Hitchcock)  Rheumatoid arthritis  Mother (Cheryl Marr)  Comments:  8/15/2018 6:42 AM CDT - [place] CMA, Jodi  years ago  Hypertension  Mother (Cheryl Marr)  Heart disease  Father (Rene Cheema, )  Comments:  8/15/2018 6:42 AM CDT - [place] CMA, Jodi  don&#39;t know age  Grandmother (P) (Allyn Cheema, )  HBP - High blood pressure  Father (Rene Cheema, )  Comments:  8/15/2018 6:42 AM CDT - [place] CMA, Jodi  in his 40&#39;s  Arthritis  Mother (Cheryl Marr)  Comments:  8/15/2018 6:42 AM CDT - [place] CMA, Jodi  years  Hearing loss  Mother (Cheryl Marr)  Comments:  8/15/2018 6:42 AM CDT - [place] CMA, Jodi  years ago  Snoring  Father (Rene Cheema, )  Comments:  8/15/2018 6:42 AM CDT - [place] CMA, Jodi  don&#39;t know  Overweight  Father (Rene Cheema, )  Comments:  8/15/2018 6:42 AM CDT - [place] CMA, Jodi  in his 40&#39;s  Obesity  Father (Rene Cheema, )  Comments:  8/15/2018 6:42 AM CDT - [place] CMA, Jodi  in his 40&#39;s  Goiter  Mother (Cheryl Marr)  Cataracts  Mother (Cheryl Marr)  Comments:  8/15/2018 6:42 AM CDT - [place] CMA Jodi  Both eyes operated on 20 &amp; 8/7/18     Procedure history:    Colonoscopy (SNOMED CT 571772009) performed by Collin Almeida MD on 2017 at 61 Years.  Comments:  2018 7:26 PM CDT - Jodi Fiore CMA  Indication: screening  Sedation: F&V  Normal   Repeat in 10 years  Arthroscopic partial medial meniscectomy (SNOMED CT 089210672) on 2016 at 60 Years.  Comments:  11/10/2016  3:38 PM CST Jodi Chaidez CMA  Right knee  Dr. Amin at [facility]  Laparoscopic supracervical hysterectomy (SNOMED CT 694888382) performed by Markus Escalante MD on 11/19/2010 at 55 Years.  Comments:  12/12/2010 2:39 PM Janice James  Pre- and post-op diagnosis is menorrhagia  Hysteroscopy, D & C performed by Markus Escalante MD on 4/23/2010 at 54 Years.  Comments:  12/31/2010 1:46 PM Janice James  Large pelvic mass, most consistent with uterine fibroids  Colonoscopy (SNOMED CT 259482124) on 7/26/2007 at 51 Years.  TL - Tubal ligation (SNOMED CT 563979704) on 4/26/1988 at 32 Years.  Childbirth (SNOMED CT 9875841481) on 4/25/1988 at 32 Years.  Comments:  8/15/2018 11:42 AM CDT Jodi Chaidez CMA  Patient Comment: Male  gall bladder (SNOMED CT 7799364918) on 2/11/1987 at 31 Years.  Comments:  8/25/2020 11:46 AM CDT Yumiko Wu CMA  Patient Comment: Removed gall bladder stones , opened the clogged bile duct, wore an outward bag for six weeks to catch bile till everything was healed.  Childbirth (SNOMED CT 6648962253) on 3/28/1986 at 30 Years.  Comments:  8/25/2020 11:46 AM CDT Yumiko Wu CMA  Patient Comment: In [place]  Childbirth (SNOMED CT 8636688267) on 3/27/1986 at 30 Years.  Comments:  8/15/2018 11:42 AM CDT Jodi Chaidez CMA  Female    11/1/2011 7:27 PM ART - Jen Robertson  G3, P2-0-1-2.  Cholecystectomy (SNOMED CT 71980207).  Comments:  4/21/2010 4:33 PM CDT - Irina WISE, Sharon Kulkarni     Social History:        Electronic Cigarette/Vaping Assessment            Electronic Cigarette Use: Never.      Alcohol Assessment            Current, Wine (5 oz), 1-2 times per week, 1 drinks/episode average.  2 drinks/episode maximum.  Ready to               change: No.      Tobacco Assessment            Never (less than 100 in lifetime)      Substance Abuse Assessment            Never      Employment and Education Assessment            Retired      Home and Environment Assessment             Marital status: .  Spouse/Partner name: Ricardo.  Risks in environment: Does not wear helmet, owns               secured gun.      Nutrition and Health Assessment            Type of diet: Low carbohydrate.      Exercise and Physical Activity Assessment            Exercise frequency: 3-4 times/week.  Exercise type: garden in summer, health club.      Sexual Assessment            Sexually active: Yes.  Sexual orientation: Heterosexual.  Contraceptive Use Details: None.  ,        Electronic Cigarette/Vaping Assessment            Electronic Cigarette Use: Never.      Alcohol Assessment            Current, Wine (5 oz), 1-2 times per week, 1 drinks/episode average.  2 drinks/episode maximum.  Ready to               change: No.      Tobacco Assessment            Never (less than 100 in lifetime)      Substance Abuse Assessment            Never      Employment and Education Assessment            Retired      Home and Environment Assessment            Marital status: .  Spouse/Partner name: Ricardo.  Risks in environment: Does not wear helmet, owns               secured gun.      Nutrition and Health Assessment            Type of diet: Low carbohydrate.      Exercise and Physical Activity Assessment            Exercise frequency: 3-4 times/week.  Exercise type: garden in summer, health club.      Sexual Assessment            Sexually active: Yes.  Sexual orientation: Heterosexual.  Contraceptive Use Details: None.        Physical Examination   Vital Signs   6/29/2021 11:06 AM CDT Temperature Tympanic 97.2 DegF  LOW    Peripheral Pulse Rate 76 bpm    Systolic Blood Pressure 114 mmHg    Diastolic Blood Pressure 72 mmHg    Mean Arterial Pressure 86 mmHg    BP Site Right arm    BP Method Manual    Oxygen Saturation 97 %      Measurements from flowsheet : Measurements   6/29/2021 11:06 AM CDT Height Measured - Standard 65 in    Weight Measured - Standard 194.8 lb    BSA 2.01 m2    Body Mass Index 32.41 kg/m2  HI       General:  Alert and oriented, No acute distress.    Eye:  Pupils are equal, round and reactive to light, Extraocular movements are intact, Normal conjunctiva.    HENT:  Normocephalic, Tympanic membranes are clear, Oral mucosa is moist, No pharyngeal erythema, Mallampati Score 2.    Neck:  Supple, Non-tender, No lymphadenopathy, No thyromegaly.    Respiratory:  Breath sounds are equal, Symmetrical chest wall expansion.         Respirations: Are within normal limits.         Pattern: Regular.         Breath sounds: Bilateral, Within normal limits.    Cardiovascular:  Normal rate, Regular rhythm, No murmur, Good pulses equal in all extremities, Normal peripheral perfusion, No edema.    Gastrointestinal:  Soft, Non-tender, Non-distended.    Musculoskeletal:  Normal range of motion, Normal strength, Normal gait.    Integumentary:  Warm, Dry, Intact, No rash.    Neurologic:  Alert, Oriented, Normal motor function, No focal deficits.    Psychiatric:  Cooperative, Appropriate mood & affect.       Review / Management   Results review:  Lab results   6/29/2021 11:45 AM CDT Sodium Level 135 mmol/L    Potassium Level 4.8 mmol/L    Chloride Level 101 mmol/L    CO2 Level 26 mmol/L    Glucose Level 212 mg/dL  HI    BUN 20 mg/dL    Creatinine Level 0.93 mg/dL    BUN/Creat Ratio NOT APPLICABLE    eGFR 65 mL/min/1.73m2    eGFR African American 76 mL/min/1.73m2    Calcium Level 9.6 mg/dL    Magnesium Level 2.1 mg/dL    WBC 5.3    RBC 3.96    Hgb 11.9 gm/dL    Hct 36.3 %    MCV 91.7 fL    MCH 30.1 pg    MCHC 32.8 gm/dL    RDW 14.3 %    Platelet 207    MPV 10.2 fL     .       Impression and Plan   Diagnosis     Pre-op exam (VMB35-WC Z01.818).     Eye problem (XRY49-RT H57.9).     Leg cramps (TWK50-HQ R25.2).     Elevated serum glucose (WBM51-FY R73.9).     Condition:  Stable,  Stable, no contraindication for general anesthesia or surgical procedure pending today's lab results.    Orders     Orders (Selected)   Outpatient  Orders  Ordered (Dispatched)  Basic Metabolic Panel* ([facility]): Specimen Type: Serum, Collection Date: 06/29/21 11:39:00 CDT  CBC (h/h, RBC, indices, WBC, Plt)* ([facility]): Specimen Type: Blood, Collection Date: 06/29/21 11:39:00 CDT  Magnesium* ([facility]): Specimen Type: Serum, Collection Date: 06/29/21 11:41:00 CDT.       No SBE prophylaxis or DVT prophylaxis necessary   She will stop all supplements and ibuprofen 7 days before procedure    addendum 6/30/2021: her random glucose within the basic metabolic panel is over 200.  This likely means she has diabetes (she has had pre diabetes in past), but does require additional evaluation.  I will contact her to set this up later this week if I can reach her.  I did contact her surgeon, Dr Saeid Hills at [facility] to inform him of this.  There is no concern for her eye condition being a symptom of diabetes. The surgery is minimally invasive and it was decided that there is no need to alter or cancel her surgery at this time..

## 2022-02-16 NOTE — TELEPHONE ENCOUNTER
"---------------------  From: Rosetta Mccormick CMA (Phone Messages Pool ([id number]_[facility]))   To: ZAK HITCHCOCK    Sent: 7/28/2021 8:13:21 AM CDT  Subject: RE: Recent diagnosis     Zak Mora is out of the clinic this week so I will forward your message to another provider to see if we can get your questions answered.  Otherwise Abyb is back in the clinic next week.    Have a great day  GLENN Vela     ---------------------  From: ZAK HITCHCOCK  To: [facility]  Sent: 07/27/2021 11:52 p.m. CDT  Subject: Recent diagnosis  Dear Abby,  I don t quite understand what you mean about the \"cut point\". I m tolerating the meds well. I have now added the Atorvastatin to the Metforman and no problems. I m sorry but I don t remember if you said I needed to make another appointment with you right away or not. If so, how soon?  I have an appointment scheduled with Vane Ramirez on Aug 16. That was the soonest I could get in to see her.  Please advise what I should do next.    Thank you, Annabelle Hitchcock  "

## 2022-02-17 ENCOUNTER — COMMUNICATION - RIVER FALLS (OUTPATIENT)
Dept: FAMILY MEDICINE | Facility: CLINIC | Age: 67
End: 2022-02-17

## 2022-03-02 VITALS — WEIGHT: 182.5 LBS | HEIGHT: 65 IN | BODY MASS INDEX: 30.41 KG/M2

## 2022-03-02 NOTE — NURSING NOTE
Quick Intake Entered On:  2/15/2022 3:22 PM CST    Performed On:  2/15/2022 3:22 PM CST by Kamini Ramirez               Summary   Menstrual Status :   Postmenopausal   Weight Measured :   182.5 lb(Converted to: 182 lb 8 oz, 82.781 kg)    Height Measured :   65 in(Converted to: 5 ft 5 in, 165.10 cm)    Body Mass Index :   30.37 kg/m2 (HI)    Body Surface Area :   1.95 m2   Languages :   English   Kamini Ramirez - 2/15/2022 3:22 PM CST

## 2022-03-02 NOTE — LETTER
(Inserted Image. Unable to display)   January 19, 2022  ZAK EVERETT  [address] [place] [address]        Dear ZAK,    Thank you for selecting [facility] for your healthcare needs.    Our records indicate you are due for the following services:     Diabetic Exam ~ Please bring your glucose meter and/or your blood glucose diary to your appointment.    (FYI   Regarding office visits: In some instances, a video visit or telephone visit may be offered as an option.)    To schedule an appointment or if you have further questions, please contact your clinic at [phone].    Powered by [employer]    Sincerely,    KAYDEN Fritz

## 2022-04-03 ENCOUNTER — HEALTH MAINTENANCE LETTER (OUTPATIENT)
Age: 67
End: 2022-04-03

## 2022-04-14 ENCOUNTER — OFFICE VISIT (OUTPATIENT)
Dept: FAMILY MEDICINE | Facility: CLINIC | Age: 67
End: 2022-04-14
Payer: COMMERCIAL

## 2022-04-14 ENCOUNTER — MYC MEDICAL ADVICE (OUTPATIENT)
Dept: FAMILY MEDICINE | Facility: CLINIC | Age: 67
End: 2022-04-14

## 2022-04-14 VITALS
DIASTOLIC BLOOD PRESSURE: 60 MMHG | BODY MASS INDEX: 29.99 KG/M2 | SYSTOLIC BLOOD PRESSURE: 120 MMHG | OXYGEN SATURATION: 97 % | HEART RATE: 65 BPM | WEIGHT: 180 LBS | HEIGHT: 65 IN

## 2022-04-14 DIAGNOSIS — Z98.890 HISTORY OF COLONOSCOPY: ICD-10-CM

## 2022-04-14 DIAGNOSIS — I34.1 MVP (MITRAL VALVE PROLAPSE): ICD-10-CM

## 2022-04-14 DIAGNOSIS — E03.9 ACQUIRED HYPOTHYROIDISM: ICD-10-CM

## 2022-04-14 DIAGNOSIS — Z87.19 HX OF NSAID-ASSOCIATED GASTROPATHY: ICD-10-CM

## 2022-04-14 DIAGNOSIS — Z90.711 HISTORY OF ABDOMINAL SUPRACERVICAL SUBTOTAL HYSTERECTOMY: ICD-10-CM

## 2022-04-14 DIAGNOSIS — Z98.890: ICD-10-CM

## 2022-04-14 DIAGNOSIS — K21.9 GASTROESOPHAGEAL REFLUX DISEASE WITHOUT ESOPHAGITIS: ICD-10-CM

## 2022-04-14 DIAGNOSIS — K76.0 FATTY LIVER: Primary | ICD-10-CM

## 2022-04-14 DIAGNOSIS — Z86.718 PERSONAL HISTORY OF DVT (DEEP VEIN THROMBOSIS): ICD-10-CM

## 2022-04-14 DIAGNOSIS — G47.10 HYPERSOMNIA: ICD-10-CM

## 2022-04-14 DIAGNOSIS — Z90.49 HISTORY OF CHOLECYSTECTOMY: ICD-10-CM

## 2022-04-14 DIAGNOSIS — E11.9 TYPE 2 DIABETES MELLITUS WITHOUT COMPLICATION, WITHOUT LONG-TERM CURRENT USE OF INSULIN (H): ICD-10-CM

## 2022-04-14 DIAGNOSIS — E78.1 HYPERTRIGLYCERIDEMIA: ICD-10-CM

## 2022-04-14 DIAGNOSIS — E66.01 MORBID OBESITY (H): ICD-10-CM

## 2022-04-14 LAB
ALBUMIN SERPL-MCNC: 4.1 G/DL (ref 3.4–5)
ALP SERPL-CCNC: 70 U/L (ref 40–150)
ALT SERPL W P-5'-P-CCNC: 75 U/L (ref 0–50)
ANION GAP SERPL CALCULATED.3IONS-SCNC: 8 MMOL/L (ref 3–14)
AST SERPL W P-5'-P-CCNC: 54 U/L (ref 0–45)
BILIRUB SERPL-MCNC: 0.7 MG/DL (ref 0.2–1.3)
BUN SERPL-MCNC: 22 MG/DL (ref 7–30)
CALCIUM SERPL-MCNC: 9.7 MG/DL (ref 8.5–10.1)
CHLORIDE BLD-SCNC: 103 MMOL/L (ref 94–109)
CHOLEST SERPL-MCNC: 119 MG/DL
CO2 SERPL-SCNC: 28 MMOL/L (ref 20–32)
CREAT SERPL-MCNC: 0.65 MG/DL (ref 0.52–1.04)
ERYTHROCYTE [DISTWIDTH] IN BLOOD BY AUTOMATED COUNT: 12.4 % (ref 10–15)
FASTING STATUS PATIENT QL REPORTED: ABNORMAL
GFR SERPL CREATININE-BSD FRML MDRD: >90 ML/MIN/1.73M2
GLUCOSE BLD-MCNC: 123 MG/DL (ref 70–99)
HCT VFR BLD AUTO: 40.1 % (ref 35–47)
HDLC SERPL-MCNC: 37 MG/DL
HGB BLD-MCNC: 13.1 G/DL (ref 11.7–15.7)
LDLC SERPL CALC-MCNC: 50 MG/DL
MCH RBC QN AUTO: 31.8 PG (ref 26.5–33)
MCHC RBC AUTO-ENTMCNC: 32.7 G/DL (ref 31.5–36.5)
MCV RBC AUTO: 97 FL (ref 78–100)
NONHDLC SERPL-MCNC: 82 MG/DL
PLATELET # BLD AUTO: 202 10E3/UL (ref 150–450)
POTASSIUM BLD-SCNC: 4.5 MMOL/L (ref 3.4–5.3)
PROT SERPL-MCNC: 7.9 G/DL (ref 6.8–8.8)
RBC # BLD AUTO: 4.12 10E6/UL (ref 3.8–5.2)
SODIUM SERPL-SCNC: 139 MMOL/L (ref 133–144)
TRIGL SERPL-MCNC: 159 MG/DL
TSH SERPL DL<=0.005 MIU/L-ACNC: 0.06 MU/L (ref 0.4–4)
WBC # BLD AUTO: 7.5 10E3/UL (ref 4–11)

## 2022-04-14 PROCEDURE — 99396 PREV VISIT EST AGE 40-64: CPT | Performed by: FAMILY MEDICINE

## 2022-04-14 PROCEDURE — 80053 COMPREHEN METABOLIC PANEL: CPT | Performed by: FAMILY MEDICINE

## 2022-04-14 PROCEDURE — 99214 OFFICE O/P EST MOD 30 MIN: CPT | Mod: 25 | Performed by: FAMILY MEDICINE

## 2022-04-14 PROCEDURE — 80061 LIPID PANEL: CPT | Performed by: FAMILY MEDICINE

## 2022-04-14 PROCEDURE — 84443 ASSAY THYROID STIM HORMONE: CPT | Performed by: FAMILY MEDICINE

## 2022-04-14 PROCEDURE — 36415 COLL VENOUS BLD VENIPUNCTURE: CPT | Performed by: FAMILY MEDICINE

## 2022-04-14 PROCEDURE — 85027 COMPLETE CBC AUTOMATED: CPT | Performed by: FAMILY MEDICINE

## 2022-04-14 RX ORDER — GLUCOSAMINE/D3/BOSWELLIA SERRA 1500MG-400
1 TABLET ORAL
COMMUNITY
End: 2023-03-06

## 2022-04-14 RX ORDER — ATORVASTATIN CALCIUM 40 MG/1
40 TABLET, FILM COATED ORAL DAILY
COMMUNITY
Start: 2022-03-14 | End: 2022-06-23

## 2022-04-14 RX ORDER — ASCORBIC ACID 100 MG
TABLET,CHEWABLE ORAL
COMMUNITY

## 2022-04-14 RX ORDER — BLOOD SUGAR DIAGNOSTIC
STRIP MISCELLANEOUS
COMMUNITY
Start: 2021-11-08 | End: 2023-03-06

## 2022-04-14 RX ORDER — LANCETS
EACH MISCELLANEOUS
COMMUNITY
Start: 2021-08-16 | End: 2023-03-06

## 2022-04-14 RX ORDER — PSEUDOEPHEDRINE HCL 120 MG/1
120 TABLET, FILM COATED, EXTENDED RELEASE ORAL
COMMUNITY

## 2022-04-14 RX ORDER — AMITRIPTYLINE HYDROCHLORIDE 50 MG/1
50 TABLET ORAL AT BEDTIME
COMMUNITY
Start: 2022-02-11 | End: 2022-10-12

## 2022-04-14 RX ORDER — CHLORAL HYDRATE 500 MG
1000 CAPSULE ORAL
COMMUNITY
End: 2023-01-17

## 2022-04-14 RX ORDER — MULTIVITAMIN
1 TABLET ORAL DAILY
COMMUNITY

## 2022-04-14 RX ORDER — METFORMIN HCL 500 MG
1000 TABLET, EXTENDED RELEASE 24 HR ORAL
Qty: 180 TABLET | Refills: 1 | Status: SHIPPED | OUTPATIENT
Start: 2022-04-14 | End: 2022-10-07

## 2022-04-14 RX ORDER — POTASSIUM GLUCONATE 595(99)MG
1 TABLET, EXTENDED RELEASE ORAL DAILY
COMMUNITY
End: 2023-03-06

## 2022-04-14 RX ORDER — MULTIVITAMIN WITH IRON
250 TABLET ORAL
COMMUNITY
End: 2023-01-17

## 2022-04-14 RX ORDER — METOPROLOL TARTRATE 50 MG
TABLET ORAL
COMMUNITY
Start: 2022-04-11 | End: 2022-10-12

## 2022-04-14 RX ORDER — FERROUS SULFATE 325(65) MG
325 TABLET ORAL
COMMUNITY
End: 2023-03-06

## 2022-04-14 RX ORDER — LEVOTHYROXINE SODIUM 150 UG/1
150 TABLET ORAL DAILY
COMMUNITY
Start: 2022-03-12 | End: 2022-04-21

## 2022-04-14 RX ORDER — METFORMIN HCL 500 MG
TABLET, EXTENDED RELEASE 24 HR ORAL
COMMUNITY
Start: 2022-04-11 | End: 2022-04-14

## 2022-04-14 NOTE — TELEPHONE ENCOUNTER
Please see and advise on patient MyChart message.    Mercedes Bhatti RN  [facility]

## 2022-04-14 NOTE — PROGRESS NOTES
SUBJECTIVE:   CC: Maritza GILL Wimell is an 66 year old woman who presents for preventive health visit.       Patient has been advised of split billing requirements and indicates understanding: Yes  Healthy Habits:     Getting at least 3 servings of Calcium per day:  Yes    Bi-annual eye exam:  Yes    Dental care twice a year:  Yes    Sleep apnea or symptoms of sleep apnea:  Daytime drowsiness    Diet:  Low salt, Low fat/cholesterol and Carbohydrate counting    Frequency of exercise:  2-3 days/week    Duration of exercise:  15-30 minutes    Taking medications regularly:  Yes    Medication side effects:  None    PHQ-2 Total Score: 0    Additional concerns today:  Yes    No trouble sleeping, during the day when she is more relaxed she needs a nap,   She has hypothyroidism, diabetes mellitus, dyslipidemia, history of mitral valve prolapse however recent echo showed no evidence of mitral valve prolapse, colon cancer screening is up-to-date, for her mammogram she can call the hospital get the schedule but does not need an order from me.  Pap smear is up-to-date.  She has fatty liver disease her hepatitis a and B immunizations are up-to-date, GERD currently well controlled,      Eye exam yesterday , getting new classesToday's PHQ-2 Score:   PHQ-2 ( 1999 [employer]) 4/12/2022   Q1: Little interest or pleasure in doing things 0   Q2: Feeling down, depressed or hopeless 0   PHQ-2 Score 0   Q1: Little interest or pleasure in doing things Not at all   Q2: Feeling down, depressed or hopeless Not at all   PHQ-2 Score 0         Do you feel safe in your environment? Yes    Have you ever done Advance Care Planning? (For example, a Health Directive, POLST, or a discussion with a medical provider or your loved ones about your wishes): Yes, advance care planning is on file.    Social History     Tobacco Use     Smoking status: Never Smoker     Smokeless tobacco: Never Used   Substance Use Topics     Alcohol use: Not on file         Alcohol  "Use 4/12/2022   Prescreen: >3 drinks/day or >7 drinks/week? No       Reviewed orders with patient.  Reviewed health maintenance and updated orders accordingly - Yes      Breast Cancer Screening:  Patient can call the hospital to schedule her next screening mammogram she does not need an order from me.  Breast CA Risk Assessment (FHS-7) 4/12/2022   Do you have a family history of breast, colon, or ovarian cancer? No / Unknown           History of abnormal Pap smear: Past 3 Pap smears have been negative her most recent also has a negative HPV.  Pap smear is currently up-to-date.     Reviewed and updated as needed this visit by clinical staff   Tobacco  Allergies  Meds                Reviewed and updated as needed this visit by Provider                   Past Medical History:   Diagnosis Date     Diabetes (H) 07/2021     Thyroid disease     Hypothyroidism      Past Surgical History:   Procedure Laterality Date     ABDOMEN SURGERY  1988    [place]     CHOLECYSTECTOMY      1987     COLONOSCOPY  ????     EYE SURGERY  2021, 2022    Left eye-Vitrectomy, Cataract     GYN SURGERY  ????    Partial Hysterectomy     SOFT TISSUE SURGERY  ????    Right knee meniscus repair     OB History   No obstetric history on file.       Review of Systems  Neg except as per hpi       OBJECTIVE:   /60 (BP Location: Right arm, Patient Position: Sitting)   Pulse 65   Ht 1.651 m (5' 5\")   Wt 81.6 kg (180 lb)   SpO2 97%   BMI 29.95 kg/m    Physical Exam      General: alert and oriented ×3 no acute distress.    HEENT: Normocephalic and atraumatic.   Eyes pupils are equal round and reactive to light extraocular motion is intact. normal conjunctiva    Hearing is grossly normal and there is no otorrhea. Tympanic membranes are pearly grey with a normal light reflex.    Nares are patent there is no rhinorrhea.     Mucous membranes are moist and pink.    Chest: has bilateral rise with no increased work of breathing. clear to auscultation " without wheezes, rhonchi, or rales.    Cardiovascular: normal perfusion and brisk capillary refill. S1S2 with regular rate and rhythm and no murmurs, gallops or rubs.    Musculoskeletal: no gross focal abnormalities and normal gait.    Neuro: no gross focal abnormalities and memory seems intact. CN 2-12 are grossly intact.    Psychiatric: speech is clear and coherent and fluent. Patient dressed appropriately for the weather. Mood is appropriate and affect is full.    DIABETIC FOOT EXAM: normal DP and PT pulses, no trophic changes or ulcerative lesions and normal sensory exam  Diabetic foot exam normal            ASSESSMENT/PLAN:       ICD-10-CM    1. Fatty liver  K76.0 Comprehensive metabolic panel     Comprehensive metabolic panel   2. Gastroesophageal reflux disease without esophagitis  K21.9    3. Hx of NSAID-associated gastropathy  Z87.19    4. Hypertriglyceridemia  E78.1 Lipid panel reflex to direct LDL Fasting     Lipid panel reflex to direct LDL Fasting   5. Acquired hypothyroidism  E03.9 TSH     TSH   6. MVP (mitral valve prolapse)  I34.1    7. Type 2 diabetes mellitus without complication, without long-term current use of insulin (H)  E11.9 Lipid panel reflex to direct LDL Fasting     Comprehensive metabolic panel     metFORMIN (GLUCOPHAGE-XR) 500 MG 24 hr tablet     Lipid panel reflex to direct LDL Fasting     Comprehensive metabolic panel   8. Morbid obesity (H)  E66.01    9. Personal history of DVT (deep vein thrombosis)  Z86.718    10. Hx of meniscectomy of right knee  Z98.890    11. History of colonoscopy  Z98.890     last in 8/2017, nml needds next in 8/2027   12. History of cholecystectomy  Z90.49    13. History of abdominal supracervical subtotal hysterectomy  Z90.711    14. Hypersomnia  G47.10 CBC with platelets     CBC with platelets     Hypersomnia would like patient to check the blood pressure and pulse when they are feeling sleepy in the afternoon.  We may need to adjust her  "beta-blocker.    Colonoscopy is up-to-date.    Fatty liver we will check a comprehensive metabolic panel hypothyroidism we will check a TSH mitral valve prolapse by history however most recent echocardiogram showed no evidence of mitral valve prolapse.  Type 2 diabetes we need to recheck a lipid panel her hemoglobin A1c looks wonderful.  Renewed her Metformin.  Obesity encouraged therapeutic lifestyle changes.        COUNSELING:  Reviewed preventive health counseling, as reflected in patient instructions  Special attention given to:        Regular exercise       Healthy diet/nutrition       Osteoporosis prevention/bone health       Advance Care Planning    Estimated body mass index is 29.95 kg/m  as calculated from the following:    Height as of this encounter: 1.651 m (5' 5\").    Weight as of this encounter: 81.6 kg (180 lb).        She reports that she has never smoked. She has never used smokeless tobacco.      Counseling Resources:  ATP IV Guidelines  Pooled Cohorts Equation Calculator  Breast Cancer Risk Calculator  BRCA-Related Cancer Risk Assessment: FHS-7 Tool  FRAX Risk Assessment  ICSI Preventive Guidelines  Dietary Guidelines for Americans, 2010  USDA's MyPlate  ASA Prophylaxis  Lung CA Screening    Irena Levi MD  [facility]  "

## 2022-04-15 ENCOUNTER — TRANSFERRED RECORDS (OUTPATIENT)
Dept: HEALTH INFORMATION MANAGEMENT | Facility: CLINIC | Age: 67
End: 2022-04-15
Payer: MEDICARE

## 2022-04-21 DIAGNOSIS — E03.9 ACQUIRED HYPOTHYROIDISM: Primary | ICD-10-CM

## 2022-04-21 RX ORDER — LEVOTHYROXINE SODIUM 125 UG/1
125 TABLET ORAL DAILY
Qty: 60 TABLET | Refills: 0 | Status: SHIPPED | OUTPATIENT
Start: 2022-04-21 | End: 2022-05-31

## 2022-04-29 ENCOUNTER — TRANSFERRED RECORDS (OUTPATIENT)
Dept: HEALTH INFORMATION MANAGEMENT | Facility: CLINIC | Age: 67
End: 2022-04-29
Payer: MEDICARE

## 2022-05-05 ENCOUNTER — TRANSFERRED RECORDS (OUTPATIENT)
Dept: HEALTH INFORMATION MANAGEMENT | Facility: CLINIC | Age: 67
End: 2022-05-05
Payer: MEDICARE

## 2022-05-27 ENCOUNTER — OFFICE VISIT (OUTPATIENT)
Dept: FAMILY MEDICINE | Facility: CLINIC | Age: 67
End: 2022-05-27
Payer: COMMERCIAL

## 2022-05-27 VITALS
OXYGEN SATURATION: 98 % | BODY MASS INDEX: 29.92 KG/M2 | HEIGHT: 65 IN | HEART RATE: 61 BPM | WEIGHT: 179.6 LBS | SYSTOLIC BLOOD PRESSURE: 100 MMHG | DIASTOLIC BLOOD PRESSURE: 60 MMHG

## 2022-05-27 DIAGNOSIS — R10.13 ABDOMINAL PAIN, EPIGASTRIC: Primary | ICD-10-CM

## 2022-05-27 DIAGNOSIS — E11.9 TYPE 2 DIABETES MELLITUS WITHOUT COMPLICATION, WITHOUT LONG-TERM CURRENT USE OF INSULIN (H): ICD-10-CM

## 2022-05-27 DIAGNOSIS — E03.9 ACQUIRED HYPOTHYROIDISM: ICD-10-CM

## 2022-05-27 DIAGNOSIS — Z01.818 PREOP GENERAL PHYSICAL EXAM: ICD-10-CM

## 2022-05-27 LAB — TSH SERPL DL<=0.005 MIU/L-ACNC: 0.3 MU/L (ref 0.4–4)

## 2022-05-27 PROCEDURE — 36415 COLL VENOUS BLD VENIPUNCTURE: CPT | Performed by: FAMILY MEDICINE

## 2022-05-27 PROCEDURE — 84443 ASSAY THYROID STIM HORMONE: CPT | Performed by: FAMILY MEDICINE

## 2022-05-27 PROCEDURE — 99213 OFFICE O/P EST LOW 20 MIN: CPT | Performed by: FAMILY MEDICINE

## 2022-05-27 RX ORDER — DICYCLOMINE HYDROCHLORIDE 10 MG/1
CAPSULE ORAL
COMMUNITY
Start: 2022-05-02 | End: 2022-08-29

## 2022-05-27 NOTE — PATIENT INSTRUCTIONS
Preparing for Your Surgery  Getting started  A nurse will call you to review your health history and instructions. They will give you an arrival time based on your scheduled surgery time. Please be ready to share:    Your doctor's clinic name and phone number    Your medical, surgical and anesthesia history    A list of allergies and sensitivities    A list of medicines, including herbal treatments and over-the-counter drugs    Whether the patient has a legal guardian (ask how to send us the papers in advance)  Please tell us if you're pregnant--or if there's any chance you might be pregnant. Some surgeries may injure a fetus (unborn baby), so they require a pregnancy test. Surgeries that are safe for a fetus don't always need a test, and you can choose whether to have one.   If you have a child who's having surgery, please ask for a copy of Preparing for Your Child's Surgery.    Preparing for surgery    Within 30 days of surgery: Have a pre-op exam (sometimes called an H&P, or History and Physical). This can be done at a clinic or pre-operative center.  ? If you're having a , you may not need this exam. Talk to your care team.    At your pre-op exam, talk to your care team about all medicines you take. If you need to stop any medicines before surgery, ask when to start taking them again.  ? We do this for your safety. Many medicines can make you bleed too much during surgery. Some change how well surgery (anesthesia) drugs work.    Call your insurance company to let them know you're having surgery. (If you don't have insurance, call [phone].)    Call your clinic if there's any change in your health. This includes signs of a cold or flu (sore throat, runny nose, cough, rash, fever). It also includes a scrape or scratch near the surgery site.    If you have questions on the day of surgery, call your hospital or surgery center.  COVID testing  You may need to be tested for COVID-19 before having  surgery. If so, we will give you instructions.  Eating and drinking guidelines  For your safety: Unless your surgeon tells you otherwise, follow the guidelines below.    Eat and drink as usual until 8 hours before surgery. After that, no food or milk.    Drink clear liquids until 2 hours before surgery. These are liquids you can see through, like water, Gatorade and Propel Water. You may also have black coffee and tea (no cream or milk).    Nothing by mouth within 2 hours of surgery. This includes gum, candy and breath mints.    If you drink alcohol: Stop drinking it the night before surgery.    If your care team tells you to take medicine on the morning of surgery, it's okay to take it with a sip of water.  Preventing infection    Shower or bathe the night before and morning of your surgery. Follow the instructions your clinic gave you. (If no instructions, use regular soap.)    Don't shave or clip hair near your surgery site. We'll remove the hair if needed.    Don't smoke or vape the morning of surgery. You may chew nicotine gum up to 2 hours before surgery. A nicotine patch is okay.  ? Note: Some surgeries require you to completely quit smoking and nicotine. Check with your surgeon.    Your care team will make every effort to keep you safe from infection. We will:  ? Clean our hands often with soap and water (or an alcohol-based hand rub).  ? Clean the skin at your surgery site with a special soap that kills germs.  ? Give you a special gown to keep you warm. (Cold raises the risk of infection.)  ? Wear special hair covers, masks, gowns and gloves during surgery.  ? Give antibiotic medicine, if prescribed. Not all surgeries need antibiotics.  What to bring on the day of surgery    Photo ID and insurance card    Copy of your health care directive, if you have one    Glasses and hearing aides (bring cases)  ? You can't wear contacts during surgery    Inhaler and eye drops, if you use them (tell us about these when  you arrive)    CPAP machine or breathing device, if you use them    A few personal items, if spending the night    If you have . . .  ? A pacemaker, ICD (cardiac defibrillator) or other implant: Bring the ID card.  ? An implanted stimulator: Bring the remote control.  ? A legal guardian: Bring a copy of the certified (court-stamped) guardianship papers.  Please remove any jewelry, including body piercings. Leave jewelry and other valuables at home.  If you're going home the day of surgery    You must have a responsible adult drive you home. They should stay with you overnight as well.    If you don't have someone to stay with you, and you aren't safe to go home alone, we may keep you overnight. Insurance often won't pay for this.  After surgery  If it's hard to control your pain or you need more pain medicine, please call your surgeon's office.  Questions?   If you have any questions for your care team, list them here: _________________________________________________________________________________________________________________________________________________________________________ ____________________________________ ____________________________________ ____________________________________  For informational purposes only. Not to replace the advice of your health care provider. Copyright   2003, 2019 [facility]. All rights reserved. Clinically reviewed by Caterina Williamson MD. [employer] [id number] - REV 07/21.

## 2022-05-27 NOTE — PROGRESS NOTES
[address] [address]  Phone: [phone]  Fax: [phone]  Primary Provider: Irena Levi        PREOPERATIVE EVALUATION:  Today's date: 5/27/2022    Maritza Hitchcock is a 66 year old female who presents for a preoperative evaluation.    Surgical Information:  Surgery/Procedure: endoscopy  Surgery Location: [facility]  Surgeon: Dr. Álvarez  Surgery Date: 6/2/2022  Time of Surgery:   Where patient plans to recover: At home with family      Type of Anesthesia Anticipated: to be determined    Assessment & Plan     The proposed surgical procedure is considered LOW risk.    Problem List Items Addressed This Visit        Endocrine    Acquired hypothyroidism    Relevant Orders    TSH    Type 2 diabetes mellitus without complication, without long-term current use of insulin (H)      Other Visit Diagnoses     Abdominal pain, epigastric    -  Primary    Preop general physical exam          Diabetes mellitus well controlled last hemoglobin A1c was 5.4.    Last month we checked patient's TSH and it showed that she was at 0.06.  She is now taking her medication religiously on an empty stomach and then waiting at least an hour before taking any other medications or eating.  We can recheck her TSH and see if her current dose is appropriate for her.         Risks and Recommendations:  The patient has the following additional risks and recommendations for perioperative complications:  Diabetes:  - Patient is not on insulin therapy: regular NPO guidelines can be followed.         RECOMMENDATION:  APPROVAL GIVEN to proceed with proposed procedure, without further diagnostic evaluation.                Subjective     HPI related to upcoming procedure: endoscopy EGD with ultrasound    Preop Questions 5/20/2022   1. Have you ever had a heart attack or stroke? No   2. Have you ever had surgery on your heart or blood vessels, such as a stent placement, a  coronary artery bypass, or surgery on an artery in your head, neck, heart, or legs? No   3. Do you have chest pain with activity? No   4. Do you have a history of  heart failure? No   5. Do you currently have a cold, bronchitis or symptoms of other infection? No   6. Do you have a cough, shortness of breath, or wheezing? No   7. Do you or anyone in your family have previous history of blood clots? No   8. Do you or does anyone in your family have a serious bleeding problem such as prolonged bleeding following surgeries or cuts? No   9. Have you ever had problems with anemia or been told to take iron pills? YES -  Due to repeated blood donation.   10. Have you had any abnormal blood loss such as black, tarry or bloody stools, or abnormal vaginal bleeding? No   11. Have you ever had a blood transfusion? No   12. Are you willing to have a blood transfusion if it is medically needed before, during, or after your surgery? Yes   13. Have you or any of your relatives ever had problems with anesthesia? No   14. Do you have sleep apnea, excessive snoring or daytime drowsiness? No   15. Do you have any artifical heart valves or other implanted medical devices like a pacemaker, defibrillator, or continuous glucose monitor? No   16. Do you have artificial joints? No   17. Are you allergic to latex? No     Last month we checked patient's TSH and it showed that she was at 0.06.  She is now taking her medication religiously on an empty stomach and then waiting at least an hour before taking any other medications or eating.  We can recheck her TSH and see if her current dose is appropriate for her.      Health Care Directive:  Patient does not have a Health Care Directive or Living Will: Patient states has Advance Directive and will bring in a copy to clinic.    Preoperative Review of :   reviewed - no record of controlled substances prescribed.          Review of Systems  Negative except as per HPI    Patient Active Problem  List    Diagnosis Date Noted     Fatty liver 04/14/2022     Priority: Medium     Gastroesophageal reflux disease without esophagitis 04/14/2022     Priority: Medium     Hx of NSAID-associated gastropathy 04/14/2022     Priority: Medium     Hypertriglyceridemia 04/14/2022     Priority: Medium     Acquired hypothyroidism 04/14/2022     Priority: Medium     MVP (mitral valve prolapse) 04/14/2022     Priority: Medium     Type 2 diabetes mellitus without complication, without long-term current use of insulin (H) 04/14/2022     Priority: Medium     Morbid obesity (H) 04/14/2022     Priority: Medium     Personal history of DVT (deep vein thrombosis) 04/14/2022     Priority: Medium     Hx of meniscectomy of right knee 04/14/2022     Priority: Medium      Past Medical History:   Diagnosis Date     Diabetes (H) 07/2021     Thyroid disease     Hypothyroidism     Past Surgical History:   Procedure Laterality Date     ABDOMEN SURGERY  1988    Tubal     CHOLECYSTECTOMY      1987     COLONOSCOPY  ????     EYE SURGERY  2021, 2022    Left eye-Vitrectomy, Cataract     GYN SURGERY  ????    Partial Hysterectomy     SOFT TISSUE SURGERY  ????    Right knee meniscus repair     Current Outpatient Medications   Medication Sig Dispense Refill     ACCU-CHEK GUIDE test strip USE 1 STRIP TO CHECK GLUCOSE TWICE DAILY       amitriptyline (ELAVIL) 50 MG tablet Take 50 mg by mouth At Bedtime       Ascorbic Acid (VITAMIN C) 100 MG CHEW Gummy, 282mg       atorvastatin (LIPITOR) 40 MG tablet Take 40 mg by mouth daily       Biotin 92732 MCG TABS Take 1 tablet by mouth       blood glucose monitoring (SOFTCLIX) lancets USE 1 LANCET (ONCE) TO CHECK GLUCOSE TWICE DAILY.       calcium citrate-vitamin D (CITRACAL) 315-250 MG-UNIT TABS per tablet        esomeprazole (NEXIUM) 20 MG DR capsule TAKE 1 CAPSULE BY MOUTH ONCE DAILY       ferrous sulfate (FEROSUL) 325 (65 Fe) MG tablet Take 325 mg by mouth       levothyroxine (SYNTHROID/LEVOTHROID) 125 MCG tablet  "Take 1 tablet (125 mcg) by mouth daily 60 tablet 0     magnesium 250 MG tablet Take 250 mg by mouth       metFORMIN (GLUCOPHAGE-XR) 500 MG 24 hr tablet Take 2 tablets (1,000 mg) by mouth daily (with dinner) 180 tablet 1     metoprolol tartrate (LOPRESSOR) 50 MG tablet TAKE 1/2 (ONE-HALF) TABLET BY MOUTH ONCE DAILY       Multiple Vitamin (ONE-A-DAY ESSENTIAL) TABS Take 1 tablet by mouth daily       Omega-3 1000 MG capsule Take 1,000 mg by mouth       Potassium Gluconate 595 MG TBCR Take 1 tablet by mouth daily       pseudoePHEDrine (SUDAFED) 120 MG 12 hr tablet Take 120 mg by mouth       dicyclomine (BENTYL) 10 MG capsule TAKE 1 CAPSULE BY MOUTH 4 TIMES DAILY         No Known Allergies     Social History     Tobacco Use     Smoking status: Former Smoker     Packs/day: 0.50     Years: 0.00     Pack years: 0.00     Types: Cigarettes     Start date: 10/1/1975     Quit date: 1976     Years since quittin.0     Smokeless tobacco: Never Used   Substance Use Topics     Alcohol use: Yes     Family History   Problem Relation Age of Onset     Hypertension Mother         Takes two blood pressure pills per day     Hyperlipidemia Mother         3 stents     Osteoporosis Mother      Thyroid Disease Mother         Goiter operation in high school     Coronary Artery Disease Father          -     Obesity Father      Obesity Paternal Grandmother         Overweight and heart disease         Objective     /60 (BP Location: Right arm, Patient Position: Sitting)   Pulse 61   Ht 1.651 m (5' 5\")   Wt 81.5 kg (179 lb 9.6 oz)   SpO2 98%   BMI 29.89 kg/m      Physical Exam      General: alert and oriented ×3 no acute distress.    HEENT: Normocephalic and atraumatic.   Eyes pupils are equal round and reactive to light extraocular motion is intact. normal conjunctiva    Hearing is grossly normal and there is no otorrhea.     Chest: has bilateral rise with no increased work of breathing. clear to auscultation without " wheezes, rhonchi, or rales.    Cardiovascular: normal perfusion and brisk capillary refill. S1S2 with regular rate and rhythm and no murmurs, gallops or rubs.    Musculoskeletal: no gross focal abnormalities and normal gait.    Neuro: no gross focal abnormalities and memory seems intact. CN 2-12 are grossly intact.    Psychiatric: speech is clear and coherent and fluent. Patient dressed appropriately for the weather. Mood is appropriate and affect is full.          Recent Labs   Lab Test 04/14/22  0944   HGB 13.1         POTASSIUM 4.5   CR 0.65            Revised Cardiac Risk Index (RCRI):  The patient has the following serious cardiovascular risks for perioperative complications:   - No serious cardiac risks = 0 points     RCRI Interpretation: 0 points: Class I (very low risk - 0.4% complication rate)           Signed Electronically by: Irena Levi MD  Copy of this evaluation report is provided to requesting physician.

## 2022-05-29 ENCOUNTER — HEALTH MAINTENANCE LETTER (OUTPATIENT)
Age: 67
End: 2022-05-29

## 2022-05-31 ENCOUNTER — LAB (OUTPATIENT)
Dept: LAB | Facility: CLINIC | Age: 67
End: 2022-05-31
Payer: MEDICARE

## 2022-05-31 DIAGNOSIS — E03.9 ACQUIRED HYPOTHYROIDISM: ICD-10-CM

## 2022-05-31 RX ORDER — LEVOTHYROXINE SODIUM 112 UG/1
112 TABLET ORAL DAILY
Qty: 60 TABLET | Refills: 0 | Status: SHIPPED | OUTPATIENT
Start: 2022-05-31 | End: 2022-07-19

## 2022-06-16 ENCOUNTER — MYC MEDICAL ADVICE (OUTPATIENT)
Dept: FAMILY MEDICINE | Facility: CLINIC | Age: 67
End: 2022-06-16
Payer: MEDICARE

## 2022-06-20 ENCOUNTER — TRANSFERRED RECORDS (OUTPATIENT)
Dept: HEALTH INFORMATION MANAGEMENT | Facility: CLINIC | Age: 67
End: 2022-06-20

## 2022-06-21 DIAGNOSIS — E78.1 HYPERTRIGLYCERIDEMIA: Primary | ICD-10-CM

## 2022-06-23 RX ORDER — ATORVASTATIN CALCIUM 40 MG/1
TABLET, FILM COATED ORAL
Qty: 90 TABLET | Refills: 3 | Status: SHIPPED | OUTPATIENT
Start: 2022-06-23 | End: 2022-10-12

## 2022-06-23 NOTE — TELEPHONE ENCOUNTER
Prescription approved per [facility] Refill Protocol.    Last Written Prescription Date: 12/21/21  Last Fill Quantity: 90,  # refills: 1   Last office visit: 5/27/2022 with prescribing provider

## 2022-07-07 ENCOUNTER — LAB (OUTPATIENT)
Dept: LAB | Facility: CLINIC | Age: 67
End: 2022-07-07
Payer: COMMERCIAL

## 2022-07-07 DIAGNOSIS — E03.9 ACQUIRED HYPOTHYROIDISM: ICD-10-CM

## 2022-07-07 LAB — TSH SERPL DL<=0.005 MIU/L-ACNC: 4.03 UIU/ML (ref 0.3–4.2)

## 2022-07-07 PROCEDURE — 36415 COLL VENOUS BLD VENIPUNCTURE: CPT

## 2022-07-07 PROCEDURE — 84443 ASSAY THYROID STIM HORMONE: CPT

## 2022-07-09 DIAGNOSIS — E78.1 HYPERTRIGLYCERIDEMIA: ICD-10-CM

## 2022-07-13 RX ORDER — ATORVASTATIN CALCIUM 40 MG/1
TABLET, FILM COATED ORAL
Qty: 90 TABLET | Refills: 0 | OUTPATIENT
Start: 2022-07-13

## 2022-07-13 NOTE — TELEPHONE ENCOUNTER
"    Last Written Prescription Date:  6/23/22  Last Fill Quantity: 90,  # refills: 3   Last office visit: 5/27/2022   Future Office Visit:        Pharmacy request denied. Should have refills on file.      Requested Prescriptions   Pending Prescriptions Disp Refills     atorvastatin (LIPITOR) 40 MG tablet [Pharmacy Med Name: Atorvastatin Calcium 40 MG Oral Tablet] 90 tablet 0     Sig: Take 1 tablet by mouth once daily       Statins Protocol Passed - 7/9/2022 11:59 PM        Passed - LDL on file in past 12 months     Recent Labs   Lab Test 04/14/22  0944   LDL 50             Passed - No abnormal creatine kinase in past 12 months     No lab results found.             Passed - Recent (12 mo) or future (30 days) visit within the authorizing provider's specialty     Patient has had an office visit with the authorizing provider or a provider within the authorizing providers department within the previous 12 mos or has a future within next 30 days. See \"Patient Info\" tab in inbasket, or \"Choose Columns\" in Meds & Orders section of the refill encounter.              Passed - Medication is active on med list        Passed - Patient is age 18 or older        Passed - No active pregnancy on record        Passed - No positive pregnancy test in past 12 months             YAZMIN SILVER RN 07/13/22 9:37 AM    "

## 2022-07-16 DIAGNOSIS — E03.9 ACQUIRED HYPOTHYROIDISM: ICD-10-CM

## 2022-07-19 RX ORDER — LEVOTHYROXINE SODIUM 112 UG/1
TABLET ORAL
Qty: 90 TABLET | Refills: 1 | Status: SHIPPED | OUTPATIENT
Start: 2022-07-19 | End: 2022-10-12

## 2022-07-19 NOTE — TELEPHONE ENCOUNTER
"    Last Written Prescription Date:  5/31/22  Last Fill Quantity: 60,  # refills: 0   Last office visit provider:  5/27/22     Requested Prescriptions   Pending Prescriptions Disp Refills     levothyroxine (SYNTHROID/LEVOTHROID) 112 MCG tablet [Pharmacy Med Name: Levothyroxine Sodium 112 MCG Oral Tablet] 60 tablet 0     Sig: Take 1 tablet by mouth once daily       Thyroid Protocol Passed - 7/16/2022 10:33 PM        Passed - Patient is 12 years or older        Passed - Recent (12 mo) or future (30 days) visit within the authorizing provider's specialty     Patient has had an office visit with the authorizing provider or a provider within the authorizing providers department within the previous 12 mos or has a future within next 30 days. See \"Patient Info\" tab in inbasket, or \"Choose Columns\" in Meds & Orders section of the refill encounter.              Passed - Medication is active on med list        Passed - Normal TSH on file in past 12 months     Recent Labs   Lab Test 07/07/22  0745   TSH 4.03              Passed - No active pregnancy on record     If patient is pregnant or has had a positive pregnancy test, please check TSH.          Passed - No positive pregnancy test in past 12 months     If patient is pregnant or has had a positive pregnancy test, please check TSH.               YAZMIN SILVER RN 07/19/22 10:07 AM    "

## 2022-08-15 ENCOUNTER — MYC MEDICAL ADVICE (OUTPATIENT)
Dept: FAMILY MEDICINE | Facility: CLINIC | Age: 67
End: 2022-08-15

## 2022-08-16 NOTE — TELEPHONE ENCOUNTER
Please see my chart message and advise.     9/2/21 last mammogram. Results state recommend annual mammograms.  8/20/18 last pap completed  4/14/22 physical [facility]JP

## 2022-08-29 ENCOUNTER — OFFICE VISIT (OUTPATIENT)
Dept: FAMILY MEDICINE | Facility: CLINIC | Age: 67
End: 2022-08-29
Payer: COMMERCIAL

## 2022-08-29 VITALS
DIASTOLIC BLOOD PRESSURE: 72 MMHG | BODY MASS INDEX: 29.11 KG/M2 | HEART RATE: 71 BPM | TEMPERATURE: 99 F | HEIGHT: 65 IN | WEIGHT: 174.7 LBS | SYSTOLIC BLOOD PRESSURE: 109 MMHG

## 2022-08-29 DIAGNOSIS — S52.125S CLOSED NONDISPLACED FRACTURE OF HEAD OF LEFT RADIUS, SEQUELA: ICD-10-CM

## 2022-08-29 DIAGNOSIS — E11.9 TYPE 2 DIABETES MELLITUS WITHOUT COMPLICATION, WITHOUT LONG-TERM CURRENT USE OF INSULIN (H): ICD-10-CM

## 2022-08-29 DIAGNOSIS — E03.9 ACQUIRED HYPOTHYROIDISM: ICD-10-CM

## 2022-08-29 DIAGNOSIS — M77.31 CALCANEAL SPUR OF RIGHT FOOT: Primary | ICD-10-CM

## 2022-08-29 DIAGNOSIS — K76.0 FATTY LIVER: ICD-10-CM

## 2022-08-29 DIAGNOSIS — Z86.718 PERSONAL HISTORY OF DVT (DEEP VEIN THROMBOSIS): ICD-10-CM

## 2022-08-29 DIAGNOSIS — C44.310 BCC (BASAL CELL CARCINOMA), FACE: ICD-10-CM

## 2022-08-29 PROBLEM — K75.81 NONALCOHOLIC STEATOHEPATITIS (NASH): Status: ACTIVE | Noted: 2022-08-29

## 2022-08-29 LAB — HBA1C MFR BLD: 5.5 % (ref 0–5.6)

## 2022-08-29 PROCEDURE — 83036 HEMOGLOBIN GLYCOSYLATED A1C: CPT | Performed by: INTERNAL MEDICINE

## 2022-08-29 PROCEDURE — 99214 OFFICE O/P EST MOD 30 MIN: CPT | Performed by: INTERNAL MEDICINE

## 2022-08-29 PROCEDURE — 36415 COLL VENOUS BLD VENIPUNCTURE: CPT | Performed by: INTERNAL MEDICINE

## 2022-08-29 PROCEDURE — 82043 UR ALBUMIN QUANTITATIVE: CPT | Performed by: INTERNAL MEDICINE

## 2022-08-29 ASSESSMENT — ENCOUNTER SYMPTOMS
FEVER: 0
DIFFICULTY URINATING: 0
UNEXPECTED WEIGHT CHANGE: 0
HEADACHES: 0
COUGH: 0
PALPITATIONS: 0
POLYDIPSIA: 0
ABDOMINAL PAIN: 0
BRUISES/BLEEDS EASILY: 1
SLEEP DISTURBANCE: 0
SHORTNESS OF BREATH: 0
FATIGUE: 0

## 2022-08-29 NOTE — PROGRESS NOTES
[address] [address]  Phone: [phone]  Fax: [phone]  Primary Provider: Irena Levi  Pre-op Performing Provider: COURTNEY RILEY      PREOPERATIVE EVALUATION:  Today's date: 8/29/2022    Maritza Hitchcock is a 66 year old female who presents for a preoperative evaluation.    Surgical Information:  Surgery/Procedure: R Dorsal Lateral calcaneal   Surgery Location: [facility]   Surgeon: Dr. Broderick   Surgery Date: 9/8/22  Time of Surgery: TBD  Where patient plans to recover: At home with family  Fax number for surgical facility: [phone]    Type of Anesthesia Anticipated: Local with MAC    Assessment & Plan     The proposed surgical procedure is considered LOW risk.    Problem List Items Addressed This Visit        Digestive    Fatty liver       Endocrine    Acquired hypothyroidism    Type 2 diabetes mellitus without complication, without long-term current use of insulin (H)    Relevant Orders    Hemoglobin A1c    Albumin Random Urine Quantitative with Creat Ratio       Other    Personal history of DVT (deep vein thrombosis)      Other Visit Diagnoses     Calcaneal spur of right foot    -  Primary    Closed nondisplaced fracture of head of left radius, sequela        BCC (basal cell carcinoma), face            Patient is a type II diabetic on metformin with hypothyroidism scheduled for low risk orthopedic surgery on the right ankle.  Unremarkable lab work earlier this year.  No history of cardiopulmonary disease.       Risks and Recommendations:  The patient has the following additional risks and recommendations for perioperative complications:   - We will consider postoperative Lovenox or Xarelto for DVT prophylaxis postoperatively given history of DVT after extremity injury.        RECOMMENDATION:  APPROVAL GIVEN to proceed with proposed procedure, without further diagnostic evaluation.          Provider  Link to [facility] Help Grid  :[id number]}    Subjective     HPI related to upcoming procedure: Patient is scheduled for surgery for right calcaneal lesion.  She has had and surgery for basal cell cancer this year.  Had a traumatic left radial head fracture and has a cast.  Had a DVT once after lower extremity injury.  No family history of thrombophilia.  No history of cardiopulmonary disease.  No history of snoring or sleep apnea.  No history of surgical bleeding.  No activity intolerance.    Preop Questions 8/27/2022   1. Have you ever had a heart attack or stroke? No   2. Have you ever had surgery on your heart or blood vessels, such as a stent placement, a coronary artery bypass, or surgery on an artery in your head, neck, heart, or legs? No   3. Do you have chest pain with activity? No   4. Do you have a history of  heart failure? No   5. Do you currently have a cold, bronchitis or symptoms of other infection? No   6. Do you have a cough, shortness of breath, or wheezing? No   7. Do you or anyone in your family have previous history of blood clots? No   8. Do you or does anyone in your family have a serious bleeding problem such as prolonged bleeding following surgeries or cuts? No   9. Have you ever had problems with anemia or been told to take iron pills? YES -history of anemia   10. Have you had any abnormal blood loss such as black, tarry or bloody stools, or abnormal vaginal bleeding? No   11. Have you ever had a blood transfusion? No   12. Are you willing to have a blood transfusion if it is medically needed before, during, or after your surgery? Yes   13. Have you or any of your relatives ever had problems with anesthesia? No   14. Do you have sleep apnea, excessive snoring or daytime drowsiness? No   15. Do you have any artifical heart valves or other implanted medical devices like a pacemaker, defibrillator, or continuous glucose monitor? No   16. Do you have artificial joints? No   17. Are you allergic to latex? No       Health Care  Directive:  Patient does not have a Health Care Directive or Living Will: Discussed advance care planning with patient; however, patient declined at this time.    Preoperative Review of :  [place] PDMP reveals a small quantity of oxycodone from the ED provider earlier this month.  [id number]}        Review of Systems   Constitutional: Negative for fatigue, fever and unexpected weight change.   Respiratory: Negative for cough and shortness of breath.    Cardiovascular: Negative for chest pain and palpitations.   Gastrointestinal: Negative for abdominal pain.   Endocrine: Negative for polydipsia and polyuria.   Genitourinary: Negative for difficulty urinating.   Neurological: Negative for headaches.   Hematological: Bruises/bleeds easily.   Psychiatric/Behavioral: Negative for sleep disturbance.         Patient Active Problem List    Diagnosis Date Noted     Fatty liver 04/14/2022     Priority: Medium     Gastroesophageal reflux disease without esophagitis 04/14/2022     Priority: Medium     Hx of NSAID-associated gastropathy 04/14/2022     Priority: Medium     Hypertriglyceridemia 04/14/2022     Priority: Medium     Acquired hypothyroidism 04/14/2022     Priority: Medium     MVP (mitral valve prolapse) 04/14/2022     Priority: Medium     Type 2 diabetes mellitus without complication, without long-term current use of insulin (H) 04/14/2022     Priority: Medium     Morbid obesity (H) 04/14/2022     Priority: Medium     Personal history of DVT (deep vein thrombosis) 04/14/2022     Priority: Medium     Hx of meniscectomy of right knee 04/14/2022     Priority: Medium      Past Medical History:   Diagnosis Date     Diabetes (H) 07/2021     Thyroid disease     Hypothyroidism     Past Surgical History:   Procedure Laterality Date     ABDOMEN SURGERY  1988    [place]     CHOLECYSTECTOMY      1987     COLONOSCOPY  ????     EYE SURGERY  2021, 2022    Left eye-Vitrectomy, Cataract     GYN SURGERY  ????    Partial Hysterectomy     SOFT  TISSUE SURGERY  ????    Right knee meniscus repair     Current Outpatient Medications   Medication Sig Dispense Refill     ACCU-CHEK GUIDE test strip USE 1 STRIP TO CHECK GLUCOSE TWICE DAILY       amitriptyline (ELAVIL) 50 MG tablet Take 50 mg by mouth At Bedtime       Ascorbic Acid (VITAMIN C) 100 MG CHEW Gummy, 282mg       atorvastatin (LIPITOR) 40 MG tablet Take 1 tablet by mouth once daily 90 tablet 3     Biotin 18287 MCG TABS Take 1 tablet by mouth       blood glucose monitoring (SOFTCLIX) lancets USE 1 LANCET (ONCE) TO CHECK GLUCOSE TWICE DAILY.       calcium citrate-vitamin D (CITRACAL) 315-250 MG-UNIT TABS per tablet        esomeprazole (NEXIUM) 20 MG DR capsule TAKE 1 CAPSULE BY MOUTH ONCE DAILY       ferrous sulfate (FEROSUL) 325 (65 Fe) MG tablet Take 325 mg by mouth       levothyroxine (SYNTHROID/LEVOTHROID) 112 MCG tablet Take 1 tablet by mouth once daily 90 tablet 1     magnesium 250 MG tablet Take 250 mg by mouth       metFORMIN (GLUCOPHAGE-XR) 500 MG 24 hr tablet Take 2 tablets (1,000 mg) by mouth daily (with dinner) 180 tablet 1     metoprolol tartrate (LOPRESSOR) 50 MG tablet TAKE 1/2 (ONE-HALF) TABLET BY MOUTH ONCE DAILY       Multiple Vitamin (ONE-A-DAY ESSENTIAL) TABS Take 1 tablet by mouth daily       Omega-3 1000 MG capsule Take 1,000 mg by mouth       Potassium Gluconate 595 MG TBCR Take 1 tablet by mouth daily       pseudoePHEDrine (SUDAFED) 120 MG 12 hr tablet Take 120 mg by mouth       dicyclomine (BENTYL) 10 MG capsule TAKE 1 CAPSULE BY MOUTH 4 TIMES DAILY (Patient not taking: Reported on 2022)         No Known Allergies     Social History     Tobacco Use     Smoking status: Former Smoker     Packs/day: 0.50     Years: 0.00     Pack years: 0.00     Types: Cigarettes     Start date: 10/1/1975     Quit date: 1976     Years since quittin.2     Smokeless tobacco: Never Used   Substance Use Topics     Alcohol use: Yes     Family History   Problem Relation Age of Onset      "Hypertension Mother         Takes two blood pressure pills per day     Hyperlipidemia Mother         3 stents     Osteoporosis Mother      Thyroid Disease Mother         Goiter operation in high school     Coronary Artery Disease Father          -     Obesity Father      Obesity Paternal Grandmother         Overweight and heart disease     History   Drug Use Unknown         Objective     /72 (BP Location: Right arm)   Pulse 71   Temp 99  F (37.2  C)   Ht 1.651 m (5' 5\")   Wt 79.2 kg (174 lb 11.2 oz)   BMI 29.07 kg/m      Physical Exam  Patient is a healthy-appearing woman in no distress.  Eyes appear normal  HEENT exam significant for postoperative scar above the right brow  Neck supple no adenopathy or thyromegaly  Lungs are clear  Cardiac exam regular no murmur or edema.  Normal carotid and posterior tibial pulsations.  Abdomen soft and nontender  Alert, oriented, speech fluent, memory good, no facial weakness.  Normal strength and gait.  Mood and affect normal.  Feet are in good condition.  Recent Labs   Lab Test 22  0944 02/15/22  1519 21  1343 21  1119 21  1023   HGB 13.1  --  11.9  --   --      --  214  --   --      --  140   < >  --    POTASSIUM 4.5  --  4.0   < >  --    CR 0.65  --  0.88   < >  --    A1C  --  5.4  --   --  6.4*    < > = values in this interval not displayed.        Diagnostics:  Routine urine microalbumin and hemoglobin A1c done  No EKG required for low risk surgery (cataract, skin procedure, breast biopsy, etc).    Revised Cardiac Risk Index (RCRI):  The patient has the following serious cardiovascular risks for perioperative complications:   - No serious cardiac risks = 0 points     RCRI Interpretation: 0 points: Class I (very low risk - 0.4% complication rate)           Signed Electronically by: Collin Almeida MD  Copy of this evaluation report is provided to requesting physician.      "

## 2022-08-31 LAB
CREAT UR-MCNC: 96.5 MG/DL
MICROALBUMIN UR-MCNC: 20.1 MG/L
MICROALBUMIN/CREAT UR: 20.83 MG/G CR (ref 0–25)

## 2022-09-12 ENCOUNTER — TELEPHONE (OUTPATIENT)
Dept: FAMILY MEDICINE | Facility: CLINIC | Age: 67
End: 2022-09-12

## 2022-09-12 NOTE — TELEPHONE ENCOUNTER
Pt states her sx started Friday 9/9 with stuffy nose, congestion, fever, HA, dry heaves and sore throat. She notes she has had loss of appetite for the last 2 weeks. Denies body aches.     She is wondering if she is a candidate for Plaxlovid and if she should have a visit with you? She does state she is feeling a little better today.

## 2022-09-12 NOTE — TELEPHONE ENCOUNTER
New Medication Request        What medication are you requesting?: Paxlovid    Reason for medication request: Covid positive    Have you taken this medication before?: No    Controlled Substance Agreement on file:   CSA -- Patient Level:    CSA: None found at the patient level.         Patient offered an appointment? No    Preferred Pharmacy:   [facility]    Could we send this information to you in [place] or would you prefer to receive a phone call?:   Patient would prefer a phone call   Okay to leave a detailed message?: Yes at Cell number on file:    Telephone Information:   [employer] [phone]

## 2022-09-13 ENCOUNTER — VIRTUAL VISIT (OUTPATIENT)
Dept: FAMILY MEDICINE | Facility: CLINIC | Age: 67
End: 2022-09-13
Payer: COMMERCIAL

## 2022-09-13 DIAGNOSIS — Z20.822 EXPOSURE TO 2019 NOVEL CORONAVIRUS: ICD-10-CM

## 2022-09-13 DIAGNOSIS — U07.1 INFECTION DUE TO 2019 NOVEL CORONAVIRUS: Primary | ICD-10-CM

## 2022-09-13 DIAGNOSIS — E66.3 OVERWEIGHT: ICD-10-CM

## 2022-09-13 DIAGNOSIS — E11.9 TYPE 2 DIABETES MELLITUS WITHOUT COMPLICATION, WITHOUT LONG-TERM CURRENT USE OF INSULIN (H): ICD-10-CM

## 2022-09-13 PROCEDURE — 99213 OFFICE O/P EST LOW 20 MIN: CPT | Mod: CS | Performed by: FAMILY MEDICINE

## 2022-09-13 NOTE — PROGRESS NOTES
"Annabelle is a 66 year old who is being evaluated via a billable telephone visit.      What phone number would you like to be contacted at? [phone]  How would you like to obtain your AVS? [facility]    Assessment & Plan   Problem List Items Addressed This Visit        Endocrine    Type 2 diabetes mellitus without complication, without long-term current use of insulin (H)      Other Visit Diagnoses     Infection due to 2019 novel coronavirus    -  Primary    Relevant Medications    nirmatrelvir and ritonavir (PAXLOVID) therapy pack    nirmatrelvir and ritonavir (PAXLOVID) therapy pack    Exposure to 2019 novel coronavirus        Overweight           Counseled patient today about paxlovid, malnupiravir, and monoclonal antibodies.  We confirmed that they are a candidate for paxlovid.  Creatinine function was checked.  Patient understands that this is a medication with emergency use authorization and should watch for signs of jaundice.  Of the 3 options as they would like to try paxlovid.  They understand that this can reduce their risk of hospitalization or death by about 90%.   They will go to the emergency room if they have worsening of symptoms or develops increased shortness of breath.  They will follow-up in clinic as needed.    Chronic conditions taken into account for medical decision making:D<. Overweight, under vaccinated      Medications patient has been instructed to hold while on paxlovid:atorvastatin             BMI:   Estimated body mass index is 29.07 kg/m  as calculated from the following:    Height as of 8/29/22: 1.651 m (5' 5\").    Weight as of 8/29/22: 79.2 kg (174 lb 11.2 oz).   Weight management plan: Discussed healthy diet and exercise guidelines        Return if symptoms worsen or fail to improve.    Irena Levi MD  [facility]    Subjective   Annabelle is a 66 year old, presenting for the following health issues:  Covid Concern (Pt took home covid test, positive " 9/12. She is c/o stuffy, congested nose, dry heaves but does not vomit, HA, fever, started 9/9. Loss of appetite x 2 weeks. )         Feeling better than she did over the weekend, + covid,           Review of Systems   As per hpi      Objective           Vitals:  No vitals were obtained today due to virtual visit.    Physical Exam   alert and no distress  PSYCH: Alert and oriented times 3; coherent speech, normal   rate and volume, able to articulate logical thoughts, able   to abstract reason, no tangential thoughts, no hallucinations   or delusions  Her affect is normal  RESP: No cough, no audible wheezing, able to talk in full sentences  Remainder of exam unable to be completed due to telephone visits                Phone call duration: 12 minutes

## 2022-10-03 ENCOUNTER — HEALTH MAINTENANCE LETTER (OUTPATIENT)
Age: 67
End: 2022-10-03

## 2022-10-12 DIAGNOSIS — G47.10 HYPERSOMNIA: Primary | ICD-10-CM

## 2022-10-12 DIAGNOSIS — E78.1 HYPERTRIGLYCERIDEMIA: ICD-10-CM

## 2022-10-12 DIAGNOSIS — I34.1 MVP (MITRAL VALVE PROLAPSE): ICD-10-CM

## 2022-10-12 DIAGNOSIS — E11.9 TYPE 2 DIABETES MELLITUS WITHOUT COMPLICATION, WITHOUT LONG-TERM CURRENT USE OF INSULIN (H): ICD-10-CM

## 2022-10-12 DIAGNOSIS — E03.9 ACQUIRED HYPOTHYROIDISM: ICD-10-CM

## 2022-10-12 RX ORDER — METFORMIN HCL 500 MG
TABLET, EXTENDED RELEASE 24 HR ORAL
Qty: 180 TABLET | Refills: 0 | Status: CANCELLED | OUTPATIENT
Start: 2022-10-12

## 2022-10-12 NOTE — TELEPHONE ENCOUNTER
10-12-22  Pt called and is requesting to switch pharmacy's form [facility], please call in: (per pt she states she called both [facility] & [facility] and neither would transfer scripts on pt's behalf)  metoprolol tartrate (LOPRESSOR) 50 MG tablet  &   levothyroxine (SYNTHROID/LEVOTHROID) 112 MCG tablet  &   amitriptyline (ELAVIL) 50 MG tablet  &   atorvastatin (LIPITOR) 40 MG tablet  &   metFORMIN (GLUCOPHAGE XR) 500 MG 24 hr tablet  Pt wants to these to go to [facility] in [facility]

## 2022-10-13 RX ORDER — AMITRIPTYLINE HYDROCHLORIDE 50 MG/1
50 TABLET ORAL AT BEDTIME
Qty: 90 TABLET | Refills: 3 | Status: SHIPPED | OUTPATIENT
Start: 2022-10-13 | End: 2022-11-21

## 2022-10-13 RX ORDER — METOPROLOL TARTRATE 50 MG
TABLET ORAL
Qty: 45 TABLET | Refills: 3 | Status: SHIPPED | OUTPATIENT
Start: 2022-10-13 | End: 2023-01-17

## 2022-10-13 RX ORDER — ATORVASTATIN CALCIUM 40 MG/1
40 TABLET, FILM COATED ORAL DAILY
Qty: 90 TABLET | Refills: 3 | Status: SHIPPED | OUTPATIENT
Start: 2022-10-13 | End: 2023-04-13

## 2022-10-13 RX ORDER — LEVOTHYROXINE SODIUM 112 UG/1
112 TABLET ORAL DAILY
Qty: 90 TABLET | Refills: 3 | Status: SHIPPED | OUTPATIENT
Start: 2022-10-13 | End: 2022-11-21

## 2022-10-13 NOTE — TELEPHONE ENCOUNTER
Pt requesting to transfer pharmacies.  Metformin refilled 10/12/22 and sent to [facility].  Levothyroxine Last Written Prescription Date: 7/19/22  Last Fill Quantity: 90,  # refills: 3   Atorvastatin last written 6/23/22 #90 with 3 refills  Amitriptyline last written 11/5/21 #90 with 3 refills    Last office visit: 8/29/2022 with prescribing provider

## 2022-11-21 ENCOUNTER — OFFICE VISIT (OUTPATIENT)
Dept: FAMILY MEDICINE | Facility: CLINIC | Age: 67
End: 2022-11-21
Payer: MEDICARE

## 2022-11-21 VITALS
DIASTOLIC BLOOD PRESSURE: 60 MMHG | OXYGEN SATURATION: 98 % | SYSTOLIC BLOOD PRESSURE: 110 MMHG | HEART RATE: 72 BPM | WEIGHT: 157.6 LBS | RESPIRATION RATE: 16 BRPM | HEIGHT: 65 IN | BODY MASS INDEX: 26.26 KG/M2

## 2022-11-21 DIAGNOSIS — E03.9 ACQUIRED HYPOTHYROIDISM: ICD-10-CM

## 2022-11-21 DIAGNOSIS — Z11.59 NEED FOR HEPATITIS C SCREENING TEST: ICD-10-CM

## 2022-11-21 DIAGNOSIS — M79.671 RIGHT FOOT PAIN: ICD-10-CM

## 2022-11-21 DIAGNOSIS — Z86.2 HISTORY OF ANEMIA: ICD-10-CM

## 2022-11-21 DIAGNOSIS — G47.10 HYPERSOMNIA: ICD-10-CM

## 2022-11-21 DIAGNOSIS — I34.1 MITRAL VALVE PROLAPSE: Primary | ICD-10-CM

## 2022-11-21 DIAGNOSIS — K21.9 GASTROESOPHAGEAL REFLUX DISEASE WITHOUT ESOPHAGITIS: ICD-10-CM

## 2022-11-21 DIAGNOSIS — E11.9 TYPE 2 DIABETES MELLITUS WITHOUT COMPLICATION, WITHOUT LONG-TERM CURRENT USE OF INSULIN (H): ICD-10-CM

## 2022-11-21 DIAGNOSIS — Z11.4 SCREENING FOR HIV (HUMAN IMMUNODEFICIENCY VIRUS): ICD-10-CM

## 2022-11-21 DIAGNOSIS — Z01.818 PREOP GENERAL PHYSICAL EXAM: ICD-10-CM

## 2022-11-21 LAB
BASOPHILS # BLD AUTO: 0 10E3/UL (ref 0–0.2)
BASOPHILS NFR BLD AUTO: 0 %
EOSINOPHIL # BLD AUTO: 0.1 10E3/UL (ref 0–0.7)
EOSINOPHIL NFR BLD AUTO: 1 %
ERYTHROCYTE [DISTWIDTH] IN BLOOD BY AUTOMATED COUNT: 13.3 % (ref 10–15)
HCT VFR BLD AUTO: 34.7 % (ref 35–47)
HGB BLD-MCNC: 11.2 G/DL (ref 11.7–15.7)
IMM GRANULOCYTES # BLD: 0 10E3/UL
IMM GRANULOCYTES NFR BLD: 0 %
LYMPHOCYTES # BLD AUTO: 1.8 10E3/UL (ref 0.8–5.3)
LYMPHOCYTES NFR BLD AUTO: 24 %
MCH RBC QN AUTO: 32.4 PG (ref 26.5–33)
MCHC RBC AUTO-ENTMCNC: 32.3 G/DL (ref 31.5–36.5)
MCV RBC AUTO: 100 FL (ref 78–100)
MONOCYTES # BLD AUTO: 0.6 10E3/UL (ref 0–1.3)
MONOCYTES NFR BLD AUTO: 8 %
NEUTROPHILS # BLD AUTO: 5 10E3/UL (ref 1.6–8.3)
NEUTROPHILS NFR BLD AUTO: 67 %
PLATELET # BLD AUTO: 232 10E3/UL (ref 150–450)
RBC # BLD AUTO: 3.46 10E6/UL (ref 3.8–5.2)
TSH SERPL DL<=0.005 MIU/L-ACNC: 0.92 UIU/ML (ref 0.3–4.2)
WBC # BLD AUTO: 7.5 10E3/UL (ref 4–11)

## 2022-11-21 PROCEDURE — 99215 OFFICE O/P EST HI 40 MIN: CPT | Performed by: FAMILY MEDICINE

## 2022-11-21 PROCEDURE — 84443 ASSAY THYROID STIM HORMONE: CPT | Performed by: FAMILY MEDICINE

## 2022-11-21 PROCEDURE — 85025 COMPLETE CBC W/AUTO DIFF WBC: CPT | Mod: QW | Performed by: FAMILY MEDICINE

## 2022-11-21 PROCEDURE — 36415 COLL VENOUS BLD VENIPUNCTURE: CPT | Performed by: FAMILY MEDICINE

## 2022-11-21 RX ORDER — MV-MN/C/THEANINE/HERB NO.310 1000-200MG
POWDER IN PACKET (EA) ORAL
COMMUNITY

## 2022-11-21 RX ORDER — AMITRIPTYLINE HYDROCHLORIDE 50 MG/1
50 TABLET ORAL AT BEDTIME
Qty: 90 TABLET | Refills: 3 | Status: SHIPPED | OUTPATIENT
Start: 2022-11-21 | End: 2023-07-12

## 2022-11-21 RX ORDER — METOPROLOL TARTRATE 25 MG/1
12.5 TABLET, FILM COATED ORAL 2 TIMES DAILY
Qty: 30 TABLET | Refills: 1 | Status: SHIPPED | OUTPATIENT
Start: 2022-11-21 | End: 2023-01-17

## 2022-11-21 RX ORDER — LEVOTHYROXINE SODIUM 112 UG/1
112 TABLET ORAL DAILY
Qty: 90 TABLET | Refills: 3 | Status: SHIPPED | OUTPATIENT
Start: 2022-11-21 | End: 2022-11-22

## 2022-11-21 RX ORDER — METFORMIN HCL 500 MG
TABLET, EXTENDED RELEASE 24 HR ORAL
Qty: 180 TABLET | Refills: 0 | Status: CANCELLED | OUTPATIENT
Start: 2022-11-21

## 2022-11-21 NOTE — PROGRESS NOTES
[address] [address]  Phone: [phone]  Fax: [phone]  Primary Provider: Rebecca Her  Pre-op Performing Provider: REBECCA HER    {  PREOPERATIVE EVALUATION:  Today's date: 11/21/2022    Maritza Hitchcock is a 67 year old female who presents for a preoperative evaluation.    Surgical Information:  Surgery/Procedure: R Bone spur removal  Surgery Location: [facility]  Surgeon: Dr. Broderick  Surgery Date: 12/16/22  Time of Surgery:   Where patient plans to recover: At home with family  Fax number for surgical facility:     Type of Anesthesia Anticipated: to be determined    Assessment & Plan     The proposed surgical procedure is considered LOW risk.    Problem List Items Addressed This Visit        Digestive    Gastroesophageal reflux disease without esophagitis    Relevant Medications    omeprazole (PRILOSEC) 20 MG DR capsule       Endocrine    Acquired hypothyroidism    Relevant Medications    levothyroxine (SYNTHROID/LEVOTHROID) 112 MCG tablet    Other Relevant Orders    TSH    Type 2 diabetes mellitus without complication, without long-term current use of insulin (H)    Relevant Medications    levothyroxine (SYNTHROID/LEVOTHROID) 112 MCG tablet    Other Relevant Orders    Hemoglobin A1c   Other Visit Diagnoses     Mitral valve prolapse    -  Primary    Relevant Medications    metoprolol tartrate (LOPRESSOR) 25 MG tablet    Hypersomnia        Relevant Medications    amitriptyline (ELAVIL) 50 MG tablet    Screening for HIV (human immunodeficiency virus)        Need for hepatitis C screening test        History of anemia        Relevant Orders    CBC with Platelets & Differential    Preop general physical exam        Relevant Orders    CBC with Platelets & Differential    Right foot pain        Relevant Orders    CBC with Platelets & Differential      Patient reports that she has a painful bone spur and that the  podiatrist is going to corrected surgically for her      Patient reports that she was diagnosed with mitral valve prolapse almost 30 years ago and was started on metoprolol at that time.  She would like to try coming off of the medication.  She is currently on 25 mg twice daily.  We are going to decrease that to 12-1/2 mg twice daily.  Reevaluate in about 1 month and if tolerating this we could consider decreasing it again.    Chronic abdominal pain amitriptyline may be helpful.  Also seems to help with mood and dealing with stress of her mom living with her.  We will get this renewed.    Patient has hypothyroidism.  She is currently on levothyroxine 112 mcg p.o. daily.  She takes it on an empty stomach and then waits at least 30 minutes before taking any other medications.  We rechecked her TSH back in July and it seemed to be an appropriate dose we will recheck today to confirm as her previous TSH was suppressed.    GERD symptoms have been well controlled with her 20 mg of omeprazole.  When we were trying to figure out what to do for her abdominal pain we had her switch from omeprazole to ranitidine than ranitidine was recalled so she was then switched to esomeprazole which controls symptoms but is not covered by her insurance.  We will switch back to the 20 mg omeprazole 1 p.o. daily.    Diabetes mellitus patient has lost 20 pounds.  She would like to try coming off of the metformin.  We will have her hold the metformin and recheck hemoglobin A1c in 3 months.    Alopecia pt is going to see their Dermatologist    Total time spent reviewing chart and preparing for appointment, with patient for appointment, and time spent charting and coordinating care on the day of the appointment in minutes was: 43    Patient is interesting and meeting with a counselor..  She has a complicated relationship with her mom who is now living with her.  She is not quite sure what kind of counseling she would benefit from or what she is  looking for.  Recommended that she meet with Lyudmila Nguyen to better clarify what patient is looking for and might benefit from.                       RECOMMENDATION:  APPROVAL GIVEN to proceed with proposed procedure, without further diagnostic evaluation.              Subjective     HPI related to upcoming procedure:    Foot surgery    covid caused loss of appetite which has resulted maid 20 lb weight loss  Preop Questions 11/19/2022   1. Have you ever had a heart attack or stroke? No   2. Have you ever had surgery on your heart or blood vessels, such as a stent placement, a coronary artery bypass, or surgery on an artery in your head, neck, heart, or legs? No   3. Do you have chest pain with activity? No   4. Do you have a history of  heart failure? No   5. Do you currently have a cold, bronchitis or symptoms of other infection? YES - improving uri Sx, now minimal   6. Do you have a cough, shortness of breath, or wheezing? No   7. Do you or anyone in your family have previous history of blood clots? YES - pt had one right lower ext provoked   8. Do you or does anyone in your family have a serious bleeding problem such as prolonged bleeding following surgeries or cuts? No   9. Have you ever had problems with anemia or been told to take iron pills? YES - due to recurrent blood loss from donations   10. Have you had any abnormal blood loss such as black, tarry or bloody stools, or abnormal vaginal bleeding? No   11. Have you ever had a blood transfusion? No   12. Are you willing to have a blood transfusion if it is medically needed before, during, or after your surgery? Yes   13. Have you or any of your relatives ever had problems with anesthesia? No   14. Do you have sleep apnea, excessive snoring or daytime drowsiness? No   15. Do you have any artifical heart valves or other implanted medical devices like a pacemaker, defibrillator, or continuous glucose monitor? No   16. Do you have artificial joints? No   17.  Are you allergic to latex? No       Health Care Directive:  Patient does not have a Health Care Directive or Living Will: Patient states has Advance Directive and will bring in a copy to clinic.    Preoperative Review of :   reviewed - controlled substances prescribed by other outside provider(s). for previous wrist fracture in August          Review of Systems  Neg except as per hpi    Patient Active Problem List    Diagnosis Date Noted     Nonalcoholic steatohepatitis (ROMERO) 08/29/2022     Priority: Medium     Fatty liver 04/14/2022     Priority: Medium     Gastroesophageal reflux disease without esophagitis 04/14/2022     Priority: Medium     Hx of NSAID-associated gastropathy 04/14/2022     Priority: Medium     Hypertriglyceridemia 04/14/2022     Priority: Medium     Acquired hypothyroidism 04/14/2022     Priority: Medium     MVP (mitral valve prolapse) 04/14/2022     Priority: Medium     Type 2 diabetes mellitus without complication, without long-term current use of insulin (H) 04/14/2022     Priority: Medium     Morbid obesity (H) 04/14/2022     Priority: Medium     Personal history of DVT (deep vein thrombosis) 04/14/2022     Priority: Medium     Hx of meniscectomy of right knee 04/14/2022     Priority: Medium     Premenopausal menorrhagia 11/18/2010     Priority: Medium      Past Medical History:   Diagnosis Date     Diabetes (H) 07/2021     Thyroid disease     Hypothyroidism     Past Surgical History:   Procedure Laterality Date     ABDOMEN SURGERY  1988    [place]     CHOLECYSTECTOMY      1987     COLONOSCOPY  ????    7/26/2007, 8/30/2017     DILATION AND CURETTAGE, HYSTEROSCOPY DIAGNOSTIC, COMBINED      4/23/2010     EYE SURGERY  2021, 2022    Left eye-Vitrectomy, Cataract     GALLBLADDER SURGERY  02/11/1987     GYN SURGERY  ????    Partial Hysterectomy     LAPAROSCOPIC HYSTERECTOMY SUPRACERVICAL  11/19/2010     MENISCECTOMY  09/27/2016    partial     SOFT TISSUE SURGERY  ????    Right knee meniscus  repair     VAGINAL DELIVERY      x3 no date     Current Outpatient Medications   Medication Sig Dispense Refill     ACCU-CHEK GUIDE test strip USE 1 STRIP TO CHECK GLUCOSE TWICE DAILY       amitriptyline (ELAVIL) 50 MG tablet Take 1 tablet (50 mg) by mouth At Bedtime 90 tablet 3     Ascorbic Acid (VITAMIN C) 100 MG CHEW Gummy, 282mg       atorvastatin (LIPITOR) 40 MG tablet Take 1 tablet (40 mg) by mouth daily 90 tablet 3     Biotin 58839 MCG TABS Take 1 tablet by mouth       blood glucose monitoring (SOFTCLIX) lancets USE 1 LANCET (ONCE) TO CHECK GLUCOSE TWICE DAILY.       calcium citrate-vitamin D (CITRACAL) 315-250 MG-UNIT TABS per tablet        ferrous sulfate (FEROSUL) 325 (65 Fe) MG tablet Take 325 mg by mouth       levothyroxine (SYNTHROID/LEVOTHROID) 112 MCG tablet Take 1 tablet (112 mcg) by mouth daily 90 tablet 3     magnesium 250 MG tablet Take 250 mg by mouth       metFORMIN (GLUCOPHAGE XR) 500 MG 24 hr tablet TAKE 2 TABLETS BY MOUTH ONCE DAILY WITH DINNER 180 tablet 0     metoprolol tartrate (LOPRESSOR) 25 MG tablet Take 0.5 tablets (12.5 mg) by mouth 2 times daily 30 tablet 1     metoprolol tartrate (LOPRESSOR) 50 MG tablet TAKE 1/2 (ONE-HALF) TABLET 25mg BY MOUTH ONCE DAILY 45 tablet 3     Misc Natural Products (NEURIVA) CAPS        Multiple Vitamin (ONE-A-DAY ESSENTIAL) TABS Take 1 tablet by mouth daily       Omega-3 1000 MG capsule Take 1,000 mg by mouth       omeprazole (PRILOSEC) 20 MG DR capsule Take 1 capsule (20 mg) by mouth daily 90 capsule 3     Potassium Gluconate 595 MG TBCR Take 1 tablet by mouth daily       pseudoePHEDrine (SUDAFED) 120 MG 12 hr tablet Take 120 mg by mouth         No Known Allergies     Social History     Tobacco Use     Smoking status: Former     Packs/day: 0.50     Years: 0.00     Pack years: 0.00     Types: Cigarettes     Start date: 10/1/1975     Quit date: 1976     Years since quittin.5     Smokeless tobacco: Never   Substance Use Topics     Alcohol use: Yes  "    Family History   Problem Relation Age of Onset     Hypertension Mother         Takes two blood pressure pills per day     Hyperlipidemia Mother         3 stents     Osteoporosis Mother      Thyroid Disease Mother         Goiter operation in high school     Hearing Loss Mother      Rheumatoid Arthritis Mother      Coronary Artery Disease Father          -     Obesity Father      Hypertension Father      Glasses (<7 y/o) Father      Heart Disease Father      Obesity Paternal Grandmother         Overweight and heart disease     Glasses (<7 y/o) Paternal Grandmother          at age: Unknown     Anxiety Disorder Daughter         +Chicken pox     Glasses (<7 y/o) Daughter      Anxiety Disorder Son         Chicken pox     Glasses (<7 y/o) Son      History   Drug Use Unknown         Objective     /60 (BP Location: Right arm, Patient Position: Right side)   Pulse 72   Resp 16   Ht 1.645 m (5' 4.75\")   Wt 71.5 kg (157 lb 9.6 oz)   SpO2 98%   BMI 26.43 kg/m      Physical Exam      General: alert and oriented ×3 no acute distress.    HEENT: Normocephalic and atraumatic.   Eyes pupils are equal round and reactive to light extraocular motion is intact. normal conjunctiva       Chest: has bilateral rise with no increased work of breathing. clear to auscultation without wheezes, rhonchi, or rales.    Cardiovascular: normal perfusion and brisk capillary refill. S1S2 with regular rate and rhythm and no murmurs, gallops or rubs.    Musculoskeletal: no gross focal abnormalities and normal gait.    Neuro: no gross focal abnormalities and memory seems intact. CN 2-12 are grossly intact.    Psychiatric: speech is clear and coherent and fluent. Patient dressed appropriately for the weather. Mood is appropriate and affect is full.          Recent Labs   Lab Test 22  1142 22  0944 02/15/22  1519 21  1343   HGB  --  13.1  --  11.9   PLT  --  202  --  214   NA  --  139  --  140   POTASSIUM  --  " 4.5  --  4.0   CR  --  0.65  --  0.88   A1C 5.5  --  5.4  --         Diagnostics:  Recent Results (from the past 24 hour(s))   TSH    Collection Time: 11/21/22  3:35 PM   Result Value Ref Range    TSH 0.92 0.30 - 4.20 uIU/mL   CBC with platelets and differential    Collection Time: 11/21/22  3:35 PM   Result Value Ref Range    WBC Count 7.5 4.0 - 11.0 10e3/uL    RBC Count 3.46 (L) 3.80 - 5.20 10e6/uL    Hemoglobin 11.2 (L) 11.7 - 15.7 g/dL    Hematocrit 34.7 (L) 35.0 - 47.0 %     78 - 100 fL    MCH 32.4 26.5 - 33.0 pg    MCHC 32.3 31.5 - 36.5 g/dL    RDW 13.3 10.0 - 15.0 %    Platelet Count 232 150 - 450 10e3/uL    % Neutrophils 67 %    % Lymphocytes 24 %    % Monocytes 8 %    % Eosinophils 1 %    % Basophils 0 %    % Immature Granulocytes 0 %    Absolute Neutrophils 5.0 1.6 - 8.3 10e3/uL    Absolute Lymphocytes 1.8 0.8 - 5.3 10e3/uL    Absolute Monocytes 0.6 0.0 - 1.3 10e3/uL    Absolute Eosinophils 0.1 0.0 - 0.7 10e3/uL    Absolute Basophils 0.0 0.0 - 0.2 10e3/uL    Absolute Immature Granulocytes 0.0 <=0.4 10e3/uL      No EKG required for low risk surgery (cataract, skin procedure, breast biopsy, etc).    Revised Cardiac Risk Index (RCRI):  The patient has the following serious cardiovascular risks for perioperative complications:   - No serious cardiac risks = 0 points     RCRI Interpretation: 0 points: Class I (very low risk - 0.4% complication rate)           Signed Electronically by: Irena Levi MD  Copy of this evaluation report is provided to requesting physician.

## 2022-11-22 RX ORDER — LEVOTHYROXINE SODIUM 112 UG/1
112 TABLET ORAL DAILY
Qty: 90 TABLET | Refills: 3 | Status: SHIPPED | OUTPATIENT
Start: 2022-11-22 | End: 2023-07-12

## 2022-11-23 NOTE — RESULT ENCOUNTER NOTE
I renewed your thyroid medication.  I also think you should start an iron supplement and hold off on donating blood for at least 6 months.

## 2022-12-26 ENCOUNTER — MYC MEDICAL ADVICE (OUTPATIENT)
Dept: FAMILY MEDICINE | Facility: CLINIC | Age: 67
End: 2022-12-26

## 2022-12-26 NOTE — TELEPHONE ENCOUNTER
See MyChart from patient needing PCP review.    Responded to MyChart and common symptoms noted with Iron supplementation.    Routed for review and any additional information the patient would benefit from.   Last office visit 11/21/22, your note:  Recommended Iron Supplement and hold off on donating blood for 6 months.    Thank-you,   Gisela Morton RN  [facility]

## 2023-01-02 ENCOUNTER — VIRTUAL VISIT (OUTPATIENT)
Dept: BEHAVIORAL HEALTH | Facility: CLINIC | Age: 68
End: 2023-01-02
Payer: MEDICARE

## 2023-01-02 DIAGNOSIS — F41.9 ANXIETY DISORDER, UNSPECIFIED TYPE: Primary | ICD-10-CM

## 2023-01-02 PROCEDURE — 90837 PSYTX W PT 60 MINUTES: CPT | Mod: 95 | Performed by: SOCIAL WORKER

## 2023-01-02 NOTE — PROGRESS NOTES
[facility] Primary Care: Integrated Behavioral Health  2023      Behavioral Health Clinician Progress Note    Patient Name: Maritza Hitchcock           Service Type:  Individual      Service Location:   [facility] / Email (patient reached)     Session Start Time: 2:00p  Session End Time: 2:55p      Session Length: 53 - 60      Attendees: Client     Service Modality:  Video Visit:      Provider verified identity through the following two step process.  Patient provided:  Patient  and Patient address    Telemedicine Visit: The patient's condition can be safely assessed and treated via synchronous audio and visual telemedicine encounter.      Reason for Telemedicine Visit: Patient has requested telehealth visit    Originating Site (Patient Location): Patient's home    Distant Site (Provider Location): Provider Remote Setting- Home Office    Consent:  The patient/guardian has verbally consented to: the potential risks and benefits of telemedicine (video visit) versus in person care; bill my insurance or make self-payment for services provided; and responsibility for payment of non-covered services.     Patient would like the video invitation sent by:  My Chart    Mode of Communication:  Video Conference via [employer]    Distant Location (Provider):  On-site    As the provider I attest to compliance with applicable laws and regulations related to telemedicine.    Visit Activities (Refresh list every visit): [facility] Only    Diagnostic Assessment Date: to be completed within first 3 sessions  Treatment Plan Review Date: to be completed within first 3-4 sessions  See Flowsheets for today's PHQ-9 and THERESA-7 results  Previous PHQ-9: No flowsheet data found.  Previous THERESA-7: No flowsheet data found.    MADDIE LEVEL:  No flowsheet data found.    DATA  Extended Session (60+ minutes): No  Interactive Complexity: No  Crisis: No  [facility] Patient: No    Treatment Objective(s) Addressed in This Session:  Target  Behavior(s): interpersonal stressors    Anxiety: will develop more effective coping skills to manage anxiety symptoms    Current Stressors / Issues:  Denies dx of depression. Denies dx of anxiety but reports that she does struggle with anxiety to some degree.    Both daughter and son struggle with anxiety symptoms as well.    Five years ago step-father passed away and at that time they moved pt's mother in with her.  Parents  when pt was around 6 but pt lived with her father primarily as a child.  Pt reports that her mother moving in hasn't gone well.  Mother wants to return to [place] however was recently dx'd with dementia.   and pt both struggle with their relationship with her.  Both pt and her mother went back to work at a part time job to get out of the house a bit and are traveling as much as they can.    Shares that her mother tries to help out with some things around the house but at times it makes things worse.   Pt is working with a company to get mother some help and in the future get her out of the house and get additional care.  Wanted to talk about a situation she is currently involved in.  Shares that at the end of Sept she got tickets to see a new country  in [place] with her daughter. Was able to meet the artist and currently does chat with this musician on-line as a fan.    Has some friends she hangs out with.  Is an only child which is why mother lives with her.  Her father passed away many years ago.      Progress on Treatment Objective(s) / Homework:  initial visit    Motivational Interviewing    MI Intervention: Expressed Empathy/Understanding, Supported Autonomy, Collaboration, Evocation, Permission to raise concern or advise and Open-ended questions     Change Talk Expressed by the Patient: Reasons to change Activation    Provider Response to Change Talk: E - Evoked more info from patient about behavior change, A - Affirmed patient's thoughts, decisions, or attempts at  behavior change, R - Reflected patient's change talk and S - Summarized patient's change talk statements      Care Plan review completed: No    Medication Review:  No current psychiatric medications prescribed    Medication Compliance:  NA    Changes in Health Issues:   None reported    Chemical Use Review:   Substance Use: Chemical use reviewed, no active concerns identified      Tobacco Use: No current tobacco use.      Assessment: Current Emotional / Mental Status (status of significant symptoms):  Risk status (Self / Other harm or suicidal ideation)  Patient denies a history of suicidal ideation, suicide attempts, self-injurious behavior, homicidal ideation, homicidal behavior and and other safety concerns  Patient denies current fears or concerns for personal safety.  Patient denies current or recent suicidal ideation or behaviors.  Patient denies current or recent homicidal ideation or behaviors.  Patient denies current or recent self injurious behavior or ideation.  Patient denies other safety concerns.  A safety and risk management plan has not been developed at this time, however patient was encouraged to call [place] [phone] should there be a change in any of these risk factors.    Appearance:   Appropriate   Eye Contact:   Good   Psychomotor Behavior: Normal   Attitude:   Cooperative   Orientation:   All  Speech   Rate / Production: Normal    Volume:  Normal   Mood:    Anxious   Affect:    Appropriate   Thought Content:  Clear   Thought Form:  Coherent  Logical   Insight:    Good     Diagnoses:  1. Anxiety disorder, unspecified type        Collateral Reports Completed:  Not Applicable    Plan: (Homework, other):  Patient was given information about behavioral services and encouraged to schedule a follow up appointment with the clinic [facility] in 1 week.  She was also given information about mental health symptoms and treatment options .  CD Recommendations: No indications of CD issues.  Lyudmila Figueroa  [facility]          JOHN Rizvi, [facility]  January 2, 2023

## 2023-01-09 ENCOUNTER — VIRTUAL VISIT (OUTPATIENT)
Dept: BEHAVIORAL HEALTH | Facility: CLINIC | Age: 68
End: 2023-01-09
Payer: MEDICARE

## 2023-01-09 DIAGNOSIS — F41.9 ANXIETY DISORDER, UNSPECIFIED TYPE: Primary | ICD-10-CM

## 2023-01-09 PROCEDURE — 90834 PSYTX W PT 45 MINUTES: CPT | Mod: 95 | Performed by: SOCIAL WORKER

## 2023-01-09 NOTE — PROGRESS NOTES
[facility] Primary Care: Integrated Behavioral Health  2023      Behavioral Health Clinician Progress Note    Patient Name: Maritza Hitchcock           Service Type:  Individual      Service Location:   [facility] / Email (patient reached)     Session Start Time: 1:31p  Session End Time: 1:20p      Session Length: 38 - 52      Attendees: Client     Service Modality:  Video Visit:      Provider verified identity through the following two step process.  Patient provided:  Patient  and Patient address    Telemedicine Visit: The patient's condition can be safely assessed and treated via synchronous audio and visual telemedicine encounter.      Reason for Telemedicine Visit: Patient has requested telehealth visit    Originating Site (Patient Location): Patient's home    Distant Site (Provider Location): Provider Remote Setting- Home Office    Consent:  The patient/guardian has verbally consented to: the potential risks and benefits of telemedicine (video visit) versus in person care; bill my insurance or make self-payment for services provided; and responsibility for payment of non-covered services.     Patient would like the video invitation sent by:  My Chart    Mode of Communication:  Video Conference via Amwell    Distant Location (Provider):  On-site    As the provider I attest to compliance with applicable laws and regulations related to telemedicine.    Visit Activities (Refresh list every visit): [facility] Only    Diagnostic Assessment Date: to be completed within first 3 sessions- next visit  Treatment Plan Review Date: to be completed within first 3-4 sessions  See Flowsheets for today's PHQ-9 and THERESA-7 results  Previous PHQ-9: No flowsheet data found.  Previous THERESA-7: No flowsheet data found.    MADDIE LEVEL:  No flowsheet data found.    DATA  Extended Session (60+ minutes): No  Interactive Complexity: No  Crisis: No  [facility] Patient: No    Treatment Objective(s) Addressed in This  Session:  Target Behavior(s): interpersonal stressors    Anxiety: will develop more effective coping skills to manage anxiety symptoms    Current Stressors / Issues:  Pt reports that she is doing ok.  Struggling with an emotional relationship she has and feeling some pressure.  She shares that she is worried about losing this relationship but that what she is being asked to do isn't possible and would have consequences.  Processed those feelings and ways to share this with the individual.    Discussed her marriage a bit and where she is seeing some differences/stressors.  Pt shares that her and her  are going on a road trip next week for 2 weeks with another couple/friends and is looking forward to relaxing.    Feels things with her mother are about the same.  Is hoping to return to work after vacation after being off for foot surgery.      Progress on Treatment Objective(s) / Homework:  Minimal progress - PREPARATION (Decided to change - considering how); Intervened by negotiating a change plan and determining options / strategies for behavior change, identifying triggers, exploring social supports, and working towards setting a date to begin behavior change    Motivational Interviewing    MI Intervention: Expressed Empathy/Understanding, Supported Autonomy, Collaboration, Evocation, Permission to raise concern or advise and Open-ended questions     Change Talk Expressed by the Patient: Reasons to change Activation    Provider Response to Change Talk: E - Evoked more info from patient about behavior change, A - Affirmed patient's thoughts, decisions, or attempts at behavior change, R - Reflected patient's change talk and S - Summarized patient's change talk statements      Care Plan review completed: No    Medication Review:  No current psychiatric medications prescribed    Medication Compliance:  NA    Changes in Health Issues:   None reported    Chemical Use Review:   Substance Use: Chemical use reviewed,  no active concerns identified      Tobacco Use: No current tobacco use.      Assessment: Current Emotional / Mental Status (status of significant symptoms):  Risk status (Self / Other harm or suicidal ideation)  Patient denies a history of suicidal ideation, suicide attempts, self-injurious behavior, homicidal ideation, homicidal behavior and and other safety concerns  Patient denies current fears or concerns for personal safety.  Patient denies current or recent suicidal ideation or behaviors.  Patient denies current or recent homicidal ideation or behaviors.  Patient denies current or recent self injurious behavior or ideation.  Patient denies other safety concerns.  A safety and risk management plan has not been developed at this time, however patient was encouraged to call [place] [phone] should there be a change in any of these risk factors.    Appearance:   Appropriate   Eye Contact:   Good   Psychomotor Behavior: Normal   Attitude:   Cooperative   Orientation:   All  Speech   Rate / Production: Normal    Volume:  Normal   Mood:    Anxious   Affect:    Appropriate   Thought Content:  Clear   Thought Form:  Coherent  Logical   Insight:    Good     Diagnoses:  1. Anxiety disorder, unspecified type        Collateral Reports Completed:  Not Applicable    Plan: (Homework, other):  Patient was given information about behavioral services and encouraged to schedule a follow up appointment with the clinic [facility] in 2 weeks.  She was also given information about mental health symptoms and treatment options .  CD Recommendations: No indications of CD issues.  Lyudmila Figueroa, CHIN, [facility]          JOHN Rizvi, [facility], [facility]  January 9, 2023

## 2023-01-11 ENCOUNTER — MYC MEDICAL ADVICE (OUTPATIENT)
Dept: FAMILY MEDICINE | Facility: CLINIC | Age: 68
End: 2023-01-11
Payer: MEDICARE

## 2023-01-11 DIAGNOSIS — I34.1 MITRAL VALVE PROLAPSE: ICD-10-CM

## 2023-01-11 NOTE — TELEPHONE ENCOUNTER
See MyChart from patient needing PCP review.    There are two orders for metoprolol:    Can you verify which order patient is suppose to be taking?    Gisela Morton RN  [facility]

## 2023-01-17 RX ORDER — METOPROLOL TARTRATE 25 MG/1
12.5 TABLET, FILM COATED ORAL DAILY
Qty: 30 TABLET | Refills: 1 | Status: SHIPPED | OUTPATIENT
Start: 2023-01-17 | End: 2023-03-06

## 2023-01-30 ENCOUNTER — VIRTUAL VISIT (OUTPATIENT)
Dept: BEHAVIORAL HEALTH | Facility: CLINIC | Age: 68
End: 2023-01-30
Payer: MEDICARE

## 2023-01-30 DIAGNOSIS — F41.9 ANXIETY DISORDER, UNSPECIFIED TYPE: Primary | ICD-10-CM

## 2023-01-30 PROCEDURE — 90791 PSYCH DIAGNOSTIC EVALUATION: CPT | Mod: 95 | Performed by: SOCIAL WORKER

## 2023-01-30 ASSESSMENT — COLUMBIA-SUICIDE SEVERITY RATING SCALE - C-SSRS
ATTEMPT LIFETIME: NO
TOTAL  NUMBER OF INTERRUPTED ATTEMPTS LIFETIME: NO
2. HAVE YOU ACTUALLY HAD ANY THOUGHTS OF KILLING YOURSELF?: NO
1. HAVE YOU WISHED YOU WERE DEAD OR WISHED YOU COULD GO TO SLEEP AND NOT WAKE UP?: NO
TOTAL  NUMBER OF ABORTED OR SELF INTERRUPTED ATTEMPTS LIFETIME: NO
6. HAVE YOU EVER DONE ANYTHING, STARTED TO DO ANYTHING, OR PREPARED TO DO ANYTHING TO END YOUR LIFE?: NO
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
## 2023-02-06 ENCOUNTER — VIRTUAL VISIT (OUTPATIENT)
Dept: BEHAVIORAL HEALTH | Facility: CLINIC | Age: 68
End: 2023-02-06
Payer: MEDICARE

## 2023-02-06 DIAGNOSIS — F41.9 ANXIETY DISORDER, UNSPECIFIED TYPE: Primary | ICD-10-CM

## 2023-02-06 PROCEDURE — 90834 PSYTX W PT 45 MINUTES: CPT | Mod: VID | Performed by: SOCIAL WORKER

## 2023-02-06 NOTE — PROGRESS NOTES
[facility] Primary Care: Integrated Behavioral Health   2023      Behavioral Health Clinician Progress Note    Patient Name: Maritza Hitchcock           Service Type:  Individual      Service Location:   [facility] / Email (patient reached)     Session Start Time: 12:34p  Session End Time: 1:24p      Session Length: 38 - 52      Attendees: Client     Service Modality:  Video Visit:      Provider verified identity through the following two step process.  Patient provided:  Patient  and Patient address    Telemedicine Visit: The patient's condition can be safely assessed and treated via synchronous audio and visual telemedicine encounter.      Reason for Telemedicine Visit: Patient has requested telehealth visit    Originating Site (Patient Location): Patient's home    Distant Site (Provider Location): Provider Remote Setting- Home Office    Consent:  The patient/guardian has verbally consented to: the potential risks and benefits of telemedicine (video visit) versus in person care; bill my insurance or make self-payment for services provided; and responsibility for payment of non-covered services.     Patient would like the video invitation sent by:  My Chart    Mode of Communication:  Video Conference via Amwell    Distant Location (Provider):  On-site    As the provider I attest to compliance with applicable laws and regulations related to telemedicine.    Visit Activities (Refresh list every visit): [facility] Only    Diagnostic Assessment Date:  23   Treatment Plan Review Date:  23    See Flowsheets for today's PHQ-9 and THERESA-7 results  Previous PHQ-9: No flowsheet data found.   PHQ-2 Score:   PHQ-2 (  [employer]) 2022   Q1: Little interest or pleasure in doing things 0 0   Q2: Feeling down, depressed or hopeless 0 0   PHQ-2 Score 0 0   Q1: Little interest or pleasure in doing things Not at all Not at all   Q2: Feeling down, depressed or hopeless Not at all Not  "at all   PHQ-2 Score 0 0     Previous THERESA-7: No flowsheet data found.     MADDIE LEVEL:  No flowsheet data found.    DATA  Extended Session (60+ minutes): No  Interactive Complexity: No  Crisis: No  BH Patient: No    Treatment Objective(s) Addressed in This Session:  Target Behavior(s): interpersonal stressors    Anxiety: will develop more effective coping skills to manage anxiety symptoms    Current Stressors / Issues:  Pt struggling this visit.  Initially at start of appt she reports that after this appt she plans to go to the bank to make a transaction for her \"friend\".  Pt very worried about possible consequences if she does follow through with this.  Pt and C discussed this in great detail and how to approach her \"friend\".   Spent much of session talking through options pt feels she has at this time.  [facility] and pt will connect again later this week.        Progress on Treatment Objective(s) / Homework:  Minimal progress - PREPARATION (Decided to change - considering how); Intervened by negotiating a change plan and determining options / strategies for behavior change, identifying triggers, exploring social supports, and working towards setting a date to begin behavior change    Motivational Interviewing    MI Intervention: Expressed Empathy/Understanding, Supported Autonomy, Collaboration, Evocation, Permission to raise concern or advise and Open-ended questions     Change Talk Expressed by the Patient: Reasons to change Activation    Provider Response to Change Talk: E - Evoked more info from patient about behavior change, A - Affirmed patient's thoughts, decisions, or attempts at behavior change, R - Reflected patient's change talk and S - Summarized patient's change talk statements      Care Plan review completed: No    Medication Review:  No current psychiatric medications prescribed    Medication Compliance:  NA    Changes in Health Issues:   None reported    Chemical Use Review:   Substance Use: Chemical " use reviewed, no active concerns identified      Tobacco Use: No current tobacco use.      Assessment: Current Emotional / Mental Status (status of significant symptoms):  Risk status (Self / Other harm or suicidal ideation)  Patient denies a history of suicidal ideation, suicide attempts, self-injurious behavior, homicidal ideation, homicidal behavior and and other safety concerns  Patient denies current fears or concerns for personal safety.  Patient denies current or recent suicidal ideation or behaviors.  Patient denies current or recent homicidal ideation or behaviors.  Patient denies current or recent self injurious behavior or ideation.  Patient denies other safety concerns.  A safety and risk management plan has not been developed at this time, however patient was encouraged to call [place] [phone] should there be a change in any of these risk factors.    Appearance:   Appropriate   Eye Contact:   Good   Psychomotor Behavior: Normal   Attitude:   Cooperative   Orientation:   All  Speech   Rate / Production: Normal    Volume:  Normal   Mood:    Anxious   Affect:    Appropriate   Thought Content:  Clear   Thought Form:  Coherent  Logical   Insight:    Good     Diagnoses:  1. Anxiety disorder, unspecified type        Collateral Reports Completed:  Not Applicable    Plan: (Homework, other):  Patient was given information about behavioral services and encouraged to schedule a follow up appointment with the clinic [facility] in 1 week.  She was also given information about mental health symptoms and treatment options .  CD Recommendations: No indications of CD issues.  CHIN Mirza, [facility]                                                Individual Treatment Plan    Patient's Name: Maritza Hitchcock  YOB: 1955    Date of Creation: 2/6/23  Date Treatment Plan Last Reviewed/Revised: 2/6/23    DSM5 Diagnoses: 300.00 (F41.9) Unspecified Anxiety Disorder  Psychosocial / Contextual Factors:  relationships  PROMIS (reviewed every 90 days):     Referral / Collaboration:  Referral to another professional/service is not indicated at this time.    Anticipated number of session for this episode of care: 6-9 sessions  Anticipation frequency of session: Every other week  Anticipated Duration of each session: 38-52 minutes  Treatment plan will be reviewed in 90 days or when goals have been changed.       MeasurableTreatment Goal(s) related to diagnosis / functional impairment(s)  Goal 1: Patient will experience a reduction in anxious symptoms, along with a corresponding increase in relaxed emotional state.    I will know I've met my goal when feel more relaxed.      Objective #A (Patient Action)    Patient will use cognitive strategies identified in therapy to challenge anxious thoughts.  Status: New - Date: 2/6/23     Intervention(s)  Therapist will utilize CBT and ACT to help pt challenge anxious thoughts and reduce intensity/duration of anxious distress.        Lyudmila Figueroa, JOHN, LICSW, [facility]  February 6, 2023

## 2023-02-09 ENCOUNTER — VIRTUAL VISIT (OUTPATIENT)
Dept: BEHAVIORAL HEALTH | Facility: CLINIC | Age: 68
End: 2023-02-09
Payer: MEDICARE

## 2023-02-09 DIAGNOSIS — F41.9 ANXIETY DISORDER, UNSPECIFIED TYPE: Primary | ICD-10-CM

## 2023-02-09 NOTE — PROGRESS NOTES
[facility] Primary Care: Integrated Behavioral Health   2023    Behavioral Health Clinician Progress Note    Patient Name: Maritza Hitchcock           Service Type:  Individual      Service Location:   [facility] / Email (patient reached)     Session Start Time: 2:00p  Session End Time: 2:11p      Session Length: 11minutes.  will not bill for this session- was meant for a time to check in given how anxious/distressed pt was on 23     Attendees: Client     Service Modality:  Video Visit:      Provider verified identity through the following two step process.  Patient provided:  Patient  and Patient address    Telemedicine Visit: The patient's condition can be safely assessed and treated via synchronous audio and visual telemedicine encounter.      Reason for Telemedicine Visit: Patient has requested telehealth visit    Originating Site (Patient Location): Patient's home    Distant Site (Provider Location): Provider Remote Setting- Home Office    Consent:  The patient/guardian has verbally consented to: the potential risks and benefits of telemedicine (video visit) versus in person care; bill my insurance or make self-payment for services provided; and responsibility for payment of non-covered services.     Patient would like the video invitation sent by:  My Chart    Mode of Communication:  Video Conference via [employer]    Distant Location (Provider):  On-site    As the provider I attest to compliance with applicable laws and regulations related to telemedicine.    Visit Activities (Refresh list every visit): [facility] Only    Diagnostic Assessment Date:  23   Treatment Plan Review Date:  23    See Flowsheets for today's PHQ-9 and THERESA-7 results  Previous PHQ-9: No flowsheet data found.   PHQ-2 Score:   PHQ-2 (  [employer]) 2022   Q1: Little interest or pleasure in doing things 0 0   Q2: Feeling down, depressed or hopeless 0 0   PHQ-2 Score 0 0   Q1: Little  interest or pleasure in doing things Not at all Not at all   Q2: Feeling down, depressed or hopeless Not at all Not at all   PHQ-2 Score 0 0     Previous THERESA-7: No flowsheet data found.     MADDIE LEVEL:  No flowsheet data found.    DATA  Extended Session (60+ minutes): No  Interactive Complexity: No  Crisis: No  [facility] Patient: No    Treatment Objective(s) Addressed in This Session:  Target Behavior(s): interpersonal stressors    Anxiety: will develop more effective coping skills to manage anxiety symptoms    Current Stressors / Issues:  Pt shares that she is doing pretty good.  Did have a discussion with friend about not being able to help financially.  Says that friend was initially frustrated but that after discussion seems better.    Pt leaving this weekend on vacation with spouse and is excited to get away for a bit.        Progress on Treatment Objective(s) / Homework:  Minimal progress - PREPARATION (Decided to change - considering how); Intervened by negotiating a change plan and determining options / strategies for behavior change, identifying triggers, exploring social supports, and working towards setting a date to begin behavior change    Motivational Interviewing    MI Intervention: Expressed Empathy/Understanding, Supported Autonomy, Collaboration, Evocation, Permission to raise concern or advise and Open-ended questions     Change Talk Expressed by the Patient: Reasons to change Activation    Provider Response to Change Talk: E - Evoked more info from patient about behavior change, A - Affirmed patient's thoughts, decisions, or attempts at behavior change, R - Reflected patient's change talk and S - Summarized patient's change talk statements      Care Plan review completed: No    Medication Review:  No current psychiatric medications prescribed    Medication Compliance:  NA    Changes in Health Issues:   None reported    Chemical Use Review:   Substance Use: Chemical use reviewed, no active concerns  identified      Tobacco Use: No current tobacco use.      Assessment: Current Emotional / Mental Status (status of significant symptoms):  Risk status (Self / Other harm or suicidal ideation)  Patient denies a history of suicidal ideation, suicide attempts, self-injurious behavior, homicidal ideation, homicidal behavior and and other safety concerns  Patient denies current fears or concerns for personal safety.  Patient denies current or recent suicidal ideation or behaviors.  Patient denies current or recent homicidal ideation or behaviors.  Patient denies current or recent self injurious behavior or ideation.  Patient denies other safety concerns.  A safety and risk management plan has not been developed at this time, however patient was encouraged to call [place] [phone] should there be a change in any of these risk factors.    Appearance:   Appropriate   Eye Contact:   Good   Psychomotor Behavior: Normal   Attitude:   Cooperative   Orientation:   All  Speech   Rate / Production: Normal    Volume:  Normal   Mood:    Anxious   Affect:    Appropriate   Thought Content:  Clear   Thought Form:  Coherent  Logical   Insight:    Good     Diagnoses:  1. Anxiety disorder, unspecified type        Collateral Reports Completed:  Not Applicable    Plan: (Homework, other):  Patient was given information about behavioral services and encouraged to schedule a follow up appointment with the clinic [facility] in 2 weeks.  She was also given information about mental health symptoms and treatment options .  CD Recommendations: No indications of CD issues.  Lyudmila Figueroa, [facility], [facility]                                                Individual Treatment Plan    Patient's Name: Maritza Hitchcock  YOB: 1955    Date of Creation: 2/6/23  Date Treatment Plan Last Reviewed/Revised: 2/6/23    DSM5 Diagnoses: 300.00 (F41.9) Unspecified Anxiety Disorder  Psychosocial / Contextual Factors: relationships  PROMIS (reviewed every 90  days):     Referral / Collaboration:  Referral to another professional/service is not indicated at this time.    Anticipated number of session for this episode of care: 6-9 sessions  Anticipation frequency of session: Every other week  Anticipated Duration of each session: 38-52 minutes  Treatment plan will be reviewed in 90 days or when goals have been changed.       MeasurableTreatment Goal(s) related to diagnosis / functional impairment(s)  Goal 1: Patient will experience a reduction in anxious symptoms, along with a corresponding increase in relaxed emotional state.    I will know I've met my goal when feel more relaxed.      Objective #A (Patient Action)    Patient will use cognitive strategies identified in therapy to challenge anxious thoughts.  Status: New - Date: 2/6/23     Intervention(s)  Therapist will utilize CBT and ACT to help pt challenge anxious thoughts and reduce intensity/duration of anxious distress.        Lyudmila Figueroa, JOHN, LICSW, [facility]  February 9, 2023

## 2023-02-12 ENCOUNTER — HEALTH MAINTENANCE LETTER (OUTPATIENT)
Age: 68
End: 2023-02-12

## 2023-02-27 ENCOUNTER — VIRTUAL VISIT (OUTPATIENT)
Dept: BEHAVIORAL HEALTH | Facility: CLINIC | Age: 68
End: 2023-02-27
Payer: MEDICARE

## 2023-02-27 ENCOUNTER — TRANSFERRED RECORDS (OUTPATIENT)
Dept: HEALTH INFORMATION MANAGEMENT | Facility: CLINIC | Age: 68
End: 2023-02-27
Payer: MEDICARE

## 2023-02-27 DIAGNOSIS — F41.9 ANXIETY DISORDER, UNSPECIFIED TYPE: Primary | ICD-10-CM

## 2023-02-27 LAB
RETINOPATHY: NEGATIVE

## 2023-02-27 PROCEDURE — 90834 PSYTX W PT 45 MINUTES: CPT | Mod: VID | Performed by: SOCIAL WORKER

## 2023-02-27 NOTE — PROGRESS NOTES
[facility] Primary Care: Integrated Behavioral Health   2023    Behavioral Health Clinician Progress Note    Patient Name: Maritza Hitchcock           Service Type:  Individual      Service Location:   [place] / Email (patient reached)     Session Start Time: 1:34p  Session End Time:   2:15p      Session Length: 38 - 52      Attendees: Client     Service Modality:  Video Visit:      Provider verified identity through the following two step process.  Patient provided:  Patient  and Patient address    Telemedicine Visit: The patient's condition can be safely assessed and treated via synchronous audio and visual telemedicine encounter.      Reason for Telemedicine Visit: Patient has requested telehealth visit    Originating Site (Patient Location): Patient's home    Distant Site (Provider Location): Provider Remote Setting- Home Office    Consent:  The patient/guardian has verbally consented to: the potential risks and benefits of telemedicine (video visit) versus in person care; bill my insurance or make self-payment for services provided; and responsibility for payment of non-covered services.     Patient would like the video invitation sent by:  My Chart    Mode of Communication:  Video Conference via Amwell    Distant Location (Provider):  On-site    As the provider I attest to compliance with applicable laws and regulations related to telemedicine.    Visit Activities (Refresh list every visit): [facility] Only    Diagnostic Assessment Date:  23   Treatment Plan Review Date:  23    See Flowsheets for today's PHQ-9 and THERESA-7 results  Previous PHQ-9: No flowsheet data found.   PHQ-2 Score:   PHQ-2 (  [employer]) 2022   Q1: Little interest or pleasure in doing things 0 0   Q2: Feeling down, depressed or hopeless 0 0   PHQ-2 Score 0 0   Q1: Little interest or pleasure in doing things Not at all Not at all   Q2: Feeling down, depressed or hopeless Not at all Not  "at all   PHQ-2 Score 0 0     Previous THERESA-7: No flowsheet data found.     MADDIE LEVEL:  No flowsheet data found.    DATA  Extended Session (60+ minutes): No  Interactive Complexity: No  Crisis: No  H Patient: No    Treatment Objective(s) Addressed in This Session:  Target Behavior(s): interpersonal stressors    Anxiety: will develop more effective coping skills to manage anxiety symptoms    Current Stressors / Issues:  Pt shares that the past couple of weeks have been difficult.  Pt admits that she did withdraw money and sent to her \"emotional friend\".  Says that she was caught right away by her bank and her  although not before the money had been sent.  Reports that they had a good conversation and that  did send her flowers this past weekend.  Does worry that the trust is broken between her and her  and can see why he wouldn't trust her at this time.  Did go on their trip and pt reports that it went ok.  Has been removed from their bank account and no longer has any access to it.    Shares that she has some more trips scheduled for the next few weeks and on one is going to see her \"friend\" perform/sing.  Feels has great support from her small group at [employer] although she reports that she isn't able to be completely open with them about all the details of what she is going through.      Progress on Treatment Objective(s) / Homework:  Minimal progress - PREPARATION (Decided to change - considering how); Intervened by negotiating a change plan and determining options / strategies for behavior change, identifying triggers, exploring social supports, and working towards setting a date to begin behavior change    Motivational Interviewing    MI Intervention: Expressed Empathy/Understanding, Supported Autonomy, Collaboration, Evocation, Permission to raise concern or advise and Open-ended questions     Change Talk Expressed by the Patient: Reasons to change Activation    Provider Response to Change " Talk: E - Evoked more info from patient about behavior change, A - Affirmed patient's thoughts, decisions, or attempts at behavior change, R - Reflected patient's change talk and S - Summarized patient's change talk statements      Care Plan review completed: No    Medication Review:  No current psychiatric medications prescribed    Medication Compliance:  NA    Changes in Health Issues:   None reported    Chemical Use Review:   Substance Use: Chemical use reviewed, no active concerns identified      Tobacco Use: No current tobacco use.      Assessment: Current Emotional / Mental Status (status of significant symptoms):  Risk status (Self / Other harm or suicidal ideation)  Patient denies a history of suicidal ideation, suicide attempts, self-injurious behavior, homicidal ideation, homicidal behavior and and other safety concerns  Patient denies current fears or concerns for personal safety.  Patient denies current or recent suicidal ideation or behaviors.  Patient denies current or recent homicidal ideation or behaviors.  Patient denies current or recent self injurious behavior or ideation.  Patient denies other safety concerns.  A safety and risk management plan has not been developed at this time, however patient was encouraged to call [place] [phone] should there be a change in any of these risk factors.    Appearance:   Appropriate   Eye Contact:   Good   Psychomotor Behavior: Normal   Attitude:   Cooperative   Orientation:   All  Speech   Rate / Production: Normal    Volume:  Normal   Mood:    Anxious   Affect:    Appropriate   Thought Content:  Clear   Thought Form:  Coherent  Logical   Insight:    Good     Diagnoses:  1. Anxiety disorder, unspecified type        Collateral Reports Completed:  Not Applicable    Plan: (Homework, other):  Patient was given information about behavioral services and encouraged to schedule a follow up appointment with the clinic [facility] in 2 weeks.  She was also given information  about mental health symptoms and treatment options .  CD Recommendations: No indications of CD issues.  Lyudmila Figueroa, CHIN, [facility]                                                Individual Treatment Plan    Patient's Name: Maritza Hitchcock  YOB: 1955    Date of Creation: 2/6/23  Date Treatment Plan Last Reviewed/Revised: 2/6/23    DSM5 Diagnoses: 300.00 (F41.9) Unspecified Anxiety Disorder  Psychosocial / Contextual Factors: relationships  PROMIS (reviewed every 90 days):     Referral / Collaboration:  Referral to another professional/service is not indicated at this time.    Anticipated number of session for this episode of care: 6-9 sessions  Anticipation frequency of session: Every other week  Anticipated Duration of each session: 38-52 minutes  Treatment plan will be reviewed in 90 days or when goals have been changed.       MeasurableTreatment Goal(s) related to diagnosis / functional impairment(s)  Goal 1: Patient will experience a reduction in anxious symptoms, along with a corresponding increase in relaxed emotional state.    I will know I've met my goal when feel more relaxed.      Objective #A (Patient Action)    Patient will use cognitive strategies identified in therapy to challenge anxious thoughts.  Status: New - Date: 2/6/23     Intervention(s)  Therapist will utilize CBT and ACT to help pt challenge anxious thoughts and reduce intensity/duration of anxious distress.        JOHN Rizvi, [facility], [facility]  February 27, 2023

## 2023-03-06 ENCOUNTER — OFFICE VISIT (OUTPATIENT)
Dept: FAMILY MEDICINE | Facility: CLINIC | Age: 68
End: 2023-03-06
Payer: MEDICARE

## 2023-03-06 ENCOUNTER — NURSE TRIAGE (OUTPATIENT)
Dept: NURSING | Facility: CLINIC | Age: 68
End: 2023-03-06

## 2023-03-06 VITALS
TEMPERATURE: 98.2 F | BODY MASS INDEX: 26.66 KG/M2 | SYSTOLIC BLOOD PRESSURE: 102 MMHG | HEART RATE: 68 BPM | DIASTOLIC BLOOD PRESSURE: 66 MMHG | WEIGHT: 160 LBS | HEIGHT: 65 IN

## 2023-03-06 DIAGNOSIS — E11.9 TYPE 2 DIABETES MELLITUS WITHOUT COMPLICATION, WITHOUT LONG-TERM CURRENT USE OF INSULIN (H): ICD-10-CM

## 2023-03-06 DIAGNOSIS — I34.1 MVP (MITRAL VALVE PROLAPSE): ICD-10-CM

## 2023-03-06 DIAGNOSIS — Z11.59 NEED FOR HEPATITIS C SCREENING TEST: ICD-10-CM

## 2023-03-06 DIAGNOSIS — R30.0 DYSURIA: Primary | ICD-10-CM

## 2023-03-06 DIAGNOSIS — Z11.4 SCREENING FOR HIV (HUMAN IMMUNODEFICIENCY VIRUS): ICD-10-CM

## 2023-03-06 PROBLEM — E66.01 MORBID OBESITY (H): Status: RESOLVED | Noted: 2022-04-14 | Resolved: 2023-03-06

## 2023-03-06 LAB
ALBUMIN UR-MCNC: NEGATIVE MG/DL
APPEARANCE UR: CLEAR
BACTERIA #/AREA URNS HPF: NORMAL /HPF
BILIRUB UR QL STRIP: NEGATIVE
COLOR UR AUTO: YELLOW
GLUCOSE UR STRIP-MCNC: NEGATIVE MG/DL
HBA1C MFR BLD: 5.6 % (ref 0–5.6)
HGB UR QL STRIP: ABNORMAL
KETONES UR STRIP-MCNC: ABNORMAL MG/DL
LEUKOCYTE ESTERASE UR QL STRIP: NEGATIVE
NITRATE UR QL: NEGATIVE
PH UR STRIP: 5 [PH] (ref 5–7)
RBC #/AREA URNS AUTO: NORMAL /HPF
SP GR UR STRIP: 1.02 (ref 1–1.03)
SQUAMOUS #/AREA URNS AUTO: NORMAL /LPF
UROBILINOGEN UR STRIP-ACNC: 0.2 E.U./DL
WBC #/AREA URNS AUTO: NORMAL /HPF

## 2023-03-06 PROCEDURE — 99214 OFFICE O/P EST MOD 30 MIN: CPT | Performed by: FAMILY MEDICINE

## 2023-03-06 PROCEDURE — 36415 COLL VENOUS BLD VENIPUNCTURE: CPT | Performed by: FAMILY MEDICINE

## 2023-03-06 PROCEDURE — 86803 HEPATITIS C AB TEST: CPT | Performed by: FAMILY MEDICINE

## 2023-03-06 PROCEDURE — 87389 HIV-1 AG W/HIV-1&-2 AB AG IA: CPT | Performed by: FAMILY MEDICINE

## 2023-03-06 PROCEDURE — 81001 URINALYSIS AUTO W/SCOPE: CPT | Performed by: FAMILY MEDICINE

## 2023-03-06 PROCEDURE — 83036 HEMOGLOBIN GLYCOSYLATED A1C: CPT | Performed by: FAMILY MEDICINE

## 2023-03-06 RX ORDER — CALCIUM CARBONATE/VITAMIN D3 500-10/5ML
1 LIQUID (ML) ORAL DAILY
COMMUNITY
Start: 2022-11-21 | End: 2024-07-22

## 2023-03-06 RX ORDER — TRIAMCINOLONE ACETONIDE 1 MG/G
CREAM TOPICAL
COMMUNITY
Start: 2022-12-15 | End: 2023-07-12

## 2023-03-06 RX ORDER — FINASTERIDE 5 MG/1
2.5 TABLET, FILM COATED ORAL EVERY MORNING
COMMUNITY
Start: 2022-12-15 | End: 2024-02-27

## 2023-03-06 NOTE — TELEPHONE ENCOUNTER
Triage Call:    Caller: Patient  Pt calling in with new incontinence issue. This has never happened before and has happened like 3 times so far. Happens wether Pt is moving or stationary small amount of leaked urine is expelled without warning or reason. No pain or burning, no flank pain, no fever. Pt is used to knowing the feeling of stress or urge leaks ( sneezing, laughing) but never anything when she has randomly   leaked urine.   Leaving out of town on friday and wondering about possible UTI.    Wednesday morning has  7 am A1c.  Was able to get an appointment today in clinic.     Protocol Recommended Disposition: See in office today     Caller verbalized understanding of instructions and questions answered.      Jayashree Nichole, RN, MN, PHN on 3/6/2023 at 12:50 PM  [facility]  RN utilized sound nursing judgement based on facility triage protocols during this encounter.        Reason for Disposition    Can't control passage of urine (i.e., urinary incontinence) and new-onset (< 2 weeks) or worsening    Additional Information    Negative: Shock suspected (e.g., cold/pale/clammy skin, too weak to stand, low BP, rapid pulse)    Negative: Sounds like a life-threatening emergency to the triager    Negative: Followed a female genital area injury (e.g., vagina, vulva)    Negative: Followed a male genital area injury (penis, scrotum)    Negative: Vaginal discharge    Negative: Pus (white, yellow) or bloody discharge from end of penis    Negative: Pain or burning with passing urine (urination) and pregnant    Negative: Pain or burning with passing urine (urination) and female    Negative: Pain or burning with passing urine (urination) and male    Negative: Pain or itching in the vulvar area    Negative: Pain in scrotum is main symptom    Negative: Blood in the urine is main symptom    Negative: Symptoms arising from use of a urinary catheter (e.g., coude, Gonzalez)    Negative: Unable to urinate (or only  "a few drops) > 4 hours and bladder feels very full (e.g., palpable bladder or strong urge to urinate)    Negative: Decreased urination and drinking very little and dehydration suspected (e.g., dark urine, no urine > 12 hours, very dry mouth, very lightheaded)    Negative: Patient sounds very sick or weak to the triager    Negative: Fever > 100.4 F  (38.0 C)    Negative: Side (flank) or lower back pain present    Answer Assessment - Initial Assessment Questions  1. SYMPTOM: \"What's the main symptom you're concerned about?\" (e.g., frequency, incontinence)      Leaking urine when moving uncontrolled , not constant just every so often     2. ONSET: \"When did the  Urine loss  start?\"      Within the last couple days     3. PAIN: \"Is there any pain?\" If Yes, ask: \"How bad is it?\" (Scale: 1-10; mild, moderate, severe)      No     4. CAUSE: \"What do you think is causing the symptoms?\"      possible UTI and     5. OTHER SYMPTOMS: \"Do you have any other symptoms?\" (e.g., fever, flank pain, blood in urine, pain with urination)      No     6. PREGNANCY: \"Is there any chance you are pregnant?\" \"When was your last menstrual period?\"    Protocols used: URINARY SYMPTOMS-A-[place]      "

## 2023-03-06 NOTE — PROGRESS NOTES
"  Assessment & Plan     Dysuria  Reviewed symptoms with patient.  Given that the symptoms are relatively recent and urine is suspicious for some increased concentration and dehydration, I would recommend pushing fluids, consider using cranberry short-term, and monitoring closely.  If this symptoms are persistent could consider further evaluation either through referral to urogynecology or referral to pelvic floor rehab.  No indication for antibiotics or urine culture at this time  - UA Macro with Reflex to Micro and Culture - lab collect; Future  - UA Macro with Reflex to Micro and Culture - lab collect  - UA Microscopic with Reflex to Culture    Type 2 diabetes mellitus without complication, without long-term current use of insulin (H)  A1c shows excellent control of blood sugars, okay to stay off of metformin, follow-up with primary care physician for ongoing concerns  - Hemoglobin A1c    Screening for HIV (human immunodeficiency virus)  HIV screening ordered by PCP by protocol  - HIV Antigen Antibody Combo    Need for hepatitis C screening test  Hepatitis C screening ordered by PCP by protocol  - Hepatitis C Screen Reflex to HCV RNA Quant and Genotype    MVP (mitral valve prolapse)  Mitral valve prolapse discussed, patient is not having any ongoing issues with palpitations.  Will stay off of metoprolol at this time.  Monitor for any recurrence of palpitations.  Medication list is updated.        35 minutes spent on the date of the encounter doing chart review, history and exam, documentation and further activities per the note       BMI:   Estimated body mass index is 26.83 kg/m  as calculated from the following:    Height as of this encounter: 1.645 m (5' 4.75\").    Weight as of this encounter: 72.6 kg (160 lb).           No follow-ups on file.    Rebecca Baker MD  [facility]    Lori Alanis is a 67 year old, presenting for the following health issues:  Urinary " Problem    Patient with concerns about urinary tract infection but also several other issues that she would like to discuss    Patient has noticed for the past 3 to 4 days that she has not usual sensation as if urine is leaking with a sudden urgency sensation.  Sometimes she is able to void when this happens and sometimes there is minimal urine present no dysuria and no urinary frequency.  Patient notes that she does not drink a lot of fluids When she is at work.  She also recently traveled.  She has not noticed blood in her urine.  There is been no vaginal discharge or pelvic discomfort except for a couple of brief stinging spasms in her suprapubic area.  No fever and no flank pain.    Patient also has questions about type 2 diabetes mellitus.  She has had significant weight loss after an episode of COVID due to loss of appetite.  Weight has been stable for the past few months but noted that metformin seem to make it worse.  She is due for an A1c today which is evaluated and shows excellent control.  She had stopped her metformin and is now diet controlled.  She is curious about diet-controlled diabetes versus prediabetes.  Discussed that at this point we would not make a change to her diagnosis until we have noted some persistence in the low blood sugar levels over the next 12 to 24 months.  If there is evidence of ongoing low A1c despite being off of all medication could consider changing the diagnosis to prediabetes in the future.  Discussed that well-controlled diabetes and prediabetes are very similar in presentation.    Also discussed patient's supplements.  She has been on potassium and magnesium for many years.  Reviewed her chart and she has been on potassium since at least 2012.  Discussed that it is hard to know whether that is necessary if the body works hard to keep those electrolytes in homeostasis.  It is reasonable for her to stop them at some point and recheck but this is something she can discuss  "further with her primary care physician and follow-up    Also discussed history of mitral valve prolapse.  Patient had a history of palpitations that were possibly related.  She had not had any palpitations for quite some time so had cut her metoprolol dose in half.  She has now been off metoprolol completely for the past 4 days.  She has not noticed any recurrence of palpitations.  Discussed that it is reasonable for her to stay off of medication and monitor.  Her blood pressure is excellent and heart rate is controlled so no reason to suspect she will need to restart at this time.    History of Present Illness       Reason for visit:  Possible uti  Symptom onset:  1-3 days ago  Symptom intensity:  Mild  Symptom progression:  Staying the same  Had these symptoms before:  No  What makes it worse:  No  What makes it better:  No    She eats 2-3 servings of fruits and vegetables daily.She consumes 0 sweetened beverage(s) daily.She exercises with enough effort to increase her heart rate 9 or less minutes per day.  She exercises with enough effort to increase her heart rate 3 or less days per week.   She is taking medications regularly.             Review of Systems         Objective    /66 (BP Location: Right arm, Patient Position: Sitting, Cuff Size: Adult Regular)   Pulse 68   Temp 98.2  F (36.8  C) (Temporal)   Ht 1.645 m (5' 4.75\")   Wt 72.6 kg (160 lb)   BMI 26.83 kg/m    Body mass index is 26.83 kg/m .  Physical Exam   Alert cooperative in no acute distress  Heart is regular rate rhythm  No CVA tenderness    Results for orders placed or performed in visit on 03/06/23 (from the past 24 hour(s))   Hemoglobin A1c   Result Value Ref Range    Hemoglobin A1C 5.6 0.0 - 5.6 %   UA Macro with Reflex to Micro and Culture - lab collect    Specimen: Urine, Clean Catch   Result Value Ref Range    Color Urine Yellow Colorless, Straw, Light Yellow, Yellow    Appearance Urine Clear Clear    Glucose Urine Negative " Negative mg/dL    Bilirubin Urine Negative Negative    Ketones Urine Trace (A) Negative mg/dL    Specific Gravity Urine 1.025 1.003 - 1.035    Blood Urine Small (A) Negative    pH Urine 5.0 5.0 - 7.0    Protein Albumin Urine Negative Negative mg/dL    Urobilinogen Urine 0.2 0.2, 1.0 E.U./dL    Nitrite Urine Negative Negative    Leukocyte Esterase Urine Negative Negative   UA Microscopic with Reflex to Culture   Result Value Ref Range    Bacteria Urine None Seen None Seen /HPF    RBC Urine 0-2 0-2 /HPF /HPF    WBC Urine None Seen 0-5 /HPF /HPF    Squamous Epithelials Urine None Seen None Seen /LPF    Narrative    Urine Culture not indicated

## 2023-03-07 LAB
HCV AB SERPL QL IA: NONREACTIVE
HIV 1+2 AB+HIV1 P24 AG SERPL QL IA: NONREACTIVE

## 2023-03-07 ASSESSMENT — PATIENT HEALTH QUESTIONNAIRE - PHQ9
10. IF YOU CHECKED OFF ANY PROBLEMS, HOW DIFFICULT HAVE THESE PROBLEMS MADE IT FOR YOU TO DO YOUR WORK, TAKE CARE OF THINGS AT HOME, OR GET ALONG WITH OTHER PEOPLE: NOT DIFFICULT AT ALL
SUM OF ALL RESPONSES TO PHQ QUESTIONS 1-9: 0
SUM OF ALL RESPONSES TO PHQ QUESTIONS 1-9: 0

## 2023-03-07 ASSESSMENT — ANXIETY QUESTIONNAIRES
4. TROUBLE RELAXING: NOT AT ALL
7. FEELING AFRAID AS IF SOMETHING AWFUL MIGHT HAPPEN: NOT AT ALL
2. NOT BEING ABLE TO STOP OR CONTROL WORRYING: NOT AT ALL
6. BECOMING EASILY ANNOYED OR IRRITABLE: NOT AT ALL
7. FEELING AFRAID AS IF SOMETHING AWFUL MIGHT HAPPEN: NOT AT ALL
GAD7 TOTAL SCORE: 0
3. WORRYING TOO MUCH ABOUT DIFFERENT THINGS: NOT AT ALL
GAD7 TOTAL SCORE: 0
GAD7 TOTAL SCORE: 0
1. FEELING NERVOUS, ANXIOUS, OR ON EDGE: NOT AT ALL
8. IF YOU CHECKED OFF ANY PROBLEMS, HOW DIFFICULT HAVE THESE MADE IT FOR YOU TO DO YOUR WORK, TAKE CARE OF THINGS AT HOME, OR GET ALONG WITH OTHER PEOPLE?: NOT DIFFICULT AT ALL
5. BEING SO RESTLESS THAT IT IS HARD TO SIT STILL: NOT AT ALL
IF YOU CHECKED OFF ANY PROBLEMS ON THIS QUESTIONNAIRE, HOW DIFFICULT HAVE THESE PROBLEMS MADE IT FOR YOU TO DO YOUR WORK, TAKE CARE OF THINGS AT HOME, OR GET ALONG WITH OTHER PEOPLE: NOT DIFFICULT AT ALL

## 2023-03-08 ENCOUNTER — VIRTUAL VISIT (OUTPATIENT)
Dept: BEHAVIORAL HEALTH | Facility: CLINIC | Age: 68
End: 2023-03-08
Payer: MEDICARE

## 2023-03-08 DIAGNOSIS — F41.9 ANXIETY DISORDER, UNSPECIFIED TYPE: Primary | ICD-10-CM

## 2023-03-08 PROCEDURE — 90834 PSYTX W PT 45 MINUTES: CPT | Mod: VID | Performed by: SOCIAL WORKER

## 2023-03-08 NOTE — PROGRESS NOTES
[facility] Primary Care: Integrated Behavioral Health   2023      Behavioral Health Clinician Progress Note    Patient Name: Maritza Hitchcock           Service Type:  Individual      Service Location:   [facility] / Email (patient reached)     Session Start Time: 12:32p  Session End Time:   1:17p      Session Length: 38 - 52      Attendees: Client     Service Modality:  Video Visit:      Provider verified identity through the following two step process.  Patient provided:  Patient  and Patient address    Telemedicine Visit: The patient's condition can be safely assessed and treated via synchronous audio and visual telemedicine encounter.      Reason for Telemedicine Visit: Patient has requested telehealth visit    Originating Site (Patient Location): Patient's home    Distant Site (Provider Location): Provider Remote Setting- Home Office    Consent:  The patient/guardian has verbally consented to: the potential risks and benefits of telemedicine (video visit) versus in person care; bill my insurance or make self-payment for services provided; and responsibility for payment of non-covered services.     Patient would like the video invitation sent by:  My Chart    Mode of Communication:  Video Conference via [employer]    Distant Location (Provider):  On-site    As the provider I attest to compliance with applicable laws and regulations related to telemedicine.    Visit Activities (Refresh list every visit): [facility] Only    Diagnostic Assessment Date:  23   Treatment Plan Review Date:  23    See Flowsheets for today's PHQ-9 and THERESA-7 results  Previous PHQ-9:   PHQ-9 SCORE 3/7/2023   PHQ-9 Total Score MyChart 0   PHQ-9 Total Score 0      PHQ-2 Score:   PHQ-2 (  [employer]) 3/6/2023 2022   Q1: Little interest or pleasure in doing things 0 0   Q2: Feeling down, depressed or hopeless 0 0   PHQ-2 Score 0 0   Q1: Little interest or pleasure in doing things Not at all Not at all   Q2:  "Feeling down, depressed or hopeless Not at all Not at all   PHQ-2 Score 0 0     Previous THERESA-7:   THERESA-7 SCORE 3/7/2023   Total Score 0 (minimal anxiety)   Total Score 0        MADDIE LEVEL:  No flowsheet data found.    DATA  Extended Session (60+ minutes): No  Interactive Complexity: No  Crisis: No  H Patient: No    Treatment Objective(s) Addressed in This Session:  Target Behavior(s): interpersonal stressors    Anxiety: will develop more effective coping skills to manage anxiety symptoms    Current Stressors / Issues:  Traveled last week to [place] with a friend and got to see her \"friend\" perform 3x.   hasn't asked any more questions recently about the money withdrawal.  Reports feeling she is in a good place with her  at this time.    Leaving tomorrow for [place] to see her daughter/family.    Noticing a decline in her mother.  Has a meeting with an agency/resource called \"[employer]\" to find some available help for her mother.      Progress on Treatment Objective(s) / Homework:  Satisfactory progress - ACTION (Actively working towards change); Intervened by reinforcing change plan / affirming steps taken    Motivational Interviewing    MI Intervention: Expressed Empathy/Understanding, Supported Autonomy, Collaboration, Evocation, Permission to raise concern or advise and Open-ended questions     Change Talk Expressed by the Patient: Reasons to change Activation    Provider Response to Change Talk: E - Evoked more info from patient about behavior change, A - Affirmed patient's thoughts, decisions, or attempts at behavior change, R - Reflected patient's change talk and S - Summarized patient's change talk statements      Care Plan review completed: No    Medication Review:  No current psychiatric medications prescribed    Medication Compliance:  NA    Changes in Health Issues:   None reported    Chemical Use Review:   Substance Use: Chemical use reviewed, no active concerns identified      Tobacco Use: No " current tobacco use.      Assessment: Current Emotional / Mental Status (status of significant symptoms):  Risk status (Self / Other harm or suicidal ideation)  Patient denies a history of suicidal ideation, suicide attempts, self-injurious behavior, homicidal ideation, homicidal behavior and and other safety concerns  Patient denies current fears or concerns for personal safety.  Patient denies current or recent suicidal ideation or behaviors.  Patient denies current or recent homicidal ideation or behaviors.  Patient denies current or recent self injurious behavior or ideation.  Patient denies other safety concerns.  A safety and risk management plan has not been developed at this time, however patient was encouraged to call [place] [phone] should there be a change in any of these risk factors.    Appearance:   Appropriate   Eye Contact:   Good   Psychomotor Behavior: Normal   Attitude:   Cooperative   Orientation:   All  Speech   Rate / Production: Normal    Volume:  Normal   Mood:    Anxious   Affect:    Appropriate   Thought Content:  Clear   Thought Form:  Coherent  Logical   Insight:    Good     Diagnoses:  1. Anxiety disorder, unspecified type        Collateral Reports Completed:  Not Applicable    Plan: (Homework, other):  Patient was given information about behavioral services and encouraged to schedule a follow up appointment with the clinic [facility] in 3 weeks.  She was also given information about mental health symptoms and treatment options .  CD Recommendations: No indications of CD issues.  Lyudmila Figueroa, [facility], [facility]                                                Individual Treatment Plan    Patient's Name: Maritza Hitchcock  YOB: 1955    Date of Creation: 2/6/23  Date Treatment Plan Last Reviewed/Revised: 2/6/23    DSM5 Diagnoses: 300.00 (F41.9) Unspecified Anxiety Disorder  Psychosocial / Contextual Factors: relationships, stress with mother  PROMIS (reviewed every 90 days):      Referral / Collaboration:  Referral to another professional/service is not indicated at this time.    Anticipated number of session for this episode of care: 6-9 sessions  Anticipation frequency of session: Every other week  Anticipated Duration of each session: 38-52 minutes  Treatment plan will be reviewed in 90 days or when goals have been changed.       MeasurableTreatment Goal(s) related to diagnosis / functional impairment(s)  Goal 1: Patient will experience a reduction in anxious symptoms, along with a corresponding increase in relaxed emotional state.    I will know I've met my goal when feel more relaxed.      Objective #A (Patient Action)    Patient will use cognitive strategies identified in therapy to challenge anxious thoughts.  Status: New - Date: 2/6/23     Intervention(s)  Therapist will utilize CBT and ACT to help pt challenge anxious thoughts and reduce intensity/duration of anxious distress.        Lyudmila Figueroa, JOHN, [facility], [facility]  March 8, 2023

## 2023-03-13 NOTE — RESULT ENCOUNTER NOTE
Dear Annabelle,     Here are your recent results which are within the expected range. Please continue with your current plan of care and let us know if you have any questions or concerns.    Regards,  Irena Levi MD

## 2023-03-14 NOTE — PROGRESS NOTES
Patient:   ZAK EVERETT            MRN: [id number]            FIN: [id number]               Age:   61 years     Sex:  Female     :  1955   Associated Diagnoses:   Acute URI   Author:   Gerry Crooks MD      Visit Information      Date of Service: 2017 05:59 pm  Performing Location: [facility]  Encounter#: [id number]      Chief Complaint   2017 6:16 PM CST    c/o cough, congestion, bilaterall ear pain, sinus pressure , headaches. Started Sat.        Review of Systems            Health Status   Allergies:    Allergic Reactions (Selected)  No known allergies   Medications:  (Selected)   Prescriptions  Prescribed  Estrace Vaginal 0.1 mg/g vaginal cream: See Instructions, Instructions: INSERT 1 GRAM VAGINALLY THREE TIMES PER WEEK, # 42 gm, 2 Refill(s), Type: Maintenance, Pharmacy: [facility], INSERT 1 GRAM VAGINALLY THREE TIMES PER WEEK  Metoprolol Tartrate 50 mg oral tablet: 0.5 tab(s) ( 25 mg ), po, daily, # 45 tab(s), 3 Refill(s), Type: Maintenance, Pharmacy: [facility], 0.5 tab(s) po daily,x90 day(s)  Zantac 150 oral tablet: 1 tab(s) ( 150 mg ), PO, BID, # 60 tab(s), 2 Refill(s), Type: Maintenance, Pharmacy: [facility], 1 tab(s) po bid  amitriptyline 25 mg oral tablet: 2 tab(s) ( 50 mg ), po, hs, # 180 tab(s), 3 Refill(s), Type: Maintenance, Pharmacy: [id number], 2 tab(s) po hs,x90 day(s)  erythromycin 0.5% ophthalmic ointment: 0.5 in, left eye, QID, # 3.5 g, 0 Refill(s), Type: Maintenance, Pharmacy: [facility], 0.5 in left eye qid  levothyroxine 150 mcg (0.15 mg) oral tablet: 1 tab(s) ( 150 mcg ), po, daily, # 90 tab(s), 1 Refill(s), Type: Maintenance, Pharmacy: [id number], 1 tab(s) po daily  Documented Medications  Documented  Fish Oil: po, daily, 0 Refill(s), Type: Maintenance   Problem list:    All Problems (Selected)  Hypothyroid / 244.9 / Confirmed  GERD (Gastroesophageal Reflux Disease) / 530.81 / Confirmed  MVP  (Mitral Valve Prolapse) / 424.0 / Confirmed  Obese / 278.00 / Probable  Hypertriglyceridemia / 272.1 / Confirmed  Anticoagulated / 249608872 / Confirmed  Personal History of DVT (Deep Vein Thrombosis) / 9048773671 / Confirmed  S/P meniscectomy / 9359457218 / Confirmed      Histories   Past Medical History:    Active  Hypothyroid (244.9)  GERD (Gastroesophageal Reflux Disease) (530.81)  MVP (Mitral Valve Prolapse) (424.0)  Resolved  Pregnancy (725828996):  Resolved in 1982 at 26 years.  Pregnancy (759457886):  Resolved in 1986 at 30 years.  Pregnancy (988110991):  Resolved in 1988 at 32 years.   Family History:    Hypertension  Mother  Heart disease  Father  Grandmother (P)  Goiter  Mother     Procedure history:    Arthroscopic partial medial meniscectomy (SNOMED CT 977550142) on 9/27/2016 at 60 Years.  Comments:  11/10/2016 3:38 PM - Jodi Fiore CMA  Right knee  Dr. Amin at [facility]  Laparoscopic supracervical hysterectomy (SNOMED CT 026621662) performed by Markus Escalante MD on 11/19/2010 at 55 Years.  Comments:  12/12/2010 2:39 PM - Janice Myers  Pre- and post-op diagnosis is menorrhagia  Hysteroscopy, D & C performed by Markus Escalante MD on 4/23/2010 at 54 Years.  Comments:  12/31/2010 1:46 PM - Janice Myers  Large pelvic mass, most consistent with uterine fibroids  Colonoscopy (SNOMED CT 795733548) on 7/26/2007 at 51 Years.  Cholecystectomy (SNOMED CT 90461233).  Comments:  4/21/2010 4:33 PM - Sharon Arevalo MD  Open    Childbirth (SNOMED CT 6930716477).  Comments:  11/1/2011 7:27 PM - Jen Robertson  G3, P2-0-1-2.   Social History:        Alcohol Assessment            Current, 1-2 times per week, 1 drinks/episode average.      Tobacco Assessment            Never      Substance Abuse Assessment            Never      Employment and Education Assessment            Work/School description: homemaker.      Home and Environment Assessment            Marital status: .                     Comments:                       11/01/2011 - Jen Robertson                      - Ricardo.      Exercise and Physical Activity Assessment            Exercise type: gardening.        Physical Examination   Vital Signs   2/14/2017 6:16 PM CST Temperature Tympanic 98.6 DegF    Peripheral Pulse Rate 78 bpm    Systolic Blood Pressure 126 mmHg    Diastolic Blood Pressure 72 mmHg    Mean Arterial Pressure 90 mmHg      Measurements from flowsheet : Measurements   2/14/2017 6:16 PM CST    Ht/Wt Measurement Refused by Patient?     Yes     General:  Alert and oriented, No acute distress.    Eye:  Pupils are equal, round and reactive to light, Normal conjunctiva.    HENT:  Oral mucosa is moist.    Neck:  Supple.    Respiratory:  Lungs are clear to auscultation, Respirations are non-labored.    Cardiovascular:  Normal rate, Regular rhythm, No edema.    Gastrointestinal:  Non-distended.    Musculoskeletal:  Normal gait.    Integumentary:  Warm, No rash.    Psychiatric:  Cooperative, Appropriate mood & affect, Normal judgment.       Impression and Plan   Diagnosis     Acute URI (ASS27-XO J06.9).     Plan:  discussed viral illnesses and what to expect and when to return  discussed symptom control and OTC meds  discussed handwashing and protection from spreading.   English

## 2023-03-29 ENCOUNTER — TRANSFERRED RECORDS (OUTPATIENT)
Dept: HEALTH INFORMATION MANAGEMENT | Facility: CLINIC | Age: 68
End: 2023-03-29
Payer: MEDICARE

## 2023-03-29 ENCOUNTER — VIRTUAL VISIT (OUTPATIENT)
Dept: BEHAVIORAL HEALTH | Facility: CLINIC | Age: 68
End: 2023-03-29
Payer: MEDICARE

## 2023-03-29 DIAGNOSIS — F41.9 ANXIETY DISORDER, UNSPECIFIED TYPE: Primary | ICD-10-CM

## 2023-03-29 LAB — RETINOPATHY: NEGATIVE

## 2023-03-29 PROCEDURE — 90834 PSYTX W PT 45 MINUTES: CPT | Mod: VID | Performed by: SOCIAL WORKER

## 2023-03-29 NOTE — PROGRESS NOTES
[facility] Primary Care: Integrated Behavioral Health   2023      Behavioral Health Clinician Progress Note    Patient Name: Maritza Hitchcock           Service Type:  Individual      [place]   [facility] [place] (patient reached)     Session Start Time: 12:30p  Session End Time:   1:18p      Session Length: 38 - 52      Attendees: Client     Service Modality:  Video Visit:      Provider verified identity through the following two step process.  Patient provided:  Patient  and Patient address    Telemedicine Visit: The patient's condition can be safely assessed and treated via synchronous audio and visual telemedicine encounter.      Reason for Telemedicine Visit: Patient has requested telehealth visit    Originating Site (Patient Location): Patient's home    Distant Site (Provider Location): Provider Remote Setting- Home Office    Consent:  The patient/guardian has verbally consented to: the potential risks and benefits of telemedicine (video visit) versus in person care; bill my insurance or make self-payment for services provided; and responsibility for payment of non-covered services.     Patient would like the video invitation sent by:  My Chart    Mode of Communication:  Video Conference via Amwell    Distant Location (Provider):  On-site    As the provider I attest to compliance with applicable laws and regulations related to telemedicine.    Visit Activities (Refresh list every visit): [facility] Only    Diagnostic Assessment Date:  23   Treatment Plan Review Date:  23    See Flowsheets for today's PHQ-9 and THERESA-7 results  Previous PHQ-9:   PHQ-9 SCORE 3/7/2023   PHQ-9 Total Score MyChart 0   PHQ-9 Total Score 0      PHQ-2 Score:   PHQ-2 (  [employer]) 3/6/2023 2022   Q1: Little interest or pleasure in doing things 0 0   Q2: Feeling down, depressed or hopeless 0 0   PHQ-2 Score 0 0   Q1: Little interest or pleasure in doing things Not at all Not at all  "  Q2: Feeling down, depressed or hopeless Not at all Not at all   PHQ-2 Score 0 0     Previous THERESA-7:   THERESA-7 SCORE 3/7/2023   Total Score 0 (minimal anxiety)   Total Score 0        MADDIE LEVEL:  No flowsheet data found.    DATA  Extended Session (60+ minutes): No  Interactive Complexity: No  Crisis: No  [facility] Patient: No    Treatment Objective(s) Addressed in This Session:  Target Behavior(s): interpersonal stressors    Anxiety: will develop more effective coping skills to manage anxiety symptoms    Current Stressors / Issues:  [place] at [place] and then spent some time with friends that flew in.    When on vacation  was \"pressing for more answers\".  Pt shares that  did log into pt's my chart and read [facility] notes and confronted pt about it what was in the notes.  Pt reports that he also logged into pt's email account.  Processed feelings regarding this and building trust.  Had a PA come out and do an assessment with pt's mother for some programming although unsure of what supports can get.    Has some plans this weekend volunteering and getting together with friends.      Progress on Treatment Objective(s) / Homework:  Satisfactory progress - ACTION (Actively working towards change); Intervened by reinforcing change plan / affirming steps taken    Motivational Interviewing    MI Intervention: Expressed Empathy/Understanding, Supported Autonomy, Collaboration, Evocation, Permission to raise concern or advise and Open-ended questions     Change Talk Expressed by the Patient: Reasons to change Activation    Provider Response to Change Talk: E - Evoked more info from patient about behavior change, A - Affirmed patient's thoughts, decisions, or attempts at behavior change, R - Reflected patient's change talk and S - Summarized patient's change talk statements      Care Plan review completed: No    Medication Review:  No current psychiatric medications prescribed    Medication Compliance:  NA    Changes in " Health Issues:   None reported    Chemical Use Review:   Substance Use: Chemical use reviewed, no active concerns identified      Tobacco Use: No current tobacco use.      Assessment: Current Emotional / Mental Status (status of significant symptoms):  Risk status (Self / Other harm or suicidal ideation)  Patient denies a history of suicidal ideation, suicide attempts, self-injurious behavior, homicidal ideation, homicidal behavior and and other safety concerns  Patient denies current fears or concerns for personal safety.  Patient denies current or recent suicidal ideation or behaviors.  Patient denies current or recent homicidal ideation or behaviors.  Patient denies current or recent self injurious behavior or ideation.  Patient denies other safety concerns.  A safety and risk management plan has not been developed at this time, however patient was encouraged to call [place] [phone] should there be a change in any of these risk factors.    Appearance:   Appropriate   Eye Contact:   Good   Psychomotor Behavior: Normal   Attitude:   Cooperative   Orientation:   All  Speech   Rate / Production: Normal    Volume:  Normal   Mood:    Anxious   Affect:    Appropriate   Thought Content:  Clear   Thought Form:  Coherent  Logical   Insight:    Good     Diagnoses:  1. Anxiety disorder, unspecified type        Collateral Reports Completed:  Not Applicable    Plan: (Homework, other):  Patient was given information about behavioral services and encouraged to schedule a follow up appointment with the clinic [facility] in 2 weeks.  She was also given information about mental health symptoms and treatment options .  CD Recommendations: No indications of CD issues.  CHIN Mirza, [facility]                                                Individual Treatment Plan    Patient's Name: Maritza Hitchcock  YOB: 1955    Date of Creation: 2/6/23  Date Treatment Plan Last Reviewed/Revised: 2/6/23    DSM5 Diagnoses: 300.00  (F41.9) Unspecified Anxiety Disorder  Psychosocial / Contextual Factors: relationships, stress with mother  PROMIS (reviewed every 90 days): 38    Referral / Collaboration:  Referral to another professional/service is not indicated at this time.    Anticipated number of session for this episode of care: 6-9 sessions  Anticipation frequency of session: Every other week  Anticipated Duration of each session: 38-52 minutes  Treatment plan will be reviewed in 90 days or when goals have been changed.       MeasurableTreatment Goal(s) related to diagnosis / functional impairment(s)  Goal 1: Patient will experience a reduction in anxious symptoms, along with a corresponding increase in relaxed emotional state.    I will know I've met my goal when feel more relaxed.      Objective #A (Patient Action)    Patient will use cognitive strategies identified in therapy to challenge anxious thoughts.  Status: New - Date: 2/6/23     Intervention(s)  Therapist will utilize CBT and ACT to help pt challenge anxious thoughts and reduce intensity/duration of anxious distress.        Lyudmila Figueroa, JOHN, [facility], [facility]  March 29, 2023

## 2023-04-06 ENCOUNTER — TRANSFERRED RECORDS (OUTPATIENT)
Dept: HEALTH INFORMATION MANAGEMENT | Facility: CLINIC | Age: 68
End: 2023-04-06

## 2023-04-06 ENCOUNTER — ANCILLARY PROCEDURE (OUTPATIENT)
Dept: GENERAL RADIOLOGY | Facility: CLINIC | Age: 68
End: 2023-04-06
Attending: OPHTHALMOLOGY
Payer: MEDICARE

## 2023-04-06 ENCOUNTER — APPOINTMENT (OUTPATIENT)
Dept: LAB | Facility: CLINIC | Age: 68
End: 2023-04-06
Payer: MEDICARE

## 2023-04-06 ENCOUNTER — MEDICAL CORRESPONDENCE (OUTPATIENT)
Dept: HEALTH INFORMATION MANAGEMENT | Facility: CLINIC | Age: 68
End: 2023-04-06

## 2023-04-06 ENCOUNTER — VIRTUAL VISIT (OUTPATIENT)
Dept: BEHAVIORAL HEALTH | Facility: CLINIC | Age: 68
End: 2023-04-06
Payer: MEDICARE

## 2023-04-06 DIAGNOSIS — F41.9 ANXIETY DISORDER, UNSPECIFIED TYPE: Primary | ICD-10-CM

## 2023-04-06 DIAGNOSIS — H04.012: ICD-10-CM

## 2023-04-06 LAB
ERYTHROCYTE [DISTWIDTH] IN BLOOD BY AUTOMATED COUNT: 13.2 % (ref 10–15)
HCT VFR BLD AUTO: 34.6 % (ref 35–47)
HGB BLD-MCNC: 11.4 G/DL (ref 11.7–15.7)
MCH RBC QN AUTO: 33 PG (ref 26.5–33)
MCHC RBC AUTO-ENTMCNC: 32.9 G/DL (ref 31.5–36.5)
MCV RBC AUTO: 100 FL (ref 78–100)
PLATELET # BLD AUTO: 220 10E3/UL (ref 150–450)
RBC # BLD AUTO: 3.45 10E6/UL (ref 3.8–5.2)
RETINOPATHY: NEGATIVE
RETINOPATHY: NEGATIVE
WBC # BLD AUTO: 6.5 10E3/UL (ref 4–11)

## 2023-04-06 PROCEDURE — 85027 COMPLETE CBC AUTOMATED: CPT | Mod: GA | Performed by: OPHTHALMOLOGY

## 2023-04-06 PROCEDURE — 36415 COLL VENOUS BLD VENIPUNCTURE: CPT | Performed by: OPHTHALMOLOGY

## 2023-04-06 PROCEDURE — 90832 PSYTX W PT 30 MINUTES: CPT | Mod: VID | Performed by: SOCIAL WORKER

## 2023-04-06 PROCEDURE — 82164 ANGIOTENSIN I ENZYME TEST: CPT | Mod: 90 | Performed by: OPHTHALMOLOGY

## 2023-04-06 PROCEDURE — 86481 TB AG RESPONSE T-CELL SUSP: CPT | Performed by: OPHTHALMOLOGY

## 2023-04-06 PROCEDURE — 99000 SPECIMEN HANDLING OFFICE-LAB: CPT | Performed by: OPHTHALMOLOGY

## 2023-04-06 PROCEDURE — 71046 X-RAY EXAM CHEST 2 VIEWS: CPT | Mod: TC | Performed by: RADIOLOGY

## 2023-04-06 NOTE — PROGRESS NOTES
[facility] Primary Care: Integrated Behavioral Health   2023      Behavioral Health Clinician Progress Note    Patient Name: Maritza Hitchcock           Service Type:  Individual      Service Location:   [facility] / Email (patient reached)     Session Start Time: 10:00a  Session End Time:   10:31a      Session Length: 16 - 37      Attendees: Client     Service Modality:  Video Visit:      Provider verified identity through the following two step process.  Patient provided:  Patient  and Patient address    Telemedicine Visit: The patient's condition can be safely assessed and treated via synchronous audio and visual telemedicine encounter.      Reason for Telemedicine Visit: Patient has requested telehealth visit    Originating Site (Patient Location): Patient's home    Distant Site (Provider Location): Provider Remote Setting- Home Office    Consent:  The patient/guardian has verbally consented to: the potential risks and benefits of telemedicine (video visit) versus in person care; bill my insurance or make self-payment for services provided; and responsibility for payment of non-covered services.     Patient would like the video invitation sent by:  My Chart    Mode of Communication:  Video Conference via Amwell    Distant Location (Provider):  On-site    As the provider I attest to compliance with applicable laws and regulations related to telemedicine.    Visit Activities (Refresh list every visit): [facility] Only    Diagnostic Assessment Date:  23   Treatment Plan Review Date:  23    See Flowsheets for today's PHQ-9 and THERESA-7 results  Previous PHQ-9:       3/7/2023     2:41 PM   PHQ-9 SCORE   PHQ-9 Total Score MyChart 0   PHQ-9 Total Score 0      PHQ-2 Score:       3/6/2023     3:24 PM 2022    12:33 PM   PHQ-2 (  [employer])   Q1: Little interest or pleasure in doing things 0 0   Q2: Feeling down, depressed or hopeless 0 0   PHQ-2 Score 0 0   Q1: Little interest or  "pleasure in doing things Not at all Not at all   Q2: Feeling down, depressed or hopeless Not at all Not at all   PHQ-2 Score 0 0     Previous THERESA-7:       3/7/2023     2:42 PM   THERESA-7 SCORE   Total Score 0 (minimal anxiety)   Total Score 0        MADDIE LEVEL:       View : No data to display.                DATA  Extended Session (60+ minutes): No  Interactive Complexity: No  Crisis: No  H Patient: No    Treatment Objective(s) Addressed in This Session:  Target Behavior(s): interpersonal stressors    Anxiety: will develop more effective coping skills to manage anxiety symptoms    Current Stressors / Issues:  Emotional past week with her friend.  Reports he has been wanting information she cannot give him and has been getting impatient and \"threatening\" to not talk to her anymore.  Pt reports getting braver in confronting him and asking questions where as before she wouldn't do that.  Encouraged her to set boundaries so that she doesn't get taken advantage of and to not feel bad about protecting herself.  Did meet with a company that is able to help with meal delivery for her mother.  Pt wants to get going on finding placement options for mother as well.    Is having some issues with an eyelid drooping and has an appt to see a provider today.      Progress on Treatment Objective(s) / Homework:  Satisfactory progress - ACTION (Actively working towards change); Intervened by reinforcing change plan / affirming steps taken    Motivational Interviewing    MI Intervention: Expressed Empathy/Understanding, Supported Autonomy, Collaboration, Evocation, Permission to raise concern or advise and Open-ended questions     Change Talk Expressed by the Patient: Reasons to change Activation    Provider Response to Change Talk: E - Evoked more info from patient about behavior change, A - Affirmed patient's thoughts, decisions, or attempts at behavior change, R - Reflected patient's change talk and S - Summarized patient's change " talk statements      Care Plan review completed: No    Medication Review:  No current psychiatric medications prescribed    Medication Compliance:  NA    Changes in Health Issues:   None reported    Chemical Use Review:   Substance Use: Chemical use reviewed, no active concerns identified      Tobacco Use: No current tobacco use.      Assessment: Current Emotional / Mental Status (status of significant symptoms):  Risk status (Self / Other harm or suicidal ideation)  Patient denies a history of suicidal ideation, suicide attempts, self-injurious behavior, homicidal ideation, homicidal behavior and and other safety concerns  Patient denies current fears or concerns for personal safety.  Patient denies current or recent suicidal ideation or behaviors.  Patient denies current or recent homicidal ideation or behaviors.  Patient denies current or recent self injurious behavior or ideation.  Patient denies other safety concerns.  A safety and risk management plan has not been developed at this time, however patient was encouraged to call [place] [phone] should there be a change in any of these risk factors.    Appearance:   Appropriate   Eye Contact:   Good   Psychomotor Behavior: Normal   Attitude:   Cooperative   Orientation:   All  Speech   Rate / Production: Normal    Volume:  Normal   Mood:    Anxious   Affect:    Appropriate   Thought Content:  Clear   Thought Form:  Coherent  Logical   Insight:    Good     Diagnoses:  1. Anxiety disorder, unspecified type        Collateral Reports Completed:  Not Applicable    Plan: (Homework, other):  Patient was given information about behavioral services and encouraged to schedule a follow up appointment with the clinic [facility] in 1 week.  She was also given information about mental health symptoms and treatment options .  CD Recommendations: No indications of CD issues.  Lyudmila Figueroa, CHIN, [facility]                                                Individual Treatment  Plan    Patient's Name: Maritza Hitchcock  YOB: 1955    Date of Creation: 2/6/23  Date Treatment Plan Last Reviewed/Revised: 2/6/23    DSM5 Diagnoses: 300.00 (F41.9) Unspecified Anxiety Disorder  Psychosocial / Contextual Factors: relationships, stress with mother  PROMIS (reviewed every 90 days): 38    Referral / Collaboration:  Referral to another professional/service is not indicated at this time.    Anticipated number of session for this episode of care: 6-9 sessions  Anticipation frequency of session: Every other week  Anticipated Duration of each session: 38-52 minutes  Treatment plan will be reviewed in 90 days or when goals have been changed.       MeasurableTreatment Goal(s) related to diagnosis / functional impairment(s)  Goal 1: Patient will experience a reduction in anxious symptoms, along with a corresponding increase in relaxed emotional state.    I will know I've met my goal when feel more relaxed.      Objective #A (Patient Action)    Patient will use cognitive strategies identified in therapy to challenge anxious thoughts.  Status: New - Date: 2/6/23     Intervention(s)  Therapist will utilize CBT and ACT to help pt challenge anxious thoughts and reduce intensity/duration of anxious distress.        Lyudmila Figueroa, JOHN, LICSW, [facility]  April 6, 2023

## 2023-04-07 ENCOUNTER — MEDICAL CORRESPONDENCE (OUTPATIENT)
Dept: HEALTH INFORMATION MANAGEMENT | Facility: CLINIC | Age: 68
End: 2023-04-07
Payer: MEDICARE

## 2023-04-08 ENCOUNTER — TRANSFERRED RECORDS (OUTPATIENT)
Dept: HEALTH INFORMATION MANAGEMENT | Facility: CLINIC | Age: 68
End: 2023-04-08
Payer: MEDICARE

## 2023-04-08 LAB
ACE SERPL-CCNC: 28 U/L
GAMMA INTERFERON BACKGROUND BLD IA-ACNC: 0.02 IU/ML
M TB IFN-G BLD-IMP: NEGATIVE
M TB IFN-G CD4+ BCKGRND COR BLD-ACNC: 9.98 IU/ML
MITOGEN IGNF BCKGRD COR BLD-ACNC: -0.01 IU/ML
MITOGEN IGNF BCKGRD COR BLD-ACNC: -0.01 IU/ML
QUANTIFERON MITOGEN: 10 IU/ML
QUANTIFERON NIL TUBE: 0.02 IU/ML
QUANTIFERON TB1 TUBE: 0.01 IU/ML
QUANTIFERON TB2 TUBE: 0.01
RETINOPATHY: NEGATIVE
RETINOPATHY: NEGATIVE

## 2023-04-09 ENCOUNTER — MYC MEDICAL ADVICE (OUTPATIENT)
Dept: FAMILY MEDICINE | Facility: CLINIC | Age: 68
End: 2023-04-09
Payer: MEDICARE

## 2023-04-10 ENCOUNTER — TRANSCRIBE ORDERS (OUTPATIENT)
Dept: OTHER | Age: 68
End: 2023-04-10

## 2023-04-10 ENCOUNTER — TELEPHONE (OUTPATIENT)
Dept: OPHTHALMOLOGY | Facility: CLINIC | Age: 68
End: 2023-04-10
Payer: MEDICARE

## 2023-04-10 DIAGNOSIS — H04.002: Primary | ICD-10-CM

## 2023-04-10 NOTE — TELEPHONE ENCOUNTER
"Spoke with patient regarding scheduling with  for: \"Lacrimal gland enlargement, ptosis, MRI 4/6/23 pushing to PACS, cole/GRADY photos/PVF/ptosis photos\"-Per Osei Zhang MD. Scheduled patient as offered and provided appointment details over the phone.-Per Patient   "

## 2023-04-11 NOTE — TELEPHONE ENCOUNTER
FUTURE VISIT INFORMATION      FUTURE VISIT INFORMATION:    Date: 4/20/23    Time: 9:30am    [place] [facility]  [place]FORMATION:    Referring provider:  Osei Zhang MD    Referring providers clinic:  [facility]    Reason for visit/diagnosis  Lacrimal gland enlargement, ptosis, MRI 4/6/23 pushing to PACS, cole/GRADY photos/PVF/ptosis photos    RECORDS REQUESTED FROM:       Clinic name Comments Records Status Imaging Status   [facility] Recs scanned into chart under 2/27/23 EPIC    Imaging MR Head done 4/6/23- [facility]- requested image be pushed- saved to PACS Care Everywhere PAC

## 2023-04-12 ENCOUNTER — VIRTUAL VISIT (OUTPATIENT)
Dept: BEHAVIORAL HEALTH | Facility: CLINIC | Age: 68
End: 2023-04-12
Payer: MEDICARE

## 2023-04-12 DIAGNOSIS — F41.9 ANXIETY DISORDER, UNSPECIFIED TYPE: Primary | ICD-10-CM

## 2023-04-12 PROCEDURE — 90837 PSYTX W PT 60 MINUTES: CPT | Mod: VID | Performed by: SOCIAL WORKER

## 2023-04-12 NOTE — PROGRESS NOTES
[facility] Primary Care: Integrated Behavioral Health   2023      Behavioral Health Clinician Progress Note    Patient Name: Maritza Hitchcock           Service Type:  Individual      Service Location:   [facility] / Email (patient reached)     Session Start Time: 1:02p  Session End Time:   2:00p      Session Length: 53 - 60      Attendees: Client     Service Modality:  Video Visit:      Provider verified identity through the following two step process.  Patient provided:  Patient  and Patient address    Telemedicine Visit: The patient's condition can be safely assessed and treated via synchronous audio and visual telemedicine encounter.      Reason for Telemedicine Visit: Patient has requested telehealth visit    Originating Site (Patient Location): Patient's home/car (parked)    Distant Site (Provider Location): Provider Remote Setting- Home Office    Consent:  The patient/guardian has verbally consented to: the potential risks and benefits of telemedicine (video visit) versus in person care; bill my insurance or make self-payment for services provided; and responsibility for payment of non-covered services.     Patient would like the video invitation sent by:  My Chart    Mode of Communication:  Video Conference via Amwell    Distant Location (Provider):  Off-site    As the provider I attest to compliance with applicable laws and regulations related to telemedicine.    Visit Activities (Refresh list every visit): [facility] [place] Only    Diagnostic Assessment Date:  23   Treatment Plan Review Date:  23    See Flowsheets for today's PHQ-9 and THERESA-7 results  Previous PHQ-9:       3/7/2023     2:41 PM   PHQ-9 SCORE   PHQ-9 Total Score MyChart 0   PHQ-9 Total Score 0      PHQ-2 Score:       3/6/2023     3:24 PM 2022    12:33 PM   PHQ-2 (  [employer])   Q1: Little interest or pleasure in doing things 0 0   Q2: Feeling down, depressed or hopeless 0 0   PHQ-2 Score 0 0   Q1: Little  "interest or pleasure in doing things Not at all Not at all   Q2: Feeling down, depressed or hopeless Not at all Not at all   PHQ-2 Score 0 0     Previous THERESA-7:       3/7/2023     2:42 PM   THERESA-7 SCORE   Total Score 0 (minimal anxiety)   Total Score 0        MADDIE LEVEL:       View : No data to display.                DATA  Extended Session (60+ minutes): No  Interactive Complexity: No  Crisis: No  H Patient: No    Treatment Objective(s) Addressed in This Session:  Target Behavior(s): interpersonal stressors    Anxiety: will develop more effective coping skills to manage anxiety symptoms    Current Stressors / Issues:  Having issues with eye and did see a provider and was sent to hospital for an MRI, blood work, and a chest xray to rule out TB. Tests showed swollen tear gland.  Also having increased headaches the past few weeks.  Reports feeling a bit worried.  [facility]  Has an appt tomorrow with PCP.  Relationship with friend is up and down.  Reports heart is \"not wanting to lose the friendship\" but her head is saying \"you are a fool\".  Discussed a bit about whether or not she has concerns that her emotional friend may not be who he says he is.  Pt admits that she has had that concern but that her friend has reassured her enough at this time. Shares that she does have a facebook friend she has opened up to about her friendship and that her facebook friend has also expressed concern and has been helpful in having her look at things in a different way.        Progress on Treatment Objective(s) / Homework:  Worsening - ACTION (Actively working towards change); Intervened by reinforcing change plan / affirming steps taken    Increased anxiety this week    Motivational Interviewing    MI Intervention: Expressed Empathy/Understanding, Supported Autonomy, Collaboration, Evocation, Permission to raise concern or advise and Open-ended questions     Change Talk Expressed by the " Patient: Reasons to change Activation    Provider Response to Change Talk: E - Evoked more info from patient about behavior change, A - Affirmed patient's thoughts, decisions, or attempts at behavior change, R - Reflected patient's change talk and S - Summarized patient's change talk statements      Care Plan review completed: No    Medication Review:  No current psychiatric medications prescribed    Medication Compliance:  NA    Changes in Health Issues:   None reported    Chemical Use Review:   Substance Use: Chemical use reviewed, no active concerns identified      Tobacco Use: No current tobacco use.      Assessment: Current Emotional / Mental Status (status of significant symptoms):  Risk status (Self / Other harm or suicidal ideation)  Patient denies a history of suicidal ideation, suicide attempts, self-injurious behavior, homicidal ideation, homicidal behavior and and other safety concerns  Patient denies current fears or concerns for personal safety.  Patient denies current or recent suicidal ideation or behaviors.  Patient denies current or recent homicidal ideation or behaviors.  Patient denies current or recent self injurious behavior or ideation.  Patient denies other safety concerns.  A safety and risk management plan has not been developed at this time, however patient was encouraged to call [place] [phone] should there be a change in any of these risk factors.    Appearance:   Appropriate   Eye Contact:   Good   Psychomotor Behavior: Normal   Attitude:   Cooperative   Orientation:   All  Speech   Rate / Production: Normal    Volume:  Normal   Mood:    Anxious   Affect:    Flat  Tearful  Thought Content:  Clear   Thought Form:  Coherent  Logical   Insight:    Good     Diagnoses:  1. Anxiety disorder, unspecified type        Collateral Reports Completed:  Not Applicable    Plan: (Homework, other):  Patient was given information about behavioral services and encouraged to schedule a follow up  appointment with the clinic [facility] in 1 week.  She was also given information about mental health symptoms and treatment options .  CD Recommendations: No indications of CD issues.  Lyudmila Figueroa, CHIN, [facility]                                                Individual Treatment Plan    Patient's Name: Maritza Hitchcock  YOB: 1955    Date of Creation: 2/6/23  Date Treatment Plan Last Reviewed/Revised: 2/6/23    DSM5 Diagnoses: 300.00 (F41.9) Unspecified Anxiety Disorder  Psychosocial / Contextual Factors: relationships, stress with mother, worry about eye  PROMIS (reviewed every 90 days): 38    Referral / Collaboration:  Referral to another professional/service is not indicated at this time.    Anticipated number of session for this episode of care: 6-9 sessions  Anticipation frequency of session: Every other week  Anticipated Duration of each session: 38-52 minutes  Treatment plan will be reviewed in 90 days or when goals have been changed.       MeasurableTreatment Goal(s) related to diagnosis / functional impairment(s)  Goal 1: Patient will experience a reduction in anxious symptoms, along with a corresponding increase in relaxed emotional state.    I will know I've met my goal when feel more relaxed.      Objective #A (Patient Action)    Patient will use cognitive strategies identified in therapy to challenge anxious thoughts.  Status: New - Date: 2/6/23     Intervention(s)  Therapist will utilize CBT and ACT to help pt challenge anxious thoughts and reduce intensity/duration of anxious distress.        JOHN Rizvi, CHIN, [facility]  April 12, 2023

## 2023-04-13 ENCOUNTER — OFFICE VISIT (OUTPATIENT)
Dept: FAMILY MEDICINE | Facility: CLINIC | Age: 68
End: 2023-04-13
Payer: MEDICARE

## 2023-04-13 VITALS
HEART RATE: 70 BPM | TEMPERATURE: 98.4 F | RESPIRATION RATE: 16 BRPM | DIASTOLIC BLOOD PRESSURE: 67 MMHG | OXYGEN SATURATION: 97 % | SYSTOLIC BLOOD PRESSURE: 103 MMHG | BODY MASS INDEX: 26.49 KG/M2 | HEIGHT: 65 IN | WEIGHT: 159 LBS

## 2023-04-13 DIAGNOSIS — E11.9 TYPE 2 DIABETES MELLITUS WITHOUT COMPLICATION, WITHOUT LONG-TERM CURRENT USE OF INSULIN (H): ICD-10-CM

## 2023-04-13 DIAGNOSIS — E78.1 HYPERTRIGLYCERIDEMIA: ICD-10-CM

## 2023-04-13 DIAGNOSIS — H04.002 DACRYOADENITIS OF LEFT LACRIMAL GLAND: Primary | ICD-10-CM

## 2023-04-13 DIAGNOSIS — R76.8 ELEVATED ANTINUCLEAR ANTIBODY (ANA) LEVEL: ICD-10-CM

## 2023-04-13 LAB
ALBUMIN SERPL BCG-MCNC: 4.8 G/DL (ref 3.5–5.2)
ALP SERPL-CCNC: 65 U/L (ref 35–104)
ALT SERPL W P-5'-P-CCNC: 21 U/L (ref 10–35)
ANION GAP SERPL CALCULATED.3IONS-SCNC: 11 MMOL/L (ref 7–15)
AST SERPL W P-5'-P-CCNC: 27 U/L (ref 10–35)
BILIRUB SERPL-MCNC: 0.4 MG/DL
BUN SERPL-MCNC: 17 MG/DL (ref 8–23)
CALCIUM SERPL-MCNC: 9.7 MG/DL (ref 8.8–10.2)
CHLORIDE SERPL-SCNC: 103 MMOL/L (ref 98–107)
CHOLEST SERPL-MCNC: 128 MG/DL
CREAT SERPL-MCNC: 0.78 MG/DL (ref 0.51–0.95)
DEPRECATED HCO3 PLAS-SCNC: 26 MMOL/L (ref 22–29)
GFR SERPL CREATININE-BSD FRML MDRD: 84 ML/MIN/1.73M2
GLUCOSE SERPL-MCNC: 103 MG/DL (ref 70–99)
HDLC SERPL-MCNC: 42 MG/DL
LDLC SERPL CALC-MCNC: 51 MG/DL
NONHDLC SERPL-MCNC: 86 MG/DL
POTASSIUM SERPL-SCNC: 3.9 MMOL/L (ref 3.4–5.3)
PROT SERPL-MCNC: 7.3 G/DL (ref 6.4–8.3)
SODIUM SERPL-SCNC: 140 MMOL/L (ref 136–145)
TRIGL SERPL-MCNC: 176 MG/DL

## 2023-04-13 PROCEDURE — 86698 HISTOPLASMA ANTIBODY: CPT | Mod: 59 | Performed by: FAMILY MEDICINE

## 2023-04-13 PROCEDURE — 82784 ASSAY IGA/IGD/IGG/IGM EACH: CPT | Performed by: FAMILY MEDICINE

## 2023-04-13 PROCEDURE — 86039 ANTINUCLEAR ANTIBODIES (ANA): CPT | Performed by: FAMILY MEDICINE

## 2023-04-13 PROCEDURE — 86160 COMPLEMENT ANTIGEN: CPT | Mod: 59 | Performed by: FAMILY MEDICINE

## 2023-04-13 PROCEDURE — 86036 ANCA SCREEN EACH ANTIBODY: CPT | Performed by: FAMILY MEDICINE

## 2023-04-13 PROCEDURE — 86037 ANCA TITER EACH ANTIBODY: CPT | Performed by: FAMILY MEDICINE

## 2023-04-13 PROCEDURE — 84550 ASSAY OF BLOOD/URIC ACID: CPT | Performed by: FAMILY MEDICINE

## 2023-04-13 PROCEDURE — 85549 MURAMIDASE: CPT | Mod: 90 | Performed by: FAMILY MEDICINE

## 2023-04-13 PROCEDURE — 36415 COLL VENOUS BLD VENIPUNCTURE: CPT | Performed by: FAMILY MEDICINE

## 2023-04-13 PROCEDURE — 80053 COMPREHEN METABOLIC PANEL: CPT | Performed by: FAMILY MEDICINE

## 2023-04-13 PROCEDURE — 86038 ANTINUCLEAR ANTIBODIES: CPT | Performed by: FAMILY MEDICINE

## 2023-04-13 PROCEDURE — 82164 ANGIOTENSIN I ENZYME TEST: CPT | Mod: 90 | Performed by: FAMILY MEDICINE

## 2023-04-13 PROCEDURE — 80061 LIPID PANEL: CPT | Performed by: FAMILY MEDICINE

## 2023-04-13 PROCEDURE — 86618 LYME DISEASE ANTIBODY: CPT | Performed by: FAMILY MEDICINE

## 2023-04-13 PROCEDURE — 86235 NUCLEAR ANTIGEN ANTIBODY: CPT | Performed by: FAMILY MEDICINE

## 2023-04-13 PROCEDURE — 86160 COMPLEMENT ANTIGEN: CPT | Performed by: FAMILY MEDICINE

## 2023-04-13 PROCEDURE — 99000 SPECIMEN HANDLING OFFICE-LAB: CPT | Performed by: FAMILY MEDICINE

## 2023-04-13 PROCEDURE — 82785 ASSAY OF IGE: CPT | Performed by: FAMILY MEDICINE

## 2023-04-13 PROCEDURE — 82787 IGG 1 2 3 OR 4 EACH: CPT | Performed by: FAMILY MEDICINE

## 2023-04-13 PROCEDURE — 83615 LACTATE (LD) (LDH) ENZYME: CPT | Performed by: FAMILY MEDICINE

## 2023-04-13 PROCEDURE — 86612 BLASTOMYCES ANTIBODY: CPT | Mod: 90 | Performed by: FAMILY MEDICINE

## 2023-04-13 PROCEDURE — 86606 ASPERGILLUS ANTIBODY: CPT | Mod: 90 | Performed by: FAMILY MEDICINE

## 2023-04-13 PROCEDURE — 99215 OFFICE O/P EST HI 40 MIN: CPT | Performed by: FAMILY MEDICINE

## 2023-04-13 PROCEDURE — 86635 COCCIDIOIDES ANTIBODY: CPT | Mod: 90 | Performed by: FAMILY MEDICINE

## 2023-04-13 RX ORDER — ATORVASTATIN CALCIUM 40 MG/1
40 TABLET, FILM COATED ORAL DAILY
Qty: 90 TABLET | Refills: 3 | Status: SHIPPED | OUTPATIENT
Start: 2023-04-13 | End: 2024-07-01

## 2023-04-13 NOTE — PROGRESS NOTES
Assessment & Plan   Problem List Items Addressed This Visit        Endocrine    Hypertriglyceridemia    Relevant Medications    atorvastatin (LIPITOR) 40 MG tablet    Type 2 diabetes mellitus without complication, without long-term current use of insulin (H)    Relevant Orders    Lipid panel reflex to direct LDL Fasting   Other Visit Diagnoses     Dacryoadenitis of left lacrimal gland    -  Primary    Relevant Orders    IgG subclasses    IgE    Complement C3    Complement C4    Anti Nuclear Ghislaine IgG by IFA with Reflex    Comprehensive metabolic panel (BMP + Alb, Alk Phos, ALT, AST, Total. Bili, TP)    ASPERGILLUS ANTIBODY    BLASTOMYCES ANTIBODY ID    COCCIDIOIDES ANTIBODY    Histoplasma Ghislaine by Immunodiffusion    Lyme Disease Total Abs Bld with Reflex to Confirm CLIA         Pt is here today with her  with note of swelling above her left eye.  Note from ophthalmology and recent MRI were both reviewed with patient than up-to-date was reviewed to help us get an idea about what labs might be helpful to have available to review with ophthalmology next week.  She does have an appointment with ophthalmology regarding this swelling.  Have also sent a message to her ophthalmologist through [employer] to see if he has any other recommendations for additional labs for us to get done for them to have available for review next week.  We discussed that she will likely need a lacrimal gland biopsy and found some information to share with her about what this procedure is and how it is done.  Of course she will need to review this in more detail with ophthalmology next week.  We discussed that looks like there are many reasons that someone can get swelling of the lacrimal gland ranging from infectious causes to autoimmune causes to the possibility of a malignancy.    She will follow-up with me as needed.    Total time spent reviewing chart and preparing for appointment, with patient for appointment, and time spent charting  "and coordinating care on the day of the appointment in minutes was: 53             BMI:   Estimated body mass index is 26.66 kg/m  as calculated from the following:    Height as of this encounter: 1.645 m (5' 4.75\").    Weight as of this encounter: 72.1 kg (159 lb).           Irena Levi MD  [facility]    Lori Alanis is a 67 year old, presenting for the following health issues:  Follow Up (Results and blood work. Eyelid drooping ) and Eye Problem (Pt c/o Left eye drooping. She has been on abx. She now has a cyst corner of left eye. )         View : No data to display.              History of Present Illness       Reason for visit:  Eye, lung, meds  Symptom onset:  3-4 weeks ago  Symptoms include:  Problems with drooping eyelid  Symptom intensity:  Severe  Symptom progression:  Worsening  Had these symptoms before:  No  What makes it worse:  The longer I'm up the worse it gets  What makes it better:  Not really. Temporary relief from Tylenol    She eats 2-3 servings of fruits and vegetables daily.She consumes 0 sweetened beverage(s) daily.She exercises with enough effort to increase her heart rate 9 or less minutes per day.  She exercises with enough effort to increase her heart rate 3 or less days per week.   She is taking medications regularly.               Review of Systems         Objective    /67 (BP Location: Right arm)   Pulse 70   Temp 98.4  F (36.9  C) (Tympanic)   Resp 16   Ht 1.645 m (5' 4.75\")   Wt 72.1 kg (159 lb)   SpO2 97%   BMI 26.66 kg/m    Body mass index is 26.66 kg/m .  Physical Exam                       "

## 2023-04-14 LAB
ANA PAT SER IF-IMP: ABNORMAL
ANA SER QL IF: POSITIVE
ANA TITR SER IF: ABNORMAL {TITER}
B BURGDOR IGG+IGM SER QL: 0.14
C3 SERPL-MCNC: 109 MG/DL (ref 81–157)
C4 SERPL-MCNC: 22 MG/DL (ref 13–39)
IGE SERPL-ACNC: <2 KU/L (ref 0–114)
IGG SERPL-MCNC: 796 MG/DL (ref 610–1616)
IGG1 SER-MCNC: 366 MG/DL (ref 382–929)
IGG2 SER-MCNC: 304 MG/DL (ref 242–700)
IGG3 SER-MCNC: 57 MG/DL (ref 22–176)
IGG4 SER-MCNC: 17 MG/DL (ref 4–86)
SUBCLASSES, PERCENT: 93 %

## 2023-04-15 LAB
LDH SERPL L TO P-CCNC: 202 U/L (ref 0–250)
URATE SERPL-MCNC: 4.3 MG/DL (ref 2.4–5.7)

## 2023-04-17 LAB
ACE SERPL-CCNC: 28 U/L
ANCA AB PATTERN SER IF-IMP: NORMAL
C-ANCA TITR SER IF: NORMAL {TITER}
COCCIDIOIDES AB TITR SER CF: NORMAL {TITER}

## 2023-04-18 LAB
ASPERGILLUS AB SER QL ID: NOT DETECTED
B DERMAT AB SER QL ID: NOT DETECTED
ENA SM IGG SER IA-ACNC: <0.7 U/ML
ENA SM IGG SER IA-ACNC: NEGATIVE
ENA SS-A AB SER IA-ACNC: 0.5 U/ML
ENA SS-A AB SER IA-ACNC: NEGATIVE
ENA SS-B IGG SER IA-ACNC: 0.7 U/ML
ENA SS-B IGG SER IA-ACNC: NEGATIVE

## 2023-04-19 ENCOUNTER — VIRTUAL VISIT (OUTPATIENT)
Dept: BEHAVIORAL HEALTH | Facility: CLINIC | Age: 68
End: 2023-04-19
Payer: MEDICARE

## 2023-04-19 DIAGNOSIS — F41.9 ANXIETY DISORDER, UNSPECIFIED TYPE: Primary | ICD-10-CM

## 2023-04-19 PROCEDURE — 90834 PSYTX W PT 45 MINUTES: CPT | Mod: VID | Performed by: SOCIAL WORKER

## 2023-04-19 NOTE — PROGRESS NOTES
[facility]: Integrated Behavioral Health   2023      Behavioral Health Clinician Progress Note    Patient Name: Maritza Hitchcock           Service Type:  Individual      [facility] (patient reached[facility]     Session Start Time: 11:02a  Session End Time:   11:53a      Session Length: 38 - 52      Attendees: Client     Service Modality:  Video Visit:      Provider verified identity through the following two step process.  Patient provided:  Patient  and Patient address    Telemedicine Visit: The patient's condition can be safely assessed and treated via synchronous audio and visual telemedicine encounter.      Reason for Telemedicine Visit: Patient has requested telehealth visit    Originating Site (Patient Location): Patient's home/car (parked)    Distant Site (Provider Location): Provider Remote Setting- Home Office    Consent:  The patient/guardian has verbally consented to: the potential risks and benefits of telemedicine (video visit) versus in person care; bill my insurance or make self-payment for services provided; and responsibility for payment of non-covered services.     Patient would like the video invitation sent by:  My Chart    Mode of Communication:  Video Conference via [employer]    Distant Location (Provider):  Off-site    As the provider I attest to compliance with applicable laws and regulations related to telemedicine.    Visit Activities (Refresh list every visit): [facility] [place] Only    Diagnostic Assessment Date:  23   Treatment Plan Review Date:  23    See Flowsheets for today's PHQ-9 and THERESA-7 results  Previous PHQ-9:       3/7/2023     2:41 PM 2023    10:53 AM   PHQ-9 SCORE   PHQ-9 Total Score MyChart 0 0   PHQ-9 Total Score 0 0      PHQ-2 Score:       3/6/2023     3:24 PM 2022    12:33 PM   PHQ-2 (  [employer])   Q1: Little interest or pleasure in doing things 0 0   Q2: Feeling down, depressed or hopeless 0 0  "  PHQ-2 Score 0 0   Q1: Little interest or pleasure in doing things Not at all Not at all   Q2: Feeling down, depressed or hopeless Not at all Not at all   PHQ-2 Score 0 0     Previous THERESA-7:       3/7/2023     2:42 PM   THERESA-7 SCORE   Total Score 0 (minimal anxiety)   Total Score 0        MADDIE LEVEL:       View : No data to display.                DATA  Extended Session (60+ minutes): No  Interactive Complexity: No  Crisis: No  [facility] Patient: No    Treatment Objective(s) Addressed in This Session:  Target Behavior(s): interpersonal stressors    Anxiety: will develop more effective coping skills to manage anxiety symptoms    Current Stressors / Issues:  Continues with worsening eye lid swelling.  Is trying not to worry too much but is having a hard time not thinking about \"worst case scenarios\".  Had a PCP appt and PCP was able to connect with the specialist pt will be seeing tomorrow.  Additional blood work was ordered so that pt will be prepared for her appt tomorrow.  Specialist informed PCP that they will likely be doing a biopsy at the appointment tomorrow.    Trying to keep self busy with activities to keep her mind off her eye.  Discussed activities she is looking forward to with spring coming.  Discussed ways to challenge her anxious thoughts as well.  Reports that nothing much is new with her friend and that they have continued to communicate.  Friend has asked for money although pt shares that she has made it clear to him that she cannot help due to having no access to their bank account.  Discussed pros vs cons of the relationship and pt shares that the pros of the relationship remain important to her.        Progress on Treatment Objective(s) / Homework:  Worsening - ACTION (Actively working towards change); Intervened by reinforcing change plan / affirming steps taken    Increased anxiety this week    Motivational Interviewing    MI Intervention: Expressed Empathy/Understanding, Supported Autonomy, " Collaboration, Evocation, Permission to raise concern or advise and Open-ended questions     Change Talk Expressed by the Patient: Reasons to change Activation    Provider Response to Change Talk: E - Evoked more info from patient about behavior change, A - Affirmed patient's thoughts, decisions, or attempts at behavior change, R - Reflected patient's change talk and S - Summarized patient's change talk statements      Care Plan review completed: No    Medication Review:  No current psychiatric medications prescribed    Medication Compliance:  NA    Changes in Health Issues:   None reported    Chemical Use Review:   Substance Use: Chemical use reviewed, no active concerns identified      Tobacco Use: No current tobacco use.      Assessment: Current Emotional / Mental Status (status of significant symptoms):  Risk status (Self / Other harm or suicidal ideation)  Patient denies a history of suicidal ideation, suicide attempts, self-injurious behavior, homicidal ideation, homicidal behavior and and other safety concerns  Patient denies current fears or concerns for personal safety.  Patient denies current or recent suicidal ideation or behaviors.  Patient denies current or recent homicidal ideation or behaviors.  Patient denies current or recent self injurious behavior or ideation.  Patient denies other safety concerns.  A safety and risk management plan has not been developed at this time, however patient was encouraged to call [place] [phone] should there be a change in any of these risk factors.    Appearance:   Appropriate   Eye Contact:   Good   Psychomotor Behavior: Normal   Attitude:   Cooperative   Orientation:   All  Speech   Rate / Production: Normal    Volume:  Normal   Mood:    Anxious   Affect:    Flat  Tearful  Thought Content:  Clear   Thought Form:  Coherent  Logical   Insight:    Good     Diagnoses:  (F41.9) Anxiety disorder, unspecified type  (primary encounter diagnosis)    Collateral Reports  Completed:  Not Applicable    Plan: (Homework, other):  Patient was given information about behavioral services and encouraged to schedule a follow up appointment with the clinic [facility] 1-2 weeks.  She was also given information about mental health symptoms and treatment options .  CD Recommendations: No indications of CD issues.  Lyudmila Figueroa, CHIN, [facility]                                                Individual Treatment Plan    Patient's Name: Maritza Hitchcock  YOB: 1955    Date of Creation: 2/6/23  Date Treatment Plan Last Reviewed/Revised: 2/6/23    DSM5 Diagnoses: 300.00 (F41.9) Unspecified Anxiety Disorder  Psychosocial / Contextual Factors: relationships, stress with mother, worry about eye  PROMIS (reviewed every 90 days): 38    Referral / Collaboration:  Referral to another professional/service is not indicated at this time.    Anticipated number of session for this episode of care: 6-9 sessions  Anticipation frequency of session: Every other week  Anticipated Duration of each session: 38-52 minutes  Treatment plan will be reviewed in 90 days or when goals have been changed.       MeasurableTreatment Goal(s) related to diagnosis / functional impairment(s)  Goal 1: Patient will experience a reduction in anxious symptoms, along with a corresponding increase in relaxed emotional state.    I will know I've met my goal when feel more relaxed.      Objective #A (Patient Action)    Patient will use cognitive strategies identified in therapy to challenge anxious thoughts.  Status: New - Date: 2/6/23     Intervention(s)  Therapist will utilize CBT and ACT to help pt challenge anxious thoughts and reduce intensity/duration of anxious distress.        Lyudmila Figueroa, JOHN, CHIN, [facility]  April 19, 2023

## 2023-04-20 ENCOUNTER — OFFICE VISIT (OUTPATIENT)
Dept: OPHTHALMOLOGY | Facility: CLINIC | Age: 68
End: 2023-04-20
Attending: OPHTHALMOLOGY
Payer: MEDICARE

## 2023-04-20 ENCOUNTER — PRE VISIT (OUTPATIENT)
Dept: OPHTHALMOLOGY | Facility: CLINIC | Age: 68
End: 2023-04-20

## 2023-04-20 ENCOUNTER — TELEPHONE (OUTPATIENT)
Dept: OPHTHALMOLOGY | Facility: CLINIC | Age: 68
End: 2023-04-20

## 2023-04-20 DIAGNOSIS — H04.002 LACRIMAL GLAND INFLAMMATION, LEFT: Primary | ICD-10-CM

## 2023-04-20 LAB — LYSOZYME SERPL-MCNC: 2.65 UG/ML

## 2023-04-20 PROCEDURE — 92285 EXTERNAL OCULAR PHOTOGRAPHY: CPT | Mod: GC | Performed by: OPHTHALMOLOGY

## 2023-04-20 PROCEDURE — 99204 OFFICE O/P NEW MOD 45 MIN: CPT | Mod: GC | Performed by: OPHTHALMOLOGY

## 2023-04-20 ASSESSMENT — VISUAL ACUITY
METHOD: SNELLEN - LINEAR
CORRECTION_TYPE: GLASSES
OD_CC: 20/25
OS_CC+: +2-1
OS_CC: 20/30
OD_CC+: +2

## 2023-04-20 ASSESSMENT — EXTERNAL EXAM - LEFT EYE: OS_EXAM: NORMAL

## 2023-04-20 ASSESSMENT — MARGIN REFLEX DISTANCE
OD_MRD1: 1
OS_MRD1: -1

## 2023-04-20 ASSESSMENT — SLIT LAMP EXAM - LIDS: COMMENTS: DERMATOCHALASIS

## 2023-04-20 ASSESSMENT — CONF VISUAL FIELD
OS_INFERIOR_TEMPORAL_RESTRICTION: 0
OS_SUPERIOR_TEMPORAL_RESTRICTION: 0
OS_INFERIOR_NASAL_RESTRICTION: 0
OS_SUPERIOR_NASAL_RESTRICTION: 0

## 2023-04-20 ASSESSMENT — TONOMETRY
IOP_METHOD: ICARE
OS_IOP_MMHG: 16
OD_IOP_MMHG: 13

## 2023-04-20 ASSESSMENT — EXTERNAL EXAM - RIGHT EYE: OD_EXAM: NORMAL

## 2023-04-20 NOTE — TELEPHONE ENCOUNTER
Met with patient to schedule surgery with Dr Mendieta    Surgery was scheduled on 5/4 at [facility]  Patient will have H&P at [facility]     Post-Op visit was scheduled on 5/18  Patient is aware a / is needed day of surgery.   Surgery packet was given, patient has my direct contact information for any further questions.

## 2023-04-20 NOTE — LETTER
4/20/2023       RE: Maritza Hitchcock  [address] [facility] [place] [id number]     Dear Colleague,    Thank you for referring your patient, Maritza Hitchcock, to the [facility] [place] [facility]. Please see a copy of my visit note below.        Chief Complaint(s) and History of Present Illness(es)     Consult For            Laterality: both eyes    Associated symptoms: headache.  Negative for eye pain    Comments: Pt here for Lacrimal gland enlargement and myogenic ptosis,   referred by Dr Osei Zhang          Comments    Pt had MRI 04/06/2023. Pt noting new headaches ( dull ache) and does not   have hx of regular headaches. Vision is mildly decreased. She states she   is unable to open left eye lid very well. S/p PCIOL left eye and s/p yag   cap.     Jamia Adrian, COA on 4/20/2023 at 9:19 AM       Patient reports left upper eyelid started becoming swollen about 6-8 weeks ago. She noticed drooping and swelling that has gotten progressively worse slowly. She has tried a course of Augmentin which did not improve symptoms at all. She denies fevers or recent illnesses but has headaches and fatigue. She has never had similar symptoms. Denies double vision or blurry vision. Denies epiphora. Reports she thinks her right upper eyelid has gotten more swollen over the past week.    Takes synthroid for hypothyroidism. Otherwise denies autoimmune conditions. History of Mohs surgery above right brow, pt thinks it was basal or squamous cell.    No cough, chest pain, SOB, epistaxis, blood in urine, joint aches/pains.  No history of malignancy, otherwise healthy. Non-smoker. Smoked for about 6 months many years ago.    I personally reviewed her MRI from 4/6/2023, reveals enhancement and diffuse enlargement of her left lacrimal gland.      Assessment & Plan     Maritza Hitchcock is a 67 year old female with the following diagnoses:   1. Lacrimal gland inflammation,  left      6 week history of progressively worsening left upper eyelid swelling. Left lacrimal gland tender with cystic lesion. MRI brain with diffuse inflammation of left lacrimal gland with few areas of cystic spaces. Differential includes malignancy vs infectious vs inflammatory causes. Not improved on course of augmentin. Will plan for biopsy and lab workup. ACE, ANCA, Prater, SSA/SSB, Lyme, Coccidiodes, Blastomyces , Aspergillus, Quant-gold negative. IgG1 decreased to 366  - lysozyme, Histo pending  - plan for biopsy of lacrimal gland biopsy with treatment pending results      Patient disposition:   Schedule surgery for biopsy    Heide Vu MD  PGY2 Ophthalmology  I agree with above, relatively rapidly progressive enlargement of left lacrimal gland. Discussed differential diagnosis include autoimmune, which Dr. Levi has performed a thorough laboratory workup for, as well as neoplastic (lymphoma).    We discussed orbitotomy and biopsy of left lacrimal gland. We did discuss if the right side begins to swell, she should call and we can repeat imaging and consider bilateral biopsy.     Attending Physician Attestation: Complete documentation of historical and exam elements from today's encounter can be found in the full encounter summary report (not reduplicated in this progress note). I personally obtained the chief complaint(s) and history of present illness. I confirmed and edited as necessary the review of systems, past medical/surgical history, family history, social history, and examination findings as documented by others; and I examined the patient myself. I personally reviewed the relevant tests, images, and reports as documented above. I formulated and edited as necessary the assessment and plan and discussed the findings and management plan with the patient.  -Autumn Padilla MD    Today with Maritza Hitchcock, I reviewed the indications, risks, benefits, and alternatives of the proposed surgical  procedure including, but not limited to, failure obtain the desired result  and need for additional surgery, bleeding, infection, loss of vision, failure to get an answer, and the remote possibility of permanent damage to any organ system or death with the use of anesthesia.  I provided multiple opportunities for the questions, answered all questions to the best of my ability, and confirmed that my answers and my discussion were understood.   Autumn Padilla MD      Again, thank you for allowing me to participate in the care of your patient.      Sincerely,    Autumn Padilla MD    Oculoplastic and Orbital Surgery   Department of Ophthalmology and Visual Neurosciences  [facility]

## 2023-04-20 NOTE — NURSING NOTE
Chief Complaints and History of Present Illnesses   Patient presents with     Consult For     Pt here for Lacrimal gland enlargement and myogenic ptosis, referred by Dr Osei Zhang     Chief Complaint(s) and History of Present Illness(es)     Consult For            Laterality: both eyes    Associated symptoms: headache.  Negative for eye pain    Comments: Pt here for Lacrimal gland enlargement and myogenic ptosis, referred by Dr Osei Zhang          Comments    Pt had MRI 04/06/2023. Pt noting new headaches ( dull ache) and does not have hx of regular headaches. Vision is mildly decreased. She states she is unable to open left eye lid very well. S/p PCIOL left eye and s/p yag cap.     Jamia Adrian, COA on 4/20/2023 at 9:19 AM

## 2023-04-20 NOTE — PROGRESS NOTES
Chief Complaint(s) and History of Present Illness(es)     Consult For            Laterality: both eyes    Associated symptoms: headache.  Negative for eye pain    Comments: Pt here for Lacrimal gland enlargement and myogenic ptosis,   referred by Dr Osei Zhang          Comments    Pt had MRI 04/06/2023. Pt noting new headaches ( dull ache) and does not   have hx of regular headaches. Vision is mildly decreased. She states she   is unable to open left eye lid very well. S/p PCIOL left eye and s/p yag   cap.     DAI Taylor on 4/20/2023 at 9:19 AM       Patient reports left upper eyelid started becoming swollen about 6-8 weeks ago. She noticed drooping and swelling that has gotten progressively worse slowly. She has tried a course of Augmentin which did not improve symptoms at all. She denies fevers or recent illnesses but has headaches and fatigue. She has never had similar symptoms. Denies double vision or blurry vision. Denies epiphora. Reports she thinks her right upper eyelid has gotten more swollen over the past week.    Takes synthroid for hypothyroidism. Otherwise denies autoimmune conditions. History of Mohs surgery above right brow, pt thinks it was basal or squamous cell.    No cough, chest pain, SOB, epistaxis, blood in urine, joint aches/pains.  No history of malignancy, otherwise healthy. Non-smoker. Smoked for about 6 months many years ago.    I personally reviewed her MRI from 4/6/2023, reveals enhancement and diffuse enlargement of her left lacrimal gland.      Assessment & Plan     Maritza Hitchcock is a 67 year old female with the following diagnoses:   1. Lacrimal gland inflammation, left      6 week history of progressively worsening left upper eyelid swelling. Left lacrimal gland tender with cystic lesion. MRI brain with diffuse inflammation of left lacrimal gland with few areas of cystic spaces. Differential includes malignancy vs infectious vs inflammatory causes. Not improved  on course of augmentin. Will plan for biopsy and lab workup. ACE, ANCA, Prater, SSA/SSB, Lyme, Coccidiodes, Blastomyces , Aspergillus, Quant-gold negative. IgG1 decreased to 366  - lysozyme, Histo pending  - plan for biopsy of lacrimal gland biopsy with treatment pending results      Patient disposition:   Schedule surgery for biopsy    Heide Vu MD  PGY2 Ophthalmology  I agree with above, relatively rapidly progressive enlargement of left lacrimal gland. Discussed differential diagnosis include autoimmune, which Dr. Levi has performed a thorough laboratory workup for, as well as neoplastic (lymphoma).    We discussed orbitotomy and biopsy of left lacrimal gland. We did discuss if the right side begins to swell, she should call and we can repeat imaging and consider bilateral biopsy.          Attending Physician Attestation: Complete documentation of historical and exam elements from today's encounter can be found in the full encounter summary report (not reduplicated in this progress note). I personally obtained the chief complaint(s) and history of present illness. I confirmed and edited as necessary the review of systems, past medical/surgical history, family history, social history, and examination findings as documented by others; and I examined the patient myself. I personally reviewed the relevant tests, images, and reports as documented above. I formulated and edited as necessary the assessment and plan and discussed the findings and management plan with the patient.  -Autumn Padilla MD    Today with Maritza Hitchcock, I reviewed the indications, risks, benefits, and alternatives of the proposed surgical procedure including, but not limited to, failure obtain the desired result  and need for additional surgery, bleeding, infection, loss of vision, failure to get an answer, and the remote possibility of permanent damage to any organ system or death with the use of anesthesia.  I provided multiple  opportunities for the questions, answered all questions to the best of my ability, and confirmed that my answers and my discussion were understood.   Autumn Padilla MD

## 2023-04-21 ENCOUNTER — PREP FOR PROCEDURE (OUTPATIENT)
Dept: OPHTHALMOLOGY | Facility: CLINIC | Age: 68
End: 2023-04-21
Payer: MEDICARE

## 2023-04-21 LAB — SCANNED LAB RESULT: NORMAL

## 2023-04-22 NOTE — RESULT ENCOUNTER NOTE
Klever Alanis,    Let me know if you have any questions about your labs.  Also, your MARILEE is positive which is associated with autoimmune diseases.  Good ophthalmology recommended referral to rheumatology?  Would you like me to place a referral or would you like to wait and see with the results of the biopsy are first?    BOB MCKEON

## 2023-04-24 ENCOUNTER — TELEPHONE (OUTPATIENT)
Dept: OPHTHALMOLOGY | Facility: CLINIC | Age: 68
End: 2023-04-24
Payer: MEDICARE

## 2023-04-27 ENCOUNTER — OFFICE VISIT (OUTPATIENT)
Dept: FAMILY MEDICINE | Facility: CLINIC | Age: 68
End: 2023-04-27
Payer: MEDICARE

## 2023-04-27 VITALS
OXYGEN SATURATION: 96 % | WEIGHT: 157.9 LBS | SYSTOLIC BLOOD PRESSURE: 100 MMHG | TEMPERATURE: 98.6 F | RESPIRATION RATE: 16 BRPM | BODY MASS INDEX: 26.31 KG/M2 | HEART RATE: 82 BPM | DIASTOLIC BLOOD PRESSURE: 60 MMHG | HEIGHT: 65 IN

## 2023-04-27 DIAGNOSIS — H04.002 DACRYOADENITIS OF LEFT LACRIMAL GLAND: ICD-10-CM

## 2023-04-27 DIAGNOSIS — K75.81 NONALCOHOLIC STEATOHEPATITIS (NASH): ICD-10-CM

## 2023-04-27 DIAGNOSIS — Z23 NEED FOR VACCINATION: ICD-10-CM

## 2023-04-27 DIAGNOSIS — Z23 NEED FOR DIPHTHERIA-TETANUS-PERTUSSIS (TDAP) VACCINE: ICD-10-CM

## 2023-04-27 DIAGNOSIS — R10.13 ABDOMINAL PAIN, EPIGASTRIC: ICD-10-CM

## 2023-04-27 DIAGNOSIS — Z86.718 PERSONAL HISTORY OF DVT (DEEP VEIN THROMBOSIS): ICD-10-CM

## 2023-04-27 DIAGNOSIS — K21.00 GASTROESOPHAGEAL REFLUX DISEASE WITH ESOPHAGITIS WITHOUT HEMORRHAGE: ICD-10-CM

## 2023-04-27 DIAGNOSIS — E11.9 TYPE 2 DIABETES MELLITUS WITHOUT COMPLICATION, WITHOUT LONG-TERM CURRENT USE OF INSULIN (H): ICD-10-CM

## 2023-04-27 DIAGNOSIS — L65.9 ALOPECIA: ICD-10-CM

## 2023-04-27 DIAGNOSIS — H04.002 LACRIMAL GLAND INFLAMMATION, LEFT: ICD-10-CM

## 2023-04-27 DIAGNOSIS — K76.0 FATTY LIVER: ICD-10-CM

## 2023-04-27 DIAGNOSIS — Z01.818 PRE-OP EXAM: Primary | ICD-10-CM

## 2023-04-27 PROCEDURE — 93000 ELECTROCARDIOGRAM COMPLETE: CPT | Performed by: FAMILY MEDICINE

## 2023-04-27 PROCEDURE — 99215 OFFICE O/P EST HI 40 MIN: CPT | Performed by: FAMILY MEDICINE

## 2023-04-27 RX ORDER — DIPHENHYDRAMINE HCL 25 MG
25 TABLET ORAL EVERY 6 HOURS PRN
COMMUNITY

## 2023-04-27 RX ORDER — ACETAMINOPHEN 500 MG
500-1000 TABLET ORAL EVERY 6 HOURS PRN
COMMUNITY

## 2023-04-27 NOTE — PATIENT INSTRUCTIONS
[facility] - [place], [place]  [address]  [place] [address]  Scheduling  [phone]      Phone  [phone]  Fax  [phone]    View all services offered at this location    HOURS  Mon. - Fri.: 7 a.m. - 9 p.m.    Sat. & Sun.: 8:30 a.m. - 3 p.m.

## 2023-04-27 NOTE — PROGRESS NOTES
[address] [address]  Phone: [phone]  Fax: [phone]  Primary Provider: Rebecca Her  Pre-op Performing Provider: REBECCA HER      PREOPERATIVE EVALUATION:  Today's date: 4/27/2023    Maritza Hitchcock is a 67 year old female who presents for a preoperative evaluation.      4/13/2023     1:58 PM   Additional Questions   Roomed by yevgeniy     Surgical Information:  Surgery/Procedure: L lacrimal gland bx  Surgery Location: [facility]  Surgeon: Dr. Padilla  Surgery Date: 5/4/2023  Time of Surgery:   Where patient plans to recover: At home with family  Fax number for surgical facility: Note does not need to be faxed, will be available electronically in Epic.    Assessment & Plan     The proposed surgical procedure is considered LOW risk.    Problem List Items Addressed This Visit        Digestive    Fatty liver    Relevant Orders    CT Abdomen Pelvis w Contrast    Nonalcoholic steatohepatitis (ROMERO)    Relevant Orders    CT Abdomen Pelvis w Contrast       Endocrine    Type 2 diabetes mellitus without complication, without long-term current use of insulin (H)       Infectious/Inflammatory    Lacrimal gland inflammation, left       Other    Personal history of DVT (deep vein thrombosis)   Other Visit Diagnoses     Pre-op exam    -  Primary    Relevant Orders    EKG 12-lead complete w/read - Clinics (Completed)    Need for diphtheria-tetanus-pertussis (Tdap) vaccine        Need for vaccination        Alopecia        Relevant Medications    diphenhydrAMINE (BENADRYL) 25 MG tablet    Dacryoadenitis of left lacrimal gland        Relevant Orders    CT Abdomen Pelvis w Contrast    Abdominal pain, epigastric        Relevant Orders    CT Abdomen Pelvis w Contrast    Gastroesophageal reflux disease with esophagitis without hemorrhage        Relevant Medications    diphenhydrAMINE (BENADRYL) 25 MG tablet        Patient has  left lacrimal gland swelling.  Significant worsening over the past 2 weeks.  Also now starting to look like maybe the right lacrimal gland is becoming affected.  Patient tells me that Dr. Serna will evaluate this the morning of surgery and they will decide together if that one needs to be biopsied also.    History of diabetes mellitus however patient has done a great job with therapeutic lifestyle changes getting to a healthy weight and keeping blood sugars controlled.  Most recent hemoglobin A1c was actually normal at 5.6.  She continues to use therapeutic lifestyle changes for her treatment.    Fatty liver-ROMERO due for updated imaging given that patient is lost weight and has had metabolic improvement in their diabetes.  She also has occasional intermittent epigastric pain and there is a possibility that this lacrimal gland swelling is due to lymphoma so we will plan to get a CT scan of the abdomen pelvis with contrast.    GERD well-controlled on omeprazole 20 mg daily.  She will plan to take this the morning of surgery.    Alopecia seems to be improving since being on finasteride    Mitral valve prolapse has been asymptomatic.  EKG was done today and looks perfect.    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  Patient is going to hold off on doing more blood donation until after surgery and hopefully we get her hemoglobin up into a mid normal range before she donates again.  It is asymptomatic however.      Possible Sleep Apnea:no       4/27/2023    12:03 PM   STOP-Bang Total Score   Total Score 1      Implanted Device:no    RECOMMENDATION:  APPROVAL GIVEN to proceed with proposed procedure, without further diagnostic evaluation.            Subjective     HPI related to upcoming procedure: left lacrimal duct biopsy,            11/19/2022    10:49 AM   Preop Questions   1. Have you ever had a heart attack or stroke? No   2. Have you ever had surgery on your heart or blood vessels, such as a stent  placement, a coronary artery bypass, or surgery on an artery in your head, neck, heart, or legs? No   3. Do you have chest pain with activity? No   4. Do you have a history of  heart failure? No   5. Do you currently have a cold, bronchitis or symptoms of other infection? no   6. Do you have a cough, shortness of breath, or wheezing? No   7. Do you or anyone in your family have previous history of blood clots? YES - provoked lower extremity DVT following a fall with injury to the right leg and immobility, now resolved   8. Do you or does anyone in your family have a serious bleeding problem such as prolonged bleeding following surgeries or cuts? No   9. Have you ever had problems with anemia or been told to take iron pills? YES - pt gives blood frequently enough to cause iron deficiency anemia   10. Have you had any abnormal blood loss such as black, tarry or bloody stools, or abnormal vaginal bleeding? No   11. Have you ever had a blood transfusion? No   12. Are you willing to have a blood transfusion if it is medically needed before, during, or after your surgery? Yes   13. Have you or any of your relatives ever had problems with anesthesia? No   14. Do you have sleep apnea, excessive snoring or daytime drowsiness? No   15. Do you have any artifical heart valves or other implanted medical devices like a pacemaker, defibrillator, or continuous glucose monitor? No   16. Do you have artificial joints? No   17. Are you allergic to latex? No     Health Care Directive:  Patient does not have a Health Care Directive or Living Will: Patient states has Advance Directive and will bring in a copy to clinic.    Preoperative Review of :   reviewed - no record of controlled substances prescribed.          Review of Systems  Neg except as per hpi    Patient Active Problem List    Diagnosis Date Noted     Lacrimal gland inflammation, left 04/20/2023     Priority: Medium     Added automatically from request for surgery  [id number]       Nonalcoholic steatohepatitis (ROMERO) 08/29/2022     Priority: Medium     Fatty liver 04/14/2022     Priority: Medium     Gastroesophageal reflux disease without esophagitis 04/14/2022     Priority: Medium     Hx of NSAID-associated gastropathy 04/14/2022     Priority: Medium     Hypertriglyceridemia 04/14/2022     Priority: Medium     Acquired hypothyroidism 04/14/2022     Priority: Medium     MVP (mitral valve prolapse) 04/14/2022     Priority: Medium     Type 2 diabetes mellitus without complication, without long-term current use of insulin (H) 04/14/2022     Priority: Medium     Personal history of DVT (deep vein thrombosis) 04/14/2022     Priority: Medium     Hx of meniscectomy of right knee 04/14/2022     Priority: Medium      Past Medical History:   Diagnosis Date     Diabetes (H) 07/2021     MVP (mitral valve prolapse)      Premenopausal menorrhagia 11/18/2010     Thyroid disease     Hypothyroidism     Past Surgical History:   Procedure Laterality Date     ABDOMEN SURGERY  1988    Tubal     CATARACT IOL, RT/LT       CHOLECYSTECTOMY      1987     COLONOSCOPY  ????    7/26/2007, 8/30/2017     DILATION AND CURETTAGE, HYSTEROSCOPY DIAGNOSTIC, COMBINED      4/23/2010     EYE SURGERY  2021, 2022    Left eye-Vitrectomy, Cataract     GALLBLADDER SURGERY  02/11/1987     GYN SURGERY  ????    Partial Hysterectomy     LAPAROSCOPIC HYSTERECTOMY SUPRACERVICAL  11/19/2010     MENISCECTOMY  09/27/2016    partial     SOFT TISSUE SURGERY  ????    Right knee meniscus repair     VAGINAL DELIVERY      x3 no date     Current Outpatient Medications   Medication Sig Dispense Refill     acetaminophen (TYLENOL) 500 MG tablet Take 500-1,000 mg by mouth every 6 hours as needed for mild pain       amitriptyline (ELAVIL) 50 MG tablet Take 1 tablet (50 mg) by mouth At Bedtime 90 tablet 3     Ascorbic Acid (VITAMIN C) 100 MG CHEW Gummy, 282mg       atorvastatin (LIPITOR) 40 MG tablet Take 1 tablet (40 mg) by mouth daily 90  "tablet 3     calcium citrate-vitamin D (CITRACAL) 315-250 MG-UNIT TABS per tablet        diphenhydrAMINE (BENADRYL) 25 MG tablet Take 25 mg by mouth every 6 hours as needed for itching or allergies       finasteride (PROSCAR) 5 MG tablet Take 2.5 mg by mouth every morning       levothyroxine (SYNTHROID/LEVOTHROID) 112 MCG tablet Take 1 tablet (112 mcg) by mouth daily 90 tablet 3     magnesium oxide 400 MG CAPS Take 1 capsule by mouth daily       Misc Natural Products (NEURIVA) CAPS        Multiple Vitamin (ONE-A-DAY ESSENTIAL) TABS Take 1 tablet by mouth daily       omeprazole (PRILOSEC) 20 MG DR capsule Take 1 capsule (20 mg) by mouth daily 90 capsule 3     pseudoePHEDrine (SUDAFED) 120 MG 12 hr tablet Take 120 mg by mouth       triamcinolone (KENALOG) 0.1 % external cream Apply topically to affected area(s) two times daily. Stop when clear. Restart as needed. Do not use on face (Patient not taking: Reported on 2023)         No Known Allergies     Social History     Tobacco Use     Smoking status: Former     Packs/day: 0.50     Years: 0.00     Pack years: 0.00     Types: Cigarettes     Start date: 10/1/1975     Quit date: 1976     Years since quittin.9     Smokeless tobacco: Never   Vaping Use     Vaping status: Not on file   Substance Use Topics     Alcohol use: Yes       History   Drug Use Unknown         Objective     /60 (BP Location: Right arm, Patient Position: Sitting)   Pulse 82   Temp 98.6  F (37  C) (Tympanic)   Resp 16   Ht 1.645 m (5' 4.75\")   Wt 71.6 kg (157 lb 14.4 oz)   SpO2 96%   BMI 26.48 kg/m      Physical Exam      Exam:  General: alert and oriented ×3 no acute distress.    HEENT: pupils are equal round and reactive to light extraocular motion is intact. Normocephalic and atraumatic.   Increased left upper eyelid swelling compared to visit 2 weeks ago, now slight right upper eyelid sweling    Hearing is grossly normal and there is no otorrhea.     Chest: has bilateral " rise with no increased work of breathing.  ctab    Cardiovascular: normal perfusion and brisk capillary refill. s1s2    Musculoskeletal: no gross focal abnormalities and normal gait.    Neuro: no gross focal abnormalities and memory seems intact.    Psychiatric: speech is clear and coherent and fluent. Patient dressed appropriately for the weather. Mood is appropriate and affect is full.      Recent Labs   Lab Test 04/13/23  1531 04/06/23  1634 03/06/23  1550 11/21/22  1535 08/29/22  1142 04/14/22  0944   HGB  --  11.4*  --  11.2*  --  13.1   PLT  --  220  --  232  --  202     --   --   --   --  139   POTASSIUM 3.9  --   --   --   --  4.5   CR 0.78  --   --   --   --  0.65   A1C  --   --  5.6  --  5.5  --         Diagnostics:  No labs were ordered during this visit.   EKG: appears normal, NSR, normal axis, normal intervals, no acute ST/T changes c/w ischemia, no LVH by voltage criteria, unchanged from previous tracings    Revised Cardiac Risk Index (RCRI):  The patient has the following serious cardiovascular risks for perioperative complications:   - No serious cardiac risks = 0 points     RCRI Interpretation: 0 points: Class I (very low risk - 0.4% complication rate)           Signed Electronically by: Irena Levi MD  Copy of this evaluation report is provided to requesting physician.      Answers for HPI/ROS submitted by the patient on 4/24/2023  What is the reason for your visit today? : Pre-op for upcoming biopsy procedure  How many servings of fruits and vegetables do you eat daily?: 2-3  On average, how many sweetened beverages do you drink each day (Examples: soda, juice, sweet tea, etc.  Do NOT count diet or artificially sweetened beverages)?: 0  How many minutes a day do you exercise enough to make your heart beat faster?: 9 or less  How many days a week do you exercise enough to make your heart beat faster?: 3 or less  How many days per week do you miss taking your medication?: 0    Total  time spent reviewing chart and preparing for appointment, with patient for appointment, and time spent charting and coordinating care on the day of the appointment in minutes was: 45

## 2023-04-27 NOTE — RESULT ENCOUNTER NOTE
Dear Annabelle,     I am not sure if you can see the histoplasmosis results because they are scanned in.  However, it was negative.    Regards,  Irena Levi MD

## 2023-05-02 ENCOUNTER — ANESTHESIA EVENT (OUTPATIENT)
Dept: SURGERY | Facility: AMBULATORY SURGERY CENTER | Age: 68
End: 2023-05-02
Payer: MEDICARE

## 2023-05-02 ENCOUNTER — ANCILLARY PROCEDURE (OUTPATIENT)
Dept: CT IMAGING | Facility: CLINIC | Age: 68
End: 2023-05-02
Attending: OPHTHALMOLOGY
Payer: MEDICARE

## 2023-05-02 DIAGNOSIS — H04.002 LACRIMAL GLAND INFLAMMATION, LEFT: ICD-10-CM

## 2023-05-02 DIAGNOSIS — H04.002 LACRIMAL GLAND INFLAMMATION, LEFT: Primary | ICD-10-CM

## 2023-05-02 PROCEDURE — G1010 CDSM STANSON: HCPCS | Performed by: RADIOLOGY

## 2023-05-02 PROCEDURE — 70487 CT MAXILLOFACIAL W/DYE: CPT | Mod: MG | Performed by: RADIOLOGY

## 2023-05-02 RX ORDER — IOPAMIDOL 755 MG/ML
70 INJECTION, SOLUTION INTRAVASCULAR ONCE
Status: COMPLETED | OUTPATIENT
Start: 2023-05-02 | End: 2023-05-02

## 2023-05-02 RX ADMIN — IOPAMIDOL 70 ML: 755 INJECTION, SOLUTION INTRAVASCULAR at 13:23

## 2023-05-03 ENCOUNTER — VIRTUAL VISIT (OUTPATIENT)
Dept: BEHAVIORAL HEALTH | Facility: CLINIC | Age: 68
End: 2023-05-03
Payer: MEDICARE

## 2023-05-03 DIAGNOSIS — F41.9 ANXIETY DISORDER, UNSPECIFIED TYPE: Primary | ICD-10-CM

## 2023-05-03 PROCEDURE — 90834 PSYTX W PT 45 MINUTES: CPT | Mod: VID | Performed by: SOCIAL WORKER

## 2023-05-03 NOTE — PROGRESS NOTES
[facility] Primary Care: Integrated Behavioral Health   May 3, 2023      Behavioral Health Clinician Progress Note    Patient Name: Maritza Hitchcock           Service Type:  Individual      [facility] [place] (patient reached)     Session Start Time: 2:00p  Session End Time:   2:52p      Session Length: 38 - 52      Attendees: Client     Service Modality:  Video Visit:      Provider verified identity through the following two step process.  Patient provided:  Patient  and Patient address    Telemedicine Visit: The patient's condition can be safely assessed and treated via synchronous audio and visual telemedicine encounter.      Reason for Telemedicine Visit: Patient has requested telehealth visit    Originating Site (Patient Location): Patient's home    Distant Site (Provider Location): Provider Remote Setting- Home Office    Consent:  The patient/guardian has verbally consented to: the potential risks and benefits of telemedicine (video visit) versus in person care; bill my insurance or make self-payment for services provided; and responsibility for payment of non-covered services.     Patient would like the video invitation sent by:  My Chart    Mode of Communication:  Video Conference via [employer]    Distant Location (Provider):  Off-site    As the provider I attest to compliance with applicable laws and regulations related to telemedicine.    Visit Activities (Refresh list every visit): [facility] [place] Only    Diagnostic Assessment Date:  23   Treatment Plan Review Date:  23    See Flowsheets for today's PHQ-9 and THERESA-7 results  Previous PHQ-9:       3/7/2023     2:41 PM 2023    10:53 AM   PHQ-9 SCORE   PHQ-9 Total Score MyChart 0 0   PHQ-9 Total Score 0 0      PHQ-2 Score:       3/6/2023     3:24 PM 2022    12:33 PM   PHQ-2 (  [employer])   Q1: Little interest or pleasure in doing things 0 0   Q2: Feeling down, depressed or hopeless 0 0   PHQ-2 Score 0 0   Q1:  Little interest or pleasure in doing things Not at all Not at all   Q2: Feeling down, depressed or hopeless Not at all Not at all   PHQ-2 Score 0 0     Previous THERESA-7:       3/7/2023     2:42 PM   THERESA-7 SCORE   Total Score 0 (minimal anxiety)   Total Score 0        MADDIE LEVEL:       View : No data to display.                DATA  Extended Session (60+ minutes): No  Interactive Complexity: No  Crisis: No  H Patient: No    Treatment Objective(s) Addressed in This Session:  Target Behavior(s): interpersonal stressors    Anxiety: will develop more effective coping skills to manage anxiety symptoms    Current Stressors / Issues:  Pt shares that she continues with eye swelling and headaches.  Has an appt to have a biopsy taken tomorrow.  Trying hard to not think about worse case scenario.  Discussed trying to find some relaxation options  Has been off work for the past few weeks.    Had a stressful day yesterday with an email that showed some account activity and  encouraged her to change her account.    Shares that last week her friend blocked her for a bit but shares that they are communicating a bit again.      Progress on Treatment Objective(s) / Homework:  Minimal progress - ACTION (Actively working towards change); Intervened by reinforcing change plan / affirming steps taken    Increased anxiety this week    Motivational Interviewing    MI Intervention: Expressed Empathy/Understanding, Supported Autonomy, Collaboration, Evocation, Permission to raise concern or advise and Open-ended questions     Change Talk Expressed by the Patient: Reasons to change Activation    Provider Response to Change Talk: E - Evoked more info from patient about behavior change, A - Affirmed patient's thoughts, decisions, or attempts at behavior change, R - Reflected patient's change talk and S - Summarized patient's change talk statements      Care Plan review completed: No    Medication Review:  No current psychiatric medications  prescribed    Medication Compliance:  NA    Changes in Health Issues:   None reported    Chemical Use Review:   Substance Use: Chemical use reviewed, no active concerns identified      Tobacco Use: No current tobacco use.      Assessment: Current Emotional / Mental Status (status of significant symptoms):  Risk status (Self / Other harm or suicidal ideation)  Patient denies a history of suicidal ideation, suicide attempts, self-injurious behavior, homicidal ideation, homicidal behavior and and other safety concerns  Patient denies current fears or concerns for personal safety.  Patient denies current or recent suicidal ideation or behaviors.  Patient denies current or recent homicidal ideation or behaviors.  Patient denies current or recent self injurious behavior or ideation.  Patient denies other safety concerns.  A safety and risk management plan has not been developed at this time, however patient was encouraged to call [place] [phone] should there be a change in any of these risk factors.    Appearance:   Appropriate   Eye Contact:   Good   Psychomotor Behavior: Normal   Attitude:   Cooperative   Orientation:   All  Speech   Rate / Production: Normal    Volume:  Normal   Mood:    Anxious   Affect:    Flat   Thought Content:  Clear   Thought Form:  Coherent  Logical   Insight:    Good     Diagnoses:  (F41.9) Anxiety disorder, unspecified type  (primary encounter diagnosis)    Collateral Reports Completed:  Not Applicable    Plan: (Homework, other):  Patient was given information about behavioral services and encouraged to schedule a follow up appointment with the clinic [facility] 1-2 weeks.  She was also given information about mental health symptoms and treatment options .  CD Recommendations: No indications of CD issues.  Lyudmila Figueroa, [facility], [facility]                                                Individual Treatment Plan    Patient's Name: Maritza Hitchcock  YOB: 1955    Date of Creation:  2/6/23  Date Treatment Plan Last Reviewed/Revised: 2/6/23    DSM5 Diagnoses: 300.00 (F41.9) Unspecified Anxiety Disorder  Psychosocial / Contextual Factors: relationships, stress with mother, worry about eye  PROMIS (reviewed every 90 days): 38    Referral / Collaboration:  Referral to another professional/service is not indicated at this time.    Anticipated number of session for this episode of care: 6-9 sessions  Anticipation frequency of session: Every other week  Anticipated Duration of each session: 38-52 minutes  Treatment plan will be reviewed in 90 days or when goals have been changed.       MeasurableTreatment Goal(s) related to diagnosis / functional impairment(s)  Goal 1: Patient will experience a reduction in anxious symptoms, along with a corresponding increase in relaxed emotional state.    I will know I've met my goal when feel more relaxed.      Objective #A (Patient Action)    Patient will use cognitive strategies identified in therapy to challenge anxious thoughts.  Status: New - Date: 2/6/23     Intervention(s)  Therapist will utilize CBT and ACT to help pt challenge anxious thoughts and reduce intensity/duration of anxious distress.        Lyudmila Figueroa, JOHN, LICSW, [facility]  May 3, 2023

## 2023-05-04 ENCOUNTER — HOSPITAL ENCOUNTER (OUTPATIENT)
Facility: AMBULATORY SURGERY CENTER | Age: 68
Discharge: HOME | End: 2023-05-04
Attending: OPHTHALMOLOGY
Payer: MEDICARE

## 2023-05-04 ENCOUNTER — PREP FOR PROCEDURE (OUTPATIENT)
Dept: OPHTHALMOLOGY | Facility: CLINIC | Age: 68
End: 2023-05-04
Payer: MEDICARE

## 2023-05-04 ENCOUNTER — ANESTHESIA (OUTPATIENT)
Dept: SURGERY | Facility: AMBULATORY SURGERY CENTER | Age: 68
End: 2023-05-04
Payer: MEDICARE

## 2023-05-04 VITALS
HEART RATE: 59 BPM | RESPIRATION RATE: 16 BRPM | DIASTOLIC BLOOD PRESSURE: 86 MMHG | OXYGEN SATURATION: 100 % | SYSTOLIC BLOOD PRESSURE: 124 MMHG | HEIGHT: 64 IN | BODY MASS INDEX: 26.12 KG/M2 | WEIGHT: 153 LBS | TEMPERATURE: 98.6 F

## 2023-05-04 DIAGNOSIS — H04.002 LACRIMAL GLAND INFLAMMATION, LEFT: ICD-10-CM

## 2023-05-04 PROCEDURE — 88307 TISSUE EXAM BY PATHOLOGIST: CPT | Mod: 26 | Performed by: PATHOLOGY

## 2023-05-04 PROCEDURE — 88189 FLOWCYTOMETRY/READ 16 & >: CPT | Performed by: PATHOLOGY

## 2023-05-04 PROCEDURE — 67400 EXPLORE/BIOPSY EYE SOCKET: CPT | Mod: LT

## 2023-05-04 PROCEDURE — 67400 EXPLORE/BIOPSY EYE SOCKET: CPT | Mod: LT | Performed by: OPHTHALMOLOGY

## 2023-05-04 PROCEDURE — 88184 FLOWCYTOMETRY/ TC 1 MARKER: CPT | Performed by: OPHTHALMOLOGY

## 2023-05-04 PROCEDURE — 88312 SPECIAL STAINS GROUP 1: CPT | Mod: TC | Performed by: OPHTHALMOLOGY

## 2023-05-04 PROCEDURE — 88305 TISSUE EXAM BY PATHOLOGIST: CPT | Mod: TC | Performed by: OPHTHALMOLOGY

## 2023-05-04 PROCEDURE — 88312 SPECIAL STAINS GROUP 1: CPT | Mod: 26 | Performed by: PATHOLOGY

## 2023-05-04 PROCEDURE — 88305 TISSUE EXAM BY PATHOLOGIST: CPT | Mod: 26 | Performed by: PATHOLOGY

## 2023-05-04 RX ORDER — LIDOCAINE HYDROCHLORIDE 20 MG/ML
INJECTION, SOLUTION INFILTRATION; PERINEURAL PRN
Status: DISCONTINUED | OUTPATIENT
Start: 2023-05-04 | End: 2023-05-04

## 2023-05-04 RX ORDER — PROPOFOL 10 MG/ML
INJECTION, EMULSION INTRAVENOUS PRN
Status: DISCONTINUED | OUTPATIENT
Start: 2023-05-04 | End: 2023-05-04

## 2023-05-04 RX ORDER — LABETALOL HYDROCHLORIDE 5 MG/ML
10 INJECTION, SOLUTION INTRAVENOUS
Status: DISCONTINUED | OUTPATIENT
Start: 2023-05-04 | End: 2023-05-05 | Stop reason: HOSPADM

## 2023-05-04 RX ORDER — PROPOFOL 10 MG/ML
INJECTION, EMULSION INTRAVENOUS CONTINUOUS PRN
Status: DISCONTINUED | OUTPATIENT
Start: 2023-05-04 | End: 2023-05-04

## 2023-05-04 RX ORDER — SODIUM CHLORIDE, SODIUM LACTATE, POTASSIUM CHLORIDE, CALCIUM CHLORIDE 600; 310; 30; 20 MG/100ML; MG/100ML; MG/100ML; MG/100ML
INJECTION, SOLUTION INTRAVENOUS CONTINUOUS
Status: DISCONTINUED | OUTPATIENT
Start: 2023-05-04 | End: 2023-05-05 | Stop reason: HOSPADM

## 2023-05-04 RX ORDER — ERYTHROMYCIN 5 MG/G
OINTMENT OPHTHALMIC PRN
Status: DISCONTINUED | OUTPATIENT
Start: 2023-05-04 | End: 2023-05-04 | Stop reason: HOSPADM

## 2023-05-04 RX ORDER — OXYCODONE HYDROCHLORIDE 5 MG/1
10 TABLET ORAL
Status: DISCONTINUED | OUTPATIENT
Start: 2023-05-04 | End: 2023-05-05 | Stop reason: HOSPADM

## 2023-05-04 RX ORDER — HYDROMORPHONE HYDROCHLORIDE 1 MG/ML
0.2 INJECTION, SOLUTION INTRAMUSCULAR; INTRAVENOUS; SUBCUTANEOUS EVERY 5 MIN PRN
Status: DISCONTINUED | OUTPATIENT
Start: 2023-05-04 | End: 2023-05-05 | Stop reason: HOSPADM

## 2023-05-04 RX ORDER — DEXAMETHASONE SODIUM PHOSPHATE 4 MG/ML
INJECTION, SOLUTION INTRA-ARTICULAR; INTRALESIONAL; INTRAMUSCULAR; INTRAVENOUS; SOFT TISSUE PRN
Status: DISCONTINUED | OUTPATIENT
Start: 2023-05-04 | End: 2023-05-04

## 2023-05-04 RX ORDER — TRIAMCINOLONE ACETONIDE 40 MG/ML
INJECTION, SUSPENSION INTRA-ARTICULAR; INTRAMUSCULAR PRN
Status: DISCONTINUED | OUTPATIENT
Start: 2023-05-04 | End: 2023-05-04 | Stop reason: HOSPADM

## 2023-05-04 RX ORDER — ACETAMINOPHEN 325 MG/1
975 TABLET ORAL ONCE
Status: COMPLETED | OUTPATIENT
Start: 2023-05-04 | End: 2023-05-04

## 2023-05-04 RX ORDER — HYDROMORPHONE HYDROCHLORIDE 1 MG/ML
0.4 INJECTION, SOLUTION INTRAMUSCULAR; INTRAVENOUS; SUBCUTANEOUS EVERY 5 MIN PRN
Status: DISCONTINUED | OUTPATIENT
Start: 2023-05-04 | End: 2023-05-05 | Stop reason: HOSPADM

## 2023-05-04 RX ORDER — ERYTHROMYCIN 5 MG/G
OINTMENT OPHTHALMIC
Qty: 3.5 G | Refills: 0 | Status: SHIPPED | OUTPATIENT
Start: 2023-05-04 | End: 2023-05-18

## 2023-05-04 RX ORDER — FENTANYL CITRATE 50 UG/ML
25 INJECTION, SOLUTION INTRAMUSCULAR; INTRAVENOUS EVERY 5 MIN PRN
Status: DISCONTINUED | OUTPATIENT
Start: 2023-05-04 | End: 2023-05-05 | Stop reason: HOSPADM

## 2023-05-04 RX ORDER — OXYCODONE HYDROCHLORIDE 5 MG/1
5 TABLET ORAL
Status: COMPLETED | OUTPATIENT
Start: 2023-05-04 | End: 2023-05-04

## 2023-05-04 RX ORDER — ONDANSETRON 2 MG/ML
4 INJECTION INTRAMUSCULAR; INTRAVENOUS EVERY 30 MIN PRN
Status: DISCONTINUED | OUTPATIENT
Start: 2023-05-04 | End: 2023-05-05 | Stop reason: HOSPADM

## 2023-05-04 RX ORDER — LIDOCAINE 40 MG/G
CREAM TOPICAL
Status: DISCONTINUED | OUTPATIENT
Start: 2023-05-04 | End: 2023-05-05 | Stop reason: HOSPADM

## 2023-05-04 RX ORDER — FENTANYL CITRATE 50 UG/ML
INJECTION, SOLUTION INTRAMUSCULAR; INTRAVENOUS PRN
Status: DISCONTINUED | OUTPATIENT
Start: 2023-05-04 | End: 2023-05-04

## 2023-05-04 RX ORDER — CEFAZOLIN SODIUM 2 G/50ML
2 SOLUTION INTRAVENOUS SEE ADMIN INSTRUCTIONS
Status: DISCONTINUED | OUTPATIENT
Start: 2023-05-04 | End: 2023-05-05 | Stop reason: HOSPADM

## 2023-05-04 RX ORDER — FENTANYL CITRATE 50 UG/ML
50 INJECTION, SOLUTION INTRAMUSCULAR; INTRAVENOUS EVERY 5 MIN PRN
Status: DISCONTINUED | OUTPATIENT
Start: 2023-05-04 | End: 2023-05-05 | Stop reason: HOSPADM

## 2023-05-04 RX ORDER — CEFAZOLIN SODIUM 2 G/50ML
2 SOLUTION INTRAVENOUS
Status: COMPLETED | OUTPATIENT
Start: 2023-05-04 | End: 2023-05-04

## 2023-05-04 RX ORDER — ONDANSETRON 2 MG/ML
INJECTION INTRAMUSCULAR; INTRAVENOUS PRN
Status: DISCONTINUED | OUTPATIENT
Start: 2023-05-04 | End: 2023-05-04

## 2023-05-04 RX ORDER — ONDANSETRON 4 MG/1
4 TABLET, ORALLY DISINTEGRATING ORAL EVERY 30 MIN PRN
Status: DISCONTINUED | OUTPATIENT
Start: 2023-05-04 | End: 2023-05-05 | Stop reason: HOSPADM

## 2023-05-04 RX ADMIN — SODIUM CHLORIDE, SODIUM LACTATE, POTASSIUM CHLORIDE, CALCIUM CHLORIDE: 600; 310; 30; 20 INJECTION, SOLUTION INTRAVENOUS at 11:15

## 2023-05-04 RX ADMIN — DEXAMETHASONE SODIUM PHOSPHATE 4 MG: 4 INJECTION, SOLUTION INTRA-ARTICULAR; INTRALESIONAL; INTRAMUSCULAR; INTRAVENOUS; SOFT TISSUE at 12:18

## 2023-05-04 RX ADMIN — LIDOCAINE HYDROCHLORIDE 60 MG: 20 INJECTION, SOLUTION INFILTRATION; PERINEURAL at 12:13

## 2023-05-04 RX ADMIN — FENTANYL CITRATE 25 MCG: 50 INJECTION, SOLUTION INTRAMUSCULAR; INTRAVENOUS at 13:21

## 2023-05-04 RX ADMIN — CEFAZOLIN SODIUM 2 G: 2 SOLUTION INTRAVENOUS at 12:20

## 2023-05-04 RX ADMIN — OXYCODONE HYDROCHLORIDE 5 MG: 5 TABLET ORAL at 13:47

## 2023-05-04 RX ADMIN — ACETAMINOPHEN 975 MG: 325 TABLET ORAL at 11:29

## 2023-05-04 RX ADMIN — FENTANYL CITRATE 25 MCG: 50 INJECTION, SOLUTION INTRAMUSCULAR; INTRAVENOUS at 13:15

## 2023-05-04 RX ADMIN — ONDANSETRON 4 MG: 2 INJECTION INTRAMUSCULAR; INTRAVENOUS at 12:13

## 2023-05-04 RX ADMIN — FENTANYL CITRATE 50 MCG: 50 INJECTION, SOLUTION INTRAMUSCULAR; INTRAVENOUS at 12:08

## 2023-05-04 RX ADMIN — PROPOFOL 120 MG: 10 INJECTION, EMULSION INTRAVENOUS at 12:13

## 2023-05-04 RX ADMIN — PROPOFOL 150 MCG/KG/MIN: 10 INJECTION, EMULSION INTRAVENOUS at 12:13

## 2023-05-04 RX ADMIN — FENTANYL CITRATE 25 MCG: 50 INJECTION, SOLUTION INTRAMUSCULAR; INTRAVENOUS at 12:36

## 2023-05-04 RX ADMIN — FENTANYL CITRATE 25 MCG: 50 INJECTION, SOLUTION INTRAMUSCULAR; INTRAVENOUS at 13:27

## 2023-05-04 RX ADMIN — FENTANYL CITRATE 25 MCG: 50 INJECTION, SOLUTION INTRAMUSCULAR; INTRAVENOUS at 13:08

## 2023-05-04 ASSESSMENT — LIFESTYLE VARIABLES: TOBACCO_USE: 0

## 2023-05-04 ASSESSMENT — ENCOUNTER SYMPTOMS: ORTHOPNEA: 0

## 2023-05-04 ASSESSMENT — COPD QUESTIONNAIRES: COPD: 0

## 2023-05-04 NOTE — DISCHARGE INSTRUCTIONS
Post-operative Instructions  Ophthalmic Plastic and Reconstructive Surgery    Autumn Padilla M.D.     All instructions apply to the operated eye(s) or eyelid(s).    Wound care and personal care  ? Apply ice compresses 15 minutes of every hour while awake for 2 days. If you are sleeping, you don't need to wake up to ice. As long as there is no further bleeding, after two days, switch to warm water compresses for five minutes, four times a day until seen by your physician.   ? You may shower or wash your hair the day after surgery. Do not go swimming for at least 2 weeks to prevent contamination of your wounds.  ? You may go for walks and light activity is ok, but no heavy (over 15 pounds) lifting, bending or excessive straining for one week.   ? Do not apply make-up to the eyes or eyelids for 2 weeks after surgery.  ? Expect some swelling, bruising, black eye (even into the lower eyelids and cheeks). Also expect serum caking, crusting and discharge from the eye and/or incisions. A small amount of surface bleeding, and depending on the type of surgery, bleeding from the inside of the eyelid, is normal for the first 48 hours.  ? Avoid straining, bending at the waist, or lifting more than 15 pounds for 1 week. Sleeping with your head elevated, such as in a recliner, for the first several days can help swelling resolve more quickly.   ? Do continue to ambulate (walk) as you normally would - being sedentary after surgery can cause blood clots.   ? Your eye(s) and eyelid(s) may be painful and tender. This is normal after surgery.      Contact information and follow-up  ? Return to the Eye Clinic for a follow-up appointment with your physician as scheduled. If no appointment has been scheduled:   - [facility] eye clinic: [phone] for an appointment with Dr. Padilla within 2 to 3 weeks from your date of surgery.     ? For severe pain, bleeding, or loss of vision, call the [facility] at [phone] or [facility] at [phone].     After hours or on weekends and holidays, call [phone] and ask to speak with the ophthalmologist on call.    An on call person can be reached after hours for concerns. The on call doctor should not call in medication refill requests after hours or on weekends, so please plan accordingly. An effort has been made to provide adequate pain medications following every surgery, and refills will not be provided in most instances.     Medications  ? Restart all regular home medications and eye drops. If you take Plavix or Aspirin on a regular basis, wait for 72 hours after your surgery before restarting these in order to decrease the risk of bleeding complications.  ? Avoid aspirin and aspirin-like medications (Motrin, Aleve, Ibuprofen, [place] etc) for 72 hours to reduce the risk of bleeding. You may take Tylenol (acetaminophen) for pain.  ? In addition to your home medications, take the following post-operative medications as prescribed by your physician.    ? Apply antibiotic ointment to all sutures three times a day, and into the operated eye(s) at night.   Once you run out, you can apply Vaseline or Aquaphor (over the counter) to the incisions. Don't put the Vaseline or Aquaphor into your eyes.   ? If you have ocular irritation, you can use over the counter artificial tears such as Refresh, Systane, or Blink. Do not use Visine, Clear Eyes, or any other drop that gets the red out.     [facility]  Home Care Following Anesthesia  For 24 hours after surgery:  Get plenty of rest.  A responsible adult must stay with you for at least 24 hours after you leave the surgery center.  Do not drive or use heavy equipment.  If you have weakness or tingling, don't drive or use heavy equipment until this feeling goes away.   Do not drink alcohol.   Avoid strenuous or risky activities.  Ask for help when climbing  stairs.  You may feel lightheaded.  IF so, sit for a few minutes before standing.  Have someone help you get up.   If you have nausea (feel sick to your stomach): Drink only clear liquids such as apple juice, ginger ale, broth or 7-Up.  Rest may also help.  Be sure to drink enough fluids.  Move to a regular diet as you feel able.   You may have a slight fever.  Call the doctor if your fever is over 100 F (37.7 C) (taken under the tongue) or lasts longer than 24 hours.  You may have a dry mouth, a sore throat, muscle aches or trouble sleeping. These should go away after 24 hours.  Do not make important or legal decisions.   It is recommended to avoid smoking.               Tips for taking pain medications  To get the best pain relief possible, remember these points:  Take pain medications as directed, before pain becomes severe.  Pain medication can upset your stomach: taking it with food may help.  Constipation is a common side effect of pain medication. Drink plenty of  fluids.  Eat foods high in fiber. Take a stool softener if recommended by your doctor or pharmacist.  Do not drink alcohol, drive or operate machinery while taking pain medications.  Ask about other ways to control pain, such as with heat, ice or relaxation.    Tylenol/Acetaminophen Consumption  To help encourage the safe use of acetaminophen, the makers of TYLENOL  have lowered the maximum daily dose for single-ingredient Extra Strength TYLENOL  (acetaminophen) products sold in the [place] from 8 pills per day (4,000 mg) to 6 pills per day (3,000 mg). The dosing interval has also changed from 2 pills every 4-6 hours to 2 pills every 6 hours.  If you feel your pain relief is insufficient, you may take Tylenol/Acetaminophen in addition to your narcotic pain medication.   Be careful not to exceed 3,000 mg of Tylenol/Acetaminophen in a 24 hour period from all sources.  If you are taking extra strength Tylenol/acetaminophen (500 mg), the maximum dose is 6  tablets in 24 hours.  If you are taking regular strength acetaminophen (325 mg), the maximum dose is 9 tablets in 24 hours.    Call a doctor for any of the following:  Signs of infection (fever, growing tenderness at the surgery site, a large amount of drainage or bleeding, severe pain, foul-smelling drainage, redness, swelling).  It has been over 8 to 10 hours since surgery and you are still not able to urinate (pass water).  Headache for over 24 hours.  Numbness, tingling or weakness the day after surgery (if you had spinal anesthesia).  Signs of Covid-19 infection (temperature over 100 degrees, shortness of breath, cough, loss of taste/smell, generalized body aches, persistent headache, chills, sore throat, nausea/vomiting/diarrhea)  Your doctor is:       Dr. Autumn Padilla, Ophthalmology: [phone]               Or dial [phone] and ask for the resident on call for:  Ophthalmology  For emergency care, call the:  [facility]:  [phone] (TTY for hearing impaired: [phone])

## 2023-05-04 NOTE — OP NOTE
PREOPERATIVE DIAGNOSIS:  Left anterior orbital mass.      POSTOPERATIVE DIAGNOSIS:  Left anterior orbital mass.      PROCEDURE:  Left anterior orbitotomy with biopsy.      SURGEON: Autumn Padilla MD     ASSISTANT:  Guerda Rowell MD     ANESTHESIA:  General with local infiltration of a 50/50 mixture of 2% lidocaine with epinephrine and 0.5% Marcaine.      COMPLICATIONS:  None.      ESTIMATED BLOOD LOSS:  Less than 5 mL.     SPECIMENS:  Left  orbital mass in saline for fresh tissue evaluation and lymphoma workup and a second piece in formalin for permanent histopathology.       HISTORY:  Maritza Hitchcock  presented with a mass in the left superior temporal orbit in the region of the lacrimal fossa.  After the risks, benefits and alternatives to the proposed procedure were explained, informed consent was obtained.      DESCRIPTION OF PROCEDURE:  Maritza Hitchcock  was brought to the operating room and placed supine on the operating table.  General anesthesia was induced.  The left upper lid crease was marked with a marking pen and infiltrated with local anesthetic.  The area was prepped and draped in the typical sterile ophthalmic fashion.  Attention was directed to the left  side.  Lid crease incision was made with a 15 blade and dissection carried down to the orbicularis with high temperature cautery.  Orbital septum was opened horizontally.  In the region of the lacrimal fossa in the superotemporal orbit, there was a firm mass in the region of the lacrimal gland.  Biopsy was obtained using Jani scissors.  Hemostasis was obtained with monopolar cautery.  The biopsy was placed in saline for fresh tissue evaluation for possible lymphoma and a second piece was placed in formalin.  Once meticulous hemostasis was obtained, the orbital tissue was reposited and the skin was closed with running 6-0 plain gut suture.  Erythromycin ointment was applied to the incision.  Maritza Hitchcock  tolerated the procedure well.   Maritza Hitchcock  left the operating room in stable condition.       Autumn Padilla MD

## 2023-05-04 NOTE — ANESTHESIA CARE TRANSFER NOTE
Patient: Maritza Hitchcock    Procedure: Procedure(s):  Left lacrimal gland biopsy       Diagnosis: Lacrimal gland inflammation, left [H04.002]  Diagnosis Additional Information: No value filed.    Anesthesia Type:   General     Note:    Oropharynx: oropharynx clear of all foreign objects and spontaneously breathing  Level of Consciousness: drowsy  Oxygen Supplementation: nasal cannula  Level of Supplemental Oxygen (L/min / FiO2): 2  Independent Airway: airway patency satisfactory and stable  Dentition: dentition unchanged  Vital Signs Stable: post-procedure vital signs reviewed and stable  Report to RN Given: handoff report given  Patient transferred to: PACU  Comments: Uneventful transport   Report to [place]  Exchanging well; color natl  Pt responds appropriately to command  IV patent  Lips/teeth/dentition as preop status  Questions answered    Handoff Report: Identifed the Patient, Identified the Reponsible Provider, Reviewed the pertinent medical history, Discussed the surgical course, Reviewed Intra-OP anesthesia mangement and issues during anesthesia, Set expectations for post-procedure period and Allowed opportunity for questions and acknowledgement of understanding      Vitals:  Vitals Value Taken Time   /79 05/04/23 1252   Temp 36.1  C (96.9  F) 05/04/23 1252   Pulse 63 05/04/23 1254   Resp 12 05/04/23 1254   SpO2 99 % 05/04/23 1254   Vitals shown include unvalidated device data.    Electronically Signed By: REMY BLANCO CRNA  May 4, 2023  12:55 PM

## 2023-05-04 NOTE — ANESTHESIA PREPROCEDURE EVALUATION
Anesthesia Pre-Procedure Evaluation    Patient: Maritza Hitchcock   MRN: [id number] : 1955        Procedure : Procedure(s):  Left lacrimal gland biopsy          Past Medical History:   Diagnosis Date     Diabetes (H) 2021     MVP (mitral valve prolapse)      Premenopausal menorrhagia 2010     Thyroid disease     Hypothyroidism      Past Surgical History:   Procedure Laterality Date     ABDOMEN SURGERY      Tubal     CATARACT IOL, RT/LT       CHOLECYSTECTOMY           COLONOSCOPY  ????    2007, 2017     DILATION AND CURETTAGE, HYSTEROSCOPY DIAGNOSTIC, COMBINED      2010     EYE SURGERY  ,     Left eye-Vitrectomy, Cataract     GALLBLADDER SURGERY  1987     GYN SURGERY  ????    Partial Hysterectomy     LAPAROSCOPIC HYSTERECTOMY SUPRACERVICAL  2010     MENISCECTOMY  2016    partial     SOFT TISSUE SURGERY  ????    Right knee meniscus repair     VAGINAL DELIVERY      x3 no date      No Known Allergies   Social History     Tobacco Use     Smoking status: Former     Packs/day: 0.50     Years: 0.00     Pack years: 0.00     Types: Cigarettes     Start date: 10/1/1975     Quit date: 1976     Years since quittin.9     Smokeless tobacco: Never   Vaping Use     Vaping status: Not on file   Substance Use Topics     Alcohol use: Yes      Wt Readings from Last 1 Encounters:   23 69.4 kg (153 lb)        Anesthesia Evaluation   Pt has had prior anesthetic. Type: General.    No history of anesthetic complications       ROS/MED HX  ENT/Pulmonary:    (-) tobacco use, asthma, COPD and recent URI   Neurologic:       Cardiovascular:     (+) -----Previous cardiac testing   Echo: Date:  Results:  Scanned, notes normal LVEF, normal RV function, trace MR, no MV prolapse  Stress Test: Date: Results:    ECG Reviewed: Date: Results:    Cath: Date: Results:   (-) ANDREW, orthopnea/PND and syncope   METS/Exercise Tolerance: >4 METS Comment: Mow lawn, up flights of  stairs without concerning cardiopulmonary symptoms   Hematologic:       Musculoskeletal:       GI/Hepatic:     (+) GERD, Asymptomatic on medication,     Renal/Genitourinary:       Endo:     (+) thyroid problem, hypothyroidism,     Psychiatric/Substance Use:       Infectious Disease:       Malignancy:       Other:            Physical Exam    Airway        Mallampati: II   TM distance: > 3 FB   Neck ROM: full   Mouth opening: > 3 cm    Respiratory Devices and Support         Dental         B=Bridge, C=Chipped, L=Loose, M=Missing    Cardiovascular          Rhythm and rate: regular and normal     Pulmonary           breath sounds clear to auscultation           OUTSIDE LABS:  CBC:   Lab Results   Component Value Date    WBC 6.5 04/06/2023    WBC 7.5 11/21/2022    HGB 11.4 (L) 04/06/2023    HGB 11.2 (L) 11/21/2022    HCT 34.6 (L) 04/06/2023    HCT 34.7 (L) 11/21/2022     04/06/2023     11/21/2022     BMP:   Lab Results   Component Value Date     04/13/2023     04/14/2022    POTASSIUM 3.9 04/13/2023    POTASSIUM 4.5 04/14/2022    CHLORIDE 103 04/13/2023    CHLORIDE 103 04/14/2022    CO2 26 04/13/2023    CO2 28 04/14/2022    BUN 17.0 04/13/2023    BUN 22 04/14/2022    CR 0.78 04/13/2023    CR 0.65 04/14/2022     (H) 04/13/2023     (H) 04/14/2022     COAGS: No results found for: PTT, INR, FIBR  POC: No results found for: BGM, HCG, HCGS  HEPATIC:   Lab Results   Component Value Date    ALBUMIN 4.8 04/13/2023    PROTTOTAL 7.3 04/13/2023    ALT 21 04/13/2023    AST 27 04/13/2023    ALKPHOS 65 04/13/2023    BILITOTAL 0.4 04/13/2023     OTHER:   Lab Results   Component Value Date    A1C 5.6 03/06/2023    DIANA 9.7 04/13/2023    MAG 2.1 06/29/2021    TSH 0.92 11/21/2022       Anesthesia Plan    ASA Status:  3   NPO Status:  NPO Appropriate    Anesthesia Type: General.     - Airway: LMA   Induction: Intravenous.   Maintenance: Balanced.        Consents    Anesthesia Plan(s) and associated  risks, benefits, and realistic alternatives discussed. Questions answered and patient/representative(s) expressed understanding.    - Discussed:     - Discussed with:  Patient         Postoperative Care    Pain management: IV analgesics, Oral pain medications.   PONV prophylaxis: Ondansetron (or other 5HT-3), Dexamethasone or Solumedrol, Background Propofol Infusion     Comments:    Other Comments: Discussed risks of general anesthesia, including aspiration pneumonia, sore throat/hoarse voice, abrasions/damage to lips/tongue/teeth, nausea, rare complications (including medication reactions, cardiac, pulmonary, hypoxia/low oxygen, recall). Ensured understanding, invited questions and all questions were answered. Patient wishes to proceed.\       H&P reviewed: Unable to attach H&P to encounter due to EHR limitations. H&P Update: appropriate H&P reviewed, patient examined. No interval changes since H&P (within 30 days).         Michelle Tan MD

## 2023-05-04 NOTE — ANESTHESIA POSTPROCEDURE EVALUATION
Patient: Maritza Hitchcock    Procedure: Procedure(s):  Left lacrimal gland biopsy       Anesthesia Type:  General    Note:  Disposition: Outpatient   Postop Pain Control: Uneventful            Sign Out: Well controlled pain   PONV: No   Neuro/Psych: Uneventful            Sign Out: Acceptable/Baseline neuro status   Airway/Respiratory: Uneventful            Sign Out: Acceptable/Baseline resp. status   CV/Hemodynamics: Uneventful            Sign Out: Acceptable CV status; No obvious hypovolemia; No obvious fluid overload   Other NRE:    DID A NON-ROUTINE EVENT OCCUR?     Event details/Postop Comments:  Doing well. Alert, oriented. No sore throat, nausea, or problems with pain control.  Patient denies any concerns.              Last vitals:  Vitals Value Taken Time   /98 05/04/23 1338   Temp 36.1  C (96.9  F) 05/04/23 1252   Pulse 57 05/04/23 1338   Resp 11 05/04/23 1338   SpO2 100 % 05/04/23 1338       Electronically Signed By: Michelle Tan MD  May 4, 2023  2:44 PM

## 2023-05-08 LAB

## 2023-05-09 DIAGNOSIS — H04.002 LACRIMAL GLAND INFLAMMATION, LEFT: Primary | ICD-10-CM

## 2023-05-09 DIAGNOSIS — L92.9 NON-CASEATING GRANULOMA: ICD-10-CM

## 2023-05-09 RX ORDER — PREDNISONE 20 MG/1
TABLET ORAL
Qty: 50 TABLET | Refills: 0 | Status: SHIPPED | OUTPATIENT
Start: 2023-05-09 | End: 2023-05-24

## 2023-05-17 ENCOUNTER — VIRTUAL VISIT (OUTPATIENT)
Dept: BEHAVIORAL HEALTH | Facility: CLINIC | Age: 68
End: 2023-05-17
Payer: MEDICARE

## 2023-05-17 DIAGNOSIS — F41.9 ANXIETY DISORDER, UNSPECIFIED TYPE: Primary | ICD-10-CM

## 2023-05-17 PROCEDURE — 90837 PSYTX W PT 60 MINUTES: CPT | Mod: VID | Performed by: SOCIAL WORKER

## 2023-05-17 NOTE — PROGRESS NOTES
[facility] Primary Care: Integrated Behavioral Health   May 17, 2023      Behavioral Health Clinician Progress Note    Patient Name: Maritza Hitchcock           Service Type:  Individual      Service Location:   [facility] / Email (patient reached)     Session Start Time: 2:00p  Session End Time:   3:00p      Session Length: 53 - 60      Attendees: Client     Service Modality:  Video Visit:      Provider verified identity through the following two step process.  Patient provided:  Patient  and Patient address    Telemedicine Visit: The patient's condition can be safely assessed and treated via synchronous audio and visual telemedicine encounter.      Reason for Telemedicine Visit: Patient has requested telehealth visit    Originating Site (Patient Location): Patient's home    Distant Site (Provider Location): Provider Remote Setting- Home Office    Consent:  The patient/guardian has verbally consented to: the potential risks and benefits of telemedicine (video visit) versus in person care; bill my insurance or make self-payment for services provided; and responsibility for payment of non-covered services.     Patient would like the video invitation sent by:  My Chart    Mode of Communication:  Video Conference via [employer]well    Distant Location (Provider):  Off-site    As the provider I attest to compliance with applicable laws and regulations related to telemedicine.    Visit Activities (Refresh list every visit): [facility] Only    Diagnostic Assessment Date:  23   Treatment Plan Review Date:  23    See Flowsheets for today's PHQ-9 and THERESA-7 results  Previous PHQ-9:       3/7/2023     2:41 PM 2023    10:53 AM   PHQ-9 SCORE   PHQ-9 Total Score MyChart 0 0   PHQ-9 Total Score 0 0      PHQ-2 Score:       3/6/2023     3:24 PM 2022    12:33 PM   PHQ-2 (  [employer])   Q1: Little interest or pleasure in doing things 0 0   Q2: Feeling down, depressed or hopeless 0 0   PHQ-2 Score 0 0  "  Q1: Little interest or pleasure in doing things Not at all Not at all   Q2: Feeling down, depressed or hopeless Not at all Not at all   PHQ-2 Score 0 0     Previous THERESA-7:       3/7/2023     2:42 PM   THERESA-7 SCORE   Total Score 0 (minimal anxiety)   Total Score 0        MADDIE LEVEL:       View : No data to display.                DATA  Extended Session (60+ minutes): No  Interactive Complexity: No  Crisis: No  [facility] Patient: No    Treatment Objective(s) Addressed in This Session:  Target Behavior(s): interpersonal stressors    Anxiety: will develop more effective coping skills to manage anxiety symptoms    Current Stressors / Issues:  Had eyelid biopsy 1 week ago and found that it is not a malignancy but rather likely an autoimmune disease.  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    Shares that she is trying \"real hard to be nicer to her mother\".  Has noticed her mother's mentation has changed a bit with her dementia process.    Shares that she did get \"coerced\" to buy some [employer] gift cards and sent to her friend.   did find out due to pt charging on their credit card.  Is having a really hard time trying to figure out where she is in both her relationships as not wanting to give up on either of them.  Discussed how her relationship with her friend has changed over the past several months and is now feeling pressured.        Progress on Treatment Objective(s) / Homework:  Minimal progress - ACTION (Actively working towards change); Intervened by reinforcing change plan / affirming steps taken        Motivational Interviewing    MI Intervention: Expressed Empathy/Understanding, Supported Autonomy, Collaboration, Evocation, Permission to raise concern or advise and Open-ended questions     Change Talk Expressed by the Patient: Reasons to change Activation    Provider Response to Change Talk: E - Evoked more info from patient about behavior " change, A - Affirmed patient's thoughts, decisions, or attempts at behavior change, R - Reflected patient's change talk and S - Summarized patient's change talk statements      Care Plan review completed: No    Medication Review:  No current psychiatric medications prescribed    Medication Compliance:  NA    Changes in Health Issues:   None reported    Chemical Use Review:   Substance Use: Chemical use reviewed, no active concerns identified      Tobacco Use: No current tobacco use.      Assessment: Current Emotional / Mental Status (status of significant symptoms):  Risk status (Self / Other harm or suicidal ideation)  Patient denies a history of suicidal ideation, suicide attempts, self-injurious behavior, homicidal ideation, homicidal behavior and and other safety concerns  Patient denies current fears or concerns for personal safety.  Patient denies current or recent suicidal ideation or behaviors.  Patient denies current or recent homicidal ideation or behaviors.  Patient denies current or recent self injurious behavior or ideation.  Patient denies other safety concerns.  A safety and risk management plan has not been developed at this time, however patient was encouraged to call [place] [phone] should there be a change in any of these risk factors.    Appearance:   Appropriate   Eye Contact:   Good   Psychomotor Behavior: Normal   Attitude:   Cooperative   Orientation:   All  Speech   Rate / Production: Normal    Volume:  Normal   Mood:    Anxious   Affect:    Flat   Thought Content:  Clear   Thought Form:  Coherent  Logical   Insight:    Good     Diagnoses:  (F41.9) Anxiety disorder, unspecified type  (primary encounter diagnosis)    Collateral Reports Completed:  Not Applicable    Plan: (Homework, other):  Patient was given information about behavioral services and encouraged to schedule a follow up appointment with the clinic [facility] 1-2 weeks.  She was also given information about mental health symptoms  and treatment options .  CD Recommendations: No indications of CD issues.  Lyudmila Figueroa, CHIN, [facility]                                                Individual Treatment Plan    Patient's Name: Maritza Hitchcock  YOB: 1955    Date of Creation: 2/6/23  Date Treatment Plan Last Reviewed/Revised: 5/17/23    DSM5 Diagnoses: 300.00 (F41.9) Unspecified Anxiety Disorder  Psychosocial / Contextual Factors: relationships, stress with mother, worry about eye  PROMIS (reviewed every 90 days): 38    Referral / Collaboration:  Referral to another professional/service is not indicated at this time.    Anticipated number of session for this episode of care: 6-9 sessions  Anticipation frequency of session: Every other week  Anticipated Duration of each session: 38-52 minutes  Treatment plan will be reviewed in 90 days or when goals have been changed.       MeasurableTreatment Goal(s) related to diagnosis / functional impairment(s)  Goal 1: Patient will experience a reduction in anxious symptoms, along with a corresponding increase in relaxed emotional state.    I will know I've met my goal when feel more relaxed.      Objective #A (Patient Action)    Patient will use cognitive strategies identified in therapy to challenge anxious thoughts.  Status: Continued - Date(s):5/17/23     Intervention(s)  Therapist will utilize CBT and ACT to help pt challenge anxious thoughts and reduce intensity/duration of anxious distress.        JOHN Rizvi, CHIN, [facility]  May 17, 2023

## 2023-05-18 ENCOUNTER — OFFICE VISIT (OUTPATIENT)
Dept: OPHTHALMOLOGY | Facility: CLINIC | Age: 68
End: 2023-05-18
Payer: MEDICARE

## 2023-05-18 DIAGNOSIS — H04.002 LACRIMAL GLAND INFLAMMATION, LEFT: Primary | ICD-10-CM

## 2023-05-18 DIAGNOSIS — L92.9 NON-CASEATING GRANULOMA: ICD-10-CM

## 2023-05-18 PROCEDURE — 99024 POSTOP FOLLOW-UP VISIT: CPT | Performed by: OPHTHALMOLOGY

## 2023-05-18 ASSESSMENT — VISUAL ACUITY
METHOD: SNELLEN - LINEAR
OS_CC+: -3
OD_CC: 20/30
CORRECTION_TYPE: GLASSES
OS_CC: 20/20
OD_CC+: -2

## 2023-05-18 ASSESSMENT — TONOMETRY
OS_IOP_MMHG: 13
IOP_METHOD: ICARE
OD_IOP_MMHG: 14

## 2023-05-18 NOTE — PROGRESS NOTES
Chief Complaint(s) and History of Present Illness(es)     Post Op (Ophthalmology) Left Eye            Laterality: left eye    Comments: Pt here for POW#2 s/p Left lacrimal gland bx. DOS 05/04/2023.          Comments    Pt notes everything is going 'okay'. Pt denies pain in eye but does have a   headache- woke up with one this morning.  DAI Taylor on 5/18/2023 at 9:12 AM      Started prednisone [id number] every 5 days. Currently on 40 mg     Final Diagnosis   A-B.  SOFT TISSUE, LEFT LACRIMAL GLAND, BIOPSY:  -Nonnecrotizing granulomatous inflammation, see comment.  -GMS and AFB special stains are negative for microorganisms (performed on blocks A1 and B1 with appropriate controls).  -Negative for malignancy.     Comment  UUMAHAYDEN   The differential diagnosis includes primarily infection, despite negative special stains, and sarcoidosis as a diagnosis of exclusion.  Clinical and radiologic correlation is necessary.     The case was seen in consultation with Dr. Geiger (Hematopathology).  There is no evidence of lymphoma in this biopsy.  Concurrent flow cytometry study is also negative for lymphoma (see case [id number]).         Assessment & Plan     Maritza Hitchcock is a 67 year old female with the following diagnoses:   Encounter Diagnoses   Name Primary?     Lacrimal gland inflammation, left Yes     Non-caseating granuloma      Finish prednisone taper  Vaseline to incision at bedtime - 1 month  Warm compresses      Patient disposition:   Return for 6-8 weeks postop dacryoadenitis. Sooner with recurrent inflammation. If there is recurrence consider increasing prednisone dose, intraorbital steroid injection, and rheumatology consultation.         Attending Physician Attestation: Complete documentation of historical and exam elements from today's encounter can be found in the full encounter summary report (not reduplicated in this progress note). I personally obtained the chief complaint(s) and history  of present illness. I confirmed and edited as necessary the review of systems, past medical/surgical history, family history, social history, and examination findings as documented by others; and I examined the patient myself. I personally reviewed the relevant tests, images, and reports as documented above. I formulated and edited as necessary the assessment and plan and discussed the findings and management plan with the patient.  -Autumn Padilla MD

## 2023-05-20 ENCOUNTER — HEALTH MAINTENANCE LETTER (OUTPATIENT)
Age: 68
End: 2023-05-20

## 2023-05-22 ENCOUNTER — VIRTUAL VISIT (OUTPATIENT)
Dept: BEHAVIORAL HEALTH | Facility: CLINIC | Age: 68
End: 2023-05-22
Payer: MEDICARE

## 2023-05-22 DIAGNOSIS — F41.9 ANXIETY DISORDER, UNSPECIFIED TYPE: Primary | ICD-10-CM

## 2023-05-22 PROCEDURE — 90834 PSYTX W PT 45 MINUTES: CPT | Mod: VID | Performed by: SOCIAL WORKER

## 2023-05-22 ASSESSMENT — PATIENT HEALTH QUESTIONNAIRE - PHQ9
SUM OF ALL RESPONSES TO PHQ QUESTIONS 1-9: 0
10. IF YOU CHECKED OFF ANY PROBLEMS, HOW DIFFICULT HAVE THESE PROBLEMS MADE IT FOR YOU TO DO YOUR WORK, TAKE CARE OF THINGS AT HOME, OR GET ALONG WITH OTHER PEOPLE: NOT DIFFICULT AT ALL
SUM OF ALL RESPONSES TO PHQ QUESTIONS 1-9: 0

## 2023-05-22 ASSESSMENT — ANXIETY QUESTIONNAIRES
2. NOT BEING ABLE TO STOP OR CONTROL WORRYING: NOT AT ALL
GAD7 TOTAL SCORE: 0
7. FEELING AFRAID AS IF SOMETHING AWFUL MIGHT HAPPEN: NOT AT ALL
7. FEELING AFRAID AS IF SOMETHING AWFUL MIGHT HAPPEN: NOT AT ALL
8. IF YOU CHECKED OFF ANY PROBLEMS, HOW DIFFICULT HAVE THESE MADE IT FOR YOU TO DO YOUR WORK, TAKE CARE OF THINGS AT HOME, OR GET ALONG WITH OTHER PEOPLE?: NOT DIFFICULT AT ALL
IF YOU CHECKED OFF ANY PROBLEMS ON THIS QUESTIONNAIRE, HOW DIFFICULT HAVE THESE PROBLEMS MADE IT FOR YOU TO DO YOUR WORK, TAKE CARE OF THINGS AT HOME, OR GET ALONG WITH OTHER PEOPLE: NOT DIFFICULT AT ALL
GAD7 TOTAL SCORE: 0
6. BECOMING EASILY ANNOYED OR IRRITABLE: NOT AT ALL
GAD7 TOTAL SCORE: 0
4. TROUBLE RELAXING: NOT AT ALL
1. FEELING NERVOUS, ANXIOUS, OR ON EDGE: NOT AT ALL
5. BEING SO RESTLESS THAT IT IS HARD TO SIT STILL: NOT AT ALL
3. WORRYING TOO MUCH ABOUT DIFFERENT THINGS: NOT AT ALL

## 2023-05-22 NOTE — PROGRESS NOTES
[facility] Primary Care: Integrated Behavioral Health   May 22, 2023      Behavioral Health Clinician Progress Note    Patient Name: Maritza Hitchcock           Service Type:  Individual      Service Location:   [facility] / Email (patient reached)     Session Start Time: 12:00p  Session End Time:   12:52p      Session Length: 38 - 52      Attendees: Client     Service Modality:  Video Visit:      Provider verified identity through the following two step process.  Patient provided:  Patient  and Patient address    Telemedicine Visit: The patient's condition can be safely assessed and treated via synchronous audio and visual telemedicine encounter.      Reason for Telemedicine Visit: Patient has requested telehealth visit    Originating Site (Patient Location): Patient's home    Distant Site (Provider Location): Provider Remote Setting- Home Office    Consent:  The patient/guardian has verbally consented to: the potential risks and benefits of telemedicine (video visit) versus in person care; bill my insurance or make self-payment for services provided; and responsibility for payment of non-covered services.     Patient would like the video invitation sent by:  My Chart    Mode of Communication:  Video Conference via [employer]    Distant Location (Provider):  Off-site    As the provider I attest to compliance with applicable laws and regulations related to telemedicine.    Visit Activities (Refresh list every visit): [facility] Only    Diagnostic Assessment Date:  23   Treatment Plan Review Date:  23    See Flowsheets for today's PHQ-9 and THERESA-7 results  Previous PHQ-9:       3/7/2023     2:41 PM 2023    10:53 AM 2023    11:55 AM   PHQ-9 SCORE   PHQ-9 Total Score MyChart 0 0 0   PHQ-9 Total Score 0 0 0          Previous THERESA-7:       3/7/2023     2:42 PM 2023    11:56 AM   THERESA-7 SCORE   Total Score 0 (minimal anxiety) 0 (minimal anxiety)   Total Score 0 0        MADDIE LEVEL:        View : No data to display.                DATA  Extended Session (60+ minutes): No  Interactive Complexity: No  Crisis: No  [facility] Patient: No    Treatment Objective(s) Addressed in This Session:  Target Behavior(s): interpersonal stressors    Anxiety: will develop more effective coping skills to manage anxiety symptoms    Current Stressors / Issues:  Has had a busy past week.  Shares that things have been working out better with her mother since changing the way she interacts with her.  Has seen some compassion from mother which pt shares she has not seen ever from her mother.    Had an appt with her bank and has a banker she has connected with.  Banker encouraged her to be more open with  and to set limits with her friend. Pt reports feeling thankful that the banker is understanding and supportive.  Pt says that she had a good discussion with  and that it went well and they are working through some things.  Is torn between wanting to get a job or not.  Discussed some pros/cons of getting a job or possibly just looking at some of the options being sent to her.  Is going out of town next week and bringing mother with.    Eyelid is continuing to improve.        Progress on Treatment Objective(s) / Homework:  Minimal progress - ACTION (Actively working towards change); Intervened by reinforcing change plan / affirming steps taken        Motivational Interviewing    MI Intervention: Expressed Empathy/Understanding, Supported Autonomy, Collaboration, Evocation, Permission to raise concern or advise and Open-ended questions     Change Talk Expressed by the Patient: Reasons to change Activation    Provider Response to Change Talk: E - Evoked more info from patient about behavior change, A - Affirmed patient's thoughts, decisions, or attempts at behavior change, R - Reflected patient's change talk and S - Summarized patient's change talk statements      Care Plan review completed: No    Medication Review:  No  current psychiatric medications prescribed    Medication Compliance:  NA    Changes in Health Issues:   None reported    Chemical Use Review:   Substance Use: Chemical use reviewed, no active concerns identified      Tobacco Use: No current tobacco use.      Assessment: Current Emotional / Mental Status (status of significant symptoms):  Risk status (Self / Other harm or suicidal ideation)  Patient denies a history of suicidal ideation, suicide attempts, self-injurious behavior, homicidal ideation, homicidal behavior and and other safety concerns  Patient denies current fears or concerns for personal safety.  Patient denies current or recent suicidal ideation or behaviors.  Patient denies current or recent homicidal ideation or behaviors.  Patient denies current or recent self injurious behavior or ideation.  Patient denies other safety concerns.  A safety and risk management plan has not been developed at this time, however patient was encouraged to call [place] [phone] should there be a change in any of these risk factors.    Appearance:   Appropriate   Eye Contact:   Good   Psychomotor Behavior: Normal   Attitude:   Cooperative   Orientation:   All  Speech   Rate / Production: Normal    Volume:  Normal   Mood:    Anxious   Affect:    Flat   Thought Content:  Clear   Thought Form:  Coherent  Logical   Insight:    Good     Diagnoses:  (F41.9) Anxiety disorder, unspecified type  (primary encounter diagnosis)    Collateral Reports Completed:  Not Applicable    Plan: (Homework, other):  Patient was given information about behavioral services and encouraged to schedule a follow up appointment with the clinic [facility] in 2 weeks.  She was also given information about mental health symptoms and treatment options .  CD Recommendations: No indications of CD issues.  CHIN Mirza, [facility]                                                Individual Treatment Plan    Patient's Name: Maritza GILL Wimell  Date Of  Birth: 1955    Date of Creation: 2/6/23  Date Treatment Plan Last Reviewed/Revised: 5/17/23    DSM5 Diagnoses: 300.00 (F41.9) Unspecified Anxiety Disorder  Psychosocial / Contextual Factors: relationships, stress with mother, worry about eye  PROMIS (reviewed every 90 days): 38    Referral / Collaboration:  Referral to another professional/service is not indicated at this time.    Anticipated number of session for this episode of care: 6-9 sessions  Anticipation frequency of session: Every other week  Anticipated Duration of each session: 38-52 minutes  Treatment plan will be reviewed in 90 days or when goals have been changed.       MeasurableTreatment Goal(s) related to diagnosis / functional impairment(s)  Goal 1: Patient will experience a reduction in anxious symptoms, along with a corresponding increase in relaxed emotional state.    I will know I've met my goal when feel more relaxed.      Objective #A (Patient Action)    Patient will use cognitive strategies identified in therapy to challenge anxious thoughts.  Status: Continued - Date(s):5/17/23     Intervention(s)  Therapist will utilize CBT and ACT to help pt challenge anxious thoughts and reduce intensity/duration of anxious distress.        Lyudmila Figueroa, MSJEANNA, LICSW, [facility]  May 22, 2023

## 2023-05-23 ENCOUNTER — MYC MEDICAL ADVICE (OUTPATIENT)
Dept: OPHTHALMOLOGY | Facility: CLINIC | Age: 68
End: 2023-05-23
Payer: MEDICARE

## 2023-05-23 DIAGNOSIS — H04.002 LACRIMAL GLAND INFLAMMATION, LEFT: Primary | ICD-10-CM

## 2023-05-23 DIAGNOSIS — Z11.59 ENCOUNTER FOR SCREENING FOR OTHER VIRAL DISEASES: ICD-10-CM

## 2023-05-23 DIAGNOSIS — L92.9 NON-CASEATING GRANULOMA: ICD-10-CM

## 2023-05-24 ENCOUNTER — TELEPHONE (OUTPATIENT)
Dept: OPHTHALMOLOGY | Facility: CLINIC | Age: 68
End: 2023-05-24

## 2023-05-24 ENCOUNTER — ALLIED HEALTH/NURSE VISIT (OUTPATIENT)
Dept: OPHTHALMOLOGY | Facility: CLINIC | Age: 68
End: 2023-05-24
Payer: MEDICARE

## 2023-05-24 DIAGNOSIS — H04.002 LACRIMAL GLAND INFLAMMATION, LEFT: ICD-10-CM

## 2023-05-24 DIAGNOSIS — L92.9 NON-CASEATING GRANULOMA: ICD-10-CM

## 2023-05-24 DIAGNOSIS — H05.10 ORBITAL INFLAMMATION: Primary | ICD-10-CM

## 2023-05-24 PROCEDURE — 67515 INJECT/TREAT EYE SOCKET: CPT | Mod: 58 | Performed by: OPHTHALMOLOGY

## 2023-05-24 PROCEDURE — 99024 POSTOP FOLLOW-UP VISIT: CPT | Mod: GC | Performed by: OPHTHALMOLOGY

## 2023-05-24 RX ORDER — PREDNISONE 10 MG/1
TABLET ORAL
Qty: 50 TABLET | Refills: 0 | Status: SHIPPED | OUTPATIENT
Start: 2023-05-24 | End: 2023-06-07

## 2023-05-24 RX ORDER — TRIAMCINOLONE ACETONIDE 40 MG/ML
40 INJECTION, SUSPENSION INTRA-ARTICULAR; INTRAMUSCULAR ONCE
Status: COMPLETED | OUTPATIENT
Start: 2023-05-24 | End: 2023-05-24

## 2023-05-24 RX ADMIN — TRIAMCINOLONE ACETONIDE 40 MG: 40 INJECTION, SUSPENSION INTRA-ARTICULAR; INTRAMUSCULAR at 16:04

## 2023-05-24 ASSESSMENT — EXTERNAL EXAM - LEFT EYE: OS_EXAM: NORMAL

## 2023-05-24 ASSESSMENT — SLIT LAMP EXAM - LIDS: COMMENTS: DERMATOCHALASIS

## 2023-05-24 ASSESSMENT — EXTERNAL EXAM - RIGHT EYE: OD_EXAM: NORMAL

## 2023-05-24 NOTE — PROGRESS NOTES
Chief Complaint(s) and History of Present Illness(es)      Patient presents for steroid injection of the lacrimal gland left eye. She underwent biopsy 5/4/23 with pathology consistent with non-caseating granuloma. She has noticed worsening of the mass a few days ago and it is also more tender.      Assessment & Plan     Maritza Hitchcock is a 67 year old female with the following diagnoses:   1. Lacrimal gland inflammation, left    2. Non-caseating granuloma      POW3 left lacrimal gland biopsy with pathology consistent with non-caseating granuloma. Pt noticed worsening/increased size of mass the past few days and increased tenderness. Discussed with Rheumatology who will hopefully be able to see her in the next few weeks to start biologic. Plan to increase PO prednisone for now. Discussed option of steroid injection today and pt wished to proceed.  - increase PO prednisone to 40 mg daily x 10 days followed by weekly taper by 10 mg  - screening labs recommended per Rheum completed 3/2023   - HIV neg   - Hep C   - HIV   - quant-gold   - pt to get Hep B tests completed tomorrow  - provided shingles vaccine info today. Vaccine completed 3/26/19 and 8/6/19  - left lacrimal gland intralesional kenalog triamcinolone injection 40 mg of Kenalog 40mg/mL was injected transcutaneous into the left lateral orbit into the region of the lacrimal gland fossa. Vision was checked post procedure and was unchanged.       Patient disposition: follow up 7/11 or sooner as needed    Heide Vu MD  PGY2 Ophthalmology       Attending Physician Attestation: Complete documentation of historical and exam elements from today's encounter can be found in the full encounter summary report (not reduplicated in this progress note). I personally obtained the chief complaint(s) and history of present illness. I confirmed and edited as necessary the review of systems, past medical/surgical history, family history, social history, and  examination findings as documented by others; and I examined the patient myself. I personally reviewed the relevant tests, images, and reports as documented above. I formulated and edited as necessary the assessment and plan and discussed the findings and management plan with the patient.  -Autumn Padilla MD      ATTENDING STAFF SURGEON: Dr. Padilla    OPERATION/PROCEDURE: left lacrimal gland intralesional kenalog 40 mg injection    ANESTHESIA: none    DESCRIPTION OF OPERATION/PROCEDURE:   The nature and purpose of the procedure, including black box risk of permanent vision loss related to kenalog injection were discussed. Possible consequences and complications, and alternative methods of treatment were explained in detail including less potent steroid not associated with vision loss.     The patient was taken to the exam room  and properly positioned. The area to be treated was defined and confirmed by the patient and physician. The area for steroid injection was marked. The steroid injection was injected into the lacrimal gland lesion.    I was present for the entire procedure. Auutmn Padilla MD

## 2023-05-24 NOTE — TELEPHONE ENCOUNTER
Call transferred to triage and spoke to patient at 1445 regarding ability to drive    Pt seeing Dr. Leyva at  clinic around 3 PM for steroid injection in left lacrimal gland area    Reviewed no sedation typically need for procedure    Would not antipate vision changes    Vision good in each eye     Reviewed may have some eye irriation and feel bump where medication injected.    Reviewed no contraindication per ophthalmology for driving if patient feels comfortable    Note to Dr. Vu/care coordinator for review    Danis Rodney RN 1:49 PM 05/24/23

## 2023-05-25 ENCOUNTER — LAB (OUTPATIENT)
Dept: LAB | Facility: CLINIC | Age: 68
End: 2023-05-25
Payer: MEDICARE

## 2023-05-25 DIAGNOSIS — Z11.59 ENCOUNTER FOR SCREENING FOR OTHER VIRAL DISEASES: ICD-10-CM

## 2023-05-25 DIAGNOSIS — H04.002 LACRIMAL GLAND INFLAMMATION, LEFT: ICD-10-CM

## 2023-05-25 DIAGNOSIS — L92.9 NON-CASEATING GRANULOMA: ICD-10-CM

## 2023-05-25 DIAGNOSIS — E11.9 TYPE 2 DIABETES MELLITUS WITHOUT COMPLICATION, WITHOUT LONG-TERM CURRENT USE OF INSULIN (H): Primary | ICD-10-CM

## 2023-05-25 LAB
HBA1C MFR BLD: 5.6 % (ref 0–5.6)
HBV SURFACE AB SERPL IA-ACNC: 1.95 M[IU]/ML
HBV SURFACE AB SERPL IA-ACNC: NONREACTIVE M[IU]/ML
HBV SURFACE AG SERPL QL IA: NONREACTIVE

## 2023-05-25 PROCEDURE — 36415 COLL VENOUS BLD VENIPUNCTURE: CPT

## 2023-05-25 PROCEDURE — 86706 HEP B SURFACE ANTIBODY: CPT

## 2023-05-25 PROCEDURE — 87340 HEPATITIS B SURFACE AG IA: CPT

## 2023-05-25 PROCEDURE — 83036 HEMOGLOBIN GLYCOSYLATED A1C: CPT

## 2023-05-26 ENCOUNTER — MYC MEDICAL ADVICE (OUTPATIENT)
Dept: RHEUMATOLOGY | Facility: CLINIC | Age: 68
End: 2023-05-26
Payer: MEDICARE

## 2023-05-30 ENCOUNTER — OFFICE VISIT (OUTPATIENT)
Dept: RHEUMATOLOGY | Facility: CLINIC | Age: 68
End: 2023-05-30
Attending: FAMILY MEDICINE
Payer: MEDICARE

## 2023-05-30 VITALS
OXYGEN SATURATION: 100 % | DIASTOLIC BLOOD PRESSURE: 78 MMHG | WEIGHT: 156.13 LBS | HEART RATE: 66 BPM | SYSTOLIC BLOOD PRESSURE: 131 MMHG | BODY MASS INDEX: 26.8 KG/M2

## 2023-05-30 DIAGNOSIS — R76.8 ELEVATED ANTINUCLEAR ANTIBODY (ANA) LEVEL: ICD-10-CM

## 2023-05-30 DIAGNOSIS — D86.9 SARCOIDOSIS: Primary | ICD-10-CM

## 2023-05-30 DIAGNOSIS — H04.002 DACRYOADENITIS OF LEFT LACRIMAL GLAND: ICD-10-CM

## 2023-05-30 DIAGNOSIS — Z79.899 HIGH RISK MEDICATION USE: ICD-10-CM

## 2023-05-30 PROCEDURE — 99417 PROLNG OP E/M EACH 15 MIN: CPT | Performed by: STUDENT IN AN ORGANIZED HEALTH CARE EDUCATION/TRAINING PROGRAM

## 2023-05-30 PROCEDURE — 99215 OFFICE O/P EST HI 40 MIN: CPT | Mod: 24 | Performed by: STUDENT IN AN ORGANIZED HEALTH CARE EDUCATION/TRAINING PROGRAM

## 2023-05-30 PROCEDURE — G0463 HOSPITAL OUTPT CLINIC VISIT: HCPCS | Performed by: STUDENT IN AN ORGANIZED HEALTH CARE EDUCATION/TRAINING PROGRAM

## 2023-05-30 RX ORDER — OMEGA-3/DHA/EPA/FISH OIL 60 MG-90MG
500 CAPSULE ORAL
COMMUNITY
End: 2023-09-17

## 2023-05-30 RX ORDER — FOLIC ACID 1 MG/1
1 TABLET ORAL DAILY
Qty: 90 TABLET | Refills: 3 | Status: SHIPPED | OUTPATIENT
Start: 2023-05-30 | End: 2024-03-29 | Stop reason: DRUGHIGH

## 2023-05-30 RX ORDER — PNV NO.95/FERROUS FUM/FOLIC AC 28MG-0.8MG
2 TABLET ORAL DAILY
COMMUNITY

## 2023-05-30 NOTE — LETTER
5/30/2023       RE: Maritza Hitchcock  [address] [facility] [address]     Dear Colleague,    Thank you for referring your patient, Maritza Hitchcock, to the [facility] RHEUMATOLOGY at [facility]. Please see a copy of my visit note below.    [facility] Rheumatology  Date of Service: 5/30/2023     Referring provider: Dr. Irena Levi  Referral for: Dacryoadenitis of left lacrimal gland, elevated MARILEE    CC: eyelid swelling    HPI: Started 10-12 weeks ago, eyelid started drooping. Was seeing an eye doctor in [place] who did her left eye cataract surgery some time prior. At the time it was on the smaller side and perhaps felt to be aging related. However, it progressively was got worst. They went on vacation to [place] and when she got back it was so bad she was having trouble seeing out of left eye. Her vision was cloudy and she was barely able to get it open. She did not have lower extremity swelling, joint pain, or rash at the time. She was getting bilateral headaches. She had a CXR that was notable only for a calcified grauloma. She had an MRI of her eyes that confirmed that she only had lacrimal gland enlargement on the left side. Around the end of April she felt that there was some swelling happening of the right eye and she had a CT orbit to evaluate which showed left sided only involvement. She was started on a Prednisone taper at the beginning of May, going from 60 mg down, but had recurrence of swelling around 30 mg. She had a lacrimal gland injection 5/24 and was put back on 40 mg prednisone temporarily before restarting tape.     Had sore spots in axilla. Always has dry mouth.felt like there was some swelling of left cheek. Headaches daily. Thought her bilateral neck glands were swollen. Never had sore throat. Sometimes felt more tired. No morning stiffness. Never rash.    ROS: 10 point ROS neg other than the symptoms noted above in the HPI.    ALLERGIES: Patient has no known allergies.   MEDS: personally reviewed, notable for prednisone 40 mg daily    PMHx:  Past Medical History:   Diagnosis Date    Diabetes (H) 2021    MVP (mitral valve prolapse)     Premenopausal menorrhagia 2010    Thyroid disease     Hypothyroidism        PSHx:  Past Surgical History:   Procedure Laterality Date    ABDOMEN SURGERY      Tubal    CATARACT IOL, RT/LT      CHOLECYSTECTOMY          COLONOSCOPY  ????    2007, 2017    DILATION AND CURETTAGE, HYSTEROSCOPY DIAGNOSTIC, COMBINED      2010    EYE SURGERY  ,     Left eye-Vitrectomy, Cataract    GALLBLADDER SURGERY  1987    GYN SURGERY  ????    Partial Hysterectomy    LAPAROSCOPIC HYSTERECTOMY SUPRACERVICAL  2010    MENISCECTOMY  2016    partial    ORBITOTOMY, RESECT TUMOR, COMBINED Left 2023    Procedure: Left lacrimal gland biopsy;  Surgeon: Autumn Padilla MD;  Location: [facility] OR    SOFT TISSUE SURGERY  ????    Right knee meniscus repair    VAGINAL DELIVERY      x3 no date       SocHx:  Social History     Tobacco Use    Smoking status: Former     Packs/day: 0.50     Years: 0.00     Pack years: 0.00     Types: Cigarettes     Start date: 10/1/1975     Quit date: 1976     Years since quittin.0    Smokeless tobacco: Never   Substance Use Topics    Alcohol use: Yes    Drug use: Never        FamHx:  family history includes Anxiety Disorder in her daughter and son; Coronary Artery Disease in her father; Glasses (<9 y/o) in her daughter, father, paternal grandmother, and son; Hearing Loss in her mother; Heart Disease in her father; Hyperlipidemia in her mother; Hypertension in her father and mother; Obesity in her father and paternal grandmother; Osteoporosis in her mother; Rheumatoid Arthritis in her mother; Thyroid Disease in her mother.     PHYSICAL EXAM  Vitals: /78 (BP Location: Left arm, Patient Position: Sitting, Cuff Size: Adult Regular)   Pulse 66    Wt 70.8 kg (156 lb 2 oz)   SpO2 100%   BMI 26.80 kg/m    BMI= Body mass index is 26.8 kg/m .   General: alert and oriented  LAD: no lymphadenopathy of axillary, submandibular, supraclavicular, or posterior cervical chains  Resp: no wheeze, rhonci, rales  CV: warm, well perfused extremities  Skin: no rash, no telangiectasias, no echymosis, nails without pitting or flaking  MSK: no swelling or redness of DIPs, PIPs, MCPs, CMCs, wrists. No swelling of ankles.    LAB REVIEW:      Latest Ref Rng & Units 2023     4:34 PM 2023     3:31 PM 2023     8:21 AM   RHEUM RESULTS   Albumin 3.5 - 5.2 g/dL  4.8      ALT 10 - 35 U/L  21      AST 10 - 35 U/L  27      MARILEE interpretation Negative  Positive      MARILEE pattern 1   Speckled      MARILEE titer 1   1:320      Complement C3 81 - 157 mg/dL  109      Complement C4 13 - 39 mg/dL  22      Creatinine 0.51 - 0.95 mg/dL  0.78      GFR Estimate >60 mL/min/1.73m2  84      Hematocrit 35.0 - 47.0 % 34.6       Hemoglobin 11.7 - 15.7 g/dL 11.4       Hep B Surface Agn Nonreactive   Nonreactive      - 1,616 mg/dL  796      WBC 4.0 - 11.0 10e3/uL 6.5       RBC Count 3.80 - 5.20 10e6/uL 3.45       RDW 10.0 - 15.0 % 13.2       MCHC 31.5 - 36.5 g/dL 32.9       MCV 78 - 100 fL 100       Platelet Count 150 - 450 10e3/uL 220       Quantiferon-TB Gold Plus Result Negative Negative           Lab Results   Component Value Date/Time    ELIJAH Positive (A) 2023 03:31 PM    ANAP1 Speckled 2023 03:31 PM    ANAT1 1:320 2023 03:31 PM    SMIGG Negative 2023 03:31 PM    SSAIGG Negative 2023 03:31 PM    SSBIGG Negative 2023 03:31 PM    ANCAT <1:10 2023 03:31 PM    ANCAP  2023 03:31 PM     The ANCA IFA is <1:10.  No further testing will be performed.        Lab Results   Component Value Date/Time    HBCAB NON-REACTIVE 2020 02:09 PM    HEPBANG Nonreactive 2023 08:21 AM    TBRES Negative 2023 04:34 PM        IMAGIN23 MRI  orbit:  ORBITS: Dedicated MRI of the orbits was performed. There is asymmetric enlargement and enhancement of the left lacrimal gland. No organized fluid collection to suggest abscess. No significant adjacent inflammatory stranding. Intact globes. Status post left cataract repair.  Symmetrical extraocular musculature without thickening/enlargement. The intraorbital, canalicular and prechiasmatic optic nerve segments are normal in size and signal intensity bilaterally. The optic chiasm and proximal optic tracts are unremarkable. No localized inflammation of the intraconal or extraconal orbital fat. Normal lacrimal glands. No intraorbital mass or pathologic intraorbital enhancement.   4/6/23: CXR personally reviewed. No clear evidence of mediastinal lymphadenopathy.  5/2/23 CT orbit   IMPRESSION: Unilateral left-sided lacrimal gland slight enlargement with homogeneous enhancement. Interval increase in size since 4/6/2023  dated MRI.Differential diagnosis includes lymphoproliferative lesions which would include lymphoid hyperplasia and lymphoma. Other differential diagnosis considerations include infectious andinflammatory dacryadenitis, sarcoidosis, Sjogren's disease. No surrounding inflammatory changes to suggest an underlying infectious dacryoadenitis.   Sarcoidosis, Sjogren disease and pseudotumor (idiopathic orbital inflammation) more commonly involve the glands bilaterally. Lack of associating pain symptom is also not typical for pseudotumor. Biopsy is recommended.    PROCEDURES:  Lacrimal gland biopsy 5/4/23  A-B.  SOFT TISSUE, LEFT LACRIMAL GLAND, BIOPSY:  -Nonnecrotizing granulomatous inflammation, see comment.  -GMS and AFB special stains are negative for microorganisms (performed on blocks A1 and B1 with appropriate controls).  -Negative for malignancy.    Lacrimal gland flow cytometry 5/4/23  A. Eye, Left, :  -Polytypic B cells  No aberrant immunophenotype on T cells    ASSESSMENT: 66 yo with lacrimal gland  enlargement and noncaseating granuloma on biopsy consistent with sarcoidosis.     DISCUSSION: Although sarcoidosis often involves bilateral lacrimal glands, it can present unilaterally. She has no evidence of mediastinal lymphadenopathy and did not have an initial presentation consistent with marla's (no ankle swelling, no erythema nodosa, etc). However, she may hav e had bilateral axillary lymphadenopathy though currently not palpable on exam. She has not had ACE or lysozyme elevation. However, in regards to alternative etiologies, she has had (1) negative serologies for histoplasma, cocci, blasto, and lyme, (2) negative ANCA, (3) negative SSA, (4) negative IgG4. Although negative IgG4 does not rule out IgG Related Disease (IgG4RD), she has no pancreatitis, parotitis, sialadenitis, abdominal pain consistent with retroperitoneal fibrosis. Appreciate of workup done by primary care and ophthamology who have cleared the way to this diagnosis.     We reviewed option for slow prednisone taper versus alternative therapy. Because she already had recurrence at 30 mg of prednisone daily, I do not think prednisone monotherapy is promising for her but it remains an option. After review of risks and benefits in detail, Annabelle and I have decided to move forward with steroid sparing therapy with methotrexate. We will start at 15 mg once per week while she continues her current prednisone taper plan as methotrexate takes 1-2 months to start working. She may need dose adjustments with goal of coming off prednisone entirely and demonstrating resolution of lacrimal gland enlargement by repeat imaging in the future.    DIAGNOSIS:  ## sarcoidosis with isolated, unilateral, left sided lacrimal gland involvement. Demonstrated steroid resistance with recurrence at 30 mg prednisone daily.    PLAN:  1. Start methotrexate 15 mg once per week  2. Start folic acid 1 mg daily  3. Standing lab orders placed to monitor medication every 3-4  months  4. Continue current steroid taper as planned    Earliest followup available is in October.  Nura Duran MD, PhD  Rheumatology     110 minutes spent on the date of encounter doing chart review, history and exam, documentation, care coordination, and further activities as noted above

## 2023-05-30 NOTE — PATIENT INSTRUCTIONS
Continue your prednisone taper as prescribed  Start methotrexate 15 mg once per week  Take folic acid every day    Please send us a message and let us know how you are tolerating the methotrexate in about a  month    Labs every 3-4 months at any [place] or [facility]    See you in October

## 2023-05-30 NOTE — PROGRESS NOTES
[facility] Rheumatology  Date of Service: 5/30/2023     Referring provider: Dr. Irena Levi  Referral for: Dacryoadenitis of left lacrimal gland, elevated MARILEE    CC: eyelid swelling    HPI: Started 10-12 weeks ago, eyelid started drooping. Was seeing an eye doctor in [place] who did her left eye cataract surgery some time prior. At the time it was on the smaller side and perhaps felt to be aging related. However, it progressively was got worst. They went on vacation to [place] and when she got back it was so bad she was having trouble seeing out of left eye. Her vision was cloudy and she was barely able to get it open. She did not have lower extremity swelling, joint pain, or rash at the time. She was getting bilateral headaches. She had a CXR that was notable only for a calcified grauloma. She had an MRI of her eyes that confirmed that she only had lacrimal gland enlargement on the left side. Around the end of April she felt that there was some swelling happening of the right eye and she had a CT orbit to evaluate which showed left sided only involvement. She was started on a Prednisone taper at the beginning of May, going from 60 mg down, but had recurrence of swelling around 30 mg. She had a lacrimal gland injection 5/24 and was put back on 40 mg prednisone temporarily before restarting tape.     Had sore spots in axilla. Always has dry mouth.felt like there was some swelling of left cheek. Headaches daily. Thought her bilateral neck glands were swollen. Never had sore throat. Sometimes felt more tired. No morning stiffness. Never rash.    ROS: 10 point ROS neg other than the symptoms noted above in the HPI.   ALLERGIES: Patient has no known allergies.   MEDS: personally reviewed, notable for prednisone 40 mg daily    PMHx:  Past Medical History:   Diagnosis Date     Diabetes (H) 07/2021     MVP (mitral valve prolapse)      Premenopausal menorrhagia 11/18/2010     Thyroid disease     Hypothyroidism         PSHx:  Past Surgical History:   Procedure Laterality Date     ABDOMEN SURGERY      Tubal     CATARACT IOL, RT/LT       CHOLECYSTECTOMY           COLONOSCOPY  ????    2007, 2017     DILATION AND CURETTAGE, HYSTEROSCOPY DIAGNOSTIC, COMBINED      2010     EYE SURGERY  ,     Left eye-Vitrectomy, Cataract     GALLBLADDER SURGERY  1987     GYN SURGERY  ????    Partial Hysterectomy     LAPAROSCOPIC HYSTERECTOMY SUPRACERVICAL  2010     MENISCECTOMY  2016    partial     ORBITOTOMY, RESECT TUMOR, COMBINED Left 2023    Procedure: Left lacrimal gland biopsy;  Surgeon: Autumn Padilla MD;  Location: [facility] OR     SOFT TISSUE SURGERY  ????    Right knee meniscus repair     VAGINAL DELIVERY      x3 no date       SocHx:  Social History     Tobacco Use     Smoking status: Former     Packs/day: 0.50     Years: 0.00     Pack years: 0.00     Types: Cigarettes     Start date: 10/1/1975     Quit date: 1976     Years since quittin.0     Smokeless tobacco: Never   Substance Use Topics     Alcohol use: Yes     Drug use: Never        FamHx:  family history includes Anxiety Disorder in her daughter and son; Coronary Artery Disease in her father; Glasses (<7 y/o) in her daughter, father, paternal grandmother, and son; Hearing Loss in her mother; Heart Disease in her father; Hyperlipidemia in her mother; Hypertension in her father and mother; Obesity in her father and paternal grandmother; Osteoporosis in her mother; Rheumatoid Arthritis in her mother; Thyroid Disease in her mother.     PHYSICAL EXAM  Vitals: /78 (BP Location: Left arm, Patient Position: Sitting, Cuff Size: Adult Regular)   Pulse 66   Wt 70.8 kg (156 lb 2 oz)   SpO2 100%   BMI 26.80 kg/m    BMI= Body mass index is 26.8 kg/m .   General: alert and oriented  LAD: no lymphadenopathy of axillary, submandibular, supraclavicular, or posterior cervical chains  Resp: no wheeze, rhonci, rales  CV: warm, well  perfused extremities  Skin: no rash, no telangiectasias, no echymosis, nails without pitting or flaking  MSK: no swelling or redness of DIPs, PIPs, MCPs, CMCs, wrists. No swelling of ankles.    LAB REVIEW:      Latest Ref Rng & Units 2023     4:34 PM 2023     3:31 PM 2023     8:21 AM   RHEUM RESULTS   Albumin 3.5 - 5.2 g/dL  4.8      ALT 10 - 35 U/L  21      AST 10 - 35 U/L  27      MARILEE interpretation Negative  Positive      MARILEE pattern 1   Speckled      MARILEE titer 1   1:320      Complement C3 81 - 157 mg/dL  109      Complement C4 13 - 39 mg/dL  22      Creatinine 0.51 - 0.95 mg/dL  0.78      GFR Estimate >60 mL/min/1.73m2  84      Hematocrit 35.0 - 47.0 % 34.6       Hemoglobin 11.7 - 15.7 g/dL 11.4       Hep B Surface Agn Nonreactive   Nonreactive      - 1,616 mg/dL  796      WBC 4.0 - 11.0 10e3/uL 6.5       RBC Count 3.80 - 5.20 10e6/uL 3.45       RDW 10.0 - 15.0 % 13.2       MCHC 31.5 - 36.5 g/dL 32.9       MCV 78 - 100 fL 100       Platelet Count 150 - 450 10e3/uL 220       Quantiferon-TB Gold Plus Result Negative Negative           Lab Results   Component Value Date/Time    ELIJAH Positive (A) 2023 03:31 PM    ANAP1 Speckled 2023 03:31 PM    ANAT1 1:320 2023 03:31 PM    SMIGG Negative 2023 03:31 PM    SSAIGG Negative 2023 03:31 PM    SSBIGG Negative 2023 03:31 PM    ANCAT <1:10 2023 03:31 PM    ANCAP  2023 03:31 PM     The ANCA IFA is <1:10.  No further testing will be performed.        Lab Results   Component Value Date/Time    HBCAB NON-REACTIVE 2020 02:09 PM    HEPBANG Nonreactive 2023 08:21 AM    TBRES Negative 2023 04:34 PM        IMAGIN23 MRI orbit:  ORBITS: Dedicated MRI of the orbits was performed. There is asymmetric enlargement and enhancement of the left lacrimal gland. No organized fluid collection to suggest abscess. No significant adjacent inflammatory stranding. Intact globes. Status post left cataract  repair.  Symmetrical extraocular musculature without thickening/enlargement. The intraorbital, canalicular and prechiasmatic optic nerve segments are normal in size and signal intensity bilaterally. The optic chiasm and proximal optic tracts are unremarkable. No localized inflammation of the intraconal or extraconal orbital fat. Normal lacrimal glands. No intraorbital mass or pathologic intraorbital enhancement.   4/6/23: CXR personally reviewed. No clear evidence of mediastinal lymphadenopathy.  5/2/23 CT orbit   IMPRESSION: Unilateral left-sided lacrimal gland slight enlargement with homogeneous enhancement. Interval increase in size since 4/6/2023  dated MRI.Differential diagnosis includes lymphoproliferative lesions which would include lymphoid hyperplasia and lymphoma. Other differential diagnosis considerations include infectious andinflammatory dacryadenitis, sarcoidosis, Sjogren's disease. No surrounding inflammatory changes to suggest an underlying infectious dacryoadenitis.   Sarcoidosis, Sjogren disease and pseudotumor (idiopathic orbital inflammation) more commonly involve the glands bilaterally. Lack of associating pain symptom is also not typical for pseudotumor. Biopsy is recommended.    PROCEDURES:  Lacrimal gland biopsy 5/4/23  A-B.  SOFT TISSUE, LEFT LACRIMAL GLAND, BIOPSY:  -Nonnecrotizing granulomatous inflammation, see comment.  -GMS and AFB special stains are negative for microorganisms (performed on blocks A1 and B1 with appropriate controls).  -Negative for malignancy.    Lacrimal gland flow cytometry 5/4/23  A. Eye, Left, :  -Polytypic B cells  No aberrant immunophenotype on T cells    ASSESSMENT: 66 yo with lacrimal gland enlargement and noncaseating granuloma on biopsy consistent with sarcoidosis.     DISCUSSION: Although sarcoidosis often involves bilateral lacrimal glands, it can present unilaterally. She has no evidence of mediastinal lymphadenopathy and did not have an initial  presentation consistent with marla's (no ankle swelling, no erythema nodosa, etc). However, she may hav e had bilateral axillary lymphadenopathy though currently not palpable on exam. She has not had ACE or lysozyme elevation. However, in regards to alternative etiologies, she has had (1) negative serologies for histoplasma, cocci, blasto, and lyme, (2) negative ANCA, (3) negative SSA, (4) negative IgG4. Although negative IgG4 does not rule out IgG Related Disease (IgG4RD), she has no pancreatitis, parotitis, sialadenitis, abdominal pain consistent with retroperitoneal fibrosis. Appreciate of workup done by primary care and ophthamology who have cleared the way to this diagnosis.     We reviewed option for slow prednisone taper versus alternative therapy. Because she already had recurrence at 30 mg of prednisone daily, I do not think prednisone monotherapy is promising for her but it remains an option. After review of risks and benefits in detail, Annabelle and I have decided to move forward with steroid sparing therapy with methotrexate. We will start at 15 mg once per week while she continues her current prednisone taper plan as methotrexate takes 1-2 months to start working. She may need dose adjustments with goal of coming off prednisone entirely and demonstrating resolution of lacrimal gland enlargement by repeat imaging in the future.    DIAGNOSIS:  ## sarcoidosis with isolated, unilateral, left sided lacrimal gland involvement. Demonstrated steroid resistance with recurrence at 30 mg prednisone daily.    PLAN:  1. Start methotrexate 15 mg once per week  2. Start folic acid 1 mg daily  3. Standing lab orders placed to monitor medication every 3-4 months  4. Continue current steroid taper as planned    Earliest followup available is in October.  Nura Duran MD, PhD  Rheumatology     110 minutes spent on the date of encounter doing chart review, history and exam, documentation, care coordination, and  further activities as noted above

## 2023-06-02 ENCOUNTER — DOCUMENTATION ONLY (OUTPATIENT)
Dept: OTHER | Facility: CLINIC | Age: 68
End: 2023-06-02
Payer: MEDICARE

## 2023-06-04 DIAGNOSIS — L92.9 NON-CASEATING GRANULOMA: ICD-10-CM

## 2023-06-04 DIAGNOSIS — H04.002 LACRIMAL GLAND INFLAMMATION, LEFT: ICD-10-CM

## 2023-06-05 NOTE — TELEPHONE ENCOUNTER
predniSONE (DELTASONE) 10 MG tablet        Last Written Prescription Date:  05- to 07-  Last Fill Quantity: 50,   # refills: 0  Last Office Visit : 5-  Future Office visit:  7-

## 2023-06-06 ENCOUNTER — VIRTUAL VISIT (OUTPATIENT)
Dept: BEHAVIORAL HEALTH | Facility: CLINIC | Age: 68
End: 2023-06-06
Payer: MEDICARE

## 2023-06-06 DIAGNOSIS — F41.9 ANXIETY DISORDER, UNSPECIFIED TYPE: Primary | ICD-10-CM

## 2023-06-06 PROCEDURE — 90834 PSYTX W PT 45 MINUTES: CPT | Mod: VID | Performed by: SOCIAL WORKER

## 2023-06-06 NOTE — PROGRESS NOTES
[facility] Primary Care: Integrated Behavioral Health   2023        Behavioral Health Clinician Progress Note    Patient Name: Maritza Hitchcock           Service Type:  Individual      [facility] (patient reached[facility]     Session Start Time: 11:06a  Session End Time:   11:57a      Session Length: 38 - 52      Attendees: Client     Service Modality:  Video Visit:      Provider verified identity through the following two step process.  Patient provided:  Patient  and Patient address    Telemedicine Visit: The patient's condition can be safely assessed and treated via synchronous audio and visual telemedicine encounter.      Reason for Telemedicine Visit: Patient has requested telehealth visit    Originating Site (Patient Location): Patient's home    Distant Site (Provider Location): Provider Remote Setting- Home Office    Consent:  The patient/guardian has verbally consented to: the potential risks and benefits of telemedicine (video visit) versus in person care; bill my insurance or make self-payment for services provided; and responsibility for payment of non-covered services.     Patient would like the video invitation sent by:  My Chart    Mode of Communication:  [facility]    Distant Location (Provider):  Off-site    As the provider I attest to compliance with applicable laws and regulations related to telemedicine.    Visit Activities (Refresh list every visit): [facility]    Diagnostic Assessment Date:  23   Treatment Plan Review Date:  23    See Flowsheets for today's PHQ-9 and THERESA-7 results  Previous PHQ-9:       3/7/2023     2:41 PM 2023    10:53 AM 2023    11:55 AM   PHQ-9 SCORE   PHQ-9 Total Score MyChart 0 0 0   PHQ-9 Total Score 0 0 0          Previous THERESA-7:       3/7/2023     2:42 PM 2023    11:56 AM   THERESA-7 SCORE   Total Score 0 (minimal anxiety) 0 (minimal anxiety)   Total Score 0 0        MADDIE LEVEL:        View : No data to display.                DATA  Extended Session (60+ minutes): No  Interactive Complexity: No  Crisis: No  BH Patient: No    Treatment Objective(s) Addressed in This Session:  Target Behavior(s): interpersonal stressors    Anxiety: will develop more effective coping skills to manage anxiety symptoms    Current Stressors / Issues:  Went out of town with  and mother.  Mother struggled with traveling and increased memory concerns.  Pt shares that the trip was rather difficult and that they will likely not be able to travel with her again.  Is a bit worried about leaving mother alone when they travel next so needs to think about next steps.  Pt and [facility] discussed POA paperwork and how she can go about getting that completed.    Eye swelling continues to decrease.    Reports that  is hovering and he has shared with her that he feels that pt is being more secretive.  Discussed continued discussions she has with friend and urging pt to protect herself (credit rating, etc).        Progress on Treatment Objective(s) / Homework:  Minimal progress - ACTION (Actively working towards change); Intervened by reinforcing change plan / affirming steps taken        Motivational Interviewing    MI Intervention: Expressed Empathy/Understanding, Supported Autonomy, Collaboration, Evocation, Permission to raise concern or advise and Open-ended questions     Change Talk Expressed by the Patient: Reasons to change Activation    Provider Response to Change Talk: E - Evoked more info from patient about behavior change, A - Affirmed patient's thoughts, decisions, or attempts at behavior change, R - Reflected patient's change talk and S - Summarized patient's change talk statements      Care Plan review completed: No    Medication Review:  No current psychiatric medications prescribed    Medication Compliance:  NA    Changes in Health Issues:   None reported    Chemical Use Review:   Substance Use: Chemical  use reviewed, no active concerns identified      Tobacco Use: No current tobacco use.      Assessment: Current Emotional / Mental Status (status of significant symptoms):  Risk status (Self / Other harm or suicidal ideation)  Patient denies a history of suicidal ideation, suicide attempts, self-injurious behavior, homicidal ideation, homicidal behavior and and other safety concerns  Patient denies current fears or concerns for personal safety.  Patient denies current or recent suicidal ideation or behaviors.  Patient denies current or recent homicidal ideation or behaviors.  Patient denies current or recent self injurious behavior or ideation.  Patient denies other safety concerns.  A safety and risk management plan has not been developed at this time, however patient was encouraged to call [place] [phone] should there be a change in any of these risk factors.    Appearance:   Appropriate   Eye Contact:   Good   Psychomotor Behavior: Normal   Attitude:   Cooperative   Orientation:   All  Speech   Rate / Production: Normal    Volume:  Normal   Mood:    Anxious   Affect:    Flat   Thought Content:  Clear   Thought Form:  Coherent  Logical   Insight:    Good     Diagnoses:  (F41.9) Anxiety disorder, unspecified type  (primary encounter diagnosis)    Collateral Reports Completed:  Not Applicable    Plan: (Homework, other):  Patient was given information about behavioral services and encouraged to schedule a follow up appointment with the clinic [facility] in 2 weeks.  She was also given information about mental health symptoms and treatment options .  CD Recommendations: No indications of CD issues.  CHIN Mirza, [facility]                                                Individual Treatment Plan    Patient's Name: Maritza Hitchcock  YOB: 1955    Date of Creation: 2/6/23  Date Treatment Plan Last Reviewed/Revised: 5/17/23    DSM5 Diagnoses: 300.00 (F41.9) Unspecified Anxiety Disorder  Psychosocial /  Contextual Factors: relationships, stress with mother, worry about eye  PROMIS (reviewed every 90 days): 38    Referral / Collaboration:  Referral to another professional/service is not indicated at this time.    Anticipated number of session for this episode of care: 6-9 sessions  Anticipation frequency of session: Every other week  Anticipated Duration of each session: 38-52 minutes  Treatment plan will be reviewed in 90 days or when goals have been changed.       MeasurableTreatment Goal(s) related to diagnosis / functional impairment(s)  Goal 1: Patient will experience a reduction in anxious symptoms, along with a corresponding increase in relaxed emotional state.    I will know I've met my goal when feel more relaxed.      Objective #A (Patient Action)    Patient will use cognitive strategies identified in therapy to challenge anxious thoughts.  Status: Continued - Date(s):5/17/23     Intervention(s)  Therapist will utilize CBT and ACT to help pt challenge anxious thoughts and reduce intensity/duration of anxious distress.        Lyudmila Figueroa, JOHN, LICSW, [facility]  June 6, 2023

## 2023-06-07 RX ORDER — PREDNISONE 10 MG/1
TABLET ORAL
Qty: 50 TABLET | Refills: 0 | Status: SHIPPED | OUTPATIENT
Start: 2023-06-07 | End: 2023-07-12

## 2023-06-20 ENCOUNTER — VIRTUAL VISIT (OUTPATIENT)
Dept: BEHAVIORAL HEALTH | Facility: CLINIC | Age: 68
End: 2023-06-20
Payer: MEDICARE

## 2023-06-20 DIAGNOSIS — F41.9 ANXIETY DISORDER, UNSPECIFIED TYPE: Primary | ICD-10-CM

## 2023-06-20 PROCEDURE — 90834 PSYTX W PT 45 MINUTES: CPT | Mod: VID | Performed by: SOCIAL WORKER

## 2023-06-20 NOTE — PROGRESS NOTES
[facility] Primary Care: Integrated Behavioral Health   2023        Behavioral Health Clinician Progress Note    Patient Name: Maritza Hitchcock           Service Type:  Individual      Service Location:   [facility] [place] Email (patient reached)     Session Start Time: 10:09a  Session End Time:  10:59a      Session Length: 38 - 52      Attendees: Client     Service Modality:  Video Visit:      Provider verified identity through the following two step process.  Patient provided:  Patient  and Patient address    Telemedicine Visit: The patient's condition can be safely assessed and treated via synchronous audio and visual telemedicine encounter.      Reason for Telemedicine Visit: Patient has requested telehealth visit    Originating Site (Patient Location): Patient's home    Distant Site (Provider Location): Provider Remote Setting- Home Office    Consent:  The patient/guardian has verbally consented to: the potential risks and benefits of telemedicine (video visit) versus in person care; bill my insurance or make self-payment for services provided; and responsibility for payment of non-covered services.     Patient would like the video invitation sent by:  My Chart    Mode of Communication:  Video Conference via [employer]    Distant Location (Provider):  Off-site    As the provider I attest to compliance with applicable laws and regulations related to telemedicine.    Visit Activities (Refresh list every visit): [facility] Only    Diagnostic Assessment Date:  23   Treatment Plan Review Date:  23    See Flowsheets for today's PHQ-9 and THERESA-7 results  Previous PHQ-9:       3/7/2023     2:41 PM 2023    10:53 AM 2023    11:55 AM   PHQ-9 SCORE   PHQ-9 Total Score MyChart 0 0 0   PHQ-9 Total Score 0 0 0          Previous THERESA-7:       3/7/2023     2:42 PM 2023    11:56 AM   THERESA-7 SCORE   Total Score 0 (minimal anxiety) 0 (minimal anxiety)   Total Score 0 0        MADDIE LEVEL:     "   View : No data to display.                DATA  Extended Session (60+ minutes): No  Interactive Complexity: No  Crisis: No  [facility] Patient: No    Treatment Objective(s) Addressed in This Session:  Target Behavior(s): interpersonal stressors    Anxiety: will develop more effective coping skills to manage anxiety symptoms    Current Stressors / Issues:  Fell in the morning last week and broke wrist.  Has a cast on for the next month or so.    Has an upcoming appointment to meet with a company that has been providing some services to her mother.  Updated the worker on changes with mother and they can assist with finding out Assisted Living options that might be available.   Shares that  went through her wallet and counted her money and then questioned her when he counted again later and then some of them were gone.   Friends are in town for the 1.5 weeks and hope to spend time with them when they are not with their own families.    Garden is coming along well.    Not much new with pt and friend.  She reports that she continues to feel some pressure to do transactions for him.  Discussed with pt the term \"love bombing\" given pt has indicated that it would be up to her friend to end the relationship and that it wouldn't be something she did.        Progress on Treatment Objective(s) / Homework:  Minimal progress - ACTION (Actively working towards change); Intervened by reinforcing change plan / affirming steps taken        Motivational Interviewing    MI Intervention: Expressed Empathy/Understanding, Supported Autonomy, Collaboration, Evocation, Permission to raise concern or advise and Open-ended questions     Change Talk Expressed by the Patient: Reasons to change Activation    Provider Response to Change Talk: E - Evoked more info from patient about behavior change, A - Affirmed patient's thoughts, decisions, or attempts at behavior change, R - Reflected patient's change talk and S - Summarized patient's change " talk statements      Care Plan review completed: No    Medication Review:  No current psychiatric medications prescribed    Medication Compliance:  NA    Changes in Health Issues:   None reported    Chemical Use Review:   Substance Use: Chemical use reviewed, no active concerns identified      Tobacco Use: No current tobacco use.      Assessment: Current Emotional / Mental Status (status of significant symptoms):  Risk status (Self / Other harm or suicidal ideation)  Patient denies a history of suicidal ideation, suicide attempts, self-injurious behavior, homicidal ideation, homicidal behavior and and other safety concerns  Patient denies current fears or concerns for personal safety.  Patient denies current or recent suicidal ideation or behaviors.  Patient denies current or recent homicidal ideation or behaviors.  Patient denies current or recent self injurious behavior or ideation.  Patient denies other safety concerns.  A safety and risk management plan has not been developed at this time, however patient was encouraged to call [place] [phone] should there be a change in any of these risk factors.    Appearance:   Appropriate   Eye Contact:   Good   Psychomotor Behavior: Normal   Attitude:   Cooperative   Orientation:   All  Speech   Rate / Production: Normal    Volume:  Normal   Mood:    Anxious   Affect:    Flat   Thought Content:  Clear   Thought Form:  Coherent  Logical   Insight:    Good     Diagnoses:  (F41.9) Anxiety disorder, unspecified type  (primary encounter diagnosis)    Collateral Reports Completed:  Not Applicable    Plan: (Homework, other):  Patient was given information about behavioral services and encouraged to schedule a follow up appointment with the clinic [facility] in 2 weeks.  She was also given information about mental health symptoms and treatment options .  CD Recommendations: No indications of CD issues.  Lyudmila Figueroa, [facility], [facility]                                                 Individual Treatment Plan    Patient's Name: Maritza Hitchcock  YOB: 1955    Date of Creation: 2/6/23  Date Treatment Plan Last Reviewed/Revised: 5/17/23    DSM5 Diagnoses: 300.00 (F41.9) Unspecified Anxiety Disorder  Psychosocial / Contextual Factors: relationships, stress with mother, worry about eye  PROMIS (reviewed every 90 days): 38    Referral / Collaboration:  Referral to another professional/service is not indicated at this time.    Anticipated number of session for this episode of care: 6-9 sessions  Anticipation frequency of session: Every other week  Anticipated Duration of each session: 38-52 minutes  Treatment plan will be reviewed in 90 days or when goals have been changed.       MeasurableTreatment Goal(s) related to diagnosis / functional impairment(s)  Goal 1: Patient will experience a reduction in anxious symptoms, along with a corresponding increase in relaxed emotional state.    I will know I've met my goal when feel more relaxed.      Objective #A (Patient Action)    Patient will use cognitive strategies identified in therapy to challenge anxious thoughts.  Status: Continued - Date(s):5/17/23     Intervention(s)  Therapist will utilize CBT and ACT to help pt challenge anxious thoughts and reduce intensity/duration of anxious distress.        Lyudmila Figueroa, JOHN, [facility], [facility]  June 20, 2023

## 2023-07-10 ASSESSMENT — ENCOUNTER SYMPTOMS
ABDOMINAL PAIN: 0
NAUSEA: 0
HEADACHES: 1
EYE PAIN: 0
WEAKNESS: 0
FREQUENCY: 0
DYSURIA: 0
HEMATOCHEZIA: 0
PARESTHESIAS: 0
CHILLS: 0
SORE THROAT: 0
ARTHRALGIAS: 0
PALPITATIONS: 0
NERVOUS/ANXIOUS: 1
COUGH: 0
DIZZINESS: 0
BREAST MASS: 0
SHORTNESS OF BREATH: 0
HEARTBURN: 0
JOINT SWELLING: 0
CONSTIPATION: 0
HEMATURIA: 0
MYALGIAS: 1
FEVER: 0
DIARRHEA: 0

## 2023-07-10 ASSESSMENT — ACTIVITIES OF DAILY LIVING (ADL): CURRENT_FUNCTION: NO ASSISTANCE NEEDED

## 2023-07-11 ENCOUNTER — OFFICE VISIT (OUTPATIENT)
Dept: OPHTHALMOLOGY | Facility: CLINIC | Age: 68
End: 2023-07-11
Payer: MEDICARE

## 2023-07-11 DIAGNOSIS — L92.9 NON-CASEATING GRANULOMA: ICD-10-CM

## 2023-07-11 DIAGNOSIS — H04.002 LACRIMAL GLAND INFLAMMATION, LEFT: Primary | ICD-10-CM

## 2023-07-11 PROCEDURE — 99024 POSTOP FOLLOW-UP VISIT: CPT | Mod: GC | Performed by: OPHTHALMOLOGY

## 2023-07-11 ASSESSMENT — CONF VISUAL FIELD
OD_INFERIOR_TEMPORAL_RESTRICTION: 0
METHOD: COUNTING FINGERS
OD_NORMAL: 1
OS_INFERIOR_TEMPORAL_RESTRICTION: 0
OS_INFERIOR_NASAL_RESTRICTION: 0
OS_SUPERIOR_TEMPORAL_RESTRICTION: 0
OD_SUPERIOR_NASAL_RESTRICTION: 0
OD_SUPERIOR_TEMPORAL_RESTRICTION: 0
OS_SUPERIOR_NASAL_RESTRICTION: 0
OS_NORMAL: 1
OD_INFERIOR_NASAL_RESTRICTION: 0

## 2023-07-11 ASSESSMENT — VISUAL ACUITY
OS_CC+: -1
CORRECTION_TYPE: GLASSES
OD_CC+: -2
METHOD: SNELLEN - LINEAR
OS_CC: 20/20
OD_CC: 20/20

## 2023-07-11 ASSESSMENT — TONOMETRY
OS_IOP_MMHG: 20
IOP_METHOD: ICARE
OD_IOP_MMHG: 20

## 2023-07-11 ASSESSMENT — SLIT LAMP EXAM - LIDS: COMMENTS: DERMATOCHALASIS

## 2023-07-11 ASSESSMENT — EXTERNAL EXAM - RIGHT EYE: OD_EXAM: NORMAL

## 2023-07-11 ASSESSMENT — CUP TO DISC RATIO
OD_RATIO: 0.3
OS_RATIO: 0.3

## 2023-07-11 ASSESSMENT — EXTERNAL EXAM - LEFT EYE: OS_EXAM: NORMAL

## 2023-07-11 NOTE — LETTER
2023         RE:  :  MRN: Maritza Hitchcock  1955  [id number]     Dear Dr. Levi and Dr. Zhang,    I had the pleasure of seeing our mutual patient in follow up. Please see my note below.     Chief Complaint(s) and History of Present Illness(es)     Follow Up            Laterality: left eye    Associated symptoms: Negative for eye pain, redness, flashes and floaters      Pain scale: 0/10      Comments    Patient states tenderness temporally near her ear and wonders if it   relates to eye at all. Patient states no issues with lacrimal gland   biopsy.   Fabby Stone, DAI 2023 10:42 AM     No recent vision changes, eye pain, eye redness, double vision, photophobia, flashes, and floaters.   Mild superolateral eyelid fullness intermittently.   She notes enlargement of a pre-auricular nodule on the left side over the last few months.    Assessment & Plan     Maritza Hitchcock is a 67 year old female with the following diagnoses:     ICD-10-CM    1. Lacrimal gland inflammation, left  H04.002       2. 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   S/p left lateral orbit lacrimal gland fossa intralesional kenalog 40 mg/mL (23)     - Laboratory workup (negative): HIV, Hep C, quant gold, Hep B, C3, C4, IgG panel, anti-smith, SSA, SSB, ACE, lysozyme, ANCA, lyme, histo, coccidioides, blastomyces, aspergillus   - Shingles vaccine information given last visit; vaccine completed 3/26/19 and 19    - Finished PO prednisone taper on 7/3/23  - Currently on methotrexate 15 mg PO weekly    - No evidence of active lacrimal gland or intraocular inflammation on exam today  - Recommend follow up with dilated eye exam in general clinic within 1 month  - One enlarged and tender left preauricular lymph node, patient plans to follow up with her PCP regarding this tomorrow    Mireya Zamarripa MD  Oculoplastic Surgery  Fellow    Agree with above. 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   Complete eye exam locally, sees Dr. Zhang.   Has an enlarged parotid node left side, I recommend she see ENT. Lives in [place]. She is seeing her primary care physician tomorrow and will ask if she knows a local ENT, otherwise we can help her arrange at [facility].     F/u 6months       Again, thank you for allowing me to participate in the care of your patient.      Sincerely,    Autumn Padilla MD  Department of Ophthalmology and Visual Neurosciences  [facility]    CC: Irena Levi MD  [address] [facility] [address]  Via In Basket     Osei Zhang MD  [facility]  [address] [facility] [address]  Via Fax: [phone]

## 2023-07-11 NOTE — PROGRESS NOTES
Chief Complaint(s) and History of Present Illness(es)     Follow Up            Laterality: left eye    Associated symptoms: Negative for eye pain, redness, flashes and floaters      Pain scale: 0/10      Comments    Patient states tenderness temporally near her ear and wonders if it   relates to eye at all. Patient states no issues with lacrimal gland   biopsy.   DAI Shannon July 11, 2023 10:42 AM     No recent vision changes, eye pain, eye redness, double vision, photophobia, flashes, and floaters.   Mild superolateral eyelid fullness intermittently.   She notes enlargement of a pre-auricular nodule on the left side over the last few months.    Assessment & Plan     Maritza Hitchcock is a 67 year old female with the following diagnoses:     ICD-10-CM    1. Lacrimal gland inflammation, left  H04.002       2. 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   S/p left lateral orbit lacrimal gland fossa intralesional kenalog 40 mg/mL (5/24/23)     - Laboratory workup (negative): HIV, Hep C, quant gold, Hep B, C3, C4, IgG panel, anti-smith, SSA, SSB, ACE, lysozyme, ANCA, lyme, histo, coccidioides, blastomyces, aspergillus   - Shingles vaccine information given last visit; vaccine completed 3/26/19 and 8/6/19    - Finished PO prednisone taper on 7/3/23  - Currently on methotrexate 15 mg PO weekly    - No evidence of active lacrimal gland or intraocular inflammation on exam today  - Recommend follow up with dilated eye exam in general clinic within 1 month  - One enlarged and tender left preauricular lymph node, patient plans to follow up with her PCP regarding this tomorrow    Mireya Zamarripa MD  Oculoplastic Surgery Fellow    Agree with above. 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 Path non-caseating granuloma.   Complete eye exam locally, sees Dr. Zhang.   Has an enlarged parotid node  left side, I recommend she see ENT. Lives in [place]. She is seeing her primary care physician tomorrow and will ask if she knows a local ENT, otherwise we can help her arrange at [facility].     F/u 6months      Attending Physician Attestation: Complete documentation of historical and exam elements from today's encounter can be found in the full encounter summary report (not reduplicated in this progress note). I personally obtained the chief complaint(s) and history of present illness. I confirmed and edited as necessary the review of systems, past medical/surgical history, family history, social history, and examination findings as documented by others; and I examined the patient myself. I personally reviewed the relevant tests, images, and reports as documented above. I formulated and edited as necessary the assessment and plan and discussed the findings and management plan with the patient.  -Autumn Padilla MD

## 2023-07-11 NOTE — NURSING NOTE
Chief Complaints and History of Present Illnesses   Patient presents with     Follow Up     Chief Complaint(s) and History of Present Illness(es)     Follow Up            Laterality: left eye    Associated symptoms: Negative for eye pain, redness, flashes and floaters    Pain scale: 0/10          Comments    Patient states tenderness temporally near her ear and wonders if it relates to eye at all. Patient states no issues with lacrimal gland biopsy.   Fabby Stone, DAI July 11, 2023 10:42 AM

## 2023-07-12 ENCOUNTER — OFFICE VISIT (OUTPATIENT)
Dept: FAMILY MEDICINE | Facility: CLINIC | Age: 68
End: 2023-07-12
Payer: MEDICARE

## 2023-07-12 VITALS
RESPIRATION RATE: 16 BRPM | HEIGHT: 65 IN | SYSTOLIC BLOOD PRESSURE: 128 MMHG | DIASTOLIC BLOOD PRESSURE: 80 MMHG | OXYGEN SATURATION: 97 % | HEART RATE: 76 BPM | WEIGHT: 162.2 LBS | BODY MASS INDEX: 27.02 KG/M2 | TEMPERATURE: 98.1 F

## 2023-07-12 DIAGNOSIS — R25.2 MUSCLE CRAMPS: ICD-10-CM

## 2023-07-12 DIAGNOSIS — E11.9 TYPE 2 DIABETES MELLITUS WITHOUT COMPLICATION, WITHOUT LONG-TERM CURRENT USE OF INSULIN (H): ICD-10-CM

## 2023-07-12 DIAGNOSIS — E21.5 PARATHYROID ABNORMALITY (H): ICD-10-CM

## 2023-07-12 DIAGNOSIS — M85.852 OSTEOPENIA OF NECKS OF BOTH FEMURS: ICD-10-CM

## 2023-07-12 DIAGNOSIS — E03.9 ACQUIRED HYPOTHYROIDISM: ICD-10-CM

## 2023-07-12 DIAGNOSIS — M85.851 OSTEOPENIA OF NECKS OF BOTH FEMURS: ICD-10-CM

## 2023-07-12 DIAGNOSIS — D86.9 SARCOIDOSIS: ICD-10-CM

## 2023-07-12 DIAGNOSIS — K21.9 GASTROESOPHAGEAL REFLUX DISEASE WITHOUT ESOPHAGITIS: ICD-10-CM

## 2023-07-12 DIAGNOSIS — K75.81 NONALCOHOLIC STEATOHEPATITIS (NASH): ICD-10-CM

## 2023-07-12 DIAGNOSIS — E04.9 ENLARGED THYROID: ICD-10-CM

## 2023-07-12 DIAGNOSIS — Z00.00 ENCOUNTER FOR MEDICARE ANNUAL WELLNESS EXAM: Primary | ICD-10-CM

## 2023-07-12 DIAGNOSIS — G47.10 HYPERSOMNIA: ICD-10-CM

## 2023-07-12 DIAGNOSIS — R59.9 ENLARGED LYMPH NODES: ICD-10-CM

## 2023-07-12 LAB
ANION GAP SERPL CALCULATED.3IONS-SCNC: 11 MMOL/L (ref 7–15)
BUN SERPL-MCNC: 17.6 MG/DL (ref 8–23)
CA-I BLD-MCNC: 4.9 MG/DL (ref 4.4–5.2)
CALCIUM SERPL-MCNC: 9.5 MG/DL (ref 8.8–10.2)
CHLORIDE SERPL-SCNC: 103 MMOL/L (ref 98–107)
CREAT SERPL-MCNC: 0.69 MG/DL (ref 0.51–0.95)
DEPRECATED HCO3 PLAS-SCNC: 27 MMOL/L (ref 22–29)
GFR SERPL CREATININE-BSD FRML MDRD: >90 ML/MIN/1.73M2
GLUCOSE SERPL-MCNC: 103 MG/DL (ref 70–99)
POTASSIUM SERPL-SCNC: 4 MMOL/L (ref 3.4–5.3)
PTH-INTACT SERPL-MCNC: 47 PG/ML (ref 15–65)
SODIUM SERPL-SCNC: 141 MMOL/L (ref 136–145)
TSH SERPL DL<=0.005 MIU/L-ACNC: 4.15 UIU/ML (ref 0.3–4.2)

## 2023-07-12 PROCEDURE — 83970 ASSAY OF PARATHORMONE: CPT | Performed by: FAMILY MEDICINE

## 2023-07-12 PROCEDURE — 36415 COLL VENOUS BLD VENIPUNCTURE: CPT | Performed by: FAMILY MEDICINE

## 2023-07-12 PROCEDURE — 80048 BASIC METABOLIC PNL TOTAL CA: CPT | Performed by: FAMILY MEDICINE

## 2023-07-12 PROCEDURE — 84443 ASSAY THYROID STIM HORMONE: CPT | Performed by: FAMILY MEDICINE

## 2023-07-12 PROCEDURE — G0402 INITIAL PREVENTIVE EXAM: HCPCS | Performed by: FAMILY MEDICINE

## 2023-07-12 PROCEDURE — 82330 ASSAY OF CALCIUM: CPT | Performed by: FAMILY MEDICINE

## 2023-07-12 PROCEDURE — 99214 OFFICE O/P EST MOD 30 MIN: CPT | Mod: 25 | Performed by: FAMILY MEDICINE

## 2023-07-12 RX ORDER — LEVOTHYROXINE SODIUM 112 UG/1
112 TABLET ORAL DAILY
Qty: 90 TABLET | Refills: 3 | Status: SHIPPED | OUTPATIENT
Start: 2023-07-12 | End: 2024-07-24

## 2023-07-12 RX ORDER — AMITRIPTYLINE HYDROCHLORIDE 50 MG/1
50 TABLET ORAL AT BEDTIME
Qty: 90 TABLET | Refills: 3 | Status: SHIPPED | OUTPATIENT
Start: 2023-07-12 | End: 2024-07-01

## 2023-07-12 ASSESSMENT — ENCOUNTER SYMPTOMS
COUGH: 0
MYALGIAS: 1
SORE THROAT: 0
DIZZINESS: 0
HEADACHES: 1
ARTHRALGIAS: 0
PARESTHESIAS: 0
NAUSEA: 0
CONSTIPATION: 0
PALPITATIONS: 0
DIARRHEA: 0
SHORTNESS OF BREATH: 0
HEARTBURN: 0
DYSURIA: 0
HEMATOCHEZIA: 0
BREAST MASS: 0
FREQUENCY: 0
JOINT SWELLING: 0
EYE PAIN: 0
WEAKNESS: 0
NERVOUS/ANXIOUS: 1
CHILLS: 0
HEMATURIA: 0
ABDOMINAL PAIN: 0
FEVER: 0

## 2023-07-12 ASSESSMENT — ACTIVITIES OF DAILY LIVING (ADL): CURRENT_FUNCTION: NO ASSISTANCE NEEDED

## 2023-07-12 NOTE — PATIENT INSTRUCTIONS
The [employer] is a good resource for ways to prevent Osteoporosis.      Patient Education   Personalized Prevention Plan  You are due for the preventive services outlined below.  Your care team is available to assist you in scheduling these services.  If you have already completed any of these items, please share that information with your care team to update in your medical record.  Health Maintenance Due   Topic Date Due    Annual Wellness Visit  04/14/2023    Diabetic Foot Exam  04/14/2023       Understanding USDA MyPlate  The USDA has guidelines to help you make healthy food choices. These are called MyPlate. MyPlate shows the food groups that make up healthy meals using the image of a place setting. Before you eat, think about the healthiest choices for what to put on your plate or in your cup or bowl. To learn more about building a healthy plate, visit www.[employer].gov.     The food groups  Fruits. Any fruit or 100% fruit juice counts as part of the Fruit Group. Fruits may be fresh, canned, frozen, or dried, and may be whole, cut-up, or pureed. Make 1/2 of your plate fruits and vegetables.  Vegetables. Any vegetable or 100% vegetable juice counts as a member of the Vegetable Group. Vegetables may be fresh, frozen, canned, or dried. They can be served raw or cooked and may be whole, cut-up, or mashed. Make 1/2 of your plate fruits and vegetables.  Grains. All foods made from grains are part of the Grains Group. These include wheat, rice, oats, cornmeal, and barley. Grains are often used to make foods such as bread, pasta, oatmeal, cereal, tortillas, and grits. Grains should be no more than 1/4 of your plate. At least half of your grains should be whole grains.  Protein. This group includes meat, poultry, seafood, beans and peas, eggs, processed soy products (such as tofu), nuts (including nut butters), and seeds. Make protein choices no more than 1/4 of your plate. Meat and poultry  choices should be lean or low fat.  Dairy. The Dairy Group includes all fluid milk products and foods made from milk that contain calcium, such as yogurt and cheese. (Foods that have little calcium, such as cream, butter, and cream cheese, are not part of this group.) Most dairy choices should be low-fat or fat-free.  Things to limit  Eating healthy also means limiting these things in your diet:  Oils. Oils aren't a food group, but they do contain essential nutrients. These are fats that are liquid at room temperature. Including small amounts of oils in your diet is fine and can even be good for you. But it's important to watch how much oil you include in your diet. Oils include avocado, canola, corn, olive, soybean, vegetable, and sunflower. Foods that are mainly oil include mayonnaise, certain salad dressings, and soft margarines. You likely already get your daily oil allowance from the foods you eat.  Salt (sodium).  Many processed foods have a lot of sodium. To keep sodium intake down, eat fresh vegetables, meats, poultry, and seafood when possible. Buy low-sodium, reduced-sodium, or no-salt-added food products at the store. And don't add salt to your meals at home. Instead, season them with herbs and spices such as dill, oregano, cumin, and paprika. Or try adding flavor with vinegar, onion, garlic, or lemon or lime zest and juice.  Saturated fat . Saturated fats are most often found in animal products such as beef, pork, and chicken. They are often solid at room temperature, such as butter. To reduce your saturated fat intake, choose leaner cuts of meat and poultry. And try healthier cooking methods such as grilling, broiling, roasting, or baking. For a simple lower-fat swap, use plain nonfat yogurt instead of mayonnaise when making potato salad or macaroni salad.  Added sugars.  These are sugars added to foods. They are in foods such as ice cream, candy, soda, fruit drinks, sports drinks, energy drinks,  cookies, pastries, jams, and syrups. Cut down on added sugars by sharing sweet treats with a family member or friend. You can also choose fruit for dessert, and drink water or other unsweetened beverages.  Alcohol. Alcohol contains calories but few nutrients. If you don't drink alcohol, don't start drinking for any reason. But if you do choose to drink alcohol, do so only in moderation. This means no more than 2 drinks in a day for men and no more than 1 drink per day for women, on days when you have alcohol.  [employer] last reviewed this educational content on 1/1/2023 2000-2023 [employer]. All rights reserved. This information is not intended as a substitute for professional medical care. Always follow your healthcare professional's instructions.

## 2023-07-12 NOTE — PROGRESS NOTES
"SUBJECTIVE:   Annabelle is a 67 year old who presents for Preventive Visit.      4/27/2023    11:14 AM   Additional Questions   Roomed by Faviola     Are you in the first 12 months of your Medicare coverage?  Yes,  Visual Acuity:  Right Eye: 20/25   Left Eye: 20/25  Both Eyes: 20/25    Healthy Habits:     In general, how would you rate your overall health?  Good    Frequency of exercise:  2-3 days/week    Duration of exercise:  45-60 minutes    Do you usually eat at least 4 servings of fruit and vegetables a day, include whole grains    & fiber and avoid regularly eating high fat or \"junk\" foods?  No    Taking medications regularly:  Yes    Ability to successfully perform activities of daily living:  No assistance needed    Home Safety:  No safety concerns identified    Hearing Impairment:  No hearing concerns    In the past 6 months, have you been bothered by leaking of urine?  No    In general, how would you rate your overall mental or emotional health?  Good    Additional concerns today:  Yes         Yes, advance care planning is on file.      Right Ear:      1000 Hz RESPONSE- on Level: 40 db (Conditioning sound)   1000 Hz: RESPONSE- on Level:   20 db    2000 Hz: RESPONSE- on Level:   20 db    4000 Hz: RESPONSE- on Level:   Refer     Left Ear:      4000 Hz: RESPONSE- on Level:   Refer    2000 Hz: RESPONSE- on Level:   20 db    1000 Hz: RESPONSE- on Level:   20 db     500 Hz: RESPONSE- on Level: 25 db    Right Ear:    500 Hz: RESPONSE- on Level: 25 db    Hearing Acuity: Refer    Hearing Assessment: abnormal--refer to audiology declined for now  Fall risk  Fallen 2 or more times in the past year?: Yes  Any fall with injury in the past year?: Yes        Cognitive Screening   1) Repeat 3 items (Leader, Season, Table)    2) Clock draw: NORMAL  3) 3 item recall: Recalls 3 objects  Results: 3 items recalled: COGNITIVE IMPAIRMENT LESS LIKELY    Mini-CogTM Copyright S Jenn. Licensed by the author for use in [employer] " Services; reprinted with permission (soob@[facility]). All rights reserved.          Reviewed and updated as needed this visit by clinical staff   Tobacco  Allergies  Meds              Reviewed and updated as needed this visit by Provider                 Social History     Tobacco Use     Smoking status: Former     Packs/day: 0.50     Years: 0.00     Pack years: 0.00     Types: Cigarettes     Start date: 10/1/1975     Quit date: 1976     Years since quittin.1     Smokeless tobacco: Never   Substance Use Topics     Alcohol use: Yes             7/10/2023     2:32 PM   Alcohol Use   Prescreen: >3 drinks/day or >7 drinks/week? No     Do you have a current opioid prescription? No  Do you use any other controlled substances or medications that are not prescribed by a provider? None              Current providers sharing in care for this patient include:   Patient Care Team:  Irena Levi MD as PCP - General (Family Medicine)  Irena Levi MD as Assigned PCP  Lyudmila Figuerao MSW as Assigned Behavioral Health Provider  Autumn Padilla MD as Assigned Surgical Provider  Nura Duran MD as Assigned Rheumatology Provider    The following health maintenance items are reviewed in Epic and correct as of today:  Health Maintenance   Topic Date Due     MEDICARE ANNUAL WELLNESS VISIT  2023     DIABETIC FOOT EXAM  2023     A1C  2023     MICROALBUMIN  2023     INFLUENZA VACCINE (1) 2023     MAMMO SCREENING  2023     TSH W/FREE T4 REFLEX  2023     ANNUAL REVIEW OF HM ORDERS  2023     EYE EXAM  2024     BMP  2024     LIPID  2024     FALL RISK ASSESSMENT  2024     COLORECTAL CANCER SCREENING  2027     ADVANCE CARE PLANNING  2028     DTAP/TDAP/TD IMMUNIZATION (5 - Td or Tdap) 2033     DEXA  2035     HEPATITIS C SCREENING  Completed     HIV SCREENING  Completed     PHQ-2 (once per calendar year)   "Completed     Pneumococcal Vaccine: 65+ Years  Completed     ZOSTER IMMUNIZATION  Completed     COVID-19 Vaccine  Completed     IPV IMMUNIZATION  Aged Out     MENINGITIS IMMUNIZATION  Aged Out     PAP  Discontinued             4/12/2022     2:22 PM   Breast CA Risk Assessment (FHS-7)   Do you have a family history of breast, colon, or ovarian cancer? No / Unknown           Pertinent mammograms are reviewed under the imaging tab.    Review of Systems   Constitutional: Negative for chills and fever.   HENT: Negative for congestion, ear pain, hearing loss and sore throat.    Eyes: Positive for visual disturbance. Negative for pain.   Respiratory: Negative for cough and shortness of breath.    Cardiovascular: Negative for chest pain, palpitations and peripheral edema.   Gastrointestinal: Negative for abdominal pain, constipation, diarrhea, heartburn, hematochezia and nausea.   Breasts:  Positive for tenderness. Negative for breast mass and discharge.   Genitourinary: Negative for dysuria, frequency, genital sores, hematuria, pelvic pain, urgency, vaginal bleeding and vaginal discharge.   Musculoskeletal: Positive for myalgias. Negative for arthralgias and joint swelling.   Skin: Negative for rash.   Neurological: Positive for headaches. Negative for dizziness, weakness and paresthesias.   Psychiatric/Behavioral: Negative for mood changes. The patient is nervous/anxious.          OBJECTIVE:   /80 (BP Location: Right arm, Patient Position: Sitting)   Pulse 76   Temp 98.1  F (36.7  C) (Tympanic)   Resp 16   Ht 1.638 m (5' 4.5\")   Wt 73.6 kg (162 lb 3.2 oz)   SpO2 97%   BMI 27.41 kg/m   Estimated body mass index is 27.41 kg/m  as calculated from the following:    Height as of this encounter: 1.638 m (5' 4.5\").    Weight as of this encounter: 73.6 kg (162 lb 3.2 oz).  Physical Exam     General: alert and oriented ×3 no acute distress.    HEENT: pupils are equal round and reactive to light extraocular motion is " intact. Normocephalic and atraumatic.     Hearing is grossly normal and there is no otorrhea.     Chest: has bilateral rise with no increased work of breathing. ctab    Cardiovascular: normal perfusion and brisk capillary refill. s1s2    Musculoskeletal: no gross focal abnormalities and normal gait.    Neuro: no gross focal abnormalities and memory seems intact.    Psychiatric: speech is clear and coherent and fluent. Patient dressed appropriately for the weather. Mood is appropriate and affect is full.            ASSESSMENT / PLAN:       ICD-10-CM    1. Encounter for Medicare annual wellness exam  Z00.00       2. Parathyroid abnormality (H)  E21.5 Adult ENT  Referral     DX Hip/Pelvis/Spine     Parathyroid Hormone Intact      3. Enlarged lymph nodes  R59.9 Adult ENT  Referral      4. Sarcoidosis  D86.9 Adult ENT  Referral      5. Osteopenia of necks of both femurs  M85.851 DX Hip/Pelvis/Spine    M85.852 Parathyroid Hormone Intact      6. Type 2 diabetes mellitus without complication, without long-term current use of insulin (H)  E11.9       7. Nonalcoholic steatohepatitis (ROMERO)  K75.81         Sarcoidosis involving lacrimal ducts.  Now with an enlarged thyroid gland and left preauricular lymphadenopathy and tenderness.  Her ophthalmologist was seen yesterday and felt like she had an enlarged parotid node.  Unsure if this was meant is a preauricular lymph node or if it was an enlarged thyroid.  However, thyroid does feel little enlarged today so we will get a thyroid ultrasound and place referral to ear nose and throat.  She would prefer to see somebody closer to home the [place].  We will place a referral to [facility].  She continues to follow with rheumatology and with ophthalmology.    Osteopenia continue weightbearing exercises and calcium supplementation and adequate intake of vitamin D.  We will also get a PTH level.  We will also check a calcium level.    Due for Pap  "smear.  Patient will schedule follow-up appointment for us to do her cervical cancer screening.      Type 2 diabetes is well controlled now with diet.    History of fatty liver recent labs show normal liver enzymes.                    COUNSELING:  Reviewed preventive health counseling, as reflected in patient instructions      BMI:   Estimated body mass index is 27.41 kg/m  as calculated from the following:    Height as of this encounter: 1.638 m (5' 4.5\").    Weight as of this encounter: 73.6 kg (162 lb 3.2 oz).   Weight management plan: Discussed healthy diet and exercise guidelines      She reports that she quit smoking about 47 years ago. Her smoking use included cigarettes. She started smoking about 47 years ago. She smoked an average of 0.50 packs per day. She has never used smokeless tobacco.      Appropriate preventive services were discussed with this patient, including applicable screening as appropriate for cardiovascular disease, diabetes, osteopenia/osteoporosis, and glaucoma.  As appropriate for age/gender, discussed screening for colorectal cancer, prostate cancer, breast cancer, and cervical cancer. Checklist reviewing preventive services available has been given to the patient.    Reviewed patients plan of care and provided an AVS. The Basic Care Plan (routine screening as documented in Health Maintenance) for Maritza meets the Care Plan requirement. This Care Plan has been established and reviewed with the Patient.      Irena Levi MD  [facility]    Identified Health Risks:    I have reviewed Opioid Use Disorder and Substance Use Disorder risk factors and made any needed referrals.       The patient was counseled and encouraged to consider modifying their diet and eating habits. She was provided with information on recommended healthy diet options.  "

## 2023-07-13 ENCOUNTER — MYC MEDICAL ADVICE (OUTPATIENT)
Dept: FAMILY MEDICINE | Facility: CLINIC | Age: 68
End: 2023-07-13
Payer: MEDICARE

## 2023-07-14 NOTE — TELEPHONE ENCOUNTER
See MyChart from Patient needing PCP review.  Please respond directly to patient, if at all able.    PERLA Antony  [facility]

## 2023-07-26 ENCOUNTER — MYC MEDICAL ADVICE (OUTPATIENT)
Dept: RHEUMATOLOGY | Facility: CLINIC | Age: 68
End: 2023-07-26
Payer: MEDICARE

## 2023-07-26 DIAGNOSIS — D86.9 SARCOIDOSIS: Primary | ICD-10-CM

## 2023-07-26 DIAGNOSIS — H04.002 DACRYOADENITIS OF LEFT LACRIMAL GLAND: ICD-10-CM

## 2023-07-26 RX ORDER — PREDNISONE 5 MG/1
TABLET ORAL
Qty: 42 TABLET | Refills: 0 | Status: SHIPPED | OUTPATIENT
Start: 2023-07-26 | End: 2023-08-16

## 2023-07-26 NOTE — TELEPHONE ENCOUNTER
Patient sending pics, showing swelling of left eye lids and lacrimal as noted in previous series of photos sent 6/05.    Route to provider for review.    Lianet Espino RN  [facility]ology [facility]

## 2023-07-26 NOTE — TELEPHONE ENCOUNTER
Spoke to patient regarding Dr. Ngo's message:  increase her methotrexate to 20 mg once per week. And we can offer her another prednisone burst and taper (15 mg for 1 week, 10 mg for 1 week, 5 mg for 1 week) -- it's ok if she wants to skip the prednisone and just increase the methotrexate.     Patient will start the methotrexate 20mg today and will start the Prednisone taper today reporting the pictures she sent us were from a couple of days ago and her eye has swollen a little bit more.    New scripts entered, signed per verbal orders and sent to the [facility] per patient request.      Lianet Espino RN  Rheumatology Clinic

## 2023-07-27 ENCOUNTER — TELEPHONE (OUTPATIENT)
Dept: OPHTHALMOLOGY | Facility: CLINIC | Age: 68
End: 2023-07-27
Payer: MEDICARE

## 2023-07-27 NOTE — TELEPHONE ENCOUNTER
Spoke with patient regarding scheduling a Return with Dr. Padilla in the next 1-2 weeks for re-evaluation.-Per  review. Scheduled patet accordingly [place]23 [place] [facility] as nothing else avail on 8/8/23 pt coming from [place]. Patient is aware of date, time and location.-Per Patient

## 2023-08-01 ENCOUNTER — MYC MEDICAL ADVICE (OUTPATIENT)
Dept: FAMILY MEDICINE | Facility: CLINIC | Age: 68
End: 2023-08-01
Payer: MEDICARE

## 2023-08-03 ENCOUNTER — MYC MEDICAL ADVICE (OUTPATIENT)
Dept: FAMILY MEDICINE | Facility: CLINIC | Age: 68
End: 2023-08-03
Payer: MEDICARE

## 2023-08-03 DIAGNOSIS — E04.1 THYROID NODULE: Primary | ICD-10-CM

## 2023-08-08 ENCOUNTER — OFFICE VISIT (OUTPATIENT)
Dept: OPHTHALMOLOGY | Facility: CLINIC | Age: 68
End: 2023-08-08
Payer: MEDICARE

## 2023-08-08 DIAGNOSIS — L92.9 NON-CASEATING GRANULOMA: ICD-10-CM

## 2023-08-08 DIAGNOSIS — H04.002 LACRIMAL GLAND INFLAMMATION, LEFT: Primary | ICD-10-CM

## 2023-08-08 PROCEDURE — 67515 INJECT/TREAT EYE SOCKET: CPT | Mod: LT | Performed by: OPHTHALMOLOGY

## 2023-08-08 PROCEDURE — 99213 OFFICE O/P EST LOW 20 MIN: CPT | Mod: 25 | Performed by: OPHTHALMOLOGY

## 2023-08-08 RX ORDER — TRIAMCINOLONE ACETONIDE 40 MG/ML
40 INJECTION, SUSPENSION INTRA-ARTICULAR; INTRAMUSCULAR ONCE
Status: COMPLETED | OUTPATIENT
Start: 2023-08-08 | End: 2023-08-08

## 2023-08-08 RX ADMIN — TRIAMCINOLONE ACETONIDE 40 MG: 40 INJECTION, SUSPENSION INTRA-ARTICULAR; INTRAMUSCULAR at 11:47

## 2023-08-08 ASSESSMENT — CONF VISUAL FIELD
OS_SUPERIOR_TEMPORAL_RESTRICTION: 0
OS_INFERIOR_TEMPORAL_RESTRICTION: 0
OD_SUPERIOR_NASAL_RESTRICTION: 0
OD_SUPERIOR_TEMPORAL_RESTRICTION: 0
OD_NORMAL: 1
OD_INFERIOR_NASAL_RESTRICTION: 0
OD_INFERIOR_TEMPORAL_RESTRICTION: 0
OS_NORMAL: 1
METHOD: COUNTING FINGERS
OS_SUPERIOR_NASAL_RESTRICTION: 0
OS_INFERIOR_NASAL_RESTRICTION: 0

## 2023-08-08 ASSESSMENT — EXTERNAL EXAM - RIGHT EYE: OD_EXAM: NORMAL

## 2023-08-08 ASSESSMENT — SLIT LAMP EXAM - LIDS: COMMENTS: DERMATOCHALASIS

## 2023-08-08 ASSESSMENT — TONOMETRY
OD_IOP_MMHG: 15
OS_IOP_MMHG: 14
IOP_METHOD: ICARE

## 2023-08-08 ASSESSMENT — CUP TO DISC RATIO
OS_RATIO: 0.3
OD_RATIO: 0.3

## 2023-08-08 ASSESSMENT — VISUAL ACUITY
OD_CC+: -2
OS_CC: 20/30
OD_CC: 20/25
CORRECTION_TYPE: GLASSES
METHOD: SNELLEN - LINEAR
OS_CC+: -2

## 2023-08-08 ASSESSMENT — EXTERNAL EXAM - LEFT EYE: OS_EXAM: NORMAL

## 2023-08-08 NOTE — PROGRESS NOTES
"     Chief Complaint(s) and History of Present Illness(es)     Follow Up            Laterality: left eye    Comments: Pt here for 4 week follow up on   1. Lacrimal gland inflammation, left    2. Non-caseating granuloma        Comments    Pt feels like left eye is \"sticking out farther than my other eye\".   Symptoms started 2 weeks ago.     Pt using:  Methotrexate- 20 mg   Prednisolone-on week 3 of taper now at 5 mg.   Jamia Adrian, COA on 8/8/2023 at 10:43 AM    Assessment & Plan     Maritza Hitchcock is a 67 year old female with the following diagnoses:     ICD-10-CM    1. Lacrimal gland inflammation, left  H04.002       2. Non-caseating granuloma  L92.9           S/p left lacrimal gland biopsy (5/4/23) with pathology consistent with non-caseating granuloma.   S/p left lateral orbit lacrimal gland fossa intralesional kenalog 40 mg/mL (5/24/23)     - Laboratory workup (negative): HIV, Hep C, quant gold, Hep B, C3, C4, IgG panel, anti-smith, SSA, SSB, ACE, lysozyme, ANCA, lyme, histo, coccidioides, blastomyces, aspergillus   - Shingles vaccine information given last visit; vaccine completed 3/26/19 and 8/6/19    Noticed increase in LESTER swelling in late July, prompting re-initiation of prednisone taper and increase in methotrexate dose. Since then, she notes that the swelling has remained relatively stable.  - Restarted on prednisone taper 15/10/5 x1 week each on 7/26/23; previously finished PO prednisone taper on 7/3/23  - Increased from methotrexate 15 mg PO weekly to 20 mg PO weekly    - Mild increase in RUL edema today, without lacrimal gland inflammation or tenderness to palpation   - No evidence of intraocular inflammation     - Complete prednisone taper and continue methotrexate  - One enlarged and tender left parotid lymph node, following with PCP and has an appointment with ENT in September  - Follow up with rheumatology as scheduled in October  - Follow up with dilated eye exam with local eye provider " (Dr. Zhang) as scheduled in October  - Follow up with oculoplastics in 3 months or sooner if worsening      Patient disposition:   Return in about 3 months (around 11/8/2023) for Follow Up.     Mireya Zamarripa MD  Oculoplastic Surgery Fellow    Agree with above. She is on low dose prednisone in addition to her methotrexate. Feels lid edema is worsening in area of left lacrimal gland.     Discussed options. Repeat kenalog injection left orbit today. 40 mg of Kenalog injected into superior lateral orbit without complication. She did get a headache after it.     F/u 3 months.        Attending Physician Attestation: Complete documentation of historical and exam elements from today's encounter can be found in the full encounter summary report (not reduplicated in this progress note). I personally obtained the chief complaint(s) and history of present illness. I confirmed and edited as necessary the review of systems, past medical/surgical history, family history, social history, and examination findings as documented by others; and I examined the patient myself. I personally reviewed the relevant tests, images, and reports as documented above. I formulated and edited as necessary the assessment and plan and discussed the findings and management plan with the patient.  -Autumn Padilla MD

## 2023-08-08 NOTE — NURSING NOTE
"Chief Complaints and History of Present Illnesses   Patient presents with    Follow Up     Pt here for 4 week follow up on   1. Lacrimal gland inflammation, left       2. Non-caseating granuloma            Chief Complaint(s) and History of Present Illness(es)       Follow Up              Laterality: left eye    Comments: Pt here for 4 week follow up on   1. Lacrimal gland inflammation, left       2. Non-caseating granuloma                     Comments    Pt feels like left eye is \"sticking out farther than my other eye\". Symptoms started 2 weeks ago.     Pt using:  Methotrexate- 20 mg   Prednisolone-on week 3 of taper now at 5 mg.   Jamia Adrian, COA on 8/8/2023 at 10:43 AM                     "

## 2023-08-16 NOTE — TELEPHONE ENCOUNTER
See MyChart from Patient needing PCP review.  Please respond directly to patient, if at all able.    PERLA Antony  [facility]

## 2023-08-28 ENCOUNTER — MYC MEDICAL ADVICE (OUTPATIENT)
Dept: FAMILY MEDICINE | Facility: CLINIC | Age: 68
End: 2023-08-28
Payer: MEDICARE

## 2023-08-28 NOTE — TELEPHONE ENCOUNTER
8/16/23 Dr. Irena Levi MD placed orders for biopsy.  8/25/23 Biopsy done    Please advise for patient follow up plan.

## 2023-09-14 ENCOUNTER — NURSE TRIAGE (OUTPATIENT)
Dept: NURSING | Facility: CLINIC | Age: 68
End: 2023-09-14

## 2023-09-14 ENCOUNTER — VIRTUAL VISIT (OUTPATIENT)
Dept: FAMILY MEDICINE | Facility: CLINIC | Age: 68
End: 2023-09-14
Payer: MEDICARE

## 2023-09-14 ENCOUNTER — TELEPHONE (OUTPATIENT)
Dept: FAMILY MEDICINE | Facility: CLINIC | Age: 68
End: 2023-09-14

## 2023-09-14 DIAGNOSIS — U07.1 INFECTION DUE TO 2019 NOVEL CORONAVIRUS: Primary | ICD-10-CM

## 2023-09-14 DIAGNOSIS — D86.9 SARCOIDOSIS: ICD-10-CM

## 2023-09-14 DIAGNOSIS — L72.0 INCLUSION CYST: ICD-10-CM

## 2023-09-14 DIAGNOSIS — E04.1 THYROID NODULE: ICD-10-CM

## 2023-09-14 PROCEDURE — 99215 OFFICE O/P EST HI 40 MIN: CPT | Mod: VID | Performed by: FAMILY MEDICINE

## 2023-09-14 PROCEDURE — 99417 PROLNG OP E/M EACH 15 MIN: CPT | Mod: VID | Performed by: FAMILY MEDICINE

## 2023-09-14 RX ORDER — HYDROCODONE BITARTRATE AND ACETAMINOPHEN 5; 325 MG/1; MG/1
TABLET ORAL
COMMUNITY
Start: 2023-09-01 | End: 2023-10-09

## 2023-09-14 RX ORDER — KRILL/OM-3/DHA/EPA/PHOSPHO/AST 500-110 MG
500 CAPSULE ORAL
COMMUNITY

## 2023-09-14 NOTE — TELEPHONE ENCOUNTER
Schedule virtual with PCP for biopsy    2. COVID:Requesting Treatment:    Tested positive 9/14/23  Symptoms: Head cold symptoms: 9/12/2023  Sinus/congestion/ST/chills/    Virtual Visit Scheduled for today:

## 2023-09-14 NOTE — PROGRESS NOTES
Annabelle is a 67 year old who is being evaluated via a billable video visit.      How would you like to obtain your AVS? MyChart  If the video visit is dropped, the invitation should be resent by: Text to cell phone: [phone]  Will anyone else be joining your video visit? No          Assessment & Plan   Problem List Items Addressed This Visit          Infectious/Inflammatory    Sarcoidosis     Other Visit Diagnoses       Infection due to 2019 novel coronavirus    -  Primary    Thyroid nodule        Inclusion cyst            Spoke with patient to clarify what happened when I ordered an ultrasound-guided fine-needle aspiration of her thyroid gland cyst.  She tells me that the radiologist and the pathologist consulted each other and decided that it must of been a typo and I must have meant to order an ultrasound-guided fine-needle aspiration of patient's parotid gland.  Looking at my note from the day that I ordered the ultrasound of the thyroid gland I have found an enlarged thyroid on my exam, patient also has history of osteopenia so I am concerned about a possible problem with the parathyroid gland, ultrasound of the thyroid gland showed a 2-1/2 cm mass that had both solid and cystic components and fine-needle aspiration was recommended.  The order placed in her chart was clearly for an ultrasound-guided fine-needle aspiration of the thyroid gland.    In regards to the enlarged parotid gland patient was given a referral to ear nose and throat.        Positive COVID test, symptoms consistent with upper respiratory infection, symptoms started 2 to 3 days ago.  Counseled patient they have increased risk of hospitalization or death due to COVID-19 because:  age, weight, DM    COVID-19 Outpatient Treatment Patient Education Sheet For Providers was reviewed with patient as well as the fact that this medication is not FDA approved but is authorized under the emergency use authorization.      They would like to proceed  with Paxlovid.      Medication interaction check performed that there is no contraindication to Paxlovid.    Kidney function is normal.    Return to clinic if symptoms worsen or do not improve, go to the emergency room if O2 sat gets below 90 or they have increased work of breathing.     Monitor for signs of liver problems such as jaundice or skin itching.    Patient has previously tolerated paxlovid the last time she had COVID-19      Addendum spoke with the reading radiologist and the manager of the radiology department over at [facility].  Apparently there was a typo in the ultrasound of the thyroid report.  In fact, the radiologist shared that the ultrasound should probably be changed to an ultrasound at complete because they did go up to the parotid gland.  The mass was actually above the parotid gland and not within the thyroid.  It had been present on an MRI done several years ago and looks like it has not changed.  The radiologist has been a put addendum onto the report.  The patient does not have a mass in her thyroid that needs to be biopsied        Total time spent reviewing chart and preparing for appointment, with patient for appointment, and time spent charting and coordinating care on the day of the appointment in minutes was:72                  Irena Levi MD  [facility]    Lori Alanis is a 67 year old, presenting for the following health issues:  Covid Concern (Pt had positive home covid this morning.  Pt is c/o ST,sinus congestion,dry cough and vomiting x 3 days)      HPI             Review of Systems         Objective           Vitals:  No vitals were obtained today due to virtual visit.    Physical Exam   GENERAL: Healthy, alert and no distress  EYES: Eyes grossly normal to inspection.  No discharge or erythema, or obvious scleral/conjunctival abnormalities.  RESP: No audible wheeze, cough, or visible cyanosis.  No visible retractions or  increased work of breathing.    SKIN: Visible skin clear. No significant rash, abnormal pigmentation or lesions.  NEURO: Cranial nerves grossly intact.  Mentation and speech appropriate for age.  PSYCH: Mentation appears normal, affect normal/bright, judgement and insight intact, normal speech and appearance well-groomed.                Video-Visit Details    Type of service:  Video Visit   Video Start Time:  5:15pm  Video End Time: 535pm    Originating Location (pt. Location): Home    Distant Location (provider location):  On-site  Platform used for Video Visit: Tod

## 2023-09-15 NOTE — TELEPHONE ENCOUNTER
Patient reports missing Rx. Review of the chart shows a provider ordering paxlovid the order is not in medication orders. Paged Dr. Ray who is on call and the provider who wrote the order. She gave telephone orders and the Rx entered into the record normal dosing. Patient notified of this and to hold atorvistatin while taking paxlovid which dr Ray reports has already been communicated to her but I made sure they were aware.       Reason for Disposition   [1] Caller has NON-URGENT medicine question about med that PCP prescribed AND [2] triager unable to answer question    Protocols used: Medication Question Call-A-

## 2023-09-28 ENCOUNTER — OFFICE VISIT (OUTPATIENT)
Dept: FAMILY MEDICINE | Facility: CLINIC | Age: 68
End: 2023-09-28
Payer: MEDICARE

## 2023-09-28 VITALS
WEIGHT: 162 LBS | OXYGEN SATURATION: 97 % | TEMPERATURE: 98 F | RESPIRATION RATE: 16 BRPM | HEIGHT: 65 IN | SYSTOLIC BLOOD PRESSURE: 108 MMHG | DIASTOLIC BLOOD PRESSURE: 78 MMHG | BODY MASS INDEX: 26.99 KG/M2 | HEART RATE: 68 BPM

## 2023-09-28 DIAGNOSIS — D86.9 SARCOIDOSIS: ICD-10-CM

## 2023-09-28 DIAGNOSIS — R55 SYNCOPE, UNSPECIFIED SYNCOPE TYPE: ICD-10-CM

## 2023-09-28 DIAGNOSIS — R22.0 FACIAL MASS: ICD-10-CM

## 2023-09-28 DIAGNOSIS — E11.9 TYPE 2 DIABETES MELLITUS WITHOUT COMPLICATION, WITHOUT LONG-TERM CURRENT USE OF INSULIN (H): Primary | ICD-10-CM

## 2023-09-28 DIAGNOSIS — Z86.16 HISTORY OF COVID-19: ICD-10-CM

## 2023-09-28 PROCEDURE — 99215 OFFICE O/P EST HI 40 MIN: CPT | Performed by: FAMILY MEDICINE

## 2023-09-28 NOTE — ASSESSMENT & PLAN NOTE
"From appointment with ENT, Diallo Graves, 9/20/2023   \"For now, we discussed options including watchful waiting verses excision. Given its benign course in appearance over the last 50 years, certainly seems reasonable to continue to watch. If it does swell, would recommend a CT neck with contrast for further consideration and evaluation as at that point we will may want to consider surgery which may involve her parotid gland.\"    "

## 2023-09-28 NOTE — PROGRESS NOTES
"  Assessment & Plan   Problem List Items Addressed This Visit          Endocrine    Type 2 diabetes mellitus without complication, without long-term current use of insulin (H) - Primary       Other    Facial mass     From appointment with ENT, Diallo Graves, 9/20/2023   \"For now, we discussed options including watchful waiting verses excision. Given its benign course in appearance over the last 50 years, certainly seems reasonable to continue to watch. If it does swell, would recommend a CT neck with contrast for further consideration and evaluation as at that point we will may want to consider surgery which may involve her parotid gland.\"            Other Visit Diagnoses       History of COVID-19        Syncope, unspecified syncope type               Facial mass has been biopsied and patient has seen Dr. Graves.  I have updated the problem list with his recommendations.  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    History of COVID 19.  Had a syncopal episode while she was dehydrated with this since then is felt back to normal.    Type 2 diabetes mellitus noted and taken into account for medical decision making she has had to have 3 rounds of prednisone for her sarcoidosis within the past 6 months.    Sarcoidosis patient is seeing rheum they have had to increase her methotrexate.  It also looks like she might have a new growth near the lateral edge of the left eye.  Would like her to follow-up with her ophthalmologist    Sarcoidosis noted in the left lacrimal gland.  Noted and taken into account for medical decision making.    Total time spent reviewing chart and preparing for appointment, with patient for appointment, and time spent charting and coordinating care on the day of the appointment in minutes was: 40                 Irena Levi MD  [facility]    Lori Alanis is a 67 year old, presenting for the following health issues:  Follow Up (Pt here for " "follow up bx. )        9/28/2023     9:32 AM   Additional Questions   Roomed by Faviola       History of Present Illness       Reason for visit:  Follow up to a biopsy    She eats 0-1 servings of fruits and vegetables daily.She consumes 0 sweetened beverage(s) daily.She exercises with enough effort to increase her heart rate 9 or less minutes per day.  She exercises with enough effort to increase her heart rate 3 or less days per week.   She is taking medications regularly.                 Review of Systems         Objective    /78 (BP Location: Right arm, Patient Position: Sitting)   Pulse 68   Temp 98  F (36.7  C) (Tympanic)   Resp 16   Ht 1.638 m (5' 4.5\")   Wt 73.5 kg (162 lb)   SpO2 97%   BMI 27.38 kg/m    Body mass index is 27.38 kg/m .  Physical Exam                         "

## 2023-10-09 ENCOUNTER — VIRTUAL VISIT (OUTPATIENT)
Dept: RHEUMATOLOGY | Facility: CLINIC | Age: 68
End: 2023-10-09
Attending: STUDENT IN AN ORGANIZED HEALTH CARE EDUCATION/TRAINING PROGRAM
Payer: MEDICARE

## 2023-10-09 VITALS — BODY MASS INDEX: 26.66 KG/M2 | HEIGHT: 65 IN | WEIGHT: 160 LBS

## 2023-10-09 DIAGNOSIS — D86.9 SARCOIDOSIS: Primary | ICD-10-CM

## 2023-10-09 DIAGNOSIS — H04.002 DACRYOADENITIS OF LEFT LACRIMAL GLAND: ICD-10-CM

## 2023-10-09 DIAGNOSIS — Z79.899 HIGH RISK MEDICATION USE: ICD-10-CM

## 2023-10-09 PROCEDURE — 99214 OFFICE O/P EST MOD 30 MIN: CPT | Mod: 95 | Performed by: STUDENT IN AN ORGANIZED HEALTH CARE EDUCATION/TRAINING PROGRAM

## 2023-10-09 ASSESSMENT — PAIN SCALES - GENERAL: PAINLEVEL: NO PAIN (0)

## 2023-10-09 NOTE — NURSING NOTE
Is the patient currently in the state of [place]? NO    Visit mode:VIDEO    If the visit is dropped, the patient can be reconnected by: VIDEO VISIT: Text to cell phone:   Telephone Information:   Mobile [phone]       Will anyone else be joining the visit? NO  (If patient encounters technical issues they should call [phone] :[id number])    How would you like to obtain your AVS? MyChart    Are changes needed to the allergy or medication list? Pt stated no changes to allergies and Pt stated no med changes    Reason for visit: RECHECK    Pt completed echeck-in an states medications and allergies are correct.      Adriano ABRAHAM

## 2023-10-09 NOTE — LETTER
10/9/2023       RE: Maritza Hitchcock  [address] [facility] [address]     Dear Colleague,    Thank you for referring your patient, Maritza Hitchcock, to the [facility] RHEUMATOLOGY at [facility]. Please see a copy of my visit note below.    [facility] Rheumatology  Date of Service: 10/09/23   Last seen: 5/30/2023     CC: sarcoidosis    Interval history: Annabelle has been tolerating 20 mg methotrexate oral very well, no side effects. There is a small amount of left orbital swelling but it has not progressed as it usually does. We discussed that she may need slightly more methotrexate but that given known issues with absorbance we would need to switch to subcutaneous dosing.    We reviewed her vaccinations and she has gotten shingrix, influenza, and RSV. She had covid ~3 weeks ago so is not eligible for covid booster at this time.    ROS: 10 point ROS neg other than the symptoms noted above in the HPI.   ALLERGIES: Patient has no known allergies.   MEDS: now off prednisone and taking 20 mg methotrexate weekly    PMHx:  Past Medical History:   Diagnosis Date    Diabetes (H) 07/2021    Infection due to 2019 novel coronavirus 09/2022    Infection due to 2019 novel coronavirus 09/14/2023    MVP (mitral valve prolapse)     Premenopausal menorrhagia 11/18/2010    Sarcoidosis     Thyroid disease     Hypothyroidism        PSHx:  Past Surgical History:   Procedure Laterality Date    ABDOMEN SURGERY  1988    Tubal    CATARACT IOL, RT/LT      CHOLECYSTECTOMY      1987    COLONOSCOPY  ????    7/26/2007, 8/30/2017    DILATION AND CURETTAGE, HYSTEROSCOPY DIAGNOSTIC, COMBINED      4/23/2010    EYE SURGERY  2021, 2022    Left eye-Vitrectomy, Cataract    GALLBLADDER SURGERY  02/11/1987    GYN SURGERY  ????    Partial Hysterectomy    LAPAROSCOPIC HYSTERECTOMY SUPRACERVICAL  11/19/2010    MENISCECTOMY  09/27/2016    partial    ORBITOTOMY, RESECT TUMOR, COMBINED Left 5/4/2023     "Procedure: Left lacrimal gland biopsy;  Surgeon: Autumn Padilla MD;  Location: [facility] OR    SOFT TISSUE SURGERY  ????    Right knee meniscus repair    VAGINAL DELIVERY      x3 no date       SocHx:  Social History     Tobacco Use    Smoking status: Former     Packs/day: 0.50     Years: 0.00     Pack years: 0.00     Types: Cigarettes     Start date: 10/1/1975     Quit date: 1976     Years since quittin.3    Smokeless tobacco: Never   Vaping Use    Vaping Use: Never used   Substance Use Topics    Alcohol use: Yes    Drug use: Never        FamHx:  family history includes Anxiety Disorder in her daughter and son; Coronary Artery Disease in her father; Glasses (<7 y/o) in her daughter, father, paternal grandmother, and son; Hearing Loss in her mother; Heart Disease in her father; Hyperlipidemia in her mother; Hypertension in her father and mother; Obesity in her father and paternal grandmother; Osteoporosis in her mother; Rheumatoid Arthritis in her mother; Thyroid Disease in her mother.     PHYSICAL EXAM  Vitals: Ht 1.638 m (5' 4.5\")   Wt 72.6 kg (160 lb)   BMI 27.04 kg/m    BMI= Body mass index is 27.04 kg/m .   General: alert and oriented  HEENT: small area of swelling at left eye lateral side  Resp: breathing comfortably on room air  CV: warm, well perfused appearing extremities  Skin: no visible rash      LAB REVIEW:      Latest Ref Rng & Units 2023     3:31 PM 2023     8:21 AM 2023     2:23 PM   RHEUM RESULTS   Albumin 3.5 - 5.2 g/dL 4.8      ALT 10 - 35 U/L 21      AST 10 - 35 U/L 27      MARILEE interpretation Negative Positive      MARILEE pattern 1  Speckled      MARILEE titer 1  1:320      Complement C3 81 - 157 mg/dL 109      Complement C4 13 - 39 mg/dL 22      Creatinine 0.51 - 0.95 mg/dL 0.78   0.69    GFR Estimate >60 mL/min/1.73m2 84   >90    Hep B Surface Agn Nonreactive  Nonreactive      - 1,616 mg/dL 796          Lab Results   Component Value Date/Time    ELIJAH Positive (A) " 2023 03:31 PM    ANAP1 Speckled 2023 03:31 PM    ANAT1 1:320 2023 03:31 PM    SMIGG Negative 2023 03:31 PM    SSAIGG Negative 2023 03:31 PM    SSBIGG Negative 2023 03:31 PM    ANCAT <1:10 2023 03:31 PM    ANCAP  2023 03:31 PM     The ANCA IFA is <1:10.  No further testing will be performed.        Lab Results   Component Value Date/Time    HBCAB NON-REACTIVE 2020 02:09 PM    HEPBANG Nonreactive 2023 08:21 AM    TBRES Negative 2023 04:34 PM        IMAGIN23 MRI orbit:  ORBITS: Dedicated MRI of the orbits was performed. There is asymmetric enlargement and enhancement of the left lacrimal gland. No organized fluid collection to suggest abscess. No significant adjacent inflammatory stranding. Intact globes. Status post left cataract repair.  Symmetrical extraocular musculature without thickening/enlargement. The intraorbital, canalicular and prechiasmatic optic nerve segments are normal in size and signal intensity bilaterally. The optic chiasm and proximal optic tracts are unremarkable. No localized inflammation of the intraconal or extraconal orbital fat. Normal lacrimal glands. No intraorbital mass or pathologic intraorbital enhancement.   23: CXR personally reviewed. No clear evidence of mediastinal lymphadenopathy.  23 CT orbit   IMPRESSION: Unilateral left-sided lacrimal gland slight enlargement with homogeneous enhancement. Interval increase in size since 2023  dated MRI.Differential diagnosis includes lymphoproliferative lesions which would include lymphoid hyperplasia and lymphoma. Other differential diagnosis considerations include infectious andinflammatory dacryadenitis, sarcoidosis, Sjogren's disease. No surrounding inflammatory changes to suggest an underlying infectious dacryoadenitis.   Sarcoidosis, Sjogren disease and pseudotumor (idiopathic orbital inflammation) more commonly involve the glands bilaterally. Lack of  associating pain symptom is also not typical for pseudotumor. Biopsy is recommended.    PROCEDURES:  Lacrimal gland biopsy 5/4/23  A-B.  SOFT TISSUE, LEFT LACRIMAL GLAND, BIOPSY:  -Nonnecrotizing granulomatous inflammation, see comment.  -GMS and AFB special stains are negative for microorganisms (performed on blocks A1 and B1 with appropriate controls).  -Negative for malignancy.    Lacrimal gland flow cytometry 5/4/23  A. Eye, Left, :  -Polytypic B cells  No aberrant immunophenotype on T cells    ASSESSMENT: 66 yo with lacrimal gland enlargement and noncaseating granuloma on biopsy consistent with sarcoidosis.     DISCUSSION: It seems that methotrexate is controlling 80% of her orbital/lacrimal gland swelling and keeping the swelling from progressing. I suspect she needs just a little more methotrexate and she is tolerating it exceptionally well. At 20 mg and above, subcutaneous methotrexate is best due to poor absorption at higher doses    DIAGNOSIS:  ## sarcoidosis with isolated, unilateral, left sided lacrimal gland involvement. Demonstrated steroid resistance with recurrence at 30 mg prednisone daily.    PLAN:  1. Switch from 20 mg oral to 20 mg subcutaneous. Monitor for 1 month, if no improvement in residual swelling would increase to 25 mg subcutaneous.  2. Continue folic acid 1 mg daily  3. Standing lab orders placed to monitor medication every 3-4 months  4. MTM referral placed for assistance with transition to subcut    Followup in 6 months    Nura Duran MD, PhD  Rheumatology     35 minutes spent on the date of encounter doing chart review, history and exam, documentation, care coordination, and further activities as noted above    Virtual Visit Details  Type of service:  Video Visit   Video Start Time: 1:31  Video End Time:1:50  Originating Location (pt. Location): Home  Distant Location (provider location):  On-site  Platform used for Video Visit: Lucio

## 2023-10-09 NOTE — PROGRESS NOTES
[facility] Rheumatology  Date of Service: 10/09/23   Last seen: 5/30/2023     CC: sarcoidosis    Interval history: Annabelle has been tolerating 20 mg methotrexate oral very well, no side effects. There is a small amount of left orbital swelling but it has not progressed as it usually does. We discussed that she may need slightly more methotrexate but that given known issues with absorbance we would need to switch to subcutaneous dosing.    We reviewed her vaccinations and she has gotten shingrix, influenza, and RSV. She had covid ~3 weeks ago so is not eligible for covid booster at this time.    ROS: 10 point ROS neg other than the symptoms noted above in the HPI.   ALLERGIES: Patient has no known allergies.   MEDS: now off prednisone and taking 20 mg methotrexate weekly    PMHx:  Past Medical History:   Diagnosis Date    Diabetes (H) 07/2021    Infection due to 2019 novel coronavirus 09/2022    Infection due to 2019 novel coronavirus 09/14/2023    MVP (mitral valve prolapse)     Premenopausal menorrhagia 11/18/2010    Sarcoidosis     Thyroid disease     Hypothyroidism        PSHx:  Past Surgical History:   Procedure Laterality Date    ABDOMEN SURGERY  1988    Tubal    CATARACT IOL, RT/LT      CHOLECYSTECTOMY      1987    COLONOSCOPY  ????    7/26/2007, 8/30/2017    DILATION AND CURETTAGE, HYSTEROSCOPY DIAGNOSTIC, COMBINED      4/23/2010    EYE SURGERY  2021, 2022    Left eye-Vitrectomy, Cataract    GALLBLADDER SURGERY  02/11/1987    GYN SURGERY  ????    Partial Hysterectomy    LAPAROSCOPIC HYSTERECTOMY SUPRACERVICAL  11/19/2010    MENISCECTOMY  09/27/2016    partial    ORBITOTOMY, RESECT TUMOR, COMBINED Left 5/4/2023    Procedure: Left lacrimal gland biopsy;  Surgeon: Autumn Padilla MD;  Location: [facility] OR    SOFT TISSUE SURGERY  ????    Right knee meniscus repair    VAGINAL DELIVERY      x3 no date       SocHx:  Social History     Tobacco Use    Smoking status: Former     Packs/day: 0.50     Years: 0.00     Pack  "years: 0.00     Types: Cigarettes     Start date: 10/1/1975     Quit date: 1976     Years since quittin.3    Smokeless tobacco: Never   Vaping Use    Vaping Use: Never used   Substance Use Topics    Alcohol use: Yes    Drug use: Never        FamHx:  family history includes Anxiety Disorder in her daughter and son; Coronary Artery Disease in her father; Glasses (<9 y/o) in her daughter, father, paternal grandmother, and son; Hearing Loss in her mother; Heart Disease in her father; Hyperlipidemia in her mother; Hypertension in her father and mother; Obesity in her father and paternal grandmother; Osteoporosis in her mother; Rheumatoid Arthritis in her mother; Thyroid Disease in her mother.     PHYSICAL EXAM  Vitals: Ht 1.638 m (5' 4.5\")   Wt 72.6 kg (160 lb)   BMI 27.04 kg/m    BMI= Body mass index is 27.04 kg/m .   General: alert and oriented  HEENT: small area of swelling at left eye lateral side  Resp: breathing comfortably on room air  CV: warm, well perfused appearing extremities  Skin: no visible rash      LAB REVIEW:      Latest Ref Rng & Units 2023     3:31 PM 2023     8:21 AM 2023     2:23 PM   RHEUM RESULTS   Albumin 3.5 - 5.2 g/dL 4.8      ALT 10 - 35 U/L 21      AST 10 - 35 U/L 27      MARILEE interpretation Negative Positive      MARILEE pattern 1  Speckled      MARILEE titer 1  1:320      Complement C3 81 - 157 mg/dL 109      Complement C4 13 - 39 mg/dL 22      Creatinine 0.51 - 0.95 mg/dL 0.78   0.69    GFR Estimate >60 mL/min/1.73m2 84   >90    Hep B Surface Agn Nonreactive  Nonreactive      - 1,616 mg/dL 796          Lab Results   Component Value Date/Time    ELIJAH Positive (A) 2023 03:31 PM    ANAP1 Speckled 2023 03:31 PM    ANAT1 1:320 2023 03:31 PM    SMIGG Negative 2023 03:31 PM    SSAIGG Negative 2023 03:31 PM    SSBIGG Negative 2023 03:31 PM    ANCAT <1:10 2023 03:31 PM    ANCAP  2023 03:31 PM     The ANCA IFA is <1:10.  No " further testing will be performed.        Lab Results   Component Value Date/Time    HBCAB NON-REACTIVE 2020 02:09 PM    HEPBANG Nonreactive 2023 08:21 AM    TBRES Negative 2023 04:34 PM        IMAGIN23 MRI orbit:  ORBITS: Dedicated MRI of the orbits was performed. There is asymmetric enlargement and enhancement of the left lacrimal gland. No organized fluid collection to suggest abscess. No significant adjacent inflammatory stranding. Intact globes. Status post left cataract repair.  Symmetrical extraocular musculature without thickening/enlargement. The intraorbital, canalicular and prechiasmatic optic nerve segments are normal in size and signal intensity bilaterally. The optic chiasm and proximal optic tracts are unremarkable. No localized inflammation of the intraconal or extraconal orbital fat. Normal lacrimal glands. No intraorbital mass or pathologic intraorbital enhancement.   23: CXR personally reviewed. No clear evidence of mediastinal lymphadenopathy.  23 CT orbit   IMPRESSION: Unilateral left-sided lacrimal gland slight enlargement with homogeneous enhancement. Interval increase in size since 2023  dated MRI.Differential diagnosis includes lymphoproliferative lesions which would include lymphoid hyperplasia and lymphoma. Other differential diagnosis considerations include infectious andinflammatory dacryadenitis, sarcoidosis, Sjogren's disease. No surrounding inflammatory changes to suggest an underlying infectious dacryoadenitis.   Sarcoidosis, Sjogren disease and pseudotumor (idiopathic orbital inflammation) more commonly involve the glands bilaterally. Lack of associating pain symptom is also not typical for pseudotumor. Biopsy is recommended.    PROCEDURES:  Lacrimal gland biopsy 23  A-B.  SOFT TISSUE, LEFT LACRIMAL GLAND, BIOPSY:  -Nonnecrotizing granulomatous inflammation, see comment.  -GMS and AFB special stains are negative for microorganisms (performed  on blocks A1 and B1 with appropriate controls).  -Negative for malignancy.    Lacrimal gland flow cytometry 5/4/23  A. Eye, Left, :  -Polytypic B cells  No aberrant immunophenotype on T cells    ASSESSMENT: 68 yo with lacrimal gland enlargement and noncaseating granuloma on biopsy consistent with sarcoidosis.     DISCUSSION: It seems that methotrexate is controlling 80% of her orbital/lacrimal gland swelling and keeping the swelling from progressing. I suspect she needs just a little more methotrexate and she is tolerating it exceptionally well. At 20 mg and above, subcutaneous methotrexate is best due to poor absorption at higher doses    DIAGNOSIS:  ## sarcoidosis with isolated, unilateral, left sided lacrimal gland involvement. Demonstrated steroid resistance with recurrence at 30 mg prednisone daily.    PLAN:  1. Switch from 20 mg oral to 20 mg subcutaneous. Monitor for 1 month, if no improvement in residual swelling would increase to 25 mg subcutaneous.  2. Continue folic acid 1 mg daily  3. Standing lab orders placed to monitor medication every 3-4 months  4. MTM referral placed for assistance with transition to subcut    Followup in 6 months    Nura Duran MD, PhD  Rheumatology     35 minutes spent on the date of encounter doing chart review, history and exam, documentation, care coordination, and further activities as noted above    Virtual Visit Details  Type of service:  Video Visit   Video Start Time: 1:31  Video End Time:1:50  Originating Location (pt. Location): Home  Distant Location (provider location):  On-site  Platform used for Video Visit: Lucio

## 2023-10-09 NOTE — PATIENT INSTRUCTIONS
I have place a referral to our specialty pharmacists to help transition you from 20 mg ORAL once-weekly methotrexate to 20 mg SUBCUTANEOUS (injectable) methotrexate once-weekly. They will call to schedule.    We will see how your small amount of swelling is doing 1 month after making that change and then decide if we need to increase the dose or not.    See you in 6 months

## 2023-10-11 NOTE — PROGRESS NOTES
Medication Therapy Management (MTM) Encounter    ASSESSMENT:                            Medication Adherence/Access: No issues identified.    Sarcoidosis: 80% improvement in symptoms on oral methotrexate 20 mg weekly. Transitioning to subcutaneous methotrexate due to decreased bioavailability of oral formulation at higher doses. Reasonable to transition 1:1 and monitor for tolerability and effectiveness before considering dose increase. Education provided on injection technique.    Vaccines: Had COVID infection 3 weeks ago, provider recommended delaying booster by 3 months. Up to date on Tdap, Shingrix, flu, RSV, and pneumonia vaccines.    Hair loss: Had hair loss prior to starting methotrexate, worsened since being on it. Will discuss supplementation with biotin and/or seeing dermatology.    Pain: Stable. Feels she is having mental health benefit from the amitriptyline, okay to continue.    Hyperlipidemia: Stable.    Hypothyroidism: Stable. Note she is not  levothyroxine by 4 hours from other medications, but TSH in goal at last check. Discussed remaining consistent.    GERD: Stable.    Allergies: Stable.    Supplements: Stable.    PLAN:                            Start methotrexate 20 mg under the skin once weekly. Follow up in 4 weeks for updated labs and to assess tolerability.  Monitor for worsening of hair thinning after transition to injectable methotrexate. Plan to discuss possible supplements after transition to injectable methotrexate.  Consider COVID booster 3 months following COVID infection (January 2024).    Follow-up: Return in 1 month (on 11/13/2023).    SUBJECTIVE/OBJECTIVE:                          Annabelle Hitchcock is a 67 year old female called for an initial visit. She was referred to me from Nura Zhang MD.    Reason for visit: Transition from oral to injectable methotrexate.    Allergies/ADRs: Reviewed in chart  Past Medical History: Reviewed in chart  Tobacco: She reports that she  quit smoking about 47 years ago. Her smoking use included cigarettes. She started smoking about 48 years ago. She smoked an average of 0.50 packs per day. She has never used smokeless tobacco.    Medication Adherence/Access: No issues reported.    Sarcoidosis:  Methotrexate 20 mg by mouth every 7 days  Folic acid 1 mg once daily  Acetaminophen 500-1000 mg every 6 hours as needed    Tolerating oral methotrexate at dose of 20 mg weekly. The only side effect she has noticed is worsening of hair thinning, had this before starting methotrexate and is taking finasteride. Some remaining left orbital swelling on current dose, per rheumatologist has had 80% improvement. Discussed switch to injectable may essentially be a dose increase due to higher bioavailability. She has done self-injections before for DVT treatment. Has incision scars on her stomach, discussed avoiding scarred areas and/or using thighs. She is amenable to having labs updated before our 4-week follow up.    CBC RESULTS:  Recent Labs   Lab Test 04/06/23  1634   WBC 6.5   RBC 3.45*   HGB 11.4*   HCT 34.6*      MCH 33.0   MCHC 32.9   RDW 13.2         Liver Function Studies -   Recent Labs   Lab Test 04/13/23  1531   PROTTOTAL 7.3   ALBUMIN 4.8   BILITOTAL 0.4   ALKPHOS 65   AST 27   ALT 21      CREATININE  0.50 - 0.90 mg/dL 0.64   eGFR  >90 mL/min/1.73m2 >90     Vaccines: Per patient provider recommended waiting 3 months after COVID infection to receive booster, which would be January 2024. She is up to date on flu, Tdap, PCV20, RSV, and Shingrix.    Pain:  Amitriptyline 50 mg at bedtime  Acetaminophen 500-1000 mg every 6 hours as needed    Had tendon surgery in arm, aches before bed sometimes. Uses acetaminophen 5-8 times per week on average. Does not use other OTC pain medications due to fatty liver disease- and prior GI intolerance to NSAIDs. Started amitriptyline for stomach pain that was thought to be neuropathic, this has resolved. She  "does feel the amitriptyline is also helping her mood and would like to stay on it.    Hair loss:  Finasteride 5 mg, takes 0.5 tab once daily    Takes for thinning hair. This started prior to methotrexate, but has worsened since she has been on it. She will continue to monitor this.    Hyperlipidemia:  Atorvastatin 40 mg daily    Notes she was previously pre-diabetic but lost 20 pounds during COVID infection and maintained lifestyle interventions afterward to keep A1C in goal. Had been on metformin but no longer needed. Statin is primarily for hypertriglyceridemia. No muscle pain reported.    Recent Labs   Lab Test 04/13/23  1531 04/14/22  0944   CHOL 128 119   HDL 42* 37*   LDL 51 50   TRIG 176* 159*      Hypothyroidism:  Levothyroxine 112 mcg once daily    Separates by 30 min from food. Is not currently  from other medications by 4 hours, but last TSH in goal. Discussed continuing as is as long as she is remaining consistent.    TSH   Date Value Ref Range Status   07/12/2023 4.15 0.30 - 4.20 uIU/mL Final   05/27/2022 0.30 (L) 0.40 - 4.00 mU/L Final     GERD:  Omeprazole 20 mg once daily    Does think she still needs daily proton pump inhibitor to control heartburn. Not requiring medication for breakthrough at this time.    Allergies:  Diphenhydramine 25 mg every 6 hours as needed  Sudafed 120 mg every 12 hours    Takes diphenhydramine and Sudafed mostly when she has a cold. Uses infrequently.    Supplements:  Vit C 282 mg daily  Ferrous sulfate 650 mg daily  Krill oil 500 mg once daily  Magnesium oxide 400 mg once daily  MVI once daily  Neuriva capsule once daily  Potassium 99 mg once daily  Calcium citrate-vit D 315-250 mg once daily, separates from MVI    Taking two iron tablets daily due to iron being low in the past when donating blood. Tolerating at this dose. Takes magnesium and potassium due to [place] horses. This has been helpful, does still have one \"once in a great while.\"    Today's Vitals: " There were no vitals taken for this visit.  ----------------    I spent 45 minutes with this patient today. All changes were made via collaborative practice agreement with Nura Duran. A copy of the visit note was provided to the patient's provider(s).    A summary of these recommendations was sent via [employer].    Lito HodgsonD  Medication Therapy Management Pharmacist  [facility]  Phone: [phone]    Elvia Ko PharmD  Medication Therapy Management Pharmacist  [facility]     Telemedicine Visit Details  Type of service:  Video Conference via Zoom (Telehealth)  Start Time:  0900  End Time:  0945     Medication Therapy Recommendations  Sarcoidosis    Current Medication: methotrexate 2.5 MG tablet   Rationale: Dose too low - Dosage too low - Effectiveness   Recommendation: Increase Dose - Start methotrexate 20 mg injected under the skin once weekly. Change to injectable may increase bioavailability enough to not require further dose increase   Status: Accepted per CPA         Vaccine counseling    Rationale: Preventive therapy - Needs additional medication therapy - Indication   Recommendation: Continue to Monitor - COVID-19 mRNA vaccine 12+y 30 MCG/0.3 ML injection - Consider COVID booster 3 months following COVID infection (January 2024)   Status: Patient Agreed - Adherence/Education

## 2023-10-12 ENCOUNTER — VIRTUAL VISIT (OUTPATIENT)
Dept: RHEUMATOLOGY | Facility: CLINIC | Age: 68
End: 2023-10-12
Attending: STUDENT IN AN ORGANIZED HEALTH CARE EDUCATION/TRAINING PROGRAM
Payer: MEDICARE

## 2023-10-12 DIAGNOSIS — D86.9 SARCOIDOSIS: Primary | ICD-10-CM

## 2023-10-12 DIAGNOSIS — G89.18 POST-OPERATIVE PAIN: ICD-10-CM

## 2023-10-12 DIAGNOSIS — Z78.9 TAKES DIETARY SUPPLEMENTS: ICD-10-CM

## 2023-10-12 DIAGNOSIS — E78.1 HYPERTRIGLYCERIDEMIA: ICD-10-CM

## 2023-10-12 DIAGNOSIS — J30.2 SEASONAL ALLERGIES: ICD-10-CM

## 2023-10-12 DIAGNOSIS — K21.9 GASTROESOPHAGEAL REFLUX DISEASE WITHOUT ESOPHAGITIS: ICD-10-CM

## 2023-10-12 DIAGNOSIS — Z71.85 VACCINE COUNSELING: ICD-10-CM

## 2023-10-12 DIAGNOSIS — E03.9 ACQUIRED HYPOTHYROIDISM: ICD-10-CM

## 2023-10-12 RX ORDER — METHOTREXATE 25 MG/ML
20 INJECTION, SOLUTION INTRA-ARTERIAL; INTRAMUSCULAR; INTRAVENOUS
Qty: 4 ML | Refills: 2 | Status: SHIPPED | OUTPATIENT
Start: 2023-10-12 | End: 2023-11-14

## 2023-10-12 NOTE — Clinical Note
10/12/2023       RE: Maritza Hitchcock  [address] [facility] [address]     Dear Colleague,    Thank you for referring your patient, Maritza Hitchcock, to the [facility] [place] at [facility]. Please see a copy of my visit note below.    Medication Therapy Management (MTM) Encounter    ASSESSMENT:                            Medication Adherence/Access: No issues identified.    Sarcoidosis: 80% improvement in symptoms on oral methotrexate 20 mg weekly. Transitioning to subcutaneous methotrexate due to decreased bioavailability of oral formulation at higher doses. Given increased bioavailability of subcutaneous formulation, reasonable to switch to current dose and monitor for tolerability and effectiveness before considering additional increase. Education provided on injection technique. ***    Vaccines: Had COVID infection 3 weeks ago, can delay booster 3 months but may consider receiving once fully recovered due to immunosuppression. Up to date on Tdap, Shingrix, flu, RSV, and pneumonia vaccines. ***    Middle of September, Dr. Ngo recommended waiting until Jan for COVID booster.    Hypersomnia: ***    Hyperlipidemia: ***    Hypothyroidism: ***    GERD: ***    Hirsutism: ***    Allergies: ***    Supplements: ***    PLAN:                            Start methotrexate 20 mg under the skin weekly. Follow up in 4 weeks for updated labs and to assess tolerability and need for further dose increases.  Consider COVID booster 3 months following COVID infection, sooner if desired due to higher risk from methotrexate therapy.  ***    Follow-up: {followuptest2:320152} 4 weeks for tolerability, labs. Consider further dose increase.    SUBJECTIVE/OBJECTIVE:                          Annabelle Hitchcock is a 67 year old female called for an initial visit. She was referred to me from Nura Zhang MD. {[facility]}     Reason for visit:  Transition from oral to injectable methotrexate.    Allergies/ADRs: Reviewed in chart  Past Medical History: Reviewed in chart  Tobacco: She reports that she quit smoking about 47 years ago. Her smoking use included cigarettes. She started smoking about 48 years ago. She smoked an average of 0.50 packs per day. She has never used smokeless tobacco.    Medication Adherence/Access: {[facility]:089215}    Sarcoidosis:  Methotrexate 20 mg by mouth every 7 days  Folic acid 1 mg once daily  Acetaminophen 500-1000 mg every 6 hours as needed    Per rheum visit 10/9, tolerating oral methotrexate 20 mg weekly with no side effects. Has had 80% improvement in symptoms, some left orbital swelling remains. Switching to injection in order to increase dose. Given increased bioavailability will switch to 20 mg weekly subcutaneous, then reassess in 4 weeks to determine if further increase needed. Had resistance to prednisone 30 mg daily. ***    Has done self injections with heparin for DVT. Had gallbladder removed in 1987. Has incision on stomach from this and some scar tissue. Has had some hair loss/thinning, she did not realize that methotrexate could contribute.    Had tendon surgery in arm, will ache before bed once in a while. Uses Tylenol occasionally before bed and during the day. On average 5-8 times per week but not on a regular schedule. Does not use other OTC pain medications due to fatty liver disease.    CBC RESULTS:  Recent Labs   Lab Test 04/06/23  1634   WBC 6.5   RBC 3.45*   HGB 11.4*   HCT 34.6*      MCH 33.0   MCHC 32.9   RDW 13.2         Liver Function Studies -   Recent Labs   Lab Test 04/13/23  1531   PROTTOTAL 7.3   ALBUMIN 4.8   BILITOTAL 0.4   ALKPHOS 65   AST 27   ALT 21      CREATININE  0.50 - 0.90 mg/dL 0.64   eGFR  >90 mL/min/1.73m2 >90     Hypersomnia:  Amitriptyline 50 mg at bedtime    Has taken for about 10 years, started due to issues with stomach pain (neuropathic?). Came and went,  "has quieted down now. She does feel it is helping her mood still.    Hyperlipidemia:  Atorvastatin 40 mg daily    Has type 2 diabetes. Was pre-diabetic, lost 20 pounds during COVID infection and maintained weight loss afterward. Was previously on metformin but no longer requiring it. Managing more with lifestyle.    Recent Labs   Lab Test 04/13/23  1531 04/14/22  0944   CHOL 128 119   HDL 42* 37*   LDL 51 50   TRIG 176* 159*      Hypothyroidism:  Levothyroxine 112 mcg once daily    TSH   Date Value Ref Range Status   07/12/2023 4.15 0.30 - 4.20 uIU/mL Final   05/27/2022 0.30 (L) 0.40 - 4.00 mU/L Final     GERD:  Omeprazole 20 mg once daily    Takes one daily, does think she still needs it.    Hirsutism:  Finasteride 2.5 mg, takes 0.5 tab once daily    Indication? Hair thinning/loss.    Allergies:  Diphenhydramine 25 mg every 6 hours as needed  Sudafed 120 mg every 12 hours    She is taking these when she feels she is coming down with a cold. Using infrequently as needed.    Supplements:  Vit C 282 mg daily  Ferrous sulfate 650 mg daily  Krill oil 500 mg once daily  Magnesium oxide 400 mg once daily  MVI once daily  Neuriva capsule once daily  Potassium 99 mg once daily  Calcium citrate-vit D 315-250 mg once daily, separates from MVI    Taking two iron tablets daily due to iron being low in the past when donating blood. Tolerating. Takes magnesium and potassium due to [place] horses. This has been helpful, does still have one \"once in a great while.\" ***    Vaccines: Per provider had COVID infection three weeks ago so not yet eligible for COVID booster - consider in 3 months? Up to date on Shingrix, flu, PCV20, and RSV vaccines. ***    Today's Vitals: There were no vitals taken for this visit.  ----------------    I spent {[facility] total time 3:949513} with this patient today. All changes were made via collaborative practice agreement with Nura Duran. A copy of the visit note was provided to the patient's " "provider(s).    A summary of these recommendations was sent via [employer].    Haley Grewal PharmD  Medication Therapy Management Pharmacist  [facility]  Phone: [phone]    Elvia Ko PharmD  Medication Therapy Management Pharmacist  [facility]     Telemedicine Visit Details  Type of service:  Video Conference via {Virtual Visit Platforms:342130::\"AmWell\"}  Start Time: {video/phone visit start time:152948}  End Time: {video/phone visit end time:152948}     Medication Therapy Recommendations  No medication therapy recommendations to display     Medication Therapy Management (MTM) Encounter    ASSESSMENT:                            Medication Adherence/Access: No issues identified.    Sarcoidosis: 80% improvement in symptoms on oral methotrexate 20 mg weekly. Transitioning to subcutaneous methotrexate due to decreased bioavailability of oral formulation at higher doses. Given increased bioavailability of subcutaneous formulation, reasonable to switch to current dose and monitor for tolerability and effectiveness before considering additional increase. Education provided on injection technique.    Vaccines: Had COVID infection 3 weeks ago, provider recommended delaying booster by 3 months. Up to date on Tdap, Shingrix, flu, RSV, and pneumonia vaccines.    Pain: Stable. Is taking amitriptyline for stomach pain that has mostly resolved, feels she is having benefit from a mood standpoint as well. Okay to continue.    Hyperlipidemia: Stable.    Hypothyroidism: Stable.    GERD: Stable.    Allergies: Stable.    Supplements: Stable.    PLAN:                            Start methotrexate 20 mg injected under the skin once weekly. Follow up in 4 weeks for updated labs and to assess tolerability.  Monitor for worsening of hair thinning after transition to injectable methotrexate.  Consider COVID booster 3 months following COVID infection (January 2024).    Follow-up: " Return in 1 month (on 11/13/2023).    SUBJECTIVE/OBJECTIVE:                          Annabelle Hitchcock is a 67 year old female called for an initial visit. She was referred to me from Nura Zhang MD.    Reason for visit: Transition from oral to injectable methotrexate.    Allergies/ADRs: Reviewed in chart  Past Medical History: Reviewed in chart  Tobacco: She reports that she quit smoking about 47 years ago. Her smoking use included cigarettes. She started smoking about 48 years ago. She smoked an average of 0.50 packs per day. She has never used smokeless tobacco.    Medication Adherence/Access: No issues reported.    Sarcoidosis:  Methotrexate 20 mg by mouth every 7 days  Folic acid 1 mg once daily  Acetaminophen 500-1000 mg every 6 hours as needed    Tolerating oral methotrexate at dose of 20 mg weekly. The only side effect she has noticed is worsening of hair thinning, she had this previously and is taking finasteride. Some remaining left orbital swelling on current dose, per rheum visit has had 80% improvement since starting. Discussed switch to injectable may essentially be a dose increase due to higher bioavailability. She has done self-injections before for DVT treatment. Has incision scars on her stomach, discussed avoiding scarred areas and/or using thighs. She is amenable to having labs updated before our 4-week follow up.    CBC RESULTS:  Recent Labs   Lab Test 04/06/23  1634   WBC 6.5   RBC 3.45*   HGB 11.4*   HCT 34.6*      MCH 33.0   MCHC 32.9   RDW 13.2         Liver Function Studies -   Recent Labs   Lab Test 04/13/23  1531   PROTTOTAL 7.3   ALBUMIN 4.8   BILITOTAL 0.4   ALKPHOS 65   AST 27   ALT 21      CREATININE  0.50 - 0.90 mg/dL 0.64   eGFR  >90 mL/min/1.73m2 >90     Vaccines: Patient states provider recommended waiting 3 months after COVID infection to receive booster, which would be January 2024. She is up to date on flu, Tdap, PCV20, RSV, and Shingrix.    Pain:  Amitriptyline  50 mg at bedtime  Acetaminophen 500-1000 mg every 6 hours as needed    Had tendon surgery in arm, will ache before bed once in a while. Uses acetaminophen 5-8 times per week on average. Does not use other OTC pain medications due to fatty liver disease- and prior GI intolerance to NSAIDs. Started amitriptyline for stomach pain that was thought to be neuropathic, this has resolved. She does feel the amitriptyline is also helping her mood and would like to stay on it.    Hirsutism:  Finasteride 2.5 mg, takes 0.5 tab once daily    Started for hair thinning which has worsened on methotrexate as described above. She had forgotten that methotrexate could cause hair loss.    Hyperlipidemia:  Atorvastatin 40 mg daily    Notes she was previously pre-diabetic but lost 20 pounds during COVID infection and maintained lifestyle interventions afterward to keep A1C in goal. Had been on metformin but no longer needed. Statin is primarily for hypertriglyceridemia. No muscle pain reported.    Recent Labs   Lab Test 04/13/23  1531 04/14/22  0944   CHOL 128 119   HDL 42* 37*   LDL 51 50   TRIG 176* 159*      Hypothyroidism:  Levothyroxine 112 mcg once daily    Separates by 30 min from food/other medications as appropriate.    TSH   Date Value Ref Range Status   07/12/2023 4.15 0.30 - 4.20 uIU/mL Final   05/27/2022 0.30 (L) 0.40 - 4.00 mU/L Final     GERD:  Omeprazole 20 mg once daily    Does think she still needs daily proton pump inhibitor to control heartburn. Not requiring medication for breakthrough at this time.    Allergies:  Diphenhydramine 25 mg every 6 hours as needed  Sudafed 120 mg every 12 hours    Takes diphenhydramine and Sudafed mostly when she has a cold. Uses infrequently.    Supplements:  Vit C 282 mg daily  Ferrous sulfate 650 mg daily  Krill oil 500 mg once daily  Magnesium oxide 400 mg once daily  MVI once daily  Neuriva capsule once daily  Potassium 99 mg once daily  Calcium citrate-vit D 315-250 mg once daily,  "separates from MVI    Taking two iron tablets daily due to iron being low in the past when donating blood. Tolerating at this dose. Takes magnesium and potassium due to [place] horses. This has been helpful, does still have one \"once in a great while.\"    Today's Vitals: There were no vitals taken for this visit.  ----------------    I spent 45 minutes with this patient today. All changes were made via collaborative practice agreement with Nura Duran. A copy of the visit note was provided to the patient's provider(s).    A summary of these recommendations was sent via [employer].    Haley Grewal PharmD  Medication Therapy Management Pharmacist  [facility]  Phone: [phone]    Elvia Ko PharmD  Medication Therapy Management Pharmacist  [facility]     Telemedicine Visit Details  Type of service:  Video Conference via {Virtual Visit Platforms:403335::\"AmWell\"}  Start Time:  0900  End Time:  0945     Medication Therapy Recommendations  Sarcoidosis    Current Medication: methotrexate 2.5 MG tablet   Rationale: Dose too low - Dosage too low - Effectiveness   Recommendation: Increase Dose - Start methotrexate 20 mg injected under the skin once weekly. Change to injectable may increase bioavailability enough to not require further dose increase   Status: Accepted per CPA         Vaccine counseling    Rationale: Preventive therapy - Needs additional medication therapy - Indication   Recommendation: Continue to Monitor - COVID-19 mRNA vaccine 12+y 30 MCG/0.3 ML injection - Consider COVID booster 3 months following COVID infection (January 2024)   Status: Patient Agreed - Adherence/Education                Again, thank you for allowing me to participate in the care of your patient.      Sincerely,    HALEY GREWAL [facility]    "

## 2023-10-12 NOTE — PATIENT INSTRUCTIONS
"Recommendations from today's MTM visit:                                                    Start methotrexate 20 mg injected under the skin once weekly. These orders were sent to [facility] in [place], [place]. Follow up in 4 weeks for updated labs and to assess tolerability.  Monitor for worsening of hair thinning after transition to injectable methotrexate.  Consider COVID booster 3 months following COVID infection (January 2024).    Follow-up: Return in 1 month (on 11/13/2023).    It was great speaking with you today.  I value your experience and would be very thankful for your time in providing feedback in our clinic survey. In the next few days, you may receive an email or text message from [facility] [employer] with a link to a survey related to your  clinical pharmacist.\"     To schedule another MTM appointment, please call the clinic directly or you may call the MTM scheduling line at [phone] or toll-free at [phone].     My Clinical Pharmacist's contact information:                                                      Please feel free to contact me with any questions or concerns you have.      Elvia Ko, PharmD  Medication Therapy Management Pharmacist  [facility]   Ph: [phone]  "

## 2023-10-19 ENCOUNTER — OFFICE VISIT (OUTPATIENT)
Dept: OPHTHALMOLOGY | Facility: CLINIC | Age: 68
End: 2023-10-19
Payer: MEDICARE

## 2023-10-19 DIAGNOSIS — H04.002 LACRIMAL GLAND INFLAMMATION, LEFT: Primary | ICD-10-CM

## 2023-10-19 DIAGNOSIS — D86.9 SARCOIDOSIS: ICD-10-CM

## 2023-10-19 PROCEDURE — 99213 OFFICE O/P EST LOW 20 MIN: CPT | Mod: 25 | Performed by: OPHTHALMOLOGY

## 2023-10-19 PROCEDURE — 67515 INJECT/TREAT EYE SOCKET: CPT | Mod: LT | Performed by: OPHTHALMOLOGY

## 2023-10-19 RX ORDER — TRIAMCINOLONE ACETONIDE 40 MG/ML
40 INJECTION, SUSPENSION INTRA-ARTICULAR; INTRAMUSCULAR ONCE
Status: COMPLETED | OUTPATIENT
Start: 2023-10-19 | End: 2023-10-19

## 2023-10-19 RX ADMIN — TRIAMCINOLONE ACETONIDE 40 MG: 40 INJECTION, SUSPENSION INTRA-ARTICULAR; INTRAMUSCULAR at 11:55

## 2023-10-19 ASSESSMENT — VISUAL ACUITY
OS_CC: 20/20
CORRECTION_TYPE: GLASSES
OD_CC: 20/20
METHOD: SNELLEN - LINEAR

## 2023-10-19 ASSESSMENT — TONOMETRY
IOP_METHOD: ICARE
OD_IOP_MMHG: 14
OS_IOP_MMHG: 14

## 2023-10-19 NOTE — PROGRESS NOTES
Chief Complaint(s) and History of Present Illness(es)      Patient denies pain but states some tenderness to touch.  Fabby StoneDAI October 19, 2023 11:15 AM      S/p left lacrimal gland biopsy (5/4/23) with pathology consistent with non-caseating granuloma.   S/p left lateral orbit lacrimal gland fossa intralesional kenalog 40 mg/mL (5/24/23)      - Laboratory workup (negative): HIV, Hep C, quant gold, Hep B, C3, C4, IgG panel, anti-smith, SSA, SSB, ACE, lysozyme, ANCA, lyme, histo, coccidioides, blastomyces, aspergillus   - Shingles vaccine information given last visit; vaccine completed 3/26/19 and 8/6/19     Noticed increase in LESTER swelling in late July, prompting re-initiation of prednisone taper and increase in methotrexate dose.  - Increased from methotrexate 15 mg PO weekly to 20 mg PO weekly and more recently to subcutaneous injections.      - Mild increase in RUL edema today and noticing more pain. There is mild lateral lid edema.   - No evidence of intraocular inflammation      Assessment & Plan     Maritza Hitchcock is a 67 year old female with the following diagnoses:   Encounter Diagnoses   Name Primary?    Lacrimal gland inflammation, left Yes    Sarcoidosis      Symptoms remain dramatically improved compared to presentation, but having recurrent lateral orbital inflammation on the left. She is on subcutaneous methotrexate. Options would be repeat prednisone taper versus intralesional kenalog. She would like intraorbital steroid injection.    40mg of kenalog was injected intraorbital in two locations in region of lacrimal gland. Vision was checked post procedure and she tolerated the procedure well. I was present for the entire procedure. Autumn Padilla MD           Attending Physician Attestation: Complete documentation of historical and exam elements from today's encounter can be found in the full encounter summary report (not reduplicated in this progress note). I personally obtained the  chief complaint(s) and history of present illness. I confirmed and edited as necessary the review of systems, past medical/surgical history, family history, social history, and examination findings as documented by others; and I examined the patient myself. I personally reviewed the relevant tests, images, and reports as documented above. I formulated and edited as necessary the assessment and plan and discussed the findings and management plan with the patient.  -Autumn Padilla MD

## 2023-10-22 ENCOUNTER — HEALTH MAINTENANCE LETTER (OUTPATIENT)
Age: 68
End: 2023-10-22

## 2023-11-06 ENCOUNTER — TRANSFERRED RECORDS (OUTPATIENT)
Dept: HEALTH INFORMATION MANAGEMENT | Facility: CLINIC | Age: 68
End: 2023-11-06
Payer: MEDICARE

## 2023-11-06 ENCOUNTER — NURSE TRIAGE (OUTPATIENT)
Dept: NURSING | Facility: CLINIC | Age: 68
End: 2023-11-06
Payer: MEDICARE

## 2023-11-06 LAB — RETINOPATHY: NEGATIVE

## 2023-11-06 NOTE — TELEPHONE ENCOUNTER
Pt is phoning stating that she has a hoarse voice and has a cough     Pt states that symptoms started with a sore throat 11/01/2023    No fever     No breathing difficulty or chest pain when not coughing     Per disposition: See in Office Today or Tomorrow     [facility] and will go to [facility] if no appointments available     Care advice given per protocol and when to call back. Pt verbalized understanding and agrees to plan of care.    Amy Beltran RN  [place] Nurse Advisor  11:42 AM 11/6/2023          Reason for Disposition   Patient wants to be seen    Additional Information   Negative: Bluish (or gray) lips or face   Negative: SEVERE difficulty breathing (e.g., struggling for each breath, speaks in single words)   Negative: Rapid onset of cough and has hives   Negative: Coughing started suddenly after medicine, an allergic food or bee sting   Negative: Difficulty breathing after exposure to flames, smoke, or fumes   Negative: Sounds like a life-threatening emergency to the triager   Negative: Previous asthma attacks and this feels like asthma attack   Negative: Dry cough (non-productive; no sputum or minimal clear sputum) and within 14 days of COVID-19 Exposure   Negative: MODERATE difficulty breathing (e.g., speaks in phrases, SOB even at rest, pulse 100-120) and still present when not coughing   Negative: Chest pain present when not coughing   Negative: Passed out (i.e., fainted, collapsed and was not responding)   Negative: Patient sounds very sick or weak to the triager   Negative: MILD difficulty breathing (e.g., minimal/no SOB at rest, SOB with walking, pulse <100) and still present when not coughing   Negative: Coughed up > 1 tablespoon (15 ml) blood (Exception: Blood-tinged sputum.)   Negative: Fever > 103 F (39.4 C)   Negative: Fever > 101 F (38.3 C) and over 60 years of age   Negative: Fever > 100.0 F (37.8 C) and has diabetes mellitus or a weak immune system (e.g., HIV  positive, cancer chemotherapy, organ transplant, splenectomy, chronic steroids)   Negative: Fever > 100.0 F (37.8 C) and bedridden (e.g., CVA, chronic illness, recovering from surgery)   Negative: Increasing ankle swelling   Negative: Wheezing is present   Negative: SEVERE coughing spells (e.g., whooping sound after coughing, vomiting after coughing)   Negative: Coughing up lisy-colored (reddish-brown) or blood-tinged sputum   Negative: Fever present > 3 days (72 hours)   Negative: Fever returns after gone for over 24 hours and symptoms worse or not improved   Negative: Using nasal washes and pain medicine > 24 hours and sinus pain persists   Negative: Known COPD or other severe lung disease (i.e., bronchiectasis, cystic fibrosis, lung surgery) and worsening symptoms (i.e., increased sputum purulence or amount, increased breathing difficulty)   Negative: Continuous (nonstop) coughing interferes with work or school and no improvement using cough treatment per Care Advice    Protocols used: Cough-A-OH

## 2023-11-07 ENCOUNTER — OFFICE VISIT (OUTPATIENT)
Dept: OPHTHALMOLOGY | Facility: CLINIC | Age: 68
End: 2023-11-07
Payer: MEDICARE

## 2023-11-07 DIAGNOSIS — H04.002 LACRIMAL GLAND INFLAMMATION, LEFT: Primary | ICD-10-CM

## 2023-11-07 DIAGNOSIS — D86.9 SARCOIDOSIS: ICD-10-CM

## 2023-11-07 DIAGNOSIS — L92.9 NON-CASEATING GRANULOMA: ICD-10-CM

## 2023-11-07 PROCEDURE — 99213 OFFICE O/P EST LOW 20 MIN: CPT | Mod: GC | Performed by: OPHTHALMOLOGY

## 2023-11-07 ASSESSMENT — CONF VISUAL FIELD
OS_NORMAL: 1
OS_INFERIOR_NASAL_RESTRICTION: 0
OS_SUPERIOR_TEMPORAL_RESTRICTION: 0
OD_SUPERIOR_NASAL_RESTRICTION: 0
OS_SUPERIOR_NASAL_RESTRICTION: 0
METHOD: COUNTING FINGERS
OD_NORMAL: 1
OD_INFERIOR_TEMPORAL_RESTRICTION: 0
OD_INFERIOR_NASAL_RESTRICTION: 0
OS_INFERIOR_TEMPORAL_RESTRICTION: 0
OD_SUPERIOR_TEMPORAL_RESTRICTION: 0

## 2023-11-07 ASSESSMENT — EXTERNAL EXAM - LEFT EYE: OS_EXAM: NORMAL

## 2023-11-07 ASSESSMENT — VISUAL ACUITY
OD_PH_CC+: -1
OD_CC+: +2
OD_PH_CC: 20/25
OD_CC: 20/30
OS_CC: 20/20
METHOD: SNELLEN - LINEAR

## 2023-11-07 ASSESSMENT — TONOMETRY
OS_IOP_MMHG: 17
IOP_METHOD: ICARE
OD_IOP_MMHG: 19

## 2023-11-07 ASSESSMENT — SLIT LAMP EXAM - LIDS: COMMENTS: DERMATOCHALASIS

## 2023-11-07 ASSESSMENT — EXTERNAL EXAM - RIGHT EYE: OD_EXAM: NORMAL

## 2023-11-07 NOTE — PROGRESS NOTES
Chief Complaint(s) and History of Present Illness(es)     Follow Up            Associated symptoms: eye pain and swelling.  Negative for tearing and   discharge    Pain scale: 1/10    Comments:   Follow up due to Lacrimal gland inflammation, left              Comments    Since last exam on 10/19/23 after injection it helped for a little bit.   Some swelling went down but did not resolve. Feels like some swelling is   coming back again. Tender to the touch yet. No issues with any discharge.   Vision is unchanged.     Request and notes today and moving forward be sent to Dr Zhang.     Rosibel Rodríguez, COT COT 10:34 AM November 7, 2023         Noticed increase in LESTER swelling in late July, prompting re-initiation of prednisone taper and increase in methotrexate dose.  - Increased from methotrexate 15 mg PO weekly to 20 mg PO weekly and more recently to subcutaneous injections of methotrexate (on week 4 now).   - last kenolog 40 injection 10/19/2023    Patient feels that overall, the inflammation of the left lacrimal gland is improved but not resolved. Wondering whether she is on the right track or needs to modify her systemic medications.     Assessment & Plan     Maritza Hitchcock is a 68 year old female with the following diagnoses:   Encounter Diagnoses   Name Primary?    Lacrimal gland inflammation, left Yes    Sarcoidosis     Non-caseating granuloma         - Improved RUL edema today and noticing some pain. There is mild lateral lid edema. Looks better than photos from 2 weeks ago.   - No evidence of intraocular inflammation      Symptoms remain dramatically improved compared to presentation, but having recurrent lateral orbital inflammation on the left. She is on subcutaneous methotrexate. Over all improved, will defer repeat injection today.      Enlarged preauricular node: has seen Dr. Diallo Graves (ENT) and biopsy had been performed in the past. I recommend she see him again as it is enlarging and becoming  more painful.    Katheryn Rees MD  PGY-2  Department of Ophthalmology  November 7, 2023 10:38 AM         Patient disposition:   Return in about 3 months (around 2/7/2024) for 3-4 month follow up.        Attending Physician Attestation: Complete documentation of historical and exam elements from today's encounter can be found in the full encounter summary report (not reduplicated in this progress note). I personally obtained the chief complaint(s) and history of present illness. I confirmed and edited as necessary the review of systems, past medical/surgical history, family history, social history, and examination findings as documented by others; and I examined the patient myself. I personally reviewed the relevant tests, images, and reports as documented above. I formulated and edited as necessary the assessment and plan and discussed the findings and management plan with the patient.  -Autumn Padilla MD

## 2023-11-07 NOTE — NURSING NOTE
Chief Complaints and History of Present Illnesses   Patient presents with    Follow Up       Follow up due to Lacrimal gland inflammation, left         Chief Complaint(s) and History of Present Illness(es)       Follow Up              Associated symptoms: eye pain and swelling.  Negative for tearing and discharge    Pain scale: 1/10    Comments:   Follow up due to Lacrimal gland inflammation, left                  Comments    Since last exam on 10/19/23 after injection it helped for a little bit. Some swelling went down but did not resolve. Feels like some swelling is coming back again. Tender to the touch yet. No issues with any discharge.   Vision is unchanged.     Request and notes today and moving forward be sent to Dr Zhang.     Rosibel Rodríguez, COT COT 10:34 AM November 7, 2023

## 2023-11-07 NOTE — NURSING NOTE
Chief Complaints and History of Present Illnesses   Patient presents with    Follow Up       Follow up due to Lacrimal gland inflammation, left         Chief Complaint(s) and History of Present Illness(es)       Follow Up              Associated symptoms: eye pain and swelling.  Negative for tearing and discharge    Pain scale: 1/10    Comments:   Follow up due to Lacrimal gland inflammation, left                  Comments    Since last exam on 10/19/23 after injection it helped for a little bit. Some swelling went down but did not resolve. Feels like some swelling is coming back again. Tender to the touch yet. No issues with any discharge.   Vision is unchanged.     SETH Spears COT 10:34 AM November 7, 2023

## 2023-11-08 ENCOUNTER — OFFICE VISIT (OUTPATIENT)
Dept: FAMILY MEDICINE | Facility: CLINIC | Age: 68
End: 2023-11-08
Payer: MEDICARE

## 2023-11-08 VITALS
SYSTOLIC BLOOD PRESSURE: 124 MMHG | TEMPERATURE: 97.2 F | OXYGEN SATURATION: 98 % | RESPIRATION RATE: 16 BRPM | DIASTOLIC BLOOD PRESSURE: 74 MMHG | HEART RATE: 67 BPM

## 2023-11-08 DIAGNOSIS — J15.9 BACTERIAL PNEUMONIA: Primary | ICD-10-CM

## 2023-11-08 PROCEDURE — 99213 OFFICE O/P EST LOW 20 MIN: CPT | Performed by: FAMILY MEDICINE

## 2023-11-08 RX ORDER — AZITHROMYCIN 250 MG/1
TABLET, FILM COATED ORAL
Qty: 6 TABLET | Refills: 0 | Status: SHIPPED | OUTPATIENT
Start: 2023-11-08 | End: 2023-11-14

## 2023-11-08 ASSESSMENT — ENCOUNTER SYMPTOMS: COUGH: 1

## 2023-11-08 NOTE — PROGRESS NOTES
Assessment & Plan     Bacterial pneumonia  Patient with left lower lobe pneumonia we will treat with Z-Tobias.  Follow-up in a week if not better sooner if worse  - azithromycin (ZITHROMAX) 250 MG tablet; Take 2 tablets (500 mg) by mouth daily for 1 day, THEN 1 tablet (250 mg) daily for 4 days.                 Ricardo Ramirez MD  [facility]    Lori Alanis is a 68 year old, presenting for the following health issues:  Cough (Sx started Thursday last week with scratchy throat. Post nasal drip. Voice loss on Sunday. Productive cough with green phlegm. No fevers. No sinus pressure. Leaving on vacation soon for 5 weeks. )      11/8/2023    12:18 PM   Additional Questions   Roomed by Jdoi Kelly    History of Present Illness       Reason for visit:  Cough/cold sinus infection  Symptom onset:  3-7 days ago  Symptoms include:  A little bit of coughing, had some green phlegm in throat, laryngitis 1 day, altered voice  Symptom intensity:  Moderate  Symptom progression:  Staying the same  Had these symptoms before:  Yes  Has tried/received treatment for these symptoms:  Yes  Previous treatment was successful:  Yes  Prior treatment description:  Antibiotics  What makes it worse:  No  What makes it better:  Antibiotics    She eats 2-3 servings of fruits and vegetables daily.She consumes 0 sweetened beverage(s) daily.She exercises with enough effort to increase her heart rate 9 or less minutes per day.  She exercises with enough effort to increase her heart rate 3 or less days per week.   She is taking medications regularly.                 Review of Systems   Respiratory:  Positive for cough.       Constitutional, HEENT, cardiovascular, pulmonary, gi and gu systems are negative, except as otherwise noted.      Objective    /74 (BP Location: Right arm, Patient Position: Sitting, Cuff Size: Adult Large)   Pulse 67   Temp 97.2  F (36.2  C) (Tympanic)   Resp 16   SpO2 98%    There is no height or weight on file to calculate BMI.  Physical Exam   Patient alert no apparent distress neck supple lungs show rales at the left base heart regular rate and rhythm

## 2023-11-13 ENCOUNTER — VIRTUAL VISIT (OUTPATIENT)
Dept: RHEUMATOLOGY | Facility: CLINIC | Age: 68
End: 2023-11-13
Attending: STUDENT IN AN ORGANIZED HEALTH CARE EDUCATION/TRAINING PROGRAM
Payer: MEDICARE

## 2023-11-13 DIAGNOSIS — D86.9 SARCOIDOSIS: ICD-10-CM

## 2023-11-13 DIAGNOSIS — L65.9 HAIR LOSS: Primary | ICD-10-CM

## 2023-11-13 NOTE — Clinical Note
11/13/2023       RE: Maritza Hitchcock  [address] [facility] [address]     Dear Colleague,    Thank you for referring your patient, Maritza Hitchcock, to the [facility] [place] [facility]. Please see a copy of my visit note below.    Medication Therapy Management (MTM) Encounter    ASSESSMENT:                            Medication Adherence/Access: No issues identified.    Sarcoidosis: Patient has tolerated the switch to injectable methotrexate, however has not had significant improvements in effectiveness at dose of 20 mg weekly. Would benefit from increasing to 25 mg weekly as previously discussed.    Hair thinning: Hair thinning is mostly stable, maybe mild worsening since switch to injectable per patient. Discussed methotrexate's possible contribution and she does not feel it is significant enough where she would want to stop methotrexate or avoid dose increases at this time. Would benefit from follow-up with dermatology to optimize finasteride dose and/or discuss other options. She has their contact information.    PLAN:                            Increase methotrexate dose to 25 mg weekly. Patient is traveling and was not able to have labs done before she left, will discuss with provider having labs done in [place] vs. waiting until she returns in December.  Encouraged patient to schedule follow up with dermatology for hair thinning and optimization of finasteride dose.    Follow-up: No follow-ups on file.    SUBJECTIVE/OBJECTIVE:                          Annabelle Hitchcock is a 67 year old female called for a follow-up visit from 10/12/23. She was referred to me from Nura Zhang MD.    Reason for visit: Follow up on transition from oral to injectable methotrexate.    Allergies/ADRs: Reviewed in chart  Past Medical History: Reviewed in chart  Tobacco: She reports that she quit smoking about 47 years ago. Her smoking use included  "cigarettes. She started smoking about 48 years ago. She smoked an average of 0.50 packs per day. She has never been exposed to tobacco smoke. She has never used smokeless tobacco.    Medication Adherence/Access: No issues reported.    Sarcoidosis:  Methotrexate 20 mg under the skin every 7 days  Folic acid 1 mg once daily  Acetaminophen 500-1000 mg every 6 hours as needed    Injections are going well, minor bruising occasionally but otherwise no injection site reactions. She has not noticed a significant benefit and did require steroid injections into left orbit/lacrimal gland from ophthalmologist. This has brought the swelling down significantly, does still have a \"bit of a droop\" in eyelid and small knot in that location. Will see ophthalmology again in January. She does not feel the dose she is at now is optimally controlling symptoms and is amenable to increasing as previously discussed.    Of note, patient is leaving for a trip today and will be gone until 12/21/23. She has not had standing labs done following switch to injectable methotrexate. She would be willing to have labs done in [place], [place] between 11/20 and 11/26 if needed.    Hair thinning:  Finasteride 2.5 mg once daily    Hair thinning started prior to methotrexate, but has worsened since she has been on it. Has not noticed significant worsening since switching to injectable, \"maybe a little.\" She does admit the thinning has become bothersome to her as it is more noticeable (hair coming out when brushing, etc.). She \"doesn't want to have bald spots.\" Encouraged seeing dermatology again as she has not followed up with them since starting the finasteride. She is amenable. We did discuss that increasing methotrexate dose may worsen hair loss, at this time it is not significant enough that she would want to avoid increasing the methotrexate for this reason.    Today's Vitals: There were no vitals taken for this visit.  ----------------    I spent 40 " minutes with this patient today. All changes were made via collaborative practice agreement with Nura Duran. A copy of the visit note was provided to the patient's provider(s).    A summary of these recommendations was sent via [employer].    Elvia Ko, PharmD  Medication Therapy Management Pharmacist  [facility]     Telemedicine Visit Details  Type of service:  Video Conference via [employer]  Start Time: 0900  End Time: 0940     Medication Therapy Recommendations  No medication therapy recommendations to display    Medication Therapy Management (MTM) Encounter    ASSESSMENT:                            Medication Adherence/Access: No issues identified.    Sarcoidosis: Patient has tolerated the switch to injectable methotrexate, however has not had significant improvements in effectiveness at dose of 20 mg weekly and recently required steroid injections. Would benefit from increasing to 25 mg weekly as previously discussed. Consider waiting to update labs upon return from her trip in December given we did not change dose when switching to subcutaneous injections.    Hair thinning: Hair thinning is mostly stable, maybe mild worsening since switch to injectable per patient. Discussed methotrexate's possible contribution and she does not feel it is significant enough where she would want to stop methotrexate or avoid dose increases at this time. Would benefit from follow-up with dermatology to optimize finasteride dose and/or discuss other options. Last TSH was in goal but may benefit from update to make sure still well-controlled. She has dermatology's contact information.    PLAN:                            Increase methotrexate dose to 25 mg weekly. Patient is traveling and was not able to have labs done before she left, will discuss with provider waiting to update when she returns in December.  Encouraged patient to schedule follow up with dermatology for hair thinning and optimization  "of finasteride dose. Consider updated TSH to make sure this is still in goal range.    Follow-up: Will follow up by phone re: plan for labs and to confirm dose increase. Then 12/27/23 (4 weeks) for tolerability/effectiveness of increased methotrexate dose.    SUBJECTIVE/OBJECTIVE:                          Annabelle Hitchcock is a 67 year old female called for a follow-up visit from 10/12/23. She was referred to me from Nura Zhang MD.    Reason for visit: Follow up on transition from oral to injectable methotrexate.    Allergies/ADRs: Reviewed in chart  Past Medical History: Reviewed in chart  Tobacco: She reports that she quit smoking about 47 years ago. Her smoking use included cigarettes. She started smoking about 48 years ago. She smoked an average of 0.50 packs per day. She has never been exposed to tobacco smoke. She has never used smokeless tobacco.    Medication Adherence/Access: No issues reported.    Sarcoidosis:  Methotrexate 20 mg under the skin every 7 days  Folic acid 1 mg once daily  Acetaminophen 500-1000 mg every 6 hours as needed    Injections are going well, minor bruising occasionally but otherwise no injection site reactions. She has not noticed a significant benefit and did require steroid injections into left orbit/lacrimal gland from ophthalmologist. This has brought the swelling down significantly, does still have a \"bit of a droop\" in eyelid and small knot in that location. Will see ophthalmology again in January. She does not feel the dose she is at now is optimally controlling symptoms and is amenable to increasing as previously discussed.    Of note, patient is leaving for a trip today and will be gone until 12/21/23. She has not had standing labs done following switch to injectable methotrexate. She would be willing to have labs done in [place], [place] between 11/20 and 11/26 if needed. Has enough methotrexate supply to get her through her trip if dose is increased.    Lab 11/8/23:  SCr " "0.71 mg/dL    Hair thinning:  Finasteride 2.5 mg once daily    Hair thinning started prior to methotrexate, but has worsened since she has been on it. Has not noticed significant worsening since switching to injectable, \"maybe a little.\" She does admit the thinning has become bothersome to her as it is more noticeable (hair coming out when brushing, etc.). She \"doesn't want to have bald spots.\" Encouraged seeing dermatology again as she has not followed up with them since starting the finasteride. She is amenable. We did discuss that increasing methotrexate dose may worsen hair loss, at this time it is not significant enough that she would want to avoid increasing the methotrexate for this reason. She has not had recent changes in levothyroxine dose.    Component      Latest Ref Rng 7/12/2023  2:23 PM   TSH      0.30 - 4.20 uIU/mL 4.15      Today's Vitals: There were no vitals taken for this visit.  ----------------    I spent 40 minutes with this patient today. All changes were made via collaborative practice agreement with Nura Duran. A copy of the visit note was provided to the patient's provider(s).    A summary of these recommendations was sent via [employer].    Elvia Ko, PharmD  Medication Therapy Management Pharmacist  [facility]     Telemedicine Visit Details  Type of service:  Video Conference via [employer]  Start Time: 0900  End Time: 0940     Medication Therapy Recommendations  Hair loss    Current Medication: finasteride (PROSCAR) 5 MG tablet   Rationale: Dose too low - Dosage too low - Effectiveness   Recommendation: Increase Dose - Consider increasing dose - recommended patient see dermatology for guidance   Status: Patient Agreed - Adherence/Education         Sarcoidosis    Current Medication: methotrexate 50 MG/2ML injection   Rationale: Dose too low - Dosage too low - Effectiveness   Recommendation: Increase Dose - Increase methotrexate to 25 mg weekly   Status: " Accepted per CPA               Again, thank you for allowing me to participate in the care of your patient.      Sincerely,    Elvia Ko, RP

## 2023-11-13 NOTE — PROGRESS NOTES
Medication Therapy Management (MTM) Encounter    ASSESSMENT:                            Medication Adherence/Access: No issues identified.    Sarcoidosis: Patient has tolerated the switch to injectable methotrexate, however has not had significant improvements in effectiveness at dose of 20 mg weekly and recently required steroid injections. Would benefit from increasing to 25 mg weekly as previously discussed. Consider waiting to update labs upon return from her trip in December given we did not change dose when switching to subcutaneous injections.    Hair thinning: Hair thinning is mostly stable, maybe mild worsening since switch to injectable per patient. Discussed methotrexate's possible contribution and she does not feel it is significant enough where she would want to stop methotrexate or avoid dose increases at this time. Would benefit from follow-up with dermatology to optimize finasteride dose and/or discuss other options. Last TSH was in goal but may benefit from update to make sure still well-controlled. She has dermatology's contact information.    PLAN:                            Increase methotrexate dose to 25 mg weekly. Patient is traveling and was not able to have labs done before she left, discussed with provider and okay to check these when she returns in December.  Encouraged patient to schedule follow up with dermatology for hair thinning and optimization of finasteride dose. Consider updated TSH to make sure this is still in goal range.    Follow-up: Will follow up by phone re: plan for labs and to confirm dose increase. Then 12/27/23 (4 weeks) for tolerability/effectiveness of increased methotrexate dose.    SUBJECTIVE/OBJECTIVE:                          Annabelle Hitchcock is a 67 year old female called for a follow-up visit from 10/12/23. She was referred to me from Nura Zhang MD.    Reason for visit: Follow up on transition from oral to injectable methotrexate.    Allergies/ADRs: Reviewed  "in chart  Past Medical History: Reviewed in chart  Tobacco: She reports that she quit smoking about 47 years ago. Her smoking use included cigarettes. She started smoking about 48 years ago. She smoked an average of 0.50 packs per day. She has never been exposed to tobacco smoke. She has never used smokeless tobacco.    Medication Adherence/Access: No issues reported.    Sarcoidosis:  Methotrexate 20 mg under the skin every 7 days  Folic acid 1 mg once daily  Acetaminophen 500-1000 mg every 6 hours as needed    Injections are going well, minor bruising occasionally but otherwise no injection site reactions. She has not noticed a significant benefit and did require steroid injections into left orbit/lacrimal gland from ophthalmologist. This has brought the swelling down significantly, does still have a \"bit of a droop\" in eyelid and small knot in that location. Will see ophthalmology again in January. She does not feel the dose she is at now is optimally controlling symptoms and is amenable to increasing as previously discussed.    Of note, patient is leaving for a trip today and will be gone until 12/21/23. She has not had standing labs done following switch to injectable methotrexate. She would be willing to have labs done in [place], [place] between 11/20 and 11/26 if needed. Has enough methotrexate supply to get her through her trip if dose is increased.    Lab 11/8/23:  SCr 0.71 mg/dL    Hair thinning:  Finasteride 2.5 mg once daily    Hair thinning started prior to methotrexate, but has worsened since she has been on it. Has not noticed significant worsening since switching to injectable, \"maybe a little.\" She does admit the thinning has become bothersome to her as it is more noticeable (hair coming out when brushing, etc.). She \"doesn't want to have bald spots.\" Encouraged seeing dermatology again as she has not followed up with them since starting the finasteride. She is amenable. We did discuss that increasing " methotrexate dose may worsen hair loss, at this time it is not significant enough that she would want to avoid increasing the methotrexate for this reason. She has not had recent changes in levothyroxine dose.    Component      Latest Ref Rng 7/12/2023  2:23 PM   TSH      0.30 - 4.20 uIU/mL 4.15      Today's Vitals: There were no vitals taken for this visit.  ----------------    I spent 40 minutes with this patient today. All changes were made via collaborative practice agreement with Nura Duran. A copy of the visit note was provided to the patient's provider(s).    A summary of these recommendations was sent via [employer].    Elvia Ko, PharmD  Medication Therapy Management Pharmacist  [facility]     Telemedicine Visit Details  Type of service:  Video Conference via [employer]  Start Time: 0900  End Time: 0940     Medication Therapy Recommendations  Hair loss    Current Medication: finasteride (PROSCAR) 5 MG tablet   Rationale: Dose too low - Dosage too low - Effectiveness   Recommendation: Increase Dose - Consider increasing dose - recommended patient see dermatology for guidance   Status: Patient Agreed - Adherence/Education         Sarcoidosis    Current Medication: methotrexate 50 MG/2ML injection   Rationale: Dose too low - Dosage too low - Effectiveness   Recommendation: Increase Dose - Increase methotrexate to 25 mg weekly   Status: Accepted per CPA

## 2023-11-13 NOTE — Clinical Note
Switch to injectable went well, no issues but has not noticed much improvement symptom-wise compared to oral dose. Did have steroid injections that helped, some swelling remaining. I think she would benefit from further dose optimization, pended an order for methotrexate 25 mg weekly - let me know if okay to sign. She's traveling until end of December, I figured we could update labs at that time since we didn't technically change dose this last time. Let me know your thoughts, thanks!

## 2023-11-14 NOTE — PATIENT INSTRUCTIONS
"Recommendations from today's MTM visit:                                                      Increase methotrexate dose to 25 mg weekly. Patient is traveling and was not able to have labs done before she left, will discuss with provider waiting to update when she returns in December.  Encouraged patient to schedule follow up with dermatology for hair thinning and optimization of finasteride dose. Consider updated TSH to make sure this is still in goal range.    Follow-up: Return in 6 weeks (on 12/27/2023).    It was great speaking with you today.  I value your experience and would be very thankful for your time in providing feedback in our clinic survey. In the next few days, you may receive an email or text message from [facility] with a link to a survey related to your  clinical pharmacist.\"     To schedule another MTM appointment, please call the clinic directly or you may call the MTM scheduling line at [phone] or toll-free at [phone].     My Clinical Pharmacist's contact information:                                                      Please feel free to contact me with any questions or concerns you have.      Elvia Ko, PharmD  Medication Therapy Management Pharmacist  [facility]    "

## 2023-11-20 ENCOUNTER — MYC MEDICAL ADVICE (OUTPATIENT)
Dept: RHEUMATOLOGY | Facility: CLINIC | Age: 68
End: 2023-11-20
Payer: MEDICARE

## 2023-11-20 RX ORDER — METHOTREXATE 25 MG/ML
25 INJECTION, SOLUTION INTRA-ARTERIAL; INTRAMUSCULAR; INTRAVENOUS
Qty: 4 ML | Refills: 2 | Status: SHIPPED | OUTPATIENT
Start: 2023-11-20 | End: 2023-11-24

## 2023-11-24 ENCOUNTER — TELEPHONE (OUTPATIENT)
Dept: RHEUMATOLOGY | Facility: CLINIC | Age: 68
End: 2023-11-24
Payer: MEDICARE

## 2023-11-24 ENCOUNTER — VIRTUAL VISIT (OUTPATIENT)
Dept: RHEUMATOLOGY | Facility: CLINIC | Age: 68
End: 2023-11-24
Attending: STUDENT IN AN ORGANIZED HEALTH CARE EDUCATION/TRAINING PROGRAM
Payer: MEDICARE

## 2023-11-24 DIAGNOSIS — D86.9 SARCOIDOSIS: Primary | ICD-10-CM

## 2023-11-24 DIAGNOSIS — R31.9 HEMATURIA: ICD-10-CM

## 2023-11-24 DIAGNOSIS — D86.9 SARCOIDOSIS: ICD-10-CM

## 2023-11-24 RX ORDER — METHOTREXATE 25 MG/ML
20 INJECTION, SOLUTION INTRA-ARTERIAL; INTRAMUSCULAR; INTRAVENOUS
Qty: 4 ML | Refills: 2 | Status: SHIPPED | OUTPATIENT
Start: 2023-11-24 | End: 2024-02-26

## 2023-11-24 RX ORDER — METHOTREXATE 25 MG/ML
20 INJECTION, SOLUTION INTRA-ARTERIAL; INTRAMUSCULAR; INTRAVENOUS
Qty: 4 ML | Refills: 2 | Status: SHIPPED | OUTPATIENT
Start: 2023-11-24 | End: 2023-11-24

## 2023-11-24 NOTE — TELEPHONE ENCOUNTER
Health Call Center    Phone Message    May a detailed message be left on voicemail: yes     Reason for Call: Medication Question or concern regarding medication   Prescription Clarification  Name of Medication: Methotrexate  Prescribing Provider: Renee   What is the problem? Pt received the message from Elvia Ko RPH via [facility] on 11/20/23, and she understands that message and the dosage she needs to be taking. Her concern right now is that she is not sure that she is reading the syringe correctly when she is drawing up her medication. She did already give herself her dose on Wednesday (11/22/23), but now she is concerned that she may not have given herself a full mL because she does not know if she was filling it up to the correct line.   Pt was initially requesting to speak with Elvia Ko RPH, but would really be ok with speaking with any nurse that can help her to understand how to read the markings on the syringes she has-she would just like for someone to call her today (11/24/23) if possible.        Action Taken: Message routed to:  [facility]    Travel Screening: Not Applicable

## 2023-11-24 NOTE — PATIENT INSTRUCTIONS
"Recommendations from today's MTM visit:                                                      Inject 0.6 mL of methotrexate today to complete this week's dose. Patient has 0.3 mL syringes so will do two injections of 0.3 mL. Do not use open vial as it has been > 28 days since punctured; use a new vial. New orders for methotrexate and syringes were sent to [facility] in [place] per request for use while traveling.  Starting next Wednesday using 1 mL syringes, restart methotrexate 20 mg weekly by injecting 0.8 mL once weekly.  Repeat labs after 4 weeks on 20 mg weekly and follow up on efficacy to determine if dose increase to 25 mg is necessary.    Follow-up: Return in 4 weeks (on 12/22/2023).    It was great speaking with you today.  I value your experience and would be very thankful for your time in providing feedback in our clinic survey. In the next few days, you may receive an email or text message from [employer] with a link to a survey related to your  clinical pharmacist.\"     To schedule another MTM appointment, please call the clinic directly or you may call the MTM scheduling line at [phone] or toll-free at [phone].     My Clinical Pharmacist's contact information:                                                      Please feel free to contact me with any questions or concerns you have.      Elvia Ko, PharmD  Medication Therapy Management Pharmacist  [facility]    "

## 2023-11-24 NOTE — TELEPHONE ENCOUNTER
Spoke with patient, she noticed the syringes she was given from dispensing pharmacy are insulin 3/10 (0.3) mL syringes. She has been drawing up to the 8 kristina, and more recently the 10 kristina, on these syringes for her dose.    Discussed that for a 30 unit insulin syringe each 10 units = 0.1 mL, therefore she has not been receiving her full dose. Last dose was Wednesday evening. Discussed with provider and we will finish that dose today (only received 0.1 mL so remainder of dose would be 0.7 mL with an appropriately sized syringe), then continue with 20 mg weekly for the next few weeks as the increase to 25 mg may not be warranted. Will follow up after several weeks on 20 mg dose.

## 2023-11-24 NOTE — PROGRESS NOTES
Medication Therapy Management (MTM) Encounter    ASSESSMENT:                            Medication Adherence/Access: No issues identified.    Sarcoidosis: Patient has been unintentionally underdosing methotrexate due to pharmacy dispensing an incorrect syringe size (insulin 0.3 mL syringes). Would benefit from injecting 0.6 mL to complete this week's dose today and then resuming 20 mg (0.8 mL) weekly next week. Because she has not been receiving the full dose, we will not increase to 25 mg unless necessary after several weeks on full 20 mg dose. Had previously tolerated 20 mg by mouth weekly. Would also benefit from updated labs at next visit. Discussed with provider who is in agreement with plan.    Hematuria: Hematuria is unlikely to be caused by low doses of methotrexate. Discussed with provider who agreed with encouraging patient to complete further work-up with primary care provider. She is no longer taking nitrofurantoin as infection was ruled out.    PLAN:                            Inject 0.6 mL of methotrexate today to complete this week's dose. Patient has 0.3 mL syringes so will do two injections of 0.3 mL. Do not use open vial as it has been > 28 days since punctured; use a new vial.  Starting next Wednesday using 1 mL syringes, restart methotrexate 20 mg weekly by injecting 0.8 mL once weekly.  Repeat labs after 4 weeks on 20 mg weekly and follow up on efficacy to determine if dose increase to 25 mg is necessary.    Follow-up: 12/22/2023    SUBJECTIVE/OBJECTIVE:                          Annabelle Hitchcock is a 67 year old female called for a follow-up visit from 11/13/2023. She was referred to me from Nura Zhang MD.    Reason for visit: Patient concerns about incorrect methotrexate syringe size.    Allergies/ADRs: Reviewed in chart  Past Medical History: Reviewed in chart  Tobacco: She reports that she quit smoking about 47 years ago. Her smoking use included cigarettes. She started smoking about 48  "years ago. She smoked an average of 0.50 packs per day. She has never been exposed to tobacco smoke. She has never used smokeless tobacco.    Medication Adherence/Access: Pharmacy dispensed incorrect syringe size so patient has been unintentionally underdosing methotrexate. See below for details.    Sarcoidosis:  Methotrexate 20 mg under the skin every 7 days  Folic acid 1 mg once daily  Acetaminophen 500-1000 mg every 6 hours as needed    Patient called today with concerns about methotrexate injections as she had noticed more leftover methotrexate than expected in vials. Upon reading off syringes it was discovered she has insulin 30 unit (0.3 mL) syringes instead of the 1 mL syringes that were ordered. Because of this dispensing error she had been injecting 8 units (0.08 ml) and more recently 10 units (0.1 mL) once weekly since switching to injectable methotrexate. She did have to have a steroid injection prior to leaving on her trip due to symptoms and had not noticed any improvements since starting on injectable.    Lab 11/8/23:  SCr 0.71 mg/dL    Hematuria: Patient reporting \"dull aching pain\" on both sides of lower back/abdomen followed by blood in urine. She was seen at urgent care in [place] and started on nitrofurantoin for UTI but infection was later ruled out. Still having the pain today. She was advised to discuss with primary care provider upon return from her travels.    Today's Vitals: There were no vitals taken for this visit.  ----------------    I spent 60 minutes with this patient today. All changes were made via collaborative practice agreement with Nura Duran MD. A copy of the visit note was provided to the patient's provider(s).    A summary of these recommendations was sent via [email or web address].    Elvia Ko, PharmD  Medication Therapy Management Pharmacist  [facility]     Telemedicine Visit Details  Type of service:  Video Conference via [employer]  Start Time: 0930   End " Time: 1030     Medication Therapy Recommendations  Sarcoidosis    Current Medication: methotrexate 50 MG/2ML injection   Rationale: Does not understand instructions - Adherence - Adherence   Recommendation: Provide Adherence Intervention - Methotrexate 25 MG/ML Sosy - Patient was dispensed wrong syringe size, discussed how to make current dose out of syringes she has and resent order for 1 mL syringes. Has been receiving a much lower dose than intended   Status: Patient Agreed - Adherence/Education

## 2023-12-22 ENCOUNTER — VIRTUAL VISIT (OUTPATIENT)
Dept: RHEUMATOLOGY | Facility: CLINIC | Age: 68
End: 2023-12-22
Attending: STUDENT IN AN ORGANIZED HEALTH CARE EDUCATION/TRAINING PROGRAM
Payer: MEDICARE

## 2023-12-22 DIAGNOSIS — D86.9 SARCOIDOSIS: Primary | ICD-10-CM

## 2023-12-22 DIAGNOSIS — L65.9 HAIR LOSS: ICD-10-CM

## 2023-12-22 NOTE — Clinical Note
Klever Sprague - this is a [facility] patient, per the Excel sheet I was given she'll be seeing you in the future although I don't see anything scheduled yet. I've been following her for switch to injectable methotrexate, plan was to increase to 25 mg weekly but she's had some worsening of hair loss and sees derm 1/11 so I'm going to wait for their input. Just wanted to loop you in since [facility] is now gone!

## 2023-12-22 NOTE — PROGRESS NOTES
"Chief Complaint   Patient presents with     Hypertension     Thyroid Problem       Initial /88 (BP Location: Left arm, Patient Position: Sitting, Cuff Size: Adult Regular)   Pulse 92   Temp 98  F (36.7  C) (Tympanic)   Ht 1.854 m (6' 1\")   Wt 120.2 kg (265 lb)   SpO2 97%   BMI 34.96 kg/m   Estimated body mass index is 34.96 kg/m  as calculated from the following:    Height as of this encounter: 1.854 m (6' 1\").    Weight as of this encounter: 120.2 kg (265 lb).  Medication Reconciliation: complete  Lizzette Eller LPN  " Medication Therapy Management (MTM) Encounter    ASSESSMENT:                            Medication Adherence/Access: No issues identified.    Sarcoidosis: Patient has had improvement in symptoms since switching to injectable methotrexate, but does still have visible bump on eye and occasional drooping of eyelid. Would likely benefit from increasing to 25 mg weekly as per original plan, however she has noticed worsening of hair loss which could be related to methotrexate. Sees dermatology on 1/11/24. May benefit from waiting until after that visit to increase dose based on dermatology's assessment. In the meantime she is due for standing labs following switch to injectable form of methotrexate 4 weeks ago.    Hair loss: See above. Methotrexate possibly contributing, will wait for assessment by dermatology before adjusting dose.    PLAN:                            Please have the following standing labs drawn at your convenience: alk phos, AST, ALT, CBC, and creatinine. These orders have been placed.  Continue methotrexate 20 mg weekly for now. We will touch base after your appointment with dermatology on 1/11/2024 to review their assessment of your ongoing hair loss. Will consider increasing to 25 mg weekly at that time.    Follow-up: 1/11/2024 to assess if increasing methotrexate dose appropriate based on dermatology's hair loss assessment. Review labs.    SUBJECTIVE/OBJECTIVE:                          Annabelle Hitchcock is a 68 year old female called for a follow-up visit from 11/24/2023. She was referred to me from Nura Zhang MD.    Reason for visit: Methotrexate tolerability and efficacy follow up.    Allergies/ADRs: Reviewed in chart  Past Medical History: Reviewed in chart  Tobacco: She reports that she quit smoking about 47 years ago. Her smoking use included cigarettes. She started smoking about 48 years ago. She smoked an average of 0.5 packs per day. She has never been exposed to tobacco smoke. She has  "never used smokeless tobacco.    Medication Adherence/Access: No issues identified.    Sarcoidosis:  Methotrexate 20 mg under the skin every 7 days  Folic acid 1 mg once daily  Acetaminophen 500-1000 mg every 6 hours as needed    Following up on methotrexate today. Had been unintentionally underdosing due to incorrect syringe size, started taking 20 mg weekly 11/24. New syringes are working fine, some occasional bruising at injection site. Original plan was to increase to 25 mg weekly, which patient thinks is still necessary based on symptoms today. Still reporting a \"bump\" on outer corner of eye which is more prominent when she's tired. \"Sometimes it feels like the left eye is drooping.\" Note she reports \"a lot more\" hair thinning, unclear if this has worsened since switching to injectable methotrexate. Has appointment with dermatology on 1/11/24 and will bring this up with them then. Would be willing to wait until after that visit to increase methotrexate. She agrees to have standing labs done in the meantime.    --    Today's Vitals: There were no vitals taken for this visit.  ----------------    I spent 45 minutes with this patient today. All changes were made via collaborative practice agreement with Nura Duran MD. A copy of the visit note was provided to the patient's provider(s) and next provider who this patient will be establishing with: Nataliia Sprague MD.    A summary of these recommendations was sent via [employer].    Elvia Ko, PharmD  Medication Therapy Management Pharmacist  [facility]     Telemedicine Visit Details  Type of service:  Video Conference via [employer]  Start Time: 0900   End Time: 0945     Medication Therapy Recommendations  Hair loss    Current Medication: methotrexate 50 MG/2ML injection   Rationale: Undesirable effect - Adverse medication event - Safety   Recommendation: Continue to Monitor - Waiting on assessment by dermatology for cause of hair loss, " started prior to methotrexate initiation but has worsened. Will wait on methotrexate dose changes   Status: Contact Provider - Awaiting Response

## 2023-12-22 NOTE — Clinical Note
12/22/2023       RE: Maritza Hitchcock  [address] [facility] [facility] [address]     Dear Colleague,    Thank you for referring your patient, Maritza Hitchcock, to the [facility] [place] [facility]. Please see a copy of my visit note below.    Follow up on methotrexate. Had been unintentionally underdosing due to incorrect syringe size, switched to 20 mg weekly on 11/24. Can consider increasing to 25 mg weekly if needed as that had been our original plan.    This is a [facility] patient - next provider per spreadsheet is Dr. Sprague, nothing scheduled yet.    --    New syringes are working fine, does get some bruising at injection site.    Still has a bump on outer corner of eye. More prominent when she's tired. Sometimes it seems like left eye is drooping again. She would like to increase to 25 mg weekly. Will have standing labs done first.    --    She is noticing a lot more hair thinning, unclear if this is worsened since switching to injectable methotrexate. Again she did have hair thinning before starting methotrexate.    Derm appointment is 1/11/24. Consider increasing folic acid to 2 mg?      Again, thank you for allowing me to participate in the care of your patient.      Sincerely,    Elvia Ko, WISAM

## 2023-12-27 NOTE — PATIENT INSTRUCTIONS
"Recommendations from today's MTM visit:                                                      Please have the following standing labs drawn at your convenience: alk phos, AST, ALT, CBC, and creatinine. These orders have been placed.  Continue methotrexate 20 mg weekly for now. We will touch base after your appointment with dermatology on 1/11/2024 to review their assessment of your ongoing hair loss. Will consider increasing to 25 mg weekly at that time.    Follow-up: 1/11/2024 to assess if increasing methotrexate dose appropriate based on dermatology's hair loss assessment. Review labs.    It was great speaking with you today.  I value your experience and would be very thankful for your time in providing feedback in our clinic survey. In the next few days, you may receive an email or text message from [employer] with a link to a survey related to your  clinical pharmacist.\"     To schedule another MTM appointment, please call the clinic directly or you may call the MTM scheduling line at [phone] or toll-free at [phone].     My Clinical Pharmacist's contact information:                                                      Please feel free to contact me with any questions or concerns you have.      Elvia Ko, PharmD  Medication Therapy Management Pharmacist  [facility]    "

## 2023-12-29 ENCOUNTER — LAB (OUTPATIENT)
Dept: LAB | Facility: CLINIC | Age: 68
End: 2023-12-29
Payer: MEDICARE

## 2023-12-29 DIAGNOSIS — Z79.899 HIGH RISK MEDICATION USE: ICD-10-CM

## 2023-12-29 DIAGNOSIS — E11.9 TYPE 2 DIABETES MELLITUS WITHOUT COMPLICATION, WITHOUT LONG-TERM CURRENT USE OF INSULIN (H): ICD-10-CM

## 2023-12-29 LAB
ALP SERPL-CCNC: 65 U/L (ref 40–150)
ALT SERPL W P-5'-P-CCNC: 41 U/L (ref 0–50)
AST SERPL W P-5'-P-CCNC: 36 U/L (ref 0–45)
BASOPHILS # BLD AUTO: 0 10E3/UL (ref 0–0.2)
BASOPHILS NFR BLD AUTO: 0 %
CREAT SERPL-MCNC: 0.75 MG/DL (ref 0.51–0.95)
CREAT UR-MCNC: 111 MG/DL
EGFRCR SERPLBLD CKD-EPI 2021: 87 ML/MIN/1.73M2
EOSINOPHIL # BLD AUTO: 0.1 10E3/UL (ref 0–0.7)
EOSINOPHIL NFR BLD AUTO: 2 %
ERYTHROCYTE [DISTWIDTH] IN BLOOD BY AUTOMATED COUNT: 13.2 % (ref 10–15)
HBA1C MFR BLD: 5.4 % (ref 0–5.6)
HCT VFR BLD AUTO: 38.7 % (ref 35–47)
HGB BLD-MCNC: 12.9 G/DL (ref 11.7–15.7)
IMM GRANULOCYTES # BLD: 0 10E3/UL
IMM GRANULOCYTES NFR BLD: 0 %
LYMPHOCYTES # BLD AUTO: 1.6 10E3/UL (ref 0.8–5.3)
LYMPHOCYTES NFR BLD AUTO: 26 %
MCH RBC QN AUTO: 34 PG (ref 26.5–33)
MCHC RBC AUTO-ENTMCNC: 33.3 G/DL (ref 31.5–36.5)
MCV RBC AUTO: 102 FL (ref 78–100)
MICROALBUMIN UR-MCNC: <12 MG/L
MICROALBUMIN/CREAT UR: NORMAL MG/G{CREAT}
MONOCYTES # BLD AUTO: 0.5 10E3/UL (ref 0–1.3)
MONOCYTES NFR BLD AUTO: 9 %
NEUTROPHILS # BLD AUTO: 3.8 10E3/UL (ref 1.6–8.3)
NEUTROPHILS NFR BLD AUTO: 63 %
PLATELET # BLD AUTO: 198 10E3/UL (ref 150–450)
RBC # BLD AUTO: 3.79 10E6/UL (ref 3.8–5.2)
WBC # BLD AUTO: 6.1 10E3/UL (ref 4–11)

## 2023-12-29 PROCEDURE — 83036 HEMOGLOBIN GLYCOSYLATED A1C: CPT

## 2023-12-29 PROCEDURE — 82570 ASSAY OF URINE CREATININE: CPT

## 2023-12-29 PROCEDURE — 84460 ALANINE AMINO (ALT) (SGPT): CPT

## 2023-12-29 PROCEDURE — 82565 ASSAY OF CREATININE: CPT

## 2023-12-29 PROCEDURE — 84075 ASSAY ALKALINE PHOSPHATASE: CPT

## 2023-12-29 PROCEDURE — 85025 COMPLETE CBC W/AUTO DIFF WBC: CPT | Mod: QW

## 2023-12-29 PROCEDURE — 36415 COLL VENOUS BLD VENIPUNCTURE: CPT

## 2023-12-29 PROCEDURE — 84450 TRANSFERASE (AST) (SGOT): CPT

## 2023-12-29 PROCEDURE — 82043 UR ALBUMIN QUANTITATIVE: CPT

## 2024-01-03 ENCOUNTER — OFFICE VISIT (OUTPATIENT)
Dept: FAMILY MEDICINE | Facility: CLINIC | Age: 69
End: 2024-01-03
Payer: MEDICARE

## 2024-01-03 VITALS
RESPIRATION RATE: 16 BRPM | TEMPERATURE: 97.9 F | WEIGHT: 175 LBS | OXYGEN SATURATION: 97 % | DIASTOLIC BLOOD PRESSURE: 81 MMHG | HEART RATE: 68 BPM | HEIGHT: 65 IN | SYSTOLIC BLOOD PRESSURE: 127 MMHG | BODY MASS INDEX: 29.16 KG/M2

## 2024-01-03 DIAGNOSIS — D86.9 SARCOIDOSIS: Primary | ICD-10-CM

## 2024-01-03 DIAGNOSIS — R31.29 MICROSCOPIC HEMATURIA: ICD-10-CM

## 2024-01-03 LAB
ALBUMIN UR-MCNC: NEGATIVE MG/DL
APPEARANCE UR: CLEAR
BACTERIA #/AREA URNS HPF: ABNORMAL /HPF
BILIRUB UR QL STRIP: NEGATIVE
COLOR UR AUTO: YELLOW
GLUCOSE UR STRIP-MCNC: NEGATIVE MG/DL
HGB UR QL STRIP: ABNORMAL
KETONES UR STRIP-MCNC: NEGATIVE MG/DL
LEUKOCYTE ESTERASE UR QL STRIP: NEGATIVE
MUCOUS THREADS #/AREA URNS LPF: PRESENT /LPF
NITRATE UR QL: NEGATIVE
PH UR STRIP: 5.5 [PH] (ref 5–7)
RBC #/AREA URNS AUTO: ABNORMAL /HPF
SP GR UR STRIP: 1.02 (ref 1–1.03)
SQUAMOUS #/AREA URNS AUTO: ABNORMAL /LPF
UROBILINOGEN UR STRIP-ACNC: 0.2 E.U./DL
WBC #/AREA URNS AUTO: ABNORMAL /HPF

## 2024-01-03 PROCEDURE — 99213 OFFICE O/P EST LOW 20 MIN: CPT | Performed by: FAMILY MEDICINE

## 2024-01-03 PROCEDURE — 87086 URINE CULTURE/COLONY COUNT: CPT | Performed by: FAMILY MEDICINE

## 2024-01-03 PROCEDURE — 81001 URINALYSIS AUTO W/SCOPE: CPT | Performed by: FAMILY MEDICINE

## 2024-01-03 NOTE — PROGRESS NOTES
"  Assessment & Plan   Problem List Items Addressed This Visit       Sarcoidosis - Primary     Other Visit Diagnoses       Microscopic hematuria        Relevant Orders    UA Macroscopic with reflex to Microscopic and Culture - Clinic Collect    Urine Culture        Over  patient had an episode of gross hematuria.  She was seen by a local provider while she was in [place] and started on nitrofurantoin however learned that she cannot take nitrofurantoin due to her methotrexate so stopped it.  She has had no dysuria and no further hematuria.  Will plan to get a repeat UA UC today.    Sarcoidosis patient continues to follow with ophthalmology and with rheumatology.  They are still trying to get her methotrexate dosed appropriately.  Looks like there is still some active disease going on above the left eye.    Patient also shares with me that her mom passed away on  after injury sustained at comforts of home.  Body has not yet been released for a .  Condolences given.           BMI:   Estimated body mass index is 29.57 kg/m  as calculated from the following:    Height as of this encounter: 1.638 m (5' 4.5\").    Weight as of this encounter: 79.4 kg (175 lb).   Weight management plan: Discussed healthy diet and exercise guidelines        Irena Levi MD  [facility]    Lori Alanis is a 68 year old, presenting for the following health issues:  Follow Up (Pt here to follow up blood in her urine. Urine culture came back in [place] over . )        1/3/2024     8:53 AM   Additional Questions   Roomed by Faviola VALVERDE               Review of Systems         Objective    /81 (BP Location: Right arm, Patient Position: Sitting)   Pulse 68   Temp 97.9  F (36.6  C) (Tympanic)   Resp 16   Ht 1.638 m (5' 4.5\")   Wt 79.4 kg (175 lb)   LMP  (LMP Unknown)   SpO2 97%   BMI 29.57 kg/m    Body mass index is 29.57 kg/m .  Physical Exam "

## 2024-01-04 LAB — BACTERIA UR CULT: NORMAL

## 2024-01-05 ENCOUNTER — VIRTUAL VISIT (OUTPATIENT)
Dept: RHEUMATOLOGY | Facility: CLINIC | Age: 69
End: 2024-01-05
Attending: STUDENT IN AN ORGANIZED HEALTH CARE EDUCATION/TRAINING PROGRAM
Payer: MEDICARE

## 2024-01-05 DIAGNOSIS — D86.9 SARCOIDOSIS: Primary | ICD-10-CM

## 2024-01-05 NOTE — Clinical Note
1/5/2024       RE: Maritza Hitchccok  [address] [facility] [address]     Dear Colleague,    Thank you for referring your patient, Maritza Hitchcock, to the [facility]UMATOLOGY CLINIC [place] at [facility]. Please see a copy of my visit note below.    Medication Therapy Management (MTM) Encounter    ASSESSMENT:                            Medication Adherence/Access: Patient was taking incorrect folic acid dose as noted below, issue resolved.    Sarcoidosis: Patient with increase in MCV typical of methotrexate dose increase, discussed folic acid supplementation and notes she is taking an OTC supplement totaling 400 mcg daily instead of the 1 mg tablets that were prescribed. It is possible appropriate folic acid supplementation will help with hair loss although thinning is currently significant. Still scheduled to see dermatology on 1/10/24. Note if other factors causing hair loss are not identified it is possible we may need to consider an alternative to methotrexate.    PLAN:                            Encouraged patient to fill folic acid 1 mg tabs and start taking those instead of OTC folic acid as the quantity of the OTC supplement is not high enough.   Will follow up after visit with dermatology regarding hair loss.    Follow-up: 1/11/24 following visit with dermatology.    SUBJECTIVE/OBJECTIVE:                          Annabelle Hitchcock is a 68 year old female called for a follow-up visit from 12/22/2023.       Reason for visit: Discuss folic acid supplementation.    Allergies/ADRs: Reviewed in chart  Past Medical History: Reviewed in chart  Tobacco: She reports that she quit smoking about 47 years ago. Her smoking use included cigarettes. She started smoking about 48 years ago. She smoked an average of 0.5 packs per day. She has never been exposed to tobacco smoke. She has never used smokeless tobacco.  Alcohol: not assessed today    Medication  Adherence/Access: Patient was taking incorrect folic acid dose as noted below, issue resolved.    Sarcoidosis:  Methotrexate 20 mg under the skin every 7 days  Folic acid 400 mcg daily (taking OTC supplement)  Acetaminophen 500-1000 mg every 6 hours as needed     Discussed MCV elevation, patient notes she is currently taking an OTC folic acid supplement rather than the 1 mg tabs which were prescribed. She is willing to switch to the 1 mg tabs. Notes there is folate in her multivitamin as well as B12. No changes from a hair loss perspective.    CBC RESULTS:   Recent Labs   Lab Test 12/29/23  0955   WBC 6.1   RBC 3.79*   HGB 12.9   HCT 38.7   *   MCH 34.0*   MCHC 33.3   RDW 13.2         Today's Vitals: LMP  (LMP Unknown)   ----------------    I spent 20 minutes with this patient today. All changes were made via collaborative practice agreement with Nura Duran MD. A copy of the visit note was provided to the patient's provider(s). Nataliia Sprague MD listed as next provider upon Dr. Ngo's departure.    A summary of these recommendations was discussed with patient.    Elvia Ko, PharmD  Medication Therapy Management Pharmacist  [facility]     Telemedicine Visit Details  Type of service:  Telephone visit  Start Time:  1000  End Time:  1020     Medication Therapy Recommendations  Sarcoidosis    Current Medication: folic acid (FOLVITE) 1 MG tablet   Rationale: Incorrect administration - Dosage too low - Effectiveness   Recommendation: Increase Dose - Increase dose to 1 mg daily as prescribed, was takin OTC and underdosing   Status: Patient Agreed - Adherence/Education               Again, thank you for allowing me to participate in the care of your patient.      Sincerely,    Elvia Ko, [facility]

## 2024-01-05 NOTE — Clinical Note
Hi Dr. Sprague, just forwarding to you as you are listed as her next provider following Nura's departure. Nothing needed at this time, going to follow up on the hair loss after she sees derm. Thanks!

## 2024-01-10 NOTE — PATIENT INSTRUCTIONS
"Recommendations from today's MTM visit:                                                      Encouraged patient to fill folic acid 1 mg tabs and start taking those instead of OTC folic acid as the quantity of the OTC supplement is not high enough.   Will follow up after visit with dermatology regarding hair loss.    Follow-up: 1/11/24 following visit with dermatology.    It was great speaking with you today.  I value your experience and would be very thankful for your time in providing feedback in our clinic survey. In the next few days, you may receive an email or text message from [employer] with a link to a survey related to your  clinical pharmacist.\"     To schedule another MTM appointment, please call the clinic directly or you may call the MTM scheduling line at [phone] or toll-free at [phone].     My Clinical Pharmacist's contact information:                                                      Please feel free to contact me with any questions or concerns you have.      Elvia Ko, PharmD  Medication Therapy Management Pharmacist  [facility]    "

## 2024-01-10 NOTE — PROGRESS NOTES
Medication Therapy Management (MTM) Encounter    ASSESSMENT:                            Medication Adherence/Access: Patient was taking incorrect folic acid dose as noted below, issue resolved.    Sarcoidosis: Patient with increase in MCV typical of methotrexate dose increase, discussed folic acid supplementation and notes she is taking an OTC supplement totaling 400 mcg daily instead of the 1 mg tablets that were prescribed. It is possible appropriate folic acid supplementation will help with hair loss although thinning is currently significant. Still scheduled to see dermatology on 1/10/24. Note if other factors causing hair loss are not identified it is possible we may need to consider an alternative to methotrexate.    PLAN:                            Encouraged patient to fill folic acid 1 mg tabs and start taking those instead of OTC folic acid as the quantity of the OTC supplement is not high enough.   Will follow up after visit with dermatology regarding hair loss.    Follow-up: 1/11/24 following visit with dermatology.    SUBJECTIVE/OBJECTIVE:                          Annabelle Hitchcock is a 68 year old female called for a follow-up visit from 12/22/2023.       Reason for visit: Discuss folic acid supplementation.    Allergies/ADRs: Reviewed in chart  Past Medical History: Reviewed in chart  Tobacco: She reports that she quit smoking about 47 years ago. Her smoking use included cigarettes. She started smoking about 48 years ago. She smoked an average of 0.5 packs per day. She has never been exposed to tobacco smoke. She has never used smokeless tobacco.  Alcohol: not assessed today    Medication Adherence/Access: Patient was taking incorrect folic acid dose as noted below, issue resolved.    Sarcoidosis:  Methotrexate 20 mg under the skin every 7 days  Folic acid 400 mcg daily (taking OTC supplement)  Acetaminophen 500-1000 mg every 6 hours as needed     Discussed MCV elevation, patient notes she is currently  taking an OTC folic acid supplement rather than the 1 mg tabs which were prescribed. She is willing to switch to the 1 mg tabs. Notes there is folate in her multivitamin as well as B12. No changes from a hair loss perspective.    CBC RESULTS:   Recent Labs   Lab Test 12/29/23  0955   WBC 6.1   RBC 3.79*   HGB 12.9   HCT 38.7   *   MCH 34.0*   MCHC 33.3   RDW 13.2         Today's Vitals: no vitals taken today  ----------------    I spent 20 minutes with this patient today. All changes were made via collaborative practice agreement with Nura Duran MD. A copy of the visit note was provided to the patient's provider(s). Nataliia Sprague MD listed as next provider upon Dr. Ngo's departure.    A summary of these recommendations was discussed with patient.    Elvia Ko, PharmD  Medication Therapy Management Pharmacist  [facility]     Telemedicine Visit Details  Type of service:  Telephone visit  Start Time:  1000  End Time:  1020     Medication Therapy Recommendations  Sarcoidosis    Current Medication: folic acid (FOLVITE) 1 MG tablet   Rationale: Incorrect administration - Dosage too low - Effectiveness   Recommendation: Increase Dose - Increase dose to 1 mg daily as prescribed, was takin OTC and underdosing   Status: Patient Agreed - Adherence/Education

## 2024-01-11 ENCOUNTER — VIRTUAL VISIT (OUTPATIENT)
Dept: RHEUMATOLOGY | Facility: CLINIC | Age: 69
End: 2024-01-11
Attending: STUDENT IN AN ORGANIZED HEALTH CARE EDUCATION/TRAINING PROGRAM
Payer: MEDICARE

## 2024-01-11 DIAGNOSIS — D86.9 SARCOIDOSIS: Primary | ICD-10-CM

## 2024-01-11 DIAGNOSIS — L65.9 HAIR LOSS: ICD-10-CM

## 2024-01-11 NOTE — Clinical Note
Hi Dr. Sprague! This is kind of a tricky one as I previously saw her with Nura for switch from oral to injectable methotrexate, our plan at the time was to increase from 20 to 25 mg if the switch alone wasn't enough. She did have benefit from 1:1 switch but is still noting some swelling/drooping of eyelid. Has seen derm recently for ongoing hair thinning/hair loss which is bothersome but she would be open to increasing dose to see if it worsens. She hasn't seen you yet and I've encouraged her to call and be scheduled. Was hoping for your thoughts on the increase to 25 mg, vs. waiting to be scheduled and reassessed. If hair loss worsens may need to consider alternate agent. Thanks!

## 2024-01-11 NOTE — PROGRESS NOTES
Medication Therapy Management (MTM) Encounter    ASSESSMENT:                            Medication Adherence/Access: No issues reported.    Sarcoidosis: Had follow up with outside dermatology 1/10/2024 to discuss hair thinning and medication regimen was adjusted as below. Dermatology did think hair loss likely related to recent stressors, methotrexate is another possible contributor. May benefit from increasing methotrexate to 25 mg weekly given ongoing swelling of eye/eyelid, with continued monitoring for worsening of hair thinning. Will discuss this option vs. alternate DMARD with provider.    Hair loss: Recently increased finasteride dose to 5 mg daily and will restart minoxidil per dermatology, these changes were made too recently to assess response.    PLAN:                            Will discuss current symptoms and potentially increasing methotrexate to 25 mg weekly as previously planned vs. alternate DMARD which would not contribute to hair loss with provider. For now, continue methotrexate 20 mg weekly.   Encouraged patient to schedule appointment with rheumatology to establish with new provider, given ongoing symptoms.  Continue folic acid 1 mg daily, hold dose on day of methotrexate.    Follow-up: Pending response from provider re: dose increase.    SUBJECTIVE/OBJECTIVE:                          Annabelle Hitchcock is a 68 year old female called for a follow-up visit from 1/5/2024.       Reason for visit: Follow up on hair loss after derm visit.    Allergies/ADRs: Reviewed in chart  Past Medical History: Reviewed in chart  Tobacco: She reports that she quit smoking about 47 years ago. Her smoking use included cigarettes. She started smoking about 48 years ago. She smoked an average of 0.5 packs per day. She has never been exposed to tobacco smoke. She has never used smokeless tobacco.  Alcohol: not assessed today    Medication Adherence/Access: No issues reported.    Sarcoidosis:  Methotrexate 20 mg (0.8 mL)  "under the skin every 7 days  Folic acid 1 mg daily  Acetaminophen 500-1000 mg every 6 hours as needed    Still has a bump on corner of eye, not as pronounced. Does also have puffiness on upper corner of eyelid, eye looks \"dark\" or bruised underneath. She saw dermatology as below and would be willing to increase methotrexate given ongoing symptoms, will continue to monitor hair thinning. It is bothersome to her but not bothersome enough that she would want to stop methotrexate or reduce dose.    CBC RESULTS:   Recent Labs   Lab Test 12/29/23  0955   WBC 6.1   RBC 3.79*   HGB 12.9   HCT 38.7   *   MCH 34.0*   MCHC 33.3   RDW 13.2         Liver Function Studies -   Recent Labs   Lab Test 12/29/23  0955 04/13/23  1531   PROTTOTAL  --  7.3   ALBUMIN  --  4.8   BILITOTAL  --  0.4   ALKPHOS 65 65   AST 36 27   ALT 41 21      Hair loss:  Finasteride 5 mg daily  Minoxidil foam     Patient had follow up with dermatology for hair loss. They suspect stress is a factor, restarted finasteride and increased dose to 5 mg daily. Does have minoxidil foam at home she will start using again as well. She has had significant recent stressors. Hair thinning did start before methotrexate but as noted previously has worsened since she has been on it. Dermatology is aware that she is on methotrexate.    Today's Vitals: no vitals taken today  ----------------    I spent 20 minutes with this patient today. All changes were made via collaborative practice agreement with Nura Duran MD. A copy of the visit note was provided to the patient's provider(s). Nataliia Sprague MD listed as next provider upon Dr. Ngo's departure.    A summary of these recommendations was discussed with patient.    Elvia Ko, PharmD  Medication Therapy Management Pharmacist  [facility]    Telemedicine Visit Details  Type of service:  Telephone visit  Start Time: 1330  End Time: 1350     Medication Therapy " Recommendations  Sarcoidosis    Current Medication: methotrexate 50 MG/2ML injection   Rationale: Dose too low - Dosage too low - Effectiveness   Recommendation: Increase Dose - Consider dose increase to 25 mg as previously planned. Note ongoing hair loss, which methotrexate may be contributing to. Will discuss with provider dose increase vs. alternate DMARD   Status: Contact Provider - Awaiting Response

## 2024-01-11 NOTE — Clinical Note
"1/11/2024       RE: Maritza Hitchcock  [address] [facility] [address]     Dear Colleague,    Thank you for referring your patient, Maritza Hitchcock, to the [facility] RHEUMATOLOGY CLINIC [place] at [facility]. Please see a copy of my visit note below.    Medication Therapy Management (MTM) Encounter    ASSESSMENT:                            Medication Adherence/Access: No issues reported.    Sarcoidosis: Had follow up with outside dermatology 1/10/2024 to discuss hair loss. ***    Hair loss: ***    PLAN:                            ***    Follow-up: ***    SUBJECTIVE/OBJECTIVE:                          Annabelle Hitchcock is a 68 year old female called for a follow-up visit from 1/5/2024.     Reason for visit: Follow up on hair loss after derm visit.    Allergies/ADRs: Reviewed in chart  Past Medical History: Reviewed in chart  Tobacco: She reports that she quit smoking about 47 years ago. Her smoking use included cigarettes. She started smoking about 48 years ago. She smoked an average of 0.5 packs per day. She has never been exposed to tobacco smoke. She has never used smokeless tobacco.  Alcohol: not assessed today    Medication Adherence/Access: No issues reported.    Sarcoidosis:  Methotrexate 20 mg (0.8 mL) under the skin every 7 days  Folic acid 1 mg daily  Acetaminophen 500-1000 mg every 6 hours as needed    Still has a bump on corner of eye, not as pronounced. Does have puffiness on upper corner of eyelid, eye looks \"dark\" or bruised underneath. ***    Hair loss:  Finasteride 5 mg daily  Minoxidil foam     Patient had follow up with dermatology for hair loss. It appears they suspect stress is a factor, restarted finasteride and increased to 5 mg daily. Does have minoxidil foam at home she will start using again as well. Derm thinks it is likely stress related. Also discussed hair goes in cycles and she may have noticed the shedding part of the cycle more " due to the stress. ***    Today's Vitals: no vitals taken today  ----------------    I spent *** minutes with this patient today. All changes were made via collaborative practice agreement with Nura Duran MD. A copy of the visit note was provided to the patient's provider(s). Nataliia Sprague MD listed as next provider upon Dr. Ngo's departure.    A summary of these recommendations was discussed with patient.    Elvia Ko, PharmD  Medication Therapy Management Pharmacist  [facility]    Telemedicine Visit Details  Type of service:  Telephone visit  Start Time: ***  End Time: ***     Medication Therapy Recommendations  No medication therapy recommendations to display      Again, thank you for allowing me to participate in the care of your patient.      Sincerely,    Elvia Ko, [facility]

## 2024-02-02 ENCOUNTER — TELEPHONE (OUTPATIENT)
Dept: RHEUMATOLOGY | Facility: CLINIC | Age: 69
End: 2024-02-02
Payer: MEDICARE

## 2024-02-02 DIAGNOSIS — E03.9 ACQUIRED HYPOTHYROIDISM: ICD-10-CM

## 2024-02-02 RX ORDER — LEVOTHYROXINE SODIUM 112 UG/1
112 TABLET ORAL DAILY
Qty: 90 TABLET | Refills: 3 | OUTPATIENT
Start: 2024-02-02

## 2024-02-02 NOTE — TELEPHONE ENCOUNTER
[facility] Call Center    Phone Message    May a detailed message be left on voicemail: yes     Reason for Call: Other: Pt is calling about changing her methotrexate dose. Pt is scheduled for next available with Dr Sprague in April and added to the wait list. Pt states that is too long to wait for dose change. Pt would like a nurse or a provider to call her to see what she can do in the meantime about changing her dose. Please call pt back at  [phone].       Action Taken: Message routed to:  Other: [facility]    Travel Screening: Not Applicable

## 2024-02-13 NOTE — TELEPHONE ENCOUNTER
"Patient still reporting \"bulging\" at top of left eye, right eye is improved but left eye is typically worse. Does tend to fluctuate day to day. Hoping to be seen as soon as possible to discuss methotrexate dose and for refills.  "

## 2024-02-26 ENCOUNTER — LAB (OUTPATIENT)
Dept: LAB | Facility: CLINIC | Age: 69
End: 2024-02-26
Payer: MEDICARE

## 2024-02-26 ENCOUNTER — OFFICE VISIT (OUTPATIENT)
Dept: RHEUMATOLOGY | Facility: CLINIC | Age: 69
End: 2024-02-26
Attending: INTERNAL MEDICINE
Payer: MEDICARE

## 2024-02-26 VITALS
HEART RATE: 70 BPM | WEIGHT: 178 LBS | DIASTOLIC BLOOD PRESSURE: 78 MMHG | BODY MASS INDEX: 30.08 KG/M2 | OXYGEN SATURATION: 97 % | SYSTOLIC BLOOD PRESSURE: 139 MMHG

## 2024-02-26 DIAGNOSIS — Z79.899 LONG-TERM USE OF HIGH-RISK MEDICATION: ICD-10-CM

## 2024-02-26 DIAGNOSIS — D86.9 SARCOIDOSIS: ICD-10-CM

## 2024-02-26 DIAGNOSIS — D86.9 SARCOIDOSIS: Primary | ICD-10-CM

## 2024-02-26 LAB — CK SERPL-CCNC: 35 U/L (ref 26–192)

## 2024-02-26 PROCEDURE — 82550 ASSAY OF CK (CPK): CPT

## 2024-02-26 PROCEDURE — 36415 COLL VENOUS BLD VENIPUNCTURE: CPT

## 2024-02-26 PROCEDURE — 99215 OFFICE O/P EST HI 40 MIN: CPT | Performed by: INTERNAL MEDICINE

## 2024-02-26 PROCEDURE — G0463 HOSPITAL OUTPT CLINIC VISIT: HCPCS | Performed by: INTERNAL MEDICINE

## 2024-02-26 RX ORDER — METHOTREXATE 25 MG/ML
25 INJECTION, SOLUTION INTRA-ARTERIAL; INTRAMUSCULAR; INTRAVENOUS
Qty: 4 ML | Refills: 2 | Status: SHIPPED | OUTPATIENT
Start: 2024-02-26 | End: 2024-04-17 | Stop reason: DRUGHIGH

## 2024-02-26 RX ORDER — FINASTERIDE 5 MG/1
5 TABLET, FILM COATED ORAL DAILY
COMMUNITY

## 2024-02-26 ASSESSMENT — PAIN SCALES - GENERAL: PAINLEVEL: NO PAIN (0)

## 2024-02-26 NOTE — LETTER
2/26/2024       RE: Maritza Hitchcock  [address] [facility] [address]     Dear Colleague,    Thank you for referring your patient, Maritza Hitchcock, to the [facility] RHEUMATOLOGY at [facility]. Please see a copy of my visit note below.                       2/26/2024    Chief Complaint/Reason for Visit: sarcoidosis      HPI:    Maritza Hitchcock is a 68 year old White female with past medical history listed below.  The patient reports feeling mostly well except that at times she notices swelling of her left eyelid, the same area where she had a biopsy taken of the lacrimal gland.  She states today she is feeling okay but there are days when her eyelid can be more swollen.  She is currently on subcutaneous methotrexate 20 mg/week along with folic acid 1 mg daily.  She mentioned a few weeks ago, she was having pain of her thigh muscles.  Of note she is also on a statin.  She was told to be off of statin at the time of her recent COVID infection which was about a month ago.  She does not have the muscle pain any longer and she is back on statin again.    REVIEW OF SYSTEMS    General -negative for fever  HEENT -negative for dry eyes, positive for dry mouth  Cardiovascular -negative for chest pain   Respiratory - negative for shortness of breath  Skin -negative for skin rashes  Neuro -negative for stroke or seizures      Past Medical History:   Diagnosis Date    Diabetes (H) 07/2021    Infection due to 2019 novel coronavirus 09/2022    Infection due to 2019 novel coronavirus 09/14/2023    MVP (mitral valve prolapse)     Premenopausal menorrhagia 11/18/2010    Sarcoidosis     Thyroid disease     Hypothyroidism     Past Surgical History:   Procedure Laterality Date    ABDOMEN SURGERY  1988    Tubal    CATARACT IOL, RT/LT      CHOLECYSTECTOMY      1987    COLONOSCOPY  ????    7/26/2007, 8/30/2017    DILATION AND CURETTAGE, HYSTEROSCOPY DIAGNOSTIC, COMBINED       2010    EYE SURGERY  ,     Left eye-Vitrectomy, Cataract    GALLBLADDER SURGERY  1987    GYN SURGERY  ????    Partial Hysterectomy    LAPAROSCOPIC HYSTERECTOMY SUPRACERVICAL  2010    MENISCECTOMY  2016    partial    ORBITOTOMY, RESECT TUMOR, COMBINED Left 2023    Procedure: Left lacrimal gland biopsy;  Surgeon: Autumn Padilla MD;  Location: [facility] OR    SOFT TISSUE SURGERY  ????    Right knee meniscus repair    VAGINAL DELIVERY      x3 no date     Family History   Problem Relation Age of Onset    Hypertension Mother         Takes two blood pressure pills per day    Hyperlipidemia Mother         3 stents    Osteoporosis Mother     Thyroid Disease Mother         Goiter operation in high school    Hearing Loss Mother     Rheumatoid Arthritis Mother     Coronary Artery Disease Father          -    Obesity Father     Hypertension Father     Glasses (<9 y/o) Father     Heart Disease Father     Obesity Paternal Grandmother         Overweight and heart disease    Glasses (<9 y/o) Paternal Grandmother          at age: Unknown    Anxiety Disorder Daughter         +Chicken pox    Glasses (<9 y/o) Daughter     Anxiety Disorder Son         Chicken pox    Glasses (<9 y/o) Son      Social History     Socioeconomic History    Marital status:    Tobacco Use    Smoking status: Former     Packs/day: 0.50     Years: 0.00     Additional pack years: 0.00     Total pack years: 0.00     Types: Cigarettes     Start date: 10/1/1975     Quit date: 1976     Years since quittin.7     Passive exposure: Never    Smokeless tobacco: Never   Vaping Use    Vaping Use: Never used   Substance and Sexual Activity    Alcohol use: Yes    Drug use: Never    Sexual activity: Yes     Partners: Male     Birth control/protection: Post-menopausal, Female Surgical   Other Topics Concern    Parent/sibling w/ CABG, MI or angioplasty before 65F 55M? Yes     Comment: Dad age 56     Social  Determinants of Health     Financial Resource Strain: Low Risk  (12/27/2023)    Financial Resource Strain     Within the past 12 months, have you or your family members you live with been unable to get utilities (heat, electricity) when it was really needed?: No   Food Insecurity: Low Risk  (12/27/2023)    Food Insecurity     Within the past 12 months, did you worry that your food would run out before you got money to buy more?: No     Within the past 12 months, did the food you bought just not last and you didn t have money to get more?: No   Transportation Needs: Low Risk  (12/27/2023)    Transportation Needs     Within the past 12 months, has lack of transportation kept you from medical appointments, getting your medicines, non-medical meetings or appointments, work, or from getting things that you need?: No   Interpersonal Safety: Low Risk  (11/8/2023)    Interpersonal Safety     Do you feel physically and emotionally safe where you currently live?: Yes     Within the past 12 months, have you been hit, slapped, kicked or otherwise physically hurt by someone?: No     Within the past 12 months, have you been humiliated or emotionally abused in other ways by your partner or ex-partner?: No   Housing Stability: Low Risk  (12/27/2023)    Housing Stability     Do you have housing? : Yes     Are you worried about losing your housing?: No       No Known Allergies    Current Outpatient Medications   Medication    acetaminophen (TYLENOL) 500 MG tablet    amitriptyline (ELAVIL) 50 MG tablet    Ascorbic Acid (VITAMIN C) 100 MG CHEW    atorvastatin (LIPITOR) 40 MG tablet    calcium citrate-vitamin D (CITRACAL) 315-250 MG-UNIT TABS per tablet    diphenhydrAMINE (BENADRYL) 25 MG tablet    Ferrous Sulfate (IRON) 325 (65 Fe) MG tablet    finasteride (PROSCAR) 5 MG tablet    folic acid (FOLVITE) 1 MG tablet    Krill Oil (OMEGA-3) 500 MG CAPS    levothyroxine (SYNTHROID/LEVOTHROID) 112 MCG tablet    magnesium oxide 400 MG CAPS     "methotrexate 50 MG/2ML injection    Misc Natural Products (NEURIVA) CAPS    Multiple Vitamin (ONE-A-DAY ESSENTIAL) TABS    omeprazole (PRILOSEC) 20 MG DR capsule    potassium 99 MG TABS    pseudoePHEDrine (SUDAFED) 120 MG 12 hr tablet    Syringe/Needle, Disp, 27G X 5/8\" 1 ML MISC     No current facility-administered medications for this visit.       PHYSICAL EXAM    LMP  (LMP Unknown)       General: Alert, No apparent distress   Psych: Affect euthymic  Eyes: Sclera noninjected  Ears, Nose, Throat, Mouth: Oral mucosa moist with normal salivary pool  Skin: No rashes noted  Cardiovascular: Regular rate and rhythm, Normal S1 and S2 and No murmurs, rubs or gallops  Respiratory: Clear to auscultation with no wheezing or crackles  Neuro: Normal gait and Able to arise from seated position unassisted  Musculoskeletal: Hands:  Normal.  Wrists:  Normal.  Elbows:  Normal.  Shoulders:  Normal.    LABS   Reviewed as below.     Dec 2023 and Jan 2024    UA neg for rbc's  Creatinine normal   CBC - Hb normal, normal wbc and platelets  LFT normal     April 2023  SSA, SSB, sm neg   ANCA neg   MARILEE pos, speckled, 1:320  C3, C4 normal   IgG4 normal   QTB NEG   HCV neg   HIV neg     2022  TSH normal     Flow Interpretation   A. Eye, Left, :  -Polytypic B cells  No aberrant immunophenotype on T cells  See comment   Electronically signed by Bobby Brennan MD on 5/8/2023 at 12:29 PM   Comment    There is no immunophenotypic evidence of non-Hodgkin lymphoma. Hodgkin lymphoma cannot be excluded by flow cytometry. T cell lymphomas cannot always be detected by this flow cytometry assay. Neoplastic cells, including large cells, may not survive specimen processing. This sample may not be representative. Final interpretation requires correlation with morphologic and clinical features.      Final Diagnosis   A-B.  SOFT TISSUE, LEFT LACRIMAL GLAND, BIOPSY:  -Nonnecrotizing granulomatous inflammation, see comment.  -GMS and AFB special stains are " negative for microorganisms (performed on blocks A1 and B1 with appropriate controls).  -Negative for malignancy.           ASSESSMENT      1. Sarcoidosis    2. Long-term use of high-risk medication      (D86.9) Sarcoidosis  (primary encounter diagnosis)  Comment: Her symptoms started in May 2023 with drooping of left eyelid and headache. CXR - calcified granuloma. MRI orbit - asymmetric enlargement and enhancement of the left lacrimal gland. CT - : Unilateral left-sided lacrimal gland slight enlargement with homogeneous enhancement. Left lacrimal gland bx - non necrotizing granulomatous inflammation. Currently on methotrexate (started Oct 2023)  subcutaneous 20 mg/week along with folic acid 1 mg daily.  Given her symptoms of left eyelid swelling, I think it is reasonable to increase her dose of methotrexate.  Plan:   Continue subcutaneous methotrexate, will increase to 25 mg/week   Continue folic acid 1 mg daily.   Continue to follow up with Ophthalmology.  Patient has appointment with Dr. Leyva tomorrow.  Orders Placed This Encounter   Procedures    CK total     (Z79.899) Long-term use of high-risk medication  Comment: Methotrexate  Plan: The adverse reactions of MTX was discussed which includes cytopenia, immunosuppression, infection, oversedation, pneumonitis, dizziness, nausea, stomach upset, risk of falls, seizures, and derangements in LFTs. Recommended against use of alcohol while on MTX. pt counseled on the adverse effects of Methotrexate including teratogenicity and need for reliable contraception, Alopecia, infection, liver dysfunction and myelosuppression, including the importance of taking daily Folic acid. Patient verbalized understanding and agrees to proceed.    RTC - 2 months     I spent 47 minutes on the date of the encounter doing chart review, history and exam, documentation and orders per the note.      SHARAN BACA MD    Division of Rheumatic & Autoimmune Diseases  [facility]  [facility]

## 2024-02-26 NOTE — NURSING NOTE
Chief Complaint   Patient presents with    Consult   /78 (BP Location: Left arm, Patient Position: Sitting, Cuff Size: Adult Large)   Pulse 70   Wt 80.7 kg (178 lb)   LMP  (LMP Unknown)   SpO2 97%   BMI 30.08 kg/m  Patience Flowers on 2/26/2024 at 12:56 PM

## 2024-02-26 NOTE — PROGRESS NOTES
2/26/2024    Chief Complaint/Reason for Visit: sarcoidosis      HPI:    Maritza GILL Wiant is a 68 year old White female with past medical history listed below.  The patient reports feeling mostly well except that at times she notices swelling of her left eyelid, the same area where she had a biopsy taken of the lacrimal gland.  She states today she is feeling okay but there are days when her eyelid can be more swollen.  She is currently on subcutaneous methotrexate 20 mg/week along with folic acid 1 mg daily.  She mentioned a few weeks ago, she was having pain of her thigh muscles.  Of note she is also on a statin.  She was told to be off of statin at the time of her recent COVID infection which was about a month ago.  She does not have the muscle pain any longer and she is back on statin again.    REVIEW OF SYSTEMS    General -negative for fever  HEENT -negative for dry eyes, positive for dry mouth  Cardiovascular -negative for chest pain   Respiratory - negative for shortness of breath  Skin -negative for skin rashes  Neuro -negative for stroke or seizures      Past Medical History:   Diagnosis Date    Diabetes (H) 07/2021    Infection due to 2019 novel coronavirus 09/2022    Infection due to 2019 novel coronavirus 09/14/2023    MVP (mitral valve prolapse)     Premenopausal menorrhagia 11/18/2010    Sarcoidosis     Thyroid disease     Hypothyroidism     Past Surgical History:   Procedure Laterality Date    ABDOMEN SURGERY  1988    Tubal    CATARACT IOL, RT/LT      CHOLECYSTECTOMY      1987    COLONOSCOPY  ????    7/26/2007, 8/30/2017    DILATION AND CURETTAGE, HYSTEROSCOPY DIAGNOSTIC, COMBINED      4/23/2010    EYE SURGERY  2021, 2022    Left eye-Vitrectomy, Cataract    GALLBLADDER SURGERY  02/11/1987    GYN SURGERY  ????    Partial Hysterectomy    LAPAROSCOPIC HYSTERECTOMY SUPRACERVICAL  11/19/2010    MENISCECTOMY  09/27/2016    partial    ORBITOTOMY, RESECT TUMOR, COMBINED Left 5/4/2023     Procedure: Left lacrimal gland biopsy;  Surgeon: Autumn Padilla MD;  Location: [facility] OR    SOFT TISSUE SURGERY  ????    Right knee meniscus repair    VAGINAL DELIVERY      x3 no date     Family History   Problem Relation Age of Onset    Hypertension Mother         Takes two blood pressure pills per day    Hyperlipidemia Mother         3 stents    Osteoporosis Mother     Thyroid Disease Mother         Goiter operation in high school    Hearing Loss Mother     Rheumatoid Arthritis Mother     Coronary Artery Disease Father          -    Obesity Father     Hypertension Father     Glasses (<9 y/o) Father     Heart Disease Father     Obesity Paternal Grandmother         Overweight and heart disease    Glasses (<9 y/o) Paternal Grandmother          at age: Unknown    Anxiety Disorder Daughter         +Chicken pox    Glasses (<9 y/o) Daughter     Anxiety Disorder Son         Chicken pox    Glasses (<9 y/o) Son      Social History     Socioeconomic History    Marital status:    Tobacco Use    Smoking status: Former     Packs/day: 0.50     Years: 0.00     Additional pack years: 0.00     Total pack years: 0.00     Types: Cigarettes     Start date: 10/1/1975     Quit date: 1976     Years since quittin.7     Passive exposure: Never    Smokeless tobacco: Never   Vaping Use    Vaping Use: Never used   Substance and Sexual Activity    Alcohol use: Yes    Drug use: Never    Sexual activity: Yes     Partners: Male     Birth control/protection: Post-menopausal, Female Surgical   Other Topics Concern    Parent/sibling w/ CABG, MI or angioplasty before 65F 55M? Yes     Comment: Dad age 56     Social Determinants of Health     Financial Resource Strain: Low Risk  (2023)    Financial Resource Strain     Within the past 12 months, have you or your family members you live with been unable to get utilities (heat, electricity) when it was really needed?: No   Food Insecurity: Low Risk  (2023)     "Food Insecurity     Within the past 12 months, did you worry that your food would run out before you got money to buy more?: No     Within the past 12 months, did the food you bought just not last and you didn t have money to get more?: No   Transportation Needs: Low Risk  (12/27/2023)    Transportation Needs     Within the past 12 months, has lack of transportation kept you from medical appointments, getting your medicines, non-medical meetings or appointments, work, or from getting things that you need?: No   Interpersonal Safety: Low Risk  (11/8/2023)    Interpersonal Safety     Do you feel physically and emotionally safe where you currently live?: Yes     Within the past 12 months, have you been hit, slapped, kicked or otherwise physically hurt by someone?: No     Within the past 12 months, have you been humiliated or emotionally abused in other ways by your partner or ex-partner?: No   Housing Stability: Low Risk  (12/27/2023)    Housing Stability     Do you have housing? : Yes     Are you worried about losing your housing?: No       No Known Allergies    Current Outpatient Medications   Medication    acetaminophen (TYLENOL) 500 MG tablet    amitriptyline (ELAVIL) 50 MG tablet    Ascorbic Acid (VITAMIN C) 100 MG CHEW    atorvastatin (LIPITOR) 40 MG tablet    calcium citrate-vitamin D (CITRACAL) 315-250 MG-UNIT TABS per tablet    diphenhydrAMINE (BENADRYL) 25 MG tablet    Ferrous Sulfate (IRON) 325 (65 Fe) MG tablet    finasteride (PROSCAR) 5 MG tablet    folic acid (FOLVITE) 1 MG tablet    Krill Oil (OMEGA-3) 500 MG CAPS    levothyroxine (SYNTHROID/LEVOTHROID) 112 MCG tablet    magnesium oxide 400 MG CAPS    methotrexate 50 MG/2ML injection    Misc Natural Products (NEURIVA) CAPS    Multiple Vitamin (ONE-A-DAY ESSENTIAL) TABS    omeprazole (PRILOSEC) 20 MG DR capsule    potassium 99 MG TABS    pseudoePHEDrine (SUDAFED) 120 MG 12 hr tablet    Syringe/Needle, Disp, 27G X 5/8\" 1 ML MISC     No current " facility-administered medications for this visit.       PHYSICAL EXAM    LMP  (LMP Unknown)       General: Alert, No apparent distress   Psych: Affect euthymic  Eyes: Sclera noninjected  Ears, Nose, Throat, Mouth: Oral mucosa moist with normal salivary pool  Skin: No rashes noted  Cardiovascular: Regular rate and rhythm, Normal S1 and S2 and No murmurs, rubs or gallops  Respiratory: Clear to auscultation with no wheezing or crackles  Neuro: Normal gait and Able to arise from seated position unassisted  Musculoskeletal: Hands:  Normal.  Wrists:  Normal.  Elbows:  Normal.  Shoulders:  Normal.    LABS   Reviewed as below.     Dec 2023 and Jan 2024    UA neg for rbc's  Creatinine normal   CBC - Hb normal, normal wbc and platelets  LFT normal     April 2023  SSA, SSB, sm neg   ANCA neg   MARILEE pos, speckled, 1:320  C3, C4 normal   IgG4 normal   QTB NEG   HCV neg   HIV neg     2022  TSH normal     Flow Interpretation   A. Eye, Left, :  -Polytypic B cells  No aberrant immunophenotype on T cells  See comment   Electronically signed by Bobby Brennan MD on 5/8/2023 at 12:29 PM   Comment    There is no immunophenotypic evidence of non-Hodgkin lymphoma. Hodgkin lymphoma cannot be excluded by flow cytometry. T cell lymphomas cannot always be detected by this flow cytometry assay. Neoplastic cells, including large cells, may not survive specimen processing. This sample may not be representative. Final interpretation requires correlation with morphologic and clinical features.      Final Diagnosis   A-B.  SOFT TISSUE, LEFT LACRIMAL GLAND, BIOPSY:  -Nonnecrotizing granulomatous inflammation, see comment.  -GMS and AFB special stains are negative for microorganisms (performed on blocks A1 and B1 with appropriate controls).  -Negative for malignancy.           ASSESSMENT      1. Sarcoidosis    2. Long-term use of high-risk medication      (D86.9) Sarcoidosis  (primary encounter diagnosis)  Comment: Her symptoms started in May 2023  with drooping of left eyelid and headache. CXR - calcified granuloma. MRI orbit - asymmetric enlargement and enhancement of the left lacrimal gland. CT - : Unilateral left-sided lacrimal gland slight enlargement with homogeneous enhancement. Left lacrimal gland bx - non necrotizing granulomatous inflammation. Currently on methotrexate (started Oct 2023)  subcutaneous 20 mg/week along with folic acid 1 mg daily.  Given her symptoms of left eyelid swelling, I think it is reasonable to increase her dose of methotrexate.  Plan:   Continue subcutaneous methotrexate, will increase to 25 mg/week   Continue folic acid 1 mg daily.   Continue to follow up with Ophthalmology.  Patient has appointment with Dr. Leyva tomorrow.  Orders Placed This Encounter   Procedures    CK total     (Z79.899) Long-term use of high-risk medication  Comment: Methotrexate  Plan: The adverse reactions of MTX was discussed which includes cytopenia, immunosuppression, infection, oversedation, pneumonitis, dizziness, nausea, stomach upset, risk of falls, seizures, and derangements in LFTs. Recommended against use of alcohol while on MTX. pt counseled on the adverse effects of Methotrexate including teratogenicity and need for reliable contraception, Alopecia, infection, liver dysfunction and myelosuppression, including the importance of taking daily Folic acid. Patient verbalized understanding and agrees to proceed.    RTC - 2 months     I spent 47 minutes on the date of the encounter doing chart review, history and exam, documentation and orders per the note.      SHARAN BACA MD    Division of Rheumatic & Autoimmune Diseases  [facility]

## 2024-02-27 ENCOUNTER — OFFICE VISIT (OUTPATIENT)
Dept: OPHTHALMOLOGY | Facility: CLINIC | Age: 69
End: 2024-02-27
Payer: MEDICARE

## 2024-02-27 DIAGNOSIS — H04.002 LACRIMAL GLAND INFLAMMATION, LEFT: Primary | ICD-10-CM

## 2024-02-27 DIAGNOSIS — H05.10 ORBITAL INFLAMMATION: ICD-10-CM

## 2024-02-27 PROCEDURE — 99213 OFFICE O/P EST LOW 20 MIN: CPT | Mod: GC | Performed by: OPHTHALMOLOGY

## 2024-02-27 ASSESSMENT — EXTERNAL EXAM - LEFT EYE: OS_EXAM: NORMAL

## 2024-02-27 ASSESSMENT — VISUAL ACUITY
OD_PH_CC: 20/25
OD_CC: 20/30
CORRECTION_TYPE: GLASSES
METHOD: SNELLEN - LINEAR
OS_CC: 20/25
OS_CC+: -2
OS_PH_CC: 20/25
OD_CC+: -2

## 2024-02-27 ASSESSMENT — CONF VISUAL FIELD
OD_NORMAL: 1
OD_INFERIOR_TEMPORAL_RESTRICTION: 0
METHOD: COUNTING FINGERS
OD_SUPERIOR_NASAL_RESTRICTION: 0
OS_SUPERIOR_TEMPORAL_RESTRICTION: 3
OD_INFERIOR_NASAL_RESTRICTION: 0
OD_SUPERIOR_TEMPORAL_RESTRICTION: 0

## 2024-02-27 ASSESSMENT — REFRACTION_WEARINGRX
OS_CYLINDER: +0.25
OD_AXIS: 149
OS_AXIS: 071
OD_ADD: +2.75
OS_SPHERE: +0.25
OD_CYLINDER: +1.50
OD_SPHERE: -0.25

## 2024-02-27 ASSESSMENT — TONOMETRY
OD_IOP_MMHG: 17
IOP_METHOD: ICARE
OS_IOP_MMHG: 18

## 2024-02-27 ASSESSMENT — EXTERNAL EXAM - RIGHT EYE: OD_EXAM: NORMAL

## 2024-02-27 ASSESSMENT — SLIT LAMP EXAM - LIDS: COMMENTS: DERMATOCHALASIS

## 2024-02-27 NOTE — PROGRESS NOTES
HPI       Follow Up    In left eye.  This started 1.  Severity is mild.  Since onset it is stable.  Associated symptoms include eye pain.  Negative for redness.  Treatments tried include no treatments.  Response to treatment was no improvement.             Comments    Met new rheumatologist yesterday, Dr. Sprague and they upped her methotrexate dose to 1.0 injectable.  Intermittently will have more left upper lid swelling but within a few days will go back down.  Occasionally will be tender to palpation.  Denies redness   Denies blurred vision.      Hien Mesa on 2/27/2024 at 10:28 AM            Last edited by Hien Mesa on 2/27/2024 10:29 AM.          LESTER has had on and off swelling without pain or headache. No increase in frequency of episodes of swelling/fullness than prior. Does not occur everyday.     Assessment & Plan     Maritza Hitchcock is a 68 year old female with the following diagnoses:   Encounter Diagnoses   Name Primary?    Lacrimal gland inflammation, left Yes    Orbital inflammation         - Improved LESTER edema today and noticing some pain. There is mild lateral lid edema/nodule that is painless possible scar rather than inflammatory reaction.   - No evidence of intraocular inflammation      Symptoms remain dramatically improved compared to presentation, but having recurrent lateral orbital inflammation on the left. She is on subcutaneous methotrexate that has recently been increased. Over all improved/stable, will defer repeat injection today.      Enlarged preauricular node: has seen Dr. Diallo Graves (ENT) and biopsy had been performed in the past with benign pathology (fat necrosis). Seen by ENT again after our last visit who wanted to monitor with imaging. CT 11/10/23 reported stable size and no concerning findings.     Jorge Douglas MD  Resident Physician, PGY-2  Department of Ophthalmology    Patient disposition:   Return for Schedule MRI .     Trying to arrange follow  up with her ENT, they did obtain a follow up CT scan for her parotid.  Agree with above, overall doing better, but still has upper lid fullness and mild hypoglobus on the left, and a tender nodule lateral to the lateral orbital rim. Her Methotrexate dose was just increased which I agree with.     Will obtain new baseline MRI. We discussed we could consider excisional biopsy of the nodule that she is most symptomatic of. I will call her after her MRI to see if she would like to proceed with that. If she decides to proceed, can inject intralesional steroid at the same time.     Addendum: Discussed MRI result with Annabelle by phone. Significant resolution of the left orbit mass. We discussed options of observation vs excision of the nodule along the lateral orbital rim. She is minimally symptomatic so she decided to observe at this time.     Follow-up 6 months. No imaging unless more symptomatic.      Attending Physician Attestation: Complete documentation of historical and exam elements from today's encounter can be found in the full encounter summary report (not reduplicated in this progress note). I personally obtained the chief complaint(s) and history of present illness. I confirmed and edited as necessary the review of systems, past medical/surgical history, family history, social history, and examination findings as documented by others; and I examined the patient myself. I personally reviewed the relevant tests, images, and reports as documented above. I formulated and edited as necessary the assessment and plan and discussed the findings and management plan with the patient.  -Autumn Padilla MD

## 2024-02-27 NOTE — NURSING NOTE
Chief Complaints and History of Present Illnesses   Patient presents with    Follow Up     Chief Complaint(s) and History of Present Illness(es)       Follow Up              Laterality: left eye    Onset: 1    Severity: mild    Course: stable    Associated symptoms: eye pain.  Negative for redness    Treatments tried: no treatments    Response to treatment: no improvement              Comments    Met new rheumatologist yesterday, Dr. Sprague and they upped her methotrexate dose to 1.0 injectable.  Intermittently will have more left upper lid swelling but within a few days will go back down.  Occasionally will be tender to palpation.  Denies redness   Denies blurred vision.      Hien Mesa on 2/27/2024 at 10:28 AM

## 2024-02-27 NOTE — LETTER
2024         RE:  :  MRN: Maritza Hitchcock  1955  [id number]     Dear Dr. Sprague,    Your patient, Maritza Hitchcock, returned for oculoplastic follow up. My assessment and plan are below.  For further details, please see my attached clinic note.      HPI       Follow Up    In left eye.  This started 1.  Severity is mild.  Since onset it is stable.  Associated symptoms include eye pain.  Negative for redness.  Treatments tried include no treatments.  Response to treatment was no improvement.             Comments    Met new rheumatologist yesterday, Dr. Sprague and they upped her methotrexate dose to 1.0 injectable.  Intermittently will have more left upper lid swelling but within a few days will go back down.  Occasionally will be tender to palpation.  Denies redness   Denies blurred vision.      Hien Mesa on 2024 at 10:28 AM            Last edited by Hien Mesa on 2024 10:29 AM.          LESTER has had on and off swelling without pain or headache. No increase in frequency of episodes of swelling/fullness than prior. Does not occur everyday.     Assessment & Plan     Maritza Hitchcock is a 68 year old female with the following diagnoses:   Encounter Diagnoses   Name Primary?    Lacrimal gland inflammation, left Yes    Orbital inflammation         - Improved LESTER edema today and noticing some pain. There is mild lateral lid edema/nodule that is painless possible scar rather than inflammatory reaction.   - No evidence of intraocular inflammation      Symptoms remain dramatically improved compared to presentation, but having recurrent lateral orbital inflammation on the left. She is on subcutaneous methotrexate that has recently been increased. Over all improved/stable, will defer repeat injection today.      Enlarged preauricular node: has seen Dr. Diallo Graves (ENT) and biopsy had been performed in the past with benign pathology (fat necrosis). Seen by ENT again after our last visit who  wanted to monitor with imaging. CT 11/10/23 reported stable size and no concerning findings.     Jorge Douglas MD  Resident Physician, PGY-2  Department of Ophthalmology    Patient disposition:   Return for Schedule MRI .     Trying to arrange follow up with her ENT, they did obtain a follow up CT scan for her parotid.  Agree with above, overall doing better, but still has upper lid fullness and mild hypoglobus on the left, and a tender nodule lateral to the lateral orbital rim. Her Methotrexate dose was just increased which I agree with.     Will obtain new baseline MRI. We discussed we could consider excisional biopsy of the nodule that she is most symptomatic of. I will call her after her MRI to see if she would like to proceed with that. If she decides to proceed, can inject intralesional steroid at the same time.         Again, thank you for allowing me to participate in the care of your patient.      Sincerely,    Autumn Padilla MD  Department of Ophthalmology and Visual Neurosciences  [facility]      CC: Nataliia Sprague MD  [address], [place] [address] [place] [id number]  Via In Basket

## 2024-03-06 ENCOUNTER — HOSPITAL ENCOUNTER (OUTPATIENT)
Dept: MRI IMAGING | Facility: CLINIC | Age: 69
Discharge: HOME | End: 2024-03-06
Attending: OPHTHALMOLOGY | Admitting: OPHTHALMOLOGY
Payer: MEDICARE

## 2024-03-06 DIAGNOSIS — H05.10 ORBITAL INFLAMMATION: ICD-10-CM

## 2024-03-06 DIAGNOSIS — H04.002 LACRIMAL GLAND INFLAMMATION, LEFT: ICD-10-CM

## 2024-03-06 PROCEDURE — 255N000002 HC RX 255 OP 636: Performed by: OPHTHALMOLOGY

## 2024-03-06 PROCEDURE — 70543 MRI ORBT/FAC/NCK W/O &W/DYE: CPT | Mod: MG

## 2024-03-06 PROCEDURE — A9585 GADOBUTROL INJECTION: HCPCS | Performed by: OPHTHALMOLOGY

## 2024-03-06 RX ORDER — GADOBUTROL 604.72 MG/ML
8 INJECTION INTRAVENOUS ONCE
Status: COMPLETED | OUTPATIENT
Start: 2024-03-06 | End: 2024-03-06

## 2024-03-06 RX ADMIN — GADOBUTROL 8 ML: 604.72 INJECTION INTRAVENOUS at 13:48

## 2024-03-25 ENCOUNTER — TELEPHONE (OUTPATIENT)
Dept: OPHTHALMOLOGY | Facility: CLINIC | Age: 69
End: 2024-03-25
Payer: MEDICARE

## 2024-03-25 NOTE — TELEPHONE ENCOUNTER
LVM for patient regarding scheduling a Return in about 6 months for follow up with . Provided [facility] and direct number for scheduling and sent a Recall Letter as well.

## 2024-03-26 ENCOUNTER — VIRTUAL VISIT (OUTPATIENT)
Dept: RHEUMATOLOGY | Facility: CLINIC | Age: 69
End: 2024-03-26
Attending: INTERNAL MEDICINE
Payer: MEDICARE

## 2024-03-26 DIAGNOSIS — D86.9 SARCOIDOSIS: Primary | ICD-10-CM

## 2024-03-26 DIAGNOSIS — L65.9 HAIR LOSS: ICD-10-CM

## 2024-03-26 NOTE — Clinical Note
Patient stable since increased methotrexate dose, no side effects or worsening of hair thinning. Had MRI 3/6 from ophthalmology and reports that showed significant improvement. Do you want her to to have labs done now (4 weeks after dose increase) or okay to wait until your visit with her on 4/29? Let me know, thanks!

## 2024-03-26 NOTE — PATIENT INSTRUCTIONS
"Recommendations from today's MTM visit:                                                      Continue methotrexate 25 mg weekly and folic acid 1 mg daily. Will discuss lab update with provider.    Follow-up: Following provider visit 4/29 for changes. Will confirm with provider if okay to wait until then for lab update.    It was great speaking with you today.  I value your experience and would be very thankful for your time in providing feedback in our clinic survey. In the next few days, you may receive an email or text message from [facility] with a link to a survey related to your  clinical pharmacist.\"     To schedule another MTM appointment, please call the clinic directly or you may call the MTM scheduling line at [phone] or toll-free at [phone].     My Clinical Pharmacist's contact information:                                                      Please feel free to contact me with any questions or concerns you have.      Elvia Ko, PharmD  Medication Therapy Management Pharmacist  [facility]    "

## 2024-03-26 NOTE — PROGRESS NOTES
Medication Therapy Management (MTM) Encounter    ASSESSMENT:                            Medication Adherence/Access: No issues reported.    Sarcoidosis: Improved MRI on current methotrexate dose and patient is tolerating without significant side effects. Recommended pinching skin to inject with 8 mm needle at 90 degree angle. Due for updated labs, given lab stability with past dose increases could consider having these done at 4/29 visit with provider.    Hair loss: Hair loss has stabilized, no worsening on increased methotrexate dose and finasteride now seems to be helping. No changes needed at this time.    PLAN:                            Continue methotrexate 25 mg weekly and folic acid 1 mg daily. Will discuss lab update with provider.    Follow-up: Following provider visit 4/29 for changes. Will confirm with provider if okay to wait until then for lab update.    SUBJECTIVE/OBJECTIVE:                          Annabelle Hitchcock is a 68 year old female called for a follow-up visit from 1/11/24.       Reason for visit: Follow up on increased methotrexate dose, update labs.    Allergies/ADRs: Reviewed in chart  Past Medical History: Reviewed in chart  Tobacco: She reports that she quit smoking about 47 years ago. Her smoking use included cigarettes. She started smoking about 48 years ago. She smoked an average of 0.5 packs per day. She has never been exposed to tobacco smoke. She has never used smokeless tobacco.  Alcohol: not assessed today    Medication Adherence/Access: No issues reported.    Sarcoidosis:  Methotrexate 25 mg under the skin every 7 days  Folic acid 1 mg daily  Acetaminophen 500-1000 mg every 6 hours as needed    Patient repots increased methotrexate dose is going okay, no notable side effects or worsening of hair thinning. Currently has 1 mL syringes with needles 31 G 8 mm. Wondering if she can inject these at a 90 degree vs 45 degree angle as it is harder to do a 45 degree angle with her shorter  needle. She is unsure how much benefit she has had on higher dose but did recently have an MRI done 3/6/24 which was much improved from prior. She will follow up with them in 6 months.    Hair loss:  Finasteride 5 mg daily  Minoxidil foam    She does think the increased finasteride dose is helping, still losing a little bit of hair but not as much as she was previously. No worsening with increased methotrexate dose.    Today's Vitals: no vitals taken today  ----------------    I spent 20 minutes with this patient today. All changes were made via collaborative practice agreement with Nataliia Sprague MD. A copy of the visit note was provided to the patient's provider(s).    A summary of these recommendations was discussed with patient.    Elvia Ko, PharmD  Medication Therapy Management Pharmacist  [facility]    Telemedicine Visit Details  Type of service:  Telephone visit  Start Time: 1100  End Time: 1120     Medication Therapy Recommendations  No medication therapy recommendations to display

## 2024-03-26 NOTE — LETTER
3/26/2024       RE: Maritza Hitchcock  [address] [facility] [address]     Dear Colleague,    Thank you for referring your patient, Maritza Hitchcock, to the [facility] [place] [facility]. Please see a copy of my visit note below.    Medication Therapy Management (MTM) Encounter    ASSESSMENT:                            Medication Adherence/Access: No issues reported.    Sarcoidosis: Improved MRI on current methotrexate dose and patient is tolerating without significant side effects. Recommended pinching skin to inject with 8 mm needle at 90 degree angle. Due for updated labs, given lab stability with past dose increases could consider having these done at 4/29 visit with provider.    Hair loss: Hair loss has stabilized, no worsening on increased methotrexate dose and finasteride now seems to be helping. No changes needed at this time.    PLAN:                            Continue methotrexate 25 mg weekly and folic acid 1 mg daily. Will discuss lab update with provider.    Follow-up: Following provider visit 4/29 for changes. Will confirm with provider if okay to wait until then for lab update.    SUBJECTIVE/OBJECTIVE:                          Annabelle Hitchcock is a 68 year old female called for a follow-up visit from 1/11/24.       Reason for visit: Follow up on increased methotrexate dose, update labs.    Allergies/ADRs: Reviewed in chart  Past Medical History: Reviewed in chart  Tobacco: She reports that she quit smoking about 47 years ago. Her smoking use included cigarettes. She started smoking about 48 years ago. She smoked an average of 0.5 packs per day. She has never been exposed to tobacco smoke. She has never used smokeless tobacco.  Alcohol: not assessed today    Medication Adherence/Access: No issues reported.    Sarcoidosis:  Methotrexate 25 mg under the skin every 7 days  Folic acid 1 mg daily  Acetaminophen 500-1000 mg every 6  hours as needed    Patient repots increased methotrexate dose is going okay, no notable side effects or worsening of hair thinning. Currently has 1 mL syringes with needles 31 G 8 mm. Wondering if she can inject these at a 90 degree vs 45 degree angle as it is harder to do a 45 degree angle with her shorter needle. She is unsure how much benefit she has had on higher dose but did recently have an MRI done 3/6/24 which was much improved from prior. She will follow up with them in 6 months.    Hair loss:  Finasteride 5 mg daily  Minoxidil foam    She does think the increased finasteride dose is helping, still losing a little bit of hair but not as much as she was previously. No worsening with increased methotrexate dose.    Today's Vitals: no vitals taken today  ----------------    I spent 20 minutes with this patient today. All changes were made via collaborative practice agreement with Nataliia Sprague MD. A copy of the visit note was provided to the patient's provider(s).    A summary of these recommendations was discussed with patient.    Elvia Ko, PharmD  Medication Therapy Management Pharmacist  [facility]    Telemedicine Visit Details  Type of service:  Telephone visit  Start Time: 1100  End Time: 1120     Medication Therapy Recommendations  No medication therapy recommendations to display

## 2024-03-27 ENCOUNTER — MYC MEDICAL ADVICE (OUTPATIENT)
Dept: RHEUMATOLOGY | Facility: CLINIC | Age: 69
End: 2024-03-27
Payer: MEDICARE

## 2024-03-27 DIAGNOSIS — D86.9 SARCOIDOSIS: Primary | ICD-10-CM

## 2024-03-28 ENCOUNTER — TELEPHONE (OUTPATIENT)
Dept: OPHTHALMOLOGY | Facility: CLINIC | Age: 69
End: 2024-03-28
Payer: MEDICARE

## 2024-03-28 NOTE — TELEPHONE ENCOUNTER
Patient called regarding scheduling a Return in about 6 months for follow up with . Scheduled patient accordingly [facility]

## 2024-03-29 ENCOUNTER — LAB (OUTPATIENT)
Dept: LAB | Facility: CLINIC | Age: 69
End: 2024-03-29
Payer: MEDICARE

## 2024-03-29 DIAGNOSIS — D86.9 SARCOIDOSIS: ICD-10-CM

## 2024-03-29 LAB
ALBUMIN SERPL BCG-MCNC: 4.5 G/DL (ref 3.5–5.2)
ALP SERPL-CCNC: 68 U/L (ref 40–150)
ALT SERPL W P-5'-P-CCNC: 82 U/L (ref 0–50)
ANION GAP SERPL CALCULATED.3IONS-SCNC: 10 MMOL/L (ref 7–15)
AST SERPL W P-5'-P-CCNC: 72 U/L (ref 0–45)
BASOPHILS # BLD AUTO: 0 10E3/UL (ref 0–0.2)
BASOPHILS NFR BLD AUTO: 1 %
BILIRUB SERPL-MCNC: 0.4 MG/DL
BUN SERPL-MCNC: 21.5 MG/DL (ref 8–23)
CALCIUM SERPL-MCNC: 9.6 MG/DL (ref 8.8–10.2)
CHLORIDE SERPL-SCNC: 103 MMOL/L (ref 98–107)
CREAT SERPL-MCNC: 0.74 MG/DL (ref 0.51–0.95)
DEPRECATED HCO3 PLAS-SCNC: 27 MMOL/L (ref 22–29)
EGFRCR SERPLBLD CKD-EPI 2021: 89 ML/MIN/1.73M2
EOSINOPHIL # BLD AUTO: 0.2 10E3/UL (ref 0–0.7)
EOSINOPHIL NFR BLD AUTO: 3 %
ERYTHROCYTE [DISTWIDTH] IN BLOOD BY AUTOMATED COUNT: 13.2 % (ref 10–15)
GLUCOSE SERPL-MCNC: 120 MG/DL (ref 70–99)
HCT VFR BLD AUTO: 38.1 % (ref 35–47)
HGB BLD-MCNC: 12.7 G/DL (ref 11.7–15.7)
IMM GRANULOCYTES # BLD: 0 10E3/UL
IMM GRANULOCYTES NFR BLD: 0 %
LYMPHOCYTES # BLD AUTO: 1.5 10E3/UL (ref 0.8–5.3)
LYMPHOCYTES NFR BLD AUTO: 26 %
MCH RBC QN AUTO: 34.4 PG (ref 26.5–33)
MCHC RBC AUTO-ENTMCNC: 33.3 G/DL (ref 31.5–36.5)
MCV RBC AUTO: 103 FL (ref 78–100)
MONOCYTES # BLD AUTO: 0.6 10E3/UL (ref 0–1.3)
MONOCYTES NFR BLD AUTO: 10 %
NEUTROPHILS # BLD AUTO: 3.5 10E3/UL (ref 1.6–8.3)
NEUTROPHILS NFR BLD AUTO: 60 %
PLATELET # BLD AUTO: 193 10E3/UL (ref 150–450)
POTASSIUM SERPL-SCNC: 4.1 MMOL/L (ref 3.4–5.3)
PROT SERPL-MCNC: 6.8 G/DL (ref 6.4–8.3)
RBC # BLD AUTO: 3.69 10E6/UL (ref 3.8–5.2)
SODIUM SERPL-SCNC: 140 MMOL/L (ref 135–145)
WBC # BLD AUTO: 5.8 10E3/UL (ref 4–11)

## 2024-03-29 PROCEDURE — 80053 COMPREHEN METABOLIC PANEL: CPT

## 2024-03-29 PROCEDURE — 85025 COMPLETE CBC W/AUTO DIFF WBC: CPT | Mod: QW

## 2024-03-29 PROCEDURE — 36415 COLL VENOUS BLD VENIPUNCTURE: CPT

## 2024-03-29 RX ORDER — FOLIC ACID 1 MG/1
2 TABLET ORAL DAILY
Qty: 60 TABLET | Refills: 3 | Status: SHIPPED | OUTPATIENT
Start: 2024-03-29 | End: 2024-07-11

## 2024-03-29 NOTE — TELEPHONE ENCOUNTER
Discussed increasing folic acid to 2 mg per provider given elevated MCV/MCH, she does have folic acid in folate supplement (800 mcg daily) and MVI (400 mcg daily) so advised continuing these as is in addition to the dose increase. Repeat labs at 4/29 visit with provider.

## 2024-04-01 ENCOUNTER — TELEPHONE (OUTPATIENT)
Dept: RHEUMATOLOGY | Facility: CLINIC | Age: 69
End: 2024-04-01
Payer: MEDICARE

## 2024-04-01 DIAGNOSIS — D86.9 SARCOIDOSIS: Primary | ICD-10-CM

## 2024-04-01 NOTE — TELEPHONE ENCOUNTER
Noted elevation of LFT.   Recommend reducing dose to subcutaneous methotrexate 20 mg/week and recheck LFT in 2 weeks. If there's still elevation of methotrexate, we will have to consider stopping and switching to an alternate agent.     Elvia, could you kindly discuss this plan with the patient? Thanks.     Orders Placed This Encounter   Procedures    Comprehensive metabolic panel

## 2024-04-03 ENCOUNTER — OFFICE VISIT (OUTPATIENT)
Dept: FAMILY MEDICINE | Facility: CLINIC | Age: 69
End: 2024-04-03
Payer: MEDICARE

## 2024-04-03 VITALS
HEART RATE: 77 BPM | BODY MASS INDEX: 28.82 KG/M2 | HEIGHT: 65 IN | WEIGHT: 173 LBS | TEMPERATURE: 97.8 F | SYSTOLIC BLOOD PRESSURE: 104 MMHG | OXYGEN SATURATION: 98 % | RESPIRATION RATE: 16 BRPM | DIASTOLIC BLOOD PRESSURE: 64 MMHG

## 2024-04-03 DIAGNOSIS — E11.9 TYPE 2 DIABETES MELLITUS WITHOUT COMPLICATION, WITHOUT LONG-TERM CURRENT USE OF INSULIN (H): Primary | ICD-10-CM

## 2024-04-03 DIAGNOSIS — D86.9 SARCOIDOSIS: ICD-10-CM

## 2024-04-03 DIAGNOSIS — H53.482 VISUAL FIELD CONSTRICTION OF LEFT EYE: ICD-10-CM

## 2024-04-03 DIAGNOSIS — E21.5 PARATHYROID ABNORMALITY (H): ICD-10-CM

## 2024-04-03 PROCEDURE — 99213 OFFICE O/P EST LOW 20 MIN: CPT | Performed by: FAMILY MEDICINE

## 2024-04-03 PROCEDURE — G2211 COMPLEX E/M VISIT ADD ON: HCPCS | Performed by: FAMILY MEDICINE

## 2024-04-03 NOTE — PROGRESS NOTES
Assessment & Plan   Problem List Items Addressed This Visit       Type 2 diabetes mellitus without complication, without long-term current use of insulin (H) - Primary    Relevant Orders    HEMOGLOBIN A1C    Lipid panel reflex to direct LDL Non-fasting    Sarcoidosis    Parathyroid abnormality (H24)     Other Visit Diagnoses       Visual field constriction of left eye               Sarcoidosis patient continues to work with rheumatology trying to get her injectable methotrexate to the correct dose    Left eye visual field is being affected by the droopiness of the upper lid.  She also has facial asymmetry now following the biopsy of her left upper lacrimal gland.  Would like her to follow-up with ophthalmology to see if they can restore her facial symmetry and lift the left eyelid so her visual field is no longer obstructed.    Diabetes mellitus will get an updated hemoglobin A1c and a lipid panel.  Should be able to add them onto labs that were drawn 4 days ago for rheumatology    Parathyroid abnormality PTH was checked within the past year and was normal.     The longitudinal plan of care for the diagnosis(es)/condition(s) as documented were addressed during this visit. Due to the added complexity in care, I will continue to support Annabelle in the subsequent management and with ongoing continuity of care.                   Lori Alanis is a 68 year old, presenting for the following health issues:  Diabetes (Pt is fasting. Pt here to follow up blood sugar levels. )        4/3/2024    11:19 AM   Additional Questions   Roomed by Faviola     History of Present Illness       Reason for visit:  Follow up sugar levels    She eats 0-1 servings of fruits and vegetables daily.She consumes 0 sweetened beverage(s) daily.She exercises with enough effort to increase her heart rate 9 or less minutes per day.  She exercises with enough effort to increase her heart rate 3 or less days per week.   She is taking medications  "regularly.                     Objective    /64 (BP Location: Right arm, Patient Position: Sitting)   Pulse 77   Temp 97.8  F (36.6  C) (Tympanic)   Resp 16   Ht 1.638 m (5' 4.5\")   Wt 78.5 kg (173 lb)   LMP  (LMP Unknown)   SpO2 98%   BMI 29.24 kg/m    Body mass index is 29.24 kg/m .  Physical Exam               Signed Electronically by: Irena Levi MD    "

## 2024-04-15 ENCOUNTER — LAB (OUTPATIENT)
Dept: LAB | Facility: CLINIC | Age: 69
End: 2024-04-15
Payer: MEDICARE

## 2024-04-15 DIAGNOSIS — E11.9 TYPE 2 DIABETES MELLITUS WITHOUT COMPLICATION, WITHOUT LONG-TERM CURRENT USE OF INSULIN (H): ICD-10-CM

## 2024-04-15 DIAGNOSIS — D86.9 SARCOIDOSIS: ICD-10-CM

## 2024-04-15 PROCEDURE — 80053 COMPREHEN METABOLIC PANEL: CPT

## 2024-04-15 PROCEDURE — 36415 COLL VENOUS BLD VENIPUNCTURE: CPT

## 2024-04-16 ENCOUNTER — APPOINTMENT (OUTPATIENT)
Dept: LAB | Facility: CLINIC | Age: 69
End: 2024-04-16
Payer: MEDICARE

## 2024-04-16 LAB
ALBUMIN SERPL BCG-MCNC: 4.5 G/DL (ref 3.5–5.2)
ALP SERPL-CCNC: 74 U/L (ref 40–150)
ALT SERPL W P-5'-P-CCNC: 55 U/L (ref 0–50)
ANION GAP SERPL CALCULATED.3IONS-SCNC: 13 MMOL/L (ref 7–15)
AST SERPL W P-5'-P-CCNC: 45 U/L (ref 0–45)
BILIRUB SERPL-MCNC: 0.4 MG/DL
BUN SERPL-MCNC: 15 MG/DL (ref 8–23)
CALCIUM SERPL-MCNC: 9.3 MG/DL (ref 8.8–10.2)
CHLORIDE SERPL-SCNC: 104 MMOL/L (ref 98–107)
CREAT SERPL-MCNC: 0.78 MG/DL (ref 0.51–0.95)
DEPRECATED HCO3 PLAS-SCNC: 25 MMOL/L (ref 22–29)
EGFRCR SERPLBLD CKD-EPI 2021: 83 ML/MIN/1.73M2
GLUCOSE SERPL-MCNC: 162 MG/DL (ref 70–99)
HBA1C MFR BLD: 5.6 % (ref 0–5.6)
POTASSIUM SERPL-SCNC: 4.3 MMOL/L (ref 3.4–5.3)
PROT SERPL-MCNC: 7 G/DL (ref 6.4–8.3)
SODIUM SERPL-SCNC: 142 MMOL/L (ref 135–145)

## 2024-04-16 PROCEDURE — 36415 COLL VENOUS BLD VENIPUNCTURE: CPT

## 2024-04-16 PROCEDURE — 80061 LIPID PANEL: CPT

## 2024-04-16 PROCEDURE — 83036 HEMOGLOBIN GLYCOSYLATED A1C: CPT

## 2024-04-17 ENCOUNTER — MYC MEDICAL ADVICE (OUTPATIENT)
Dept: RHEUMATOLOGY | Facility: CLINIC | Age: 69
End: 2024-04-17
Payer: MEDICARE

## 2024-04-17 DIAGNOSIS — D86.9 SARCOIDOSIS: Primary | ICD-10-CM

## 2024-04-17 LAB
CHOLEST SERPL-MCNC: 141 MG/DL
FASTING STATUS PATIENT QL REPORTED: YES
HDLC SERPL-MCNC: 38 MG/DL
LDLC SERPL CALC-MCNC: 65 MG/DL
NONHDLC SERPL-MCNC: 103 MG/DL
TRIGL SERPL-MCNC: 188 MG/DL

## 2024-04-17 RX ORDER — METHOTREXATE 2.5 MG/1
20 TABLET ORAL
Qty: 32 TABLET | Refills: 2 | Status: SHIPPED | OUTPATIENT
Start: 2024-04-17 | End: 2024-07-10

## 2024-04-17 NOTE — TELEPHONE ENCOUNTER
Switching from injectable back to oral methotrexate per provider due to LFT elevations despite reducing dose from 25 mg to 20 mg.    Component      Latest Ref Rng 3/29/2024  9:34 AM 4/15/2024  3:40 PM   Sodium      135 - 145 mmol/L 140  142    Potassium      3.4 - 5.3 mmol/L 4.1  4.3    Carbon Dioxide (CO2)      22 - 29 mmol/L 27  25    Anion Gap      7 - 15 mmol/L 10  13    Urea Nitrogen      8.0 - 23.0 mg/dL 21.5  15.0    Creatinine      0.51 - 0.95 mg/dL 0.74  0.78    GFR Estimate      >60 mL/min/1.73m2 89  83    Calcium      8.8 - 10.2 mg/dL 9.6  9.3    Chloride      98 - 107 mmol/L 103  104    Glucose      70 - 99 mg/dL 120 (H)  162 (H)    Alkaline Phosphatase      40 - 150 U/L 68  74    AST      0 - 45 U/L 72 (H)  45    ALT      0 - 50 U/L 82 (H)  55 (H)    Protein Total      6.4 - 8.3 g/dL 6.8  7.0    Albumin      3.5 - 5.2 g/dL 4.5  4.5    Bilirubin Total      <=1.2 mg/dL 0.4  0.4

## 2024-04-29 ENCOUNTER — OFFICE VISIT (OUTPATIENT)
Dept: RHEUMATOLOGY | Facility: CLINIC | Age: 69
End: 2024-04-29
Attending: INTERNAL MEDICINE
Payer: MEDICARE

## 2024-04-29 VITALS
HEART RATE: 66 BPM | SYSTOLIC BLOOD PRESSURE: 137 MMHG | BODY MASS INDEX: 29.39 KG/M2 | OXYGEN SATURATION: 99 % | DIASTOLIC BLOOD PRESSURE: 74 MMHG | WEIGHT: 173.9 LBS

## 2024-04-29 DIAGNOSIS — D86.9 SARCOIDOSIS: Primary | ICD-10-CM

## 2024-04-29 DIAGNOSIS — Z79.899 LONG-TERM USE OF HIGH-RISK MEDICATION: ICD-10-CM

## 2024-04-29 PROCEDURE — G0463 HOSPITAL OUTPT CLINIC VISIT: HCPCS | Performed by: INTERNAL MEDICINE

## 2024-04-29 PROCEDURE — 99214 OFFICE O/P EST MOD 30 MIN: CPT | Performed by: INTERNAL MEDICINE

## 2024-04-29 ASSESSMENT — PAIN SCALES - GENERAL: PAINLEVEL: MILD PAIN (2)

## 2024-04-29 NOTE — PROGRESS NOTES
2/26/2024    Chief Complaint/Reason for Visit: sarcoidosis      HPI:    Maritza GILL Wiant is a 68 year old White female with past medical history listed below.  She continues to have some difficulty fully opening her left eye as she has to use her hands to open her eye sometimes. Also mentions of having pain above left ankle that started Friday night. No history of trauma. No redness, warmth or swelling of the area. She was transitioned from subcutaneous methotrexate to PO methotrexate 20 due to elevated liver enzymes.    REVIEW OF SYSTEMS    General -negative for fever  HEENT -negative for dry eyes, positive for dry mouth  Cardiovascular -negative for chest pain   Respiratory - negative for shortness of breath  Skin -negative for skin rashes  Neuro -negative for stroke or seizures      Past Medical History:   Diagnosis Date    Diabetes (H) 07/2021    Infection due to 2019 novel coronavirus 09/2022    Infection due to 2019 novel coronavirus 09/14/2023    MVP (mitral valve prolapse)     Premenopausal menorrhagia 11/18/2010    Sarcoidosis     Thyroid disease     Hypothyroidism     Past Surgical History:   Procedure Laterality Date    ABDOMEN SURGERY  1988    Tubal    CATARACT IOL, RT/LT      CHOLECYSTECTOMY      1987    COLONOSCOPY  ????    7/26/2007, 8/30/2017    DILATION AND CURETTAGE, HYSTEROSCOPY DIAGNOSTIC, COMBINED      4/23/2010    EYE SURGERY  2021, 2022    Left eye-Vitrectomy, Cataract    GALLBLADDER SURGERY  02/11/1987    GYN SURGERY  ????    Partial Hysterectomy    LAPAROSCOPIC HYSTERECTOMY SUPRACERVICAL  11/19/2010    MENISCECTOMY  09/27/2016    partial    ORBITOTOMY, RESECT TUMOR, COMBINED Left 5/4/2023    Procedure: Left lacrimal gland biopsy;  Surgeon: Autumn Padilla MD;  Location: [facility] OR    SOFT TISSUE SURGERY  ????    Right knee meniscus repair    VAGINAL DELIVERY      x3 no date     Family History   Problem Relation Age of Onset    Hypertension Mother         Takes two blood  pressure pills per day    Hyperlipidemia Mother         3 stents    Osteoporosis Mother     Thyroid Disease Mother         Goiter operation in high school    Hearing Loss Mother     Rheumatoid Arthritis Mother     Coronary Artery Disease Father          -    Obesity Father     Hypertension Father     Glasses (<9 y/o) Father     Heart Disease Father     Obesity Paternal Grandmother         Overweight and heart disease    Glasses (<9 y/o) Paternal Grandmother          at age: Unknown    Anxiety Disorder Daughter         +Chicken pox    Glasses (<9 y/o) Daughter     Anxiety Disorder Son         Chicken pox    Glasses (<9 y/o) Son      Social History     Socioeconomic History    Marital status:    Tobacco Use    Smoking status: Former     Packs/day: 0.50     Years: 0.00     Additional pack years: 0.00     Total pack years: 0.00     Types: Cigarettes     Start date: 10/1/1975     Quit date: 1976     Years since quittin.7     Passive exposure: Never    Smokeless tobacco: Never   Vaping Use    Vaping Use: Never used   Substance and Sexual Activity    Alcohol use: Yes    Drug use: Never    Sexual activity: Yes     Partners: Male     Birth control/protection: Post-menopausal, Female Surgical   Other Topics Concern    Parent/sibling w/ CABG, MI or angioplasty before 65F 55M? Yes     Comment: Dad age 56     Social Determinants of Health     Financial Resource Strain: Low Risk  (2023)    Financial Resource Strain     Within the past 12 months, have you or your family members you live with been unable to get utilities (heat, electricity) when it was really needed?: No   Food Insecurity: Low Risk  (2023)    Food Insecurity     Within the past 12 months, did you worry that your food would run out before you got money to buy more?: No     Within the past 12 months, did the food you bought just not last and you didn t have money to get more?: No   Transportation Needs: Low Risk   (12/27/2023)    Transportation Needs     Within the past 12 months, has lack of transportation kept you from medical appointments, getting your medicines, non-medical meetings or appointments, work, or from getting things that you need?: No   Interpersonal Safety: Low Risk  (11/8/2023)    Interpersonal Safety     Do you feel physically and emotionally safe where you currently live?: Yes     Within the past 12 months, have you been hit, slapped, kicked or otherwise physically hurt by someone?: No     Within the past 12 months, have you been humiliated or emotionally abused in other ways by your partner or ex-partner?: No   Housing Stability: Low Risk  (12/27/2023)    Housing Stability     Do you have housing? : Yes     Are you worried about losing your housing?: No       No Known Allergies    Current Outpatient Medications   Medication Sig Dispense Refill    acetaminophen (TYLENOL) 500 MG tablet Take 500-1,000 mg by mouth every 6 hours as needed for mild pain      amitriptyline (ELAVIL) 50 MG tablet Take 1 tablet (50 mg) by mouth At Bedtime 90 tablet 3    Ascorbic Acid (VITAMIN C) 100 MG CHEW Gummy, 282mg      atorvastatin (LIPITOR) 40 MG tablet Take 1 tablet (40 mg) by mouth daily 90 tablet 3    calcium citrate-vitamin D (CITRACAL) 315-250 MG-UNIT TABS per tablet       diphenhydrAMINE (BENADRYL) 25 MG tablet Take 25 mg by mouth every 6 hours as needed for itching or allergies      Ferrous Sulfate (IRON) 325 (65 Fe) MG tablet Take 2 tablets by mouth daily      finasteride (PROSCAR) 5 MG tablet Take 5 mg by mouth daily      folic acid (FOLVITE) 1 MG tablet Take 2 tablets (2 mg) by mouth daily 60 tablet 3    Krill Oil (OMEGA-3) 500 MG CAPS Take 500 mg by mouth      levothyroxine (SYNTHROID/LEVOTHROID) 112 MCG tablet Take 1 tablet (112 mcg) by mouth daily 90 tablet 3    magnesium oxide 400 MG CAPS Take 1 capsule by mouth daily      methotrexate 2.5 MG tablet Take 8 tablets (20 mg) by mouth every 7 days Labs every 8 - 12  "weeks for refills. 32 tablet 2    Misc Natural Products (NEURIVA) CAPS       Multiple Vitamin (ONE-A-DAY ESSENTIAL) TABS Take 1 tablet by mouth daily      omeprazole (PRILOSEC) 20 MG DR capsule Take 1 capsule (20 mg) by mouth daily 90 capsule 3    potassium 99 MG TABS Take 1 tablet by mouth      pseudoePHEDrine (SUDAFED) 120 MG 12 hr tablet Take 120 mg by mouth      Syringe/Needle, Disp, 27G X 5/8\" 1 ML MISC Use as directed for methotrexate injections. 21 each 3     No current facility-administered medications for this visit.       PHYSICAL EXAM    Wt 78.9 kg (173 lb 14.4 oz)   LMP  (LMP Unknown)   BMI 29.39 kg/m        General: Alert, No apparent distress   Psych: Affect euthymic  Eyes: Sclera noninjected  Ears, Nose, Throat, Mouth: Oral mucosa moist with normal salivary pool  Skin: No rashes noted  Cardiovascular: Regular rate and rhythm, Normal S1 and S2 and No murmurs, rubs or gallops  Respiratory: Clear to auscultation with no wheezing or crackles  Neuro: Normal gait and Able to arise from seated position unassisted  Musculoskeletal: Hands:  Normal.  Ankles ; normal, area above left ankle tender to touch, no increase in temperature, warmth or swelling noted  Wrists:  Normal.  Elbows:  Normal.  Shoulders:  Normal.    LABS   Reviewed as below.     March 2024  CBC - MCV elevated at 103, normal wbc, Hb and plt       April 2023  SSA, SSB, sm neg   ANCA neg   MARILEE pos, speckled, 1:320  C3, C4 normal   IgG4 normal   QTB NEG   HCV neg   HIV neg     2022  TSH normal     Flow Interpretation   A. Eye, Left, :  -Polytypic B cells  No aberrant immunophenotype on T cells  See comment   Electronically signed by Bobby Brennan MD on 5/8/2023 at 12:29 PM   Comment    There is no immunophenotypic evidence of non-Hodgkin lymphoma. Hodgkin lymphoma cannot be excluded by flow cytometry. T cell lymphomas cannot always be detected by this flow cytometry assay. Neoplastic cells, including large cells, may not survive specimen " processing. This sample may not be representative. Final interpretation requires correlation with morphologic and clinical features.      Final Diagnosis   A-B.  SOFT TISSUE, LEFT LACRIMAL GLAND, BIOPSY:  -Nonnecrotizing granulomatous inflammation, see comment.  -GMS and AFB special stains are negative for microorganisms (performed on blocks A1 and B1 with appropriate controls).  -Negative for malignancy.           ASSESSMENT      1. Sarcoidosis    2. Long-term use of high-risk medication        (D86.9) Sarcoidosis  (primary encounter diagnosis)  Comment: Her symptoms started in May 2023 with drooping of left eyelid and headache. CXR - calcified granuloma. MRI orbit - asymmetric enlargement and enhancement of the left lacrimal gland. CT - : Unilateral left-sided lacrimal gland slight enlargement with homogeneous enhancement. Left lacrimal gland bx - non necrotizing granulomatous inflammation. Subcutaneous  methotrexate (started Oct 2023)  subcutaneous 20 mg/week along with folic acid 1 mg daily - discontinued subcutaneous as this caused LFT elevation likely due to more systemic absorption. Switched back to PO methotrexate 20 mg/week.     Recent MRI in March 2024 - significant interval decrease in size of the previously present enlarged left lacrimal gland. The gland now appears symmetric to the contralateral side. There has also been interval decrease in previously present skin thickening and enhancement lateral to the left lacrimal gland. There is a 0.3 x 0.7 cm nodular enhancing lesion in this area, however, it is unclear whether this lesion is new compared  to the prior study, or simply represents a small amount of residual skin thickening/enhancement. This lesion was likely present on the 11/09/2023 CT neck study and is overall similar to that examination.    Per 's note in Feb 2024, no imaging or change in Rx planned.     Plan:   Continue methotrexate PO 20 mg/week  Continue folic acid 2mg daily.    Recheck LFT.   An area above left ankle tender to touch, no increase in temperature, warmth or swelling noted - likely muscle strain. If it continues, we disussed about getting USG.   Orders Placed This Encounter   Procedures    Comprehensive metabolic panel    CRP inflammation     (Z79.899) Long-term use of high-risk medication  Comment: Methotrexate  Plan: The adverse reactions of MTX was discussed which includes cytopenia, immunosuppression, infection, oversedation, pneumonitis, dizziness, nausea, stomach upset, risk of falls, seizures, and derangements in LFTs. Recommended against use of alcohol while on MTX. pt counseled on the adverse effects of Methotrexate including teratogenicity and need for reliable contraception, Alopecia, infection, liver dysfunction and myelosuppression, including the importance of taking daily Folic acid. Patient verbalized understanding and agrees to proceed.    RTC - 3 months     SHARAN BACA MD    Division of Rheumatic & Autoimmune Diseases  [facility]

## 2024-04-29 NOTE — NURSING NOTE
Chief Complaint   Patient presents with    RECHECK     /74 (BP Location: Right arm, Patient Position: Sitting, Cuff Size: Adult Large)   Pulse 66   Wt 78.9 kg (173 lb 14.4 oz)   LMP  (LMP Unknown)   SpO2 99%   BMI 29.39 kg/m    Odette Carlos [facility]

## 2024-04-29 NOTE — LETTER
4/29/2024       RE: Maritza Hitchcock  [address] [facility] [place] [address]     Dear Colleague,    Thank you for referring your patient, Maritza Hitchcock, to the [facility] RHE[facility]OLOGY at [facility]. Please see a copy of my visit note below.                       2/26/2024    Chief Complaint/Reason for Visit: sarcoidosis      HPI:    Maritza Hitchcock is a 68 year old White female with past medical history listed below.  She continues to have some difficulty fully opening her left eye as she has to use her hands to open her eye sometimes. Also mentions of having pain above left ankle that started Friday night. No history of trauma. No redness, warmth or swelling of the area. She was transitioned from subcutaneous methotrexate to PO methotrexate 20 due to elevated liver enzymes.    REVIEW OF SYSTEMS    General -negative for fever  HEENT -negative for dry eyes, positive for dry mouth  Cardiovascular -negative for chest pain   Respiratory - negative for shortness of breath  Skin -negative for skin rashes  Neuro -negative for stroke or seizures      Past Medical History:   Diagnosis Date    Diabetes (H) 07/2021    Infection due to 2019 novel coronavirus 09/2022    Infection due to 2019 novel coronavirus 09/14/2023    MVP (mitral valve prolapse)     Premenopausal menorrhagia 11/18/2010    Sarcoidosis     Thyroid disease     Hypothyroidism     Past Surgical History:   Procedure Laterality Date    ABDOMEN SURGERY  1988    Tubal    CATARACT IOL, RT/LT      CHOLECYSTECTOMY      1987    COLONOSCOPY  ????    7/26/2007, 8/30/2017    DILATION AND CURETTAGE, HYSTEROSCOPY DIAGNOSTIC, COMBINED      4/23/2010    EYE SURGERY  2021, 2022    Left eye-Vitrectomy, Cataract    GALLBLADDER SURGERY  02/11/1987    GYN SURGERY  ????    Partial Hysterectomy    LAPAROSCOPIC HYSTERECTOMY SUPRACERVICAL  11/19/2010    MENISCECTOMY  09/27/2016    partial    ORBITOTOMY, RESECT TUMOR, COMBINED  Left 2023    Procedure: Left lacrimal gland biopsy;  Surgeon: Autumn Padilla MD;  Location: [facility] OR    SOFT TISSUE SURGERY  ????    Right knee meniscus repair    VAGINAL DELIVERY      x3 no date     Family History   Problem Relation Age of Onset    Hypertension Mother         Takes two blood pressure pills per day    Hyperlipidemia Mother         3 stents    Osteoporosis Mother     Thyroid Disease Mother         Goiter operation in high school    Hearing Loss Mother     Rheumatoid Arthritis Mother     Coronary Artery Disease Father          -    Obesity Father     Hypertension Father     Glasses (<7 y/o) Father     Heart Disease Father     Obesity Paternal Grandmother         Overweight and heart disease    Glasses (<7 y/o) Paternal Grandmother          at age: Unknown    Anxiety Disorder Daughter         +Chicken pox    Glasses (<7 y/o) Daughter     Anxiety Disorder Son         Chicken pox    Glasses (<7 y/o) Son      Social History     Socioeconomic History    Marital status:    Tobacco Use    Smoking status: Former     Packs/day: 0.50     Years: 0.00     Additional pack years: 0.00     Total pack years: 0.00     Types: Cigarettes     Start date: 10/1/1975     Quit date: 1976     Years since quittin.7     Passive exposure: Never    Smokeless tobacco: Never   Vaping Use    Vaping Use: Never used   Substance and Sexual Activity    Alcohol use: Yes    Drug use: Never    Sexual activity: Yes     Partners: Male     Birth control/protection: Post-menopausal, Female Surgical   Other Topics Concern    Parent/sibling w/ CABG, MI or angioplasty before 65F 55M? Yes     Comment: Dad age 56     Social Determinants of Health     Financial Resource Strain: Low Risk  (2023)    Financial Resource Strain     Within the past 12 months, have you or your family members you live with been unable to get utilities (heat, electricity) when it was really needed?: No   Food Insecurity: Low Risk   (12/27/2023)    Food Insecurity     Within the past 12 months, did you worry that your food would run out before you got money to buy more?: No     Within the past 12 months, did the food you bought just not last and you didn t have money to get more?: No   Transportation Needs: Low Risk  (12/27/2023)    Transportation Needs     Within the past 12 months, has lack of transportation kept you from medical appointments, getting your medicines, non-medical meetings or appointments, work, or from getting things that you need?: No   Interpersonal Safety: Low Risk  (11/8/2023)    Interpersonal Safety     Do you feel physically and emotionally safe where you currently live?: Yes     Within the past 12 months, have you been hit, slapped, kicked or otherwise physically hurt by someone?: No     Within the past 12 months, have you been humiliated or emotionally abused in other ways by your partner or ex-partner?: No   Housing Stability: Low Risk  (12/27/2023)    Housing Stability     Do you have housing? : Yes     Are you worried about losing your housing?: No       No Known Allergies    Current Outpatient Medications   Medication Sig Dispense Refill    acetaminophen (TYLENOL) 500 MG tablet Take 500-1,000 mg by mouth every 6 hours as needed for mild pain      amitriptyline (ELAVIL) 50 MG tablet Take 1 tablet (50 mg) by mouth At Bedtime 90 tablet 3    Ascorbic Acid (VITAMIN C) 100 MG CHEW Gummy, 282mg      atorvastatin (LIPITOR) 40 MG tablet Take 1 tablet (40 mg) by mouth daily 90 tablet 3    calcium citrate-vitamin D (CITRACAL) 315-250 MG-UNIT TABS per tablet       diphenhydrAMINE (BENADRYL) 25 MG tablet Take 25 mg by mouth every 6 hours as needed for itching or allergies      Ferrous Sulfate (IRON) 325 (65 Fe) MG tablet Take 2 tablets by mouth daily      finasteride (PROSCAR) 5 MG tablet Take 5 mg by mouth daily      folic acid (FOLVITE) 1 MG tablet Take 2 tablets (2 mg) by mouth daily 60 tablet 3    Krill Oil (OMEGA-3) 500 MG  "CAPS Take 500 mg by mouth      levothyroxine (SYNTHROID/LEVOTHROID) 112 MCG tablet Take 1 tablet (112 mcg) by mouth daily 90 tablet 3    magnesium oxide 400 MG CAPS Take 1 capsule by mouth daily      methotrexate 2.5 MG tablet Take 8 tablets (20 mg) by mouth every 7 days Labs every 8 - 12 weeks for refills. 32 tablet 2    Misc Natural Products (NEURIVA) CAPS       Multiple Vitamin (ONE-A-DAY ESSENTIAL) TABS Take 1 tablet by mouth daily      omeprazole (PRILOSEC) 20 MG DR capsule Take 1 capsule (20 mg) by mouth daily 90 capsule 3    potassium 99 MG TABS Take 1 tablet by mouth      pseudoePHEDrine (SUDAFED) 120 MG 12 hr tablet Take 120 mg by mouth      Syringe/Needle, Disp, 27G X 5/8\" 1 ML MISC Use as directed for methotrexate injections. 21 each 3     No current facility-administered medications for this visit.       PHYSICAL EXAM    Wt 78.9 kg (173 lb 14.4 oz)   LMP  (LMP Unknown)   BMI 29.39 kg/m        General: Alert, No apparent distress   Psych: Affect euthymic  Eyes: Sclera noninjected  Ears, Nose, Throat, Mouth: Oral mucosa moist with normal salivary pool  Skin: No rashes noted  Cardiovascular: Regular rate and rhythm, Normal S1 and S2 and No murmurs, rubs or gallops  Respiratory: Clear to auscultation with no wheezing or crackles  Neuro: Normal gait and Able to arise from seated position unassisted  Musculoskeletal: Hands:  Normal.  Ankles ; normal, area above left ankle tender to touch, no increase in temperature, warmth or swelling noted  Wrists:  Normal.  Elbows:  Normal.  Shoulders:  Normal.    LABS   Reviewed as below.     March 2024  CBC - MCV elevated at 103, normal wbc, Hb and plt       April 2023  SSA, SSB, sm neg   ANCA neg   MARILEE pos, speckled, 1:320  C3, C4 normal   IgG4 normal   QTB NEG   HCV neg   HIV neg     2022  TSH normal     Flow Interpretation   A. Eye, Left, :  -Polytypic B cells  No aberrant immunophenotype on T cells  See comment   Electronically signed by Bobby Brennan MD on " 5/8/2023 at 12:29 PM   Comment    There is no immunophenotypic evidence of non-Hodgkin lymphoma. Hodgkin lymphoma cannot be excluded by flow cytometry. T cell lymphomas cannot always be detected by this flow cytometry assay. Neoplastic cells, including large cells, may not survive specimen processing. This sample may not be representative. Final interpretation requires correlation with morphologic and clinical features.      Final Diagnosis   A-B.  SOFT TISSUE, LEFT LACRIMAL GLAND, BIOPSY:  -Nonnecrotizing granulomatous inflammation, see comment.  -GMS and AFB special stains are negative for microorganisms (performed on blocks A1 and B1 with appropriate controls).  -Negative for malignancy.           ASSESSMENT      1. Sarcoidosis    2. Long-term use of high-risk medication        (D86.9) Sarcoidosis  (primary encounter diagnosis)  Comment: Her symptoms started in May 2023 with drooping of left eyelid and headache. CXR - calcified granuloma. MRI orbit - asymmetric enlargement and enhancement of the left lacrimal gland. CT - : Unilateral left-sided lacrimal gland slight enlargement with homogeneous enhancement. Left lacrimal gland bx - non necrotizing granulomatous inflammation. Subcutaneous  methotrexate (started Oct 2023)  subcutaneous 20 mg/week along with folic acid 1 mg daily - discontinued subcutaneous as this caused LFT elevation likely due to more systemic absorption. Switched back to PO methotrexate 20 mg/week.     Recent MRI in March 2024 - significant interval decrease in size of the previously present enlarged left lacrimal gland. The gland now appears symmetric to the contralateral side. There has also been interval decrease in previously present skin thickening and enhancement lateral to the left lacrimal gland. There is a 0.3 x 0.7 cm nodular enhancing lesion in this area, however, it is unclear whether this lesion is new compared  to the prior study, or simply represents a small amount of residual  skin thickening/enhancement. This lesion was likely present on the 11/09/2023 CT neck study and is overall similar to that examination.    Per 's note in Feb 2024, no imaging or change in Rx planned.     Plan:   Continue methotrexate PO 20 mg/week  Continue folic acid 2mg daily.   Recheck LFT.   An area above left ankle tender to touch, no increase in temperature, warmth or swelling noted - likely muscle strain. If it continues, we disussed about getting USG.   Orders Placed This Encounter   Procedures    Comprehensive metabolic panel    CRP inflammation     (Z79.899) Long-term use of high-risk medication  Comment: Methotrexate  Plan: The adverse reactions of MTX was discussed which includes cytopenia, immunosuppression, infection, oversedation, pneumonitis, dizziness, nausea, stomach upset, risk of falls, seizures, and derangements in LFTs. Recommended against use of alcohol while on MTX. pt counseled on the adverse effects of Methotrexate including teratogenicity and need for reliable contraception, Alopecia, infection, liver dysfunction and myelosuppression, including the importance of taking daily Folic acid. Patient verbalized understanding and agrees to proceed.    RTC - 3 months     SHARAN BACA MD    Division of Rheumatic & Autoimmune Diseases  [facility]

## 2024-05-01 ENCOUNTER — TELEPHONE (OUTPATIENT)
Dept: RHEUMATOLOGY | Facility: CLINIC | Age: 69
End: 2024-05-01
Payer: MEDICARE

## 2024-05-01 NOTE — TELEPHONE ENCOUNTER
Called pt and LVM. Dr. Sprague would like her to follow up in 3 months for a return visit. Waiting on call back.

## 2024-05-02 ENCOUNTER — LAB (OUTPATIENT)
Dept: LAB | Facility: CLINIC | Age: 69
End: 2024-05-02
Payer: MEDICARE

## 2024-05-02 DIAGNOSIS — D86.9 SARCOIDOSIS: ICD-10-CM

## 2024-05-02 LAB
BASOPHILS # BLD AUTO: 0 10E3/UL (ref 0–0.2)
BASOPHILS NFR BLD AUTO: 1 %
EOSINOPHIL # BLD AUTO: 0.2 10E3/UL (ref 0–0.7)
EOSINOPHIL NFR BLD AUTO: 2 %
ERYTHROCYTE [DISTWIDTH] IN BLOOD BY AUTOMATED COUNT: 13 % (ref 10–15)
HCT VFR BLD AUTO: 40.2 % (ref 35–47)
HGB BLD-MCNC: 13.4 G/DL (ref 11.7–15.7)
IMM GRANULOCYTES # BLD: 0 10E3/UL
IMM GRANULOCYTES NFR BLD: 0 %
LYMPHOCYTES # BLD AUTO: 1.8 10E3/UL (ref 0.8–5.3)
LYMPHOCYTES NFR BLD AUTO: 27 %
MCH RBC QN AUTO: 34.1 PG (ref 26.5–33)
MCHC RBC AUTO-ENTMCNC: 33.3 G/DL (ref 31.5–36.5)
MCV RBC AUTO: 102 FL (ref 78–100)
MONOCYTES # BLD AUTO: 0.7 10E3/UL (ref 0–1.3)
MONOCYTES NFR BLD AUTO: 10 %
NEUTROPHILS # BLD AUTO: 4 10E3/UL (ref 1.6–8.3)
NEUTROPHILS NFR BLD AUTO: 60 %
PLATELET # BLD AUTO: 215 10E3/UL (ref 150–450)
RBC # BLD AUTO: 3.93 10E6/UL (ref 3.8–5.2)
WBC # BLD AUTO: 6.6 10E3/UL (ref 4–11)

## 2024-05-02 PROCEDURE — 86140 C-REACTIVE PROTEIN: CPT

## 2024-05-02 PROCEDURE — 80053 COMPREHEN METABOLIC PANEL: CPT

## 2024-05-02 PROCEDURE — 36415 COLL VENOUS BLD VENIPUNCTURE: CPT

## 2024-05-02 PROCEDURE — 85025 COMPLETE CBC W/AUTO DIFF WBC: CPT | Mod: QW

## 2024-05-03 ENCOUNTER — VIRTUAL VISIT (OUTPATIENT)
Dept: RHEUMATOLOGY | Facility: CLINIC | Age: 69
End: 2024-05-03
Attending: INTERNAL MEDICINE
Payer: MEDICARE

## 2024-05-03 DIAGNOSIS — D86.9 SARCOIDOSIS: Primary | ICD-10-CM

## 2024-05-03 LAB
ALBUMIN SERPL BCG-MCNC: 4.8 G/DL (ref 3.5–5.2)
ALP SERPL-CCNC: 71 U/L (ref 40–150)
ALT SERPL W P-5'-P-CCNC: 43 U/L (ref 0–50)
ANION GAP SERPL CALCULATED.3IONS-SCNC: 11 MMOL/L (ref 7–15)
AST SERPL W P-5'-P-CCNC: 40 U/L (ref 0–45)
BILIRUB SERPL-MCNC: 0.5 MG/DL
BUN SERPL-MCNC: 14.8 MG/DL (ref 8–23)
CALCIUM SERPL-MCNC: 9.7 MG/DL (ref 8.8–10.2)
CHLORIDE SERPL-SCNC: 103 MMOL/L (ref 98–107)
CREAT SERPL-MCNC: 0.77 MG/DL (ref 0.51–0.95)
CRP SERPL-MCNC: <3 MG/L
DEPRECATED HCO3 PLAS-SCNC: 27 MMOL/L (ref 22–29)
EGFRCR SERPLBLD CKD-EPI 2021: 85 ML/MIN/1.73M2
GLUCOSE SERPL-MCNC: 116 MG/DL (ref 70–99)
POTASSIUM SERPL-SCNC: 4.3 MMOL/L (ref 3.4–5.3)
PROT SERPL-MCNC: 7.1 G/DL (ref 6.4–8.3)
SODIUM SERPL-SCNC: 141 MMOL/L (ref 135–145)

## 2024-05-03 NOTE — Clinical Note
5/3/2024       RE: Maritza Hitchcock  [address] [facility] [address]     Dear Colleague,    Thank you for referring your patient, Maritza Hitchcock, to the [facility] [place] at [facility]. Please see a copy of my visit note below.    Medication Therapy Management (MTM) Encounter    ASSESSMENT:                            Medication Adherence/Access: No issues reported.    Sarcoidosis: Patient has not noticed any significant change since reducing methotrexate from 25 mg to 20 mg due to LFT elevations. Note improvement on MRI at ophthalmology visit in February, but does have ongoing eyelid swelling/drooping and difficulty opening eye on some days. Writer will discuss these ongoing symptoms with provider, per patient rheumatologist was going to consult with ophthalmology for recommendations.    PLAN:                            Continue methotrexate 20 mg weekly and folic acid 2 mg daily. Writer will assess lab results once available.  Will pass along patient's question re: rheumatologist consulting with ophthalmology given ongoing symptoms?    Follow-up: ***    SUBJECTIVE/OBJECTIVE:                          Annabelle Hitchcock is a 68 year old female called for a follow-up visit from 3/26/24.       Reason for visit: Follow up on increased methotrexate dose, update labs.    Allergies/ADRs: Reviewed in chart  Past Medical History: Reviewed in chart  Tobacco: She reports that she quit smoking about 47 years ago. Her smoking use included cigarettes. She started smoking about 48 years ago. She has a 0.3 pack-year smoking history. She has never been exposed to tobacco smoke. She has never used smokeless tobacco.  Alcohol: not assessed today    Medication Adherence/Access: No issues reported.    Sarcoidosis:  Methotrexate 20 mg under the skin every 7 days  Folic acid 2 mg daily  Acetaminophen 500-1000 mg every 6 hours as needed    After our last follow up  on 3/26, reduced methotrexate from 25 to 20 mg weekly due to elevated LFTs. She has now been on this dose for 4 weeks. Does still feel like her eyelid doesn't open all the way, and has puffiness some days. This is no different from when she was on the 25 mg methotrexate dose. Note she saw Dr. Leyva at ophthalmology in February and left orbit mass had improved significantly on MRI. She is wondering if Dr. Sprague has consulted with Dr. Leyva about her ongoing symptoms.    CMP and CRP from 5/2/24 are still in process    Component      Latest Ref Rng 5/2/2024  9:39 AM   WBC      4.0 - 11.0 10e3/uL 6.6    RBC Count      3.80 - 5.20 10e6/uL 3.93    Hemoglobin      11.7 - 15.7 g/dL 13.4    Hematocrit      35.0 - 47.0 % 40.2    MCV      78 - 100 fL 102 (H)    MCH      26.5 - 33.0 pg 34.1 (H)    MCHC      31.5 - 36.5 g/dL 33.3    RDW      10.0 - 15.0 % 13.0    Platelet Count      150 - 450 10e3/uL 215    % Neutrophils      % 60    % Lymphocytes      % 27    % Monocytes      % 10    % Eosinophils      % 2    % Basophils      % 1    % Immature Granulocytes      % 0    Absolute Neutrophils      1.6 - 8.3 10e3/uL 4.0    Absolute Lymphocytes      0.8 - 5.3 10e3/uL 1.8    Absolute Monocytes      0.0 - 1.3 10e3/uL 0.7    Absolute Eosinophils      0.0 - 0.7 10e3/uL 0.2    Absolute Basophils      0.0 - 0.2 10e3/uL 0.0    Absolute Immature Granulocytes      <=0.4 10e3/uL 0.0       Today's Vitals: no vitals taken today  ----------------    I spent 15 minutes with this patient today. All changes were made via collaborative practice agreement with Nataliia Sprague MD. A copy of the visit note was provided to the patient's provider(s).    A summary of these recommendations was discussed with patient.    Elvia Ko, PharmD  Medication Therapy Management Pharmacist  [facility]    Telemedicine Visit Details  Type of service:  Telephone visit  Start Time: 1000  End Time: 1015     Medication Therapy Recommendations  No  medication therapy recommendations to display      Again, thank you for allowing me to participate in the care of your patient.      Sincerely,    Elvia Ko, WISAMH

## 2024-05-03 NOTE — Clinical Note
Hi Dr. Sprague, checked in with Annabelle to see how she's doing on the reduced methotrexate dose. Her AST and ALT have normalized based on labs done last week. She has not noticed significant changes symptoms-wise on the lower dose. Does still have the eyelid drooping/puffiness and she was wondering if you had discussed this with ophthalmologist. Thanks!

## 2024-05-03 NOTE — PROGRESS NOTES
Medication Therapy Management (MTM) Encounter    ASSESSMENT:                            Medication Adherence/Access: No issues reported.    Sarcoidosis: Patient has not noticed any significant change since reducing methotrexate from 25 mg to 20 mg due to LFT elevations. Note improvement on MRI at ophthalmology visit in February, but does have ongoing eyelid swelling/drooping and difficulty opening eye on some days. Writer will discuss these ongoing symptoms with provider, per patient rheumatologist was going to consult with ophthalmology for recommendations.    PLAN:                            Continue methotrexate 20 mg weekly and folic acid 2 mg daily. Writer will assess lab results once available.  Will pass along patient's question re: rheumatologist consulting with ophthalmology given ongoing symptoms?    Follow-up: Pending provider response, then 3 months routine check in.    SUBJECTIVE/OBJECTIVE:                          Annabelle Hitchcock is a 68 year old female called for a follow-up visit from 3/26/24.       Reason for visit: Follow up on increased methotrexate dose, update labs.    Allergies/ADRs: Reviewed in chart  Past Medical History: Reviewed in chart  Tobacco: She reports that she quit smoking about 47 years ago. Her smoking use included cigarettes. She started smoking about 48 years ago. She has a 0.3 pack-year smoking history. She has never been exposed to tobacco smoke. She has never used smokeless tobacco.  Alcohol: not assessed today    Medication Adherence/Access: No issues reported.    Sarcoidosis:  Methotrexate 20 mg under the skin every 7 days  Folic acid 2 mg daily  Acetaminophen 500-1000 mg every 6 hours as needed    After our last follow up on 3/26, reduced methotrexate from 25 to 20 mg weekly due to elevated LFTs. She has now been on this dose for 4 weeks. Does still feel like her eyelid doesn't open all the way, and has puffiness some days. This is no different from when she was on the 25 mg  methotrexate dose. Note she saw Dr. Leyva at ophthalmology in February and left orbit mass had improved significantly on MRI. She is wondering if Dr. Sprague has consulted with Dr. Leyva about her ongoing symptoms.    Component      Latest Ref Rng 5/2/2024  9:39 AM   WBC      4.0 - 11.0 10e3/uL 6.6    RBC Count      3.80 - 5.20 10e6/uL 3.93    Hemoglobin      11.7 - 15.7 g/dL 13.4    Hematocrit      35.0 - 47.0 % 40.2    MCV      78 - 100 fL 102 (H)    MCH      26.5 - 33.0 pg 34.1 (H)    MCHC      31.5 - 36.5 g/dL 33.3    RDW      10.0 - 15.0 % 13.0    Platelet Count      150 - 450 10e3/uL 215    % Neutrophils      % 60    % Lymphocytes      % 27    % Monocytes      % 10    % Eosinophils      % 2    % Basophils      % 1    % Immature Granulocytes      % 0    Absolute Neutrophils      1.6 - 8.3 10e3/uL 4.0    Absolute Lymphocytes      0.8 - 5.3 10e3/uL 1.8    Absolute Monocytes      0.0 - 1.3 10e3/uL 0.7    Absolute Eosinophils      0.0 - 0.7 10e3/uL 0.2    Absolute Basophils      0.0 - 0.2 10e3/uL 0.0    Absolute Immature Granulocytes      <=0.4 10e3/uL 0.0    Sodium      135 - 145 mmol/L 141    Potassium      3.4 - 5.3 mmol/L 4.3    Carbon Dioxide (CO2)      22 - 29 mmol/L 27    Anion Gap      7 - 15 mmol/L 11    Urea Nitrogen      8.0 - 23.0 mg/dL 14.8    Creatinine      0.51 - 0.95 mg/dL 0.77    GFR Estimate      >60 mL/min/1.73m2 85    Calcium      8.8 - 10.2 mg/dL 9.7    Chloride      98 - 107 mmol/L 103    Glucose      70 - 99 mg/dL 116 (H)    Alkaline Phosphatase      40 - 150 U/L 71    AST      0 - 45 U/L 40    ALT      0 - 50 U/L 43    Protein Total      6.4 - 8.3 g/dL 7.1    Albumin      3.5 - 5.2 g/dL 4.8    Bilirubin Total      <=1.2 mg/dL 0.5    CRP Inflammation      <5.00 mg/L <3.00       Today's Vitals: no vitals taken today  ----------------    I spent 15 minutes with this patient today. All changes were made via collaborative practice agreement with Nataliia Sprague MD. A copy of the visit note was provided to  the patient's provider(s).    A summary of these recommendations was discussed with patient.    Elvia Ko, PharmD  Medication Therapy Management Pharmacist  [facility]    Telemedicine Visit Details  Type of service:  Telephone visit  Start Time: 1000  End Time: 1015     Medication Therapy Recommendations  Hair loss    Current Medication: methotrexate 50 MG/2ML injection (Discontinued)   Rationale: Undesirable effect - Adverse medication event - Safety   Recommendation: Continue to Monitor - Waiting on assessment by dermatology for cause of hair loss, started prior to methotrexate initiation but has worsened. Will wait on methotrexate dose changes   Status: No Longer Relevant         Sarcoidosis    Current Medication: methotrexate 50 MG/2ML injection (Discontinued)   Rationale: Dose too low - Dosage too low - Effectiveness   Recommendation: Increase Dose - Consider dose increase to 25 mg as previously planned. Note ongoing hair loss, which methotrexate may be contributing to. Will discuss with provider dose increase vs. alternate DMARD   Status: Accepted per CPA   Note: Dose later lowered again due to elevated LFTs

## 2024-05-07 NOTE — PATIENT INSTRUCTIONS
"Recommendations from today's MTM visit:                                                      Continue methotrexate 20 mg weekly and folic acid 2 mg daily. Writer will assess lab results once available.  Will pass along patient's question re: rheumatologist consulting with ophthalmology given ongoing symptoms?    Follow-up: Pending provider response, then 3 months check in.    It was great speaking with you today.  I value your experience and would be very thankful for your time in providing feedback in our clinic survey. In the next few days, you may receive an email or text message from [employer] with a link to a survey related to your  clinical pharmacist.\"     To schedule another MTM appointment, please call the clinic directly or you may call the MTM scheduling line at [phone] or toll-free at [phone].     My Clinical Pharmacist's contact information:                                                      Please feel free to contact me with any questions or concerns you have.      Elvia Ko, PharmD  Medication Therapy Management Pharmacist  [facility]    "

## 2024-05-16 ENCOUNTER — TELEPHONE (OUTPATIENT)
Dept: OPHTHALMOLOGY | Facility: CLINIC | Age: 69
End: 2024-05-16
Payer: MEDICARE

## 2024-05-16 ENCOUNTER — DOCUMENTATION ONLY (OUTPATIENT)
Dept: RHEUMATOLOGY | Facility: CLINIC | Age: 69
End: 2024-05-16
Payer: MEDICARE

## 2024-05-16 NOTE — PROGRESS NOTES
Autumn Padilla MD Das, Aparna, MD; Elvia Ko [facility] Nataliia,    Her last MRI looked quite good. We could consider surgery to raise her eyelid. We mainly had discussed the small lateral eyelid cyst, and she decided to observe. If she wants to try to surgically raise the eyelid, I'm more than happy to get her back in. There is an eye drop as well, called Upneeq. It is not covered by insurance. If she has an event or something (like a wedding, or family pictures, etc...) it works well to raise the eyelid for several hours, but it is not a good long term solution. I would be happy to see her back if she would like and discuss these options.    Autumn Dumont          Previous Messages       ----- Message -----  From: Nataliia Sprague MD  Sent: 5/7/2024   2:47 PM CDT  To: Elvia Ko [facility]; MD Amandeep Ahmadi:    She continues to have some difficulty fully opening her left eye as she has to use her hands to open her eye sometimes. Do you have any suggestions? Thank you.  ----- Message -----  From: Elvia Ko, [facility]  Sent: 5/7/2024   2:43 PM CDT  To: Nataliia Sprague MD    Hi Dr. Sprague, checked in with Annabelle to see how she's doing on the reduced methotrexate dose. Her AST and ALT have normalized based on labs done last week. She has not noticed significant changes symptoms-wise on the lower dose. Does still have the eyelid drooping/puffiness and she was wondering if you had discussed this with ophthalmologist. Thanks!

## 2024-05-16 NOTE — TELEPHONE ENCOUNTER
Patient confirmed scheduled appointment:  Date: 6/4/24  Time: 9:00am  Visit type: Return Plastics  Provider: Dr. Padilla  Location: [facility] Location  Testing/imaging: Consult for Eye Lid  Additional notes: Return to discuss options for her eyelid. -Per  conversation-Appt Per PT    Scheduled patient accordingly and patient will see appointment in [facility]. Patient elected to keep September appointment in place as well.-Per Patient

## 2024-06-04 ENCOUNTER — OFFICE VISIT (OUTPATIENT)
Dept: OPHTHALMOLOGY | Facility: CLINIC | Age: 69
End: 2024-06-04
Payer: MEDICARE

## 2024-06-04 DIAGNOSIS — H02.403 INVOLUTIONAL PTOSIS, ACQUIRED, BILATERAL: Primary | ICD-10-CM

## 2024-06-04 DIAGNOSIS — H02.831 DERMATOCHALASIS OF BOTH UPPER EYELIDS: ICD-10-CM

## 2024-06-04 DIAGNOSIS — D86.9 SARCOIDOSIS: ICD-10-CM

## 2024-06-04 DIAGNOSIS — H02.834 DERMATOCHALASIS OF BOTH UPPER EYELIDS: ICD-10-CM

## 2024-06-04 DIAGNOSIS — L92.9 NON-CASEATING GRANULOMA: ICD-10-CM

## 2024-06-04 PROCEDURE — 92081 LIMITED VISUAL FIELD XM: CPT | Mod: GC | Performed by: OPHTHALMOLOGY

## 2024-06-04 PROCEDURE — 99214 OFFICE O/P EST MOD 30 MIN: CPT | Mod: GC | Performed by: OPHTHALMOLOGY

## 2024-06-04 PROCEDURE — 92285 EXTERNAL OCULAR PHOTOGRAPHY: CPT | Mod: GC | Performed by: OPHTHALMOLOGY

## 2024-06-04 ASSESSMENT — VISUAL ACUITY
CORRECTION_TYPE: GLASSES
OD_CC+: +3
OS_CC: 20/40
OS_PH_CC+: +3
OD_PH_CC: 20/25
OD_CC: 20/30
METHOD: SNELLEN - LINEAR
OD_PH_CC+: +3
OS_CC+: +2
OS_PH_CC: 20/30

## 2024-06-04 ASSESSMENT — TONOMETRY
OD_IOP_MMHG: 24
IOP_METHOD: ICARE
OS_IOP_MMHG: 24

## 2024-06-04 ASSESSMENT — LEVATOR FUNCTION
OS_LEVATOR: 15
OD_LEVATOR: 20

## 2024-06-04 ASSESSMENT — REFRACTION_WEARINGRX: SPECS_TYPE: PAL

## 2024-06-04 ASSESSMENT — CONF VISUAL FIELD
OD_SUPERIOR_TEMPORAL_RESTRICTION: 0
OS_INFERIOR_NASAL_RESTRICTION: 0
OD_SUPERIOR_NASAL_RESTRICTION: 0
OS_SUPERIOR_TEMPORAL_RESTRICTION: 3
OS_SUPERIOR_NASAL_RESTRICTION: 0
OD_INFERIOR_TEMPORAL_RESTRICTION: 0
OD_NORMAL: 1
OS_INFERIOR_TEMPORAL_RESTRICTION: 0
OD_INFERIOR_NASAL_RESTRICTION: 0

## 2024-06-04 ASSESSMENT — SLIT LAMP EXAM - LIDS: COMMENTS: HEAVY DERMATOCHALASIS RESTING ON LASHES

## 2024-06-04 ASSESSMENT — MARGIN REFLEX DISTANCE
OS_MRD1: 1
OD_MRD1: 3

## 2024-06-04 ASSESSMENT — EXTERNAL EXAM - RIGHT EYE: OD_EXAM: NORMAL

## 2024-06-04 ASSESSMENT — EXTERNAL EXAM - LEFT EYE: OS_EXAM: NORMAL

## 2024-06-04 NOTE — NURSING NOTE
Chief Complaints and History of Present Illnesses   Patient presents with    Eye Lid Swelling     Chief Complaint(s) and History of Present Illness(es)       Eye Lid Swelling              Laterality: left eye    Associated signs and symptoms: Negative for eye pain    Duration: 1 year    Pain scale: 0/10              Comments    Swelling seems to fluctuate left upper lid. No treatment currently per patient. Patient states she has noticed a change with her vision, mostly left eye.     Grace Mckeon on 6/4/2024 at 9:20 AM

## 2024-06-04 NOTE — PROGRESS NOTES
Chief Complaint(s) and History of Present Illness(es)     Eye Lid Swelling            Laterality: left eye    Associated signs and symptoms: Negative for eye pain    Duration: 1 year    Pain scale: 0/10          Comments    Swelling seems to fluctuate left upper lid. No treatment currently per   patient. Patient states she has noticed a change with her vision, mostly   left eye.     Grace Sorianofeldt on 6/4/2024 at 9:20 AM    PMH of sarcoidosis and hypothyroidism. Currently on methotrexate, recently reduced from 25mg to 20mg due to elevated LFTs. No changes in symptoms. She does note droopiness of her left eye that began when her symptoms initially started and has persisted. Worsens throughout the day and feels it gets in the way of her vision.      Maritza Hitchcock is a 68 year old female who has noted gradual onset of droopy eyelids over the past years. The droopy eyelid is interfering with activities of daily living including driving, and reading. The patient denies double vision, variability of the eyelid position. She uses artificial tears for dry eye symptoms. The bump on the outside of her left eye continues to cause irritation.     EXAM:     MRD1: 2mm right, 1mm left    VISUAL FIELD:  Right eye untaped:20 degrees Right eye taped:50 degrees  Left eye untaped:10 degrees Left eye taped:50 degrees    Assessment & Plan     Maritza Hitchcock is a 68 year old female with the following diagnoses:   Encounter Diagnoses   Name Primary?    Involutional ptosis, acquired, bilateral Yes    Dermatochalasis of both upper eyelids     Non-caseating granuloma     Sarcoidosis        Plan:  Both upper eyelid blepharoplasty (skin, use NF from both sides to fill hollow of area of prior dacryoadenitis left upper eyelid) 96790   Left posterior approach ptosis repair 8 mm 68522  Excise left lateral canthus cyst - send to pathology         PHOTOS DEMONSTRATE:    Significant dermatochalasis with lids resting on eyelashes and  obstructing visual axis  Blepharoptosis - left side  And left lateral canthus cyst.         Adan Cooney MD  PGY-2 Ophthalmology Resident         Attending Physician Attestation: Complete documentation of historical and exam elements from today's encounter can be found in the full encounter summary report (not reduplicated in this progress note). I personally obtained the chief complaint(s) and history of present illness. I confirmed and edited as necessary the review of systems, past medical/surgical history, family history, social history, and examination findings as documented by others; and I examined the patient myself. I personally reviewed the relevant tests, images, and reports as documented above. I formulated and edited as necessary the assessment and plan and discussed the findings and management plan with the patient.  -Autumn Padilla MD    Today with Maritza GILL Wiant  and her , I reviewed the indications, risks, benefits, and alternatives of the proposed surgical procedure including, but not limited to, failure obtain the desired result  and need for additional surgery, bleeding, infection, loss of vision, loss of the eye, and the remote possibility of permanent damage to any organ system or death with the use of anesthesia.  I provided multiple opportunities for the questions, answered all questions to the best of my ability, and confirmed that my answers and my discussion were understood.   Autumn Padilla MD

## 2024-06-06 ENCOUNTER — TELEPHONE (OUTPATIENT)
Dept: OPHTHALMOLOGY | Facility: CLINIC | Age: 69
End: 2024-06-06
Payer: MEDICARE

## 2024-06-06 PROBLEM — D86.9 SARCOIDOSIS: Status: ACTIVE | Noted: 2023-09-14

## 2024-06-06 PROBLEM — H02.834 DERMATOCHALASIS OF BOTH UPPER EYELIDS: Status: ACTIVE | Noted: 2024-06-04

## 2024-06-06 PROBLEM — H02.831 DERMATOCHALASIS OF BOTH UPPER EYELIDS: Status: ACTIVE | Noted: 2024-06-04

## 2024-06-06 PROBLEM — L92.9 NON-CASEATING GRANULOMA: Status: ACTIVE | Noted: 2024-06-04

## 2024-06-06 PROBLEM — H02.403 INVOLUTIONAL PTOSIS, ACQUIRED, BILATERAL: Status: ACTIVE | Noted: 2024-06-04

## 2024-06-06 NOTE — TELEPHONE ENCOUNTER
Called patient to schedule surgery with Dr. Padilla    Spoke with: Annabelle    Date of Surgery: 8-8-24      Patient aware of approximate arrival time: No at Pt will get a call a couple of days prior to surgery     Location of surgery: [facility]     Pre-Op H&P: [facility]     Informed patient that they need to call to schedule pre-op H&P with PCP within 30 days of surgery date: Yes    Post-Op Appt Dates: 8-20-24 10:30 [facility]     Discussed with patient pre-op RN will call 2-3 days prior to surgery with arrival time and instructions:  Yes    Discussed with patient PAC RN will provide arrival time and instructions for surgery at the time of the appointment: [[place] locations only]: No      Standard Surgery Packet Sent: Yes 06/06/24  via Mail - Standard      Surgical Soap Discussed with Patient: Yes  - Received:  Local Pharmacy       Additional Information Sent in Packet:          Informed patient that they will need an adult  to bring patient home from surgery: Yes  : - Ricardo         Additional Comments:        All patients questions were answered and was instructed to review surgical packet and call back [phone] with any questions or concerns.       Odette Walton on 6/6/2024 at 11:13 AM

## 2024-06-12 ENCOUNTER — PATIENT OUTREACH (OUTPATIENT)
Dept: CARE COORDINATION | Facility: CLINIC | Age: 69
End: 2024-06-12
Payer: MEDICARE

## 2024-06-29 DIAGNOSIS — G47.10 HYPERSOMNIA: ICD-10-CM

## 2024-06-29 DIAGNOSIS — E78.1 HYPERTRIGLYCERIDEMIA: ICD-10-CM

## 2024-07-01 RX ORDER — ATORVASTATIN CALCIUM 40 MG/1
40 TABLET, FILM COATED ORAL DAILY
Qty: 90 TABLET | Refills: 3 | Status: SHIPPED | OUTPATIENT
Start: 2024-07-01

## 2024-07-01 RX ORDER — AMITRIPTYLINE HYDROCHLORIDE 50 MG/1
50 TABLET ORAL AT BEDTIME
Qty: 90 TABLET | Refills: 3 | Status: SHIPPED | OUTPATIENT
Start: 2024-07-01

## 2024-07-05 NOTE — PROGRESS NOTES
Patient:   ZAK EVERETT            MRN: [id number]            FIN: [id number]               Age:   66 years     Sex:  Female     :  1955   Associated Diagnoses:   New onset type 2 diabetes mellitus; Obese; Hypertriglyceridemia   Author:   Kamini Ramirez      Visit Information   Visit type:  Diabetes Self Management Education .    Accompanied by:  Spouse.    Referral source:  Irena Levi MD.       Chief Complaint   DM Type II, Obesity, Hypertriglyceridemia       History of Present Illness   2021  Pt here with known hx of prediabetes without intervention.  Pt admits to eating more and less activity through the pandemic.    New dx of DM with nonfasting glucose >200 and fasting glucose of 136mg/dL.    2021 Follow up.  Pt is down 8.2# since last consult.  Pt brings in BG readings, asking appropriate questions.    2/15/2022 Ongoing education.  90 day BG avg is 115mg/dL will have a1c drawn today    Discussed nutrition, diabetes, and lifestyle management with pt  Nutrition:  Reading labels and trying to follow carb controlled meal plan.  Meal replacement drink am occ, or yogurt or omelet/ toast/ apple slices   Physical Activity:  no routine, has a membership to [employer] but doesn't feel comfortable going back yet   Stress:   low   Sleep: good  Support: family   BG Testing: as directed pre/ 2 hr pp 30 day avg 115mg/dL out of 35 readings   DM related medication: metformin ER 500mg ii tabs daily - taking as directed with evening meal and tolerating well   Routine diabetes care:    Dilated Retinal Eye Exam:  2021   Skin: no concerns  Dental: q 6 mo, no issues  Feet: cuts own toenails, no concerns monofilament test with PCP, no concerns   Immunizations: UTD   Hgb A1c: 6.4% = 137 mg/dL estimated average glucose      Health Status   Allergies:    Allergic Reactions (Selected)  No Known Medication Allergies   Medications:  (Selected)   Prescriptions  Prescribed  Accu check soft clix lancets: Accu check  soft clix lancets, See Instructions, Instructions: testing 2x/ day, Supply, # 200 EA, 1 Refill(s), Type: Maintenance, Pharmacy: [facility], testing 2x/ day, 65, in, 08/16/21 14:25:00 CDT, Height Measured, 195.1, lb, 08/16/21 1...  Lipitor 40 mg oral tablet: = 1 tab(s) ( 40 mg ), Oral, daily, # 90 tab(s), 1 Refill(s), Type: Maintenance, Pharmacy: [place] [id number], 1 tab(s) Oral daily, 65, in, 12/21/21 13:49:00 CST, Height Measured, 178, lb, 12/21/21 13:49:00 CST, Weight Measured  Metoprolol Tartrate 50 mg oral tablet: = 0.5 tab(s), Oral, daily, # 45 tab(s), 3 Refill(s), Type: Maintenance, Pharmacy: [place] [id number], 0.5 tab(s) Oral daily, 65, in, 09/03/21 10:11:00 CDT, Height Measured, 186.6, lb, 09/03/21 10:11:00 CDT, Weight Measured  accu chek guide test strips: accu chek guide test strips, See Instructions, Instructions: test 2x/ day, Supply, # 200 EA, 1 Refill(s), Type: Maintenance, Pharmacy: [place] [id number], test 2x/ day, 65, in, 08/16/21 14:25:00 CDT, Height Measured, 195.1, lb, 08/16/21 14:25:00...  amitriptyline 50 mg oral tablet: = 1 tab(s) ( 50 mg ), Oral, hs, # 90 tab(s), 3 Refill(s), Type: Maintenance, Pharmacy: [place] [address], 1 tab(s) Oral hs, 65, in, 09/03/21 10:11:00 CDT, Height Measured, 186.6, lb, 09/03/21 10:11:00 CDT, Weight Measured  esomeprazole 20 mg oral delayed release capsule: = 1 cap(s), Oral, daily, # 90 cap(s), 3 Refill(s), Type: Maintenance, Pharmacy: [id number], Due for appt, 1 cap(s) Oral daily, 65, in, 09/03/21 10:11:00 CDT, Height Measured, 186.6, lb, 09/03/21 10:11:00 CDT, Weight Measured  levothyroxine 150 mcg (0.15 mg) oral tablet: = 1 tab(s) ( 150 mcg ), po, daily, # 90 tab(s), 3 Refill(s), Type: Maintenance, Pharmacy: [id number], 1 tab(s) Oral daily, 65, in, 09/03/21 10:11:00 CDT, Height Measured, 186.6, lb, 09/03/21 10:11:00 CDT, Weight Measured  metFORMIN 500 mg oral tablet, extended release: = 2  tab(s) ( 1,000 mg ), Oral, daily, Instructions: with evening meal, # 180 EA, 1 Refill(s), Type: Maintenance, Pharmacy: [id number], 2 tab(s) Oral daily,Instr:with evening meal, 65, in, 12/21/21 13:49:00 CST, Height Measured, 178, lb,...  Documented Medications  Documented  Citracal Maximum + D: Oral, daily, 0 Refill(s), Type: Maintenance  Fish Oil: ( 1,000 mg ), po, daily, 0 Refill(s), Type: Maintenance  Hair, Skin & Nails: Instructions: 2 Gummies, 1x per day, 0 Refill(s), Type: Soft Stop  Multiple Vitamins oral tablet: Oral, daily, 0 Refill(s), Type: Maintenance  OTC - Magnesium: OTC - Magnesium, Oral, daily, 0 Refill(s), Type: Maintenance  OTC - Potassium: OTC - Potassium, Oral, daily, 0 Refill(s), Type: Maintenance  ferrous sulfate: Oral, 0 Refill(s), Type: Maintenance,    Medications          *denotes recorded medication          *OTC - Potassium: Oral, daily, 0 Refill(s).          *OTC - Magnesium: Oral, daily, 0 Refill(s).          Accu check soft clix lancets: See Instructions, testing 2x/ day, 200 EA, 1 Refill(s).          accu chek guide test strips: See Instructions, test 2x/ day, 200 EA, 1 Refill(s).          amitriptyline 50 mg oral tablet: 50 mg, 1 tab(s), Oral, hs, 90 tab(s), 3 Refill(s).          Lipitor 40 mg oral tablet: 40 mg, 1 tab(s), Oral, daily, 90 tab(s), 1 Refill(s).          *Hair, Skin & Nails: 2 Gummies, 1x per day, 0 Refill(s).          *Citracal Maximum + D: Oral, daily, 0 Refill(s).          esomeprazole 20 mg oral delayed release capsule: 1 cap(s), Oral, daily, 90 cap(s), 3 Refill(s).          *ferrous sulfate: Oral, 0 Refill(s).          levothyroxine 150 mcg (0.15 mg) oral tablet: 150 mcg, 1 tab(s), po, daily, 90 tab(s), 3 Refill(s).          metFORMIN 500 mg oral tablet, extended release: 1,000 mg, 2 tab(s), Oral, daily, with evening meal, 180 EA, 1 Refill(s).          Metoprolol Tartrate 50 mg oral tablet: 0.5 tab(s), Oral, daily, 45 tab(s), 3 Refill(s).           *Multiple Vitamins oral tablet: Oral, daily, 0 Refill(s).          *Fish Oil: 1,000 mg, po, daily, 0 Refill(s).       Problem list:    All Problems  New onset type 2 diabetes mellitus / SNOMED CT 130526970 / Confirmed  Fatty liver / SNOMED CT 899896661 / Confirmed  Obese / SNOMED CT 3352114297 / Probable  ROMERO (nonalcoholic steatohepatitis) / SNOMED CT 1464898170 / Confirmed  MVP (mitral valve prolapse) / SNOMED CT 8733565865 / Confirmed  Hypothyroid / SNOMED CT 35913286 / Confirmed  Hypertriglyceridemia / SNOMED CT 864834065 / Confirmed  Personal History of DVT (Deep Vein Thrombosis) / SNOMED CT 5069145282 / Confirmed  S/P meniscectomy / SNOMED CT 7287022548 / Confirmed  History of NSAID-associated gastropathy / SNOMED CT 944090016 / Confirmed  GERD (gastroesophageal reflux disease) / SNOMED CT 397739362 / Confirmed  Inactive: Dysfunctional uterine bleeding / SNOMED CT 11879311  Resolved: Anticoagulated / SNOMED CT 151627546  Resolved: Pregnancy / SNOMED CT 320554781  Resolved: Pregnancy / SNOMED CT 333120014  Resolved: Pregnancy / SNOMED CT 239700989      Histories   Past Medical History:    Active  Hypertriglyceridemia (416744521): Onset on 3/10/2011 at 55 years.  Comments:  3/10/2011 CST 4:44 PM CST -     Hypothyroid (94583392)  GERD (gastroesophageal reflux disease) (101398473)  MVP (mitral valve prolapse) (1329323755)  Obese (5192193430)  Resolved  Anticoagulated (668055886):  Resolved.  Comments:  6/29/2021 CDT 11:28 AM CDT - Abby Laws  heparin injections approx 2 weeks after DVT from immobility of ankle injury  Pregnancy (19572):  Resolved in 1982 at 26 years.  Pregnancy (347884686):  Resolved in 1986 at 30 years.  Pregnancy (879251758):  Resolved in 1988 at 32 years.   Family History:    Chicken pox  Daughter (Jessi Hodges)  Comments:  8/15/2018 6:42 AM ART Jodi Chaidez CMA  age 5  Son (Clarke Hitchcock)  Comments:  8/15/2018 6:42 AM CDJaun Romero CMAsey  age 3  Wears  glasses  Mother (Cheryl Marr)  Comments:  8/15/2018 6:42 AM CDT - [place] CMA, Jodi  Diagnosed in High School  Daughter (Jessi Hodges)  Comments:  8/15/2018 6:42 AM CDT - [place] CMA, Jodi  reading only early 20&#39;s  Father (Rene Cheema, )  Comments:  8/15/2018 6:42 AM CDT - [place] CMA, Jodi  don&#39;t know  Grandmother (P) (Allyn Cheema, )  Comments:  8/15/2018 6:42 AM CDT - [place] CMA, Jodi  don&#39;t know  Son (Clarke Hitchcock)  Comments:  8/15/2018 6:42 AM CDT - [place] CMA, Jodi  since middle school  Anxiety  Daughter (Jessi Hodges)  Comments:  8/15/2018 6:42 AM CDT - [place] CMA, Jodi  age 20  Son (Clarke Hitchcock)  Rheumatoid arthritis  Mother (Cheryl Marr)  Comments:  8/15/2018 6:42 AM CDT - [place] CMA, Jodi  years ago  Hypertension  Mother (Cheryl Marr)  Heart disease  Father (Rene Cheema, )  Comments:  8/15/2018 6:42 AM CDT - [place] CMA, Jodi  don&#39;t know age  Grandmother (P) (Allyn Cheema, )  HBP - High blood pressure  Father (Rene Berhaneanthony, )  Comments:  8/15/2018 6:42 AM CDT - [place] CMA, Jodi  in his 40&#39;s  Arthritis  Mother (Cheryl Marr)  Comments:  8/15/2018 6:42 AM CDT - [place] CMA, Jodi  years  Hearing loss  Mother (Cheryl Marr)  Comments:  8/15/2018 6:42 AM CDT - [place] CMA, Jodi  years ago  Snoring  Father (Rene Cheema, )  Comments:  8/15/2018 6:42 AM CDT - [place] CMA, Jodi  don&#39;t know  Overweight  Father (Rene Deni, )  Comments:  8/15/2018 6:42 AM CDT - [place] CMA, Jodi  in his 40&#39;s  Obesity  Father (Rene Cheema, )  Comments:  8/15/2018 6:42 AM CDT - [place] Jodi ELIZABETH  in his 40&#39;s  Goiter  Mother (Cheryl Marr)  Cataracts  Mother (Cheryl Marr)  Comments:  8/15/2018 6:42 AM CDT - [place] Jodi ELIZABETH  Both eyes operated on 18 &amp; 18     Procedure history:    Colonoscopy (SNOMED CT 010030610) performed by Collin Almeida MD on  8/30/2017 at 61 Years.  Comments:  8/13/2018 7:26 PM CDT Jodi Chaidez CMA  Indication: screening  Sedation: F&V  Normal   Repeat in 10 years  Arthroscopic partial medial meniscectomy (SNOMED CT 064623497) on 9/27/2016 at 60 Years.  Comments:  11/10/2016 3:38 PM CST Jodi Chaidez CMA  Right knee  Dr. Amin at [facility]  Laparoscopic supracervical hysterectomy (SNOMED CT 324882734) performed by Markus Escalante MD on 11/19/2010 at 55 Years.  Comments:  12/12/2010 2:39 PM Janice James  Pre- and post-op diagnosis is menorrhagia  Hysteroscopy, D & C performed by Markus Escalante MD on 4/23/2010 at 54 Years.  Comments:  12/31/2010 1:46 PM Janice James  Large pelvic mass, most consistent with uterine fibroids  Colonoscopy (SNOMED CT 415803861) on 7/26/2007 at 51 Years.  TL - Tubal ligation (SNOMED CT 731172961) on 4/26/1988 at 32 Years.  Childbirth (SNOMED CT 8984240400) on 4/25/1988 at 32 Years.  Comments:  8/15/2018 11:42 AM CDT - Jodi Fiore CMA  Patient Comment: Male  gall bladder (SNOMED CT 6221061240) on 2/11/1987 at 31 Years.  Comments:  8/25/2020 11:46 AM CDYumiko Chowdary CMA  Patient Comment: Removed gall bladder stones , opened the clogged bile duct, wore an outward bag for six weeks to catch bile till everything was healed.  Childbirth (SNOMED CT 8296943537) on 3/28/1986 at 30 Years.  Comments:  8/25/2020 11:46 AM CDT Yumiko Wu CMA  Patient Comment: In [place]  Childbirth (SNOMED CT 6641816917) on 3/27/1986 at 30 Years.  Comments:  8/15/2018 11:42 AM ART Jodi Chaidez CMA  Female    11/1/2011 7:27 PM Jen Cornejo  G3, P2-0-1-2.  Cholecystectomy (SNOMED CT 12802176).  Comments:  4/21/2010 4:33 PM CDT - Irina WISE, Sharon Kulkarni     Social History:        Electronic Cigarette/Vaping Assessment            Electronic Cigarette Use: Never.      Alcohol Assessment            Current, Wine (5 oz), 3-5 times a month, 1 drinks/episode average.  2 drinks/episode maximum.  Ready  to               change: No.      Tobacco Assessment            Never (less than 100 in lifetime)      Substance Abuse Assessment            Never      Employment and Education Assessment            Retired      Home and Environment Assessment            Marital status: .  Spouse/Partner name: Ricardo.  Living situation: Home/Independent.  Feels unsafe at               home: No.  Family/Friends available for support: Yes.  Risks in environment: Does not wear helmet, owns               secured gun.      Nutrition and Health Assessment            Type of diet: Low carbohydrate.      Exercise and Physical Activity Assessment            Exercise frequency: 3-4 times/week.  Exercise type: garden in summer, health club.      Sexual Assessment            Sexually active: Yes.  Sexual orientation: Heterosexual.  Contraceptive Use Details: None.        Physical Examination   Measurements from flowsheet : Measurements   2/15/2022 3:22 PM CST Height Measured - Standard 65 in    Weight Measured - Standard 182.5 lb    BSA 1.95 m2    Body Mass Index 30.37 kg/m2  HI         Review / Management   Results review      Impression and Plan   Diagnosis     New onset type 2 diabetes mellitus (LUC11-OA E11.9).     Obese (TCN61-SO E66.9).     Hypertriglyceridemia (QJC13-YU E78.1).       Counseled: Patient, Further instruction on AADE7 Self Care Behaviors; Motivational counseling to help achieve goals and congratulated pt on significant improvements in lifestyle.    Discussed living well with diabetes, further information about diabetes disease process, reviewed chronic condition and appropriate treatment and self management  Healthy Eating - Review and further instruction on carbohydrate counting, reading food labels, appropriate portion sizes, well balanced meals, diabetic plate method as a general rule.  Patient is provided with additional magazines with recipes, meal planning ideas to incorporate dietary recommendations, meal  planning and preparation  Weight loss tips with mindful eating.  Education review on decreasing cardiovascular risk with following Therapeutic Lifestyle Changes (TLC) nutrition plan for heart healthy eating.    Being Active - Education on how exercise helps with : pt will continue to work towards goal    - lowering BG by increasing the muscles ability to take up and use glucose   - weight loss   - healthier heart (improve lipid profile)   - improve sleep, mood, energy   - decrease stress      Monitoring - Reviewed recommended testing schedule and blood glucose target levels.      Taking Medication - on metformin - education on the mechanism of action, discussed potential to increase/ add medications in the future with the progression of diabetes     Problem Solving -  medical support team assistance, resources provided (written); good control of stress  Healthy Coping - support of friends, family, and medical support team   Reducing Risks - Education on importance of good glucose control to help reduce the potential for developing microvascular and macrovascular complications associated with high blood glucose over time.    Education on the following complications    *heart attack/cardiovascular disease - prevention with heart healthy diet, daily activity, and weight control   *retinopathy - annual eye exam   *nephropathy - discussion on annual or prn kidney function labs with urinalysis    *stroke - maintaining recommended glucose control   *peripheral arterial disease - regular activity, maintaining recommended glucose control   *neuropathy - monofilament testing, regular activity, maintaining recommended glucose control  Appropriate foot care education  Vaccinations as recommended influenza, pneumonia etc.   Routine dental appt.'s for prevention of periodontal diseases   Importance of adequate sleep   Good skin care, monitor for any open areas, sores, or cuts.      Pt able to verbalize understanding of above  education, handouts provided for additional resources.        Goals:  1.  A1c <6.5%  2.  BG testing 2x/ day on 1+ days/ week before eating 80 - 130 target; two hours after eating 80 - 150mg/dL  3.  Physical active 150 min/ week   4.  Carb controlled heart healthy meal plan <130gm CHO/ day; <200mg Cholesterol/ day   5.  Take medications as directed continue metformin  mg ii tabs daily    follow up in 6 mo       Professional Services   Time spent with pt 30min   cc Dr. Levi   03-Aug-2024

## 2024-07-10 ENCOUNTER — MYC MEDICAL ADVICE (OUTPATIENT)
Dept: RHEUMATOLOGY | Facility: CLINIC | Age: 69
End: 2024-07-10
Payer: MEDICARE

## 2024-07-10 DIAGNOSIS — D86.9 SARCOIDOSIS: ICD-10-CM

## 2024-07-10 RX ORDER — METHOTREXATE 2.5 MG/1
20 TABLET ORAL
Qty: 32 TABLET | Refills: 2 | Status: SHIPPED | OUTPATIENT
Start: 2024-07-10 | End: 2024-08-05

## 2024-07-10 NOTE — TELEPHONE ENCOUNTER
methotrexate 2.5 MG tablet 32 tablet 2 4/17/2024 -- --   Sig - Route: Take 8 tablets (20 mg) by mouth every 7 days Labs every 8 - 12 weeks for refills. - Oral     Last Office Visit: 4/29/2024  [facility]    Future Office visit:  0    CBC RESULTS:   Recent Labs   Lab Test 05/02/24  0939   WBC 6.6   RBC 3.93   HGB 13.4   HCT 40.2   *   MCH 34.1*   MCHC 33.3   RDW 13.0          Creatinine   Date Value Ref Range Status   05/02/2024 0.77 0.51 - 0.95 mg/dL Final   ]    Liver Function Studies -   Recent Labs   Lab Test 05/02/24  0939   PROTTOTAL 7.1   ALBUMIN 4.8   BILITOTAL 0.5   ALKPHOS 71   AST 40   ALT 43       Routing refill request to provider for review/approval because:  DMARD.

## 2024-07-11 NOTE — TELEPHONE ENCOUNTER
Last Written Prescription Date:  3/29/24  Last Fill Quantity: 60,  # refills: 3   Last office visit: 4/29/2024 ; last virtual visit: 10/9/2023 with prescribing provider:  Celestine   Future Office Visit: Not scheduled     Requested Prescriptions   Pending Prescriptions Disp Refills    folic acid (FOLVITE) 1 MG tablet 60 tablet 3     Sig: Take 3 tablets (3 mg) by mouth daily     Patient reports needs refill due to dose increase per Dr. Sprague. Message routed to provider to review and sign.     Lashay Kolb RN

## 2024-07-12 ENCOUNTER — TELEPHONE (OUTPATIENT)
Dept: RHEUMATOLOGY | Facility: CLINIC | Age: 69
End: 2024-07-12
Payer: MEDICARE

## 2024-07-12 RX ORDER — FOLIC ACID 1 MG/1
3 TABLET ORAL DAILY
Qty: 60 TABLET | Refills: 3 | Status: SHIPPED | OUTPATIENT
Start: 2024-07-12

## 2024-07-12 NOTE — TELEPHONE ENCOUNTER
Called and LM with patient to inform of medication refill status, notify that labs are not needed prior to eyelid surgery per Dr. Sprague, and schedule patient for follow up appointment. Provided phone number to call back, awaiting return call.     Lashay Kolb RN    
duy

## 2024-07-17 SDOH — HEALTH STABILITY: PHYSICAL HEALTH: ON AVERAGE, HOW MANY MINUTES DO YOU ENGAGE IN EXERCISE AT THIS LEVEL?: 30 MIN

## 2024-07-17 SDOH — HEALTH STABILITY: PHYSICAL HEALTH: ON AVERAGE, HOW MANY DAYS PER WEEK DO YOU ENGAGE IN MODERATE TO STRENUOUS EXERCISE (LIKE A BRISK WALK)?: 2 DAYS

## 2024-07-17 ASSESSMENT — SOCIAL DETERMINANTS OF HEALTH (SDOH): HOW OFTEN DO YOU GET TOGETHER WITH FRIENDS OR RELATIVES?: TWICE A WEEK

## 2024-07-22 ENCOUNTER — OFFICE VISIT (OUTPATIENT)
Dept: FAMILY MEDICINE | Facility: CLINIC | Age: 69
End: 2024-07-22
Payer: MEDICARE

## 2024-07-22 VITALS
BODY MASS INDEX: 29.29 KG/M2 | OXYGEN SATURATION: 98 % | RESPIRATION RATE: 16 BRPM | DIASTOLIC BLOOD PRESSURE: 64 MMHG | TEMPERATURE: 97.9 F | SYSTOLIC BLOOD PRESSURE: 118 MMHG | HEART RATE: 65 BPM | WEIGHT: 175.8 LBS | HEIGHT: 65 IN

## 2024-07-22 DIAGNOSIS — K75.81 NONALCOHOLIC STEATOHEPATITIS (NASH): ICD-10-CM

## 2024-07-22 DIAGNOSIS — M85.851 OSTEOPENIA OF NECKS OF BOTH FEMURS: ICD-10-CM

## 2024-07-22 DIAGNOSIS — E11.9 TYPE 2 DIABETES MELLITUS WITHOUT COMPLICATION, WITHOUT LONG-TERM CURRENT USE OF INSULIN (H): Primary | ICD-10-CM

## 2024-07-22 DIAGNOSIS — E03.9 ACQUIRED HYPOTHYROIDISM: ICD-10-CM

## 2024-07-22 DIAGNOSIS — Z00.00 ENCOUNTER FOR MEDICARE ANNUAL WELLNESS EXAM: ICD-10-CM

## 2024-07-22 DIAGNOSIS — M85.852 OSTEOPENIA OF NECKS OF BOTH FEMURS: ICD-10-CM

## 2024-07-22 DIAGNOSIS — D50.0 IRON DEFICIENCY ANEMIA DUE TO CHRONIC BLOOD LOSS: ICD-10-CM

## 2024-07-22 DIAGNOSIS — D86.9 SARCOIDOSIS: ICD-10-CM

## 2024-07-22 DIAGNOSIS — K21.9 GASTROESOPHAGEAL REFLUX DISEASE WITHOUT ESOPHAGITIS: ICD-10-CM

## 2024-07-22 DIAGNOSIS — Z23 NEED FOR PROPHYLACTIC VACCINATION AGAINST HEPATITIS B VIRUS: ICD-10-CM

## 2024-07-22 PROBLEM — I34.1 MVP (MITRAL VALVE PROLAPSE): Status: RESOLVED | Noted: 2017-08-30 | Resolved: 2024-07-22

## 2024-07-22 LAB
CHOLEST SERPL-MCNC: 134 MG/DL
FASTING STATUS PATIENT QL REPORTED: ABNORMAL
HBA1C MFR BLD: 5.6 % (ref 0–5.6)
HDLC SERPL-MCNC: 42 MG/DL
LDLC SERPL CALC-MCNC: 61 MG/DL
NONHDLC SERPL-MCNC: 92 MG/DL
TRIGL SERPL-MCNC: 157 MG/DL
TSH SERPL DL<=0.005 MIU/L-ACNC: 4.39 UIU/ML (ref 0.3–4.2)

## 2024-07-22 PROCEDURE — 83036 HEMOGLOBIN GLYCOSYLATED A1C: CPT | Performed by: FAMILY MEDICINE

## 2024-07-22 PROCEDURE — 83540 ASSAY OF IRON: CPT | Performed by: FAMILY MEDICINE

## 2024-07-22 PROCEDURE — 90480 ADMN SARSCOV2 VAC 1/ONLY CMP: CPT | Performed by: FAMILY MEDICINE

## 2024-07-22 PROCEDURE — 80061 LIPID PANEL: CPT | Performed by: FAMILY MEDICINE

## 2024-07-22 PROCEDURE — 84466 ASSAY OF TRANSFERRIN: CPT | Performed by: FAMILY MEDICINE

## 2024-07-22 PROCEDURE — G0438 PPPS, INITIAL VISIT: HCPCS | Performed by: FAMILY MEDICINE

## 2024-07-22 PROCEDURE — 91320 SARSCV2 VAC 30MCG TRS-SUC IM: CPT | Performed by: FAMILY MEDICINE

## 2024-07-22 PROCEDURE — 36415 COLL VENOUS BLD VENIPUNCTURE: CPT | Performed by: FAMILY MEDICINE

## 2024-07-22 PROCEDURE — 84443 ASSAY THYROID STIM HORMONE: CPT | Performed by: FAMILY MEDICINE

## 2024-07-22 PROCEDURE — 82728 ASSAY OF FERRITIN: CPT | Performed by: FAMILY MEDICINE

## 2024-07-22 PROCEDURE — 99214 OFFICE O/P EST MOD 30 MIN: CPT | Mod: 25 | Performed by: FAMILY MEDICINE

## 2024-07-22 RX ORDER — PLANT STANOL ESTER 450 MG
TABLET ORAL
COMMUNITY

## 2024-07-22 NOTE — PATIENT INSTRUCTIONS
Patient Education   Preventive Care Advice   This is general advice given by our system to help you stay healthy. However, your care team may have specific advice just for you. Please talk to your care team about your preventive care needs.  Nutrition  Eat 5 or more servings of fruits and vegetables each day.  Try wheat bread, brown rice and whole grain pasta (instead of white bread, rice, and pasta).  Get enough calcium and vitamin D. Check the label on foods and aim for 100% of the RDA (recommended daily allowance).  Lifestyle  Exercise at least 150 minutes each week  (30 minutes a day, 5 days a week).  Do muscle strengthening activities 2 days a week. These help control your weight and prevent disease.  No smoking.  Wear sunscreen to prevent skin cancer.  Have a dental exam and cleaning every 6 months.  Yearly exams  See your health care team every year to talk about:  Any changes in your health.  Any medicines your care team has prescribed.  Preventive care, family planning, and ways to prevent chronic diseases.  Shots (vaccines)   HPV shots (up to age 26), if you've never had them before.  Hepatitis B shots (up to age 59), if you've never had them before.  COVID-19 shot: Get this shot when it's due.  Flu shot: Get a flu shot every year.  Tetanus shot: Get a tetanus shot every 10 years.  Pneumococcal, hepatitis A, and RSV shots: Ask your care team if you need these based on your risk.  Shingles shot (for age 50 and up)  General health tests  Diabetes screening:  Starting at age 35, Get screened for diabetes at least every 3 years.  If you are younger than age 35, ask your care team if you should be screened for diabetes.  Cholesterol test: At age 39, start having a cholesterol test every 5 years, or more often if advised.  Bone density scan (DEXA): At age 50, ask your care team if you should have this scan for osteoporosis (brittle bones).  Hepatitis C: Get tested at least once in your life.  STIs (sexually  transmitted infections)  Before age 24: Ask your care team if you should be screened for STIs.  After age 24: Get screened for STIs if you're at risk. You are at risk for STIs (including HIV) if:  You are sexually active with more than one person.  You don't use condoms every time.  You or a partner was diagnosed with a sexually transmitted infection.  If you are at risk for HIV, ask about PrEP medicine to prevent HIV.  Get tested for HIV at least once in your life, whether you are at risk for HIV or not.  Cancer screening tests  Cervical cancer screening: If you have a cervix, begin getting regular cervical cancer screening tests starting at age 21.  Breast cancer scan (mammogram): If you've ever had breasts, begin having regular mammograms starting at age 40. This is a scan to check for breast cancer.  Colon cancer screening: It is important to start screening for colon cancer at age 45.  Have a colonoscopy test every 10 years (or more often if you're at risk) Or, ask your provider about stool tests like a FIT test every year or Cologuard test every 3 years.  To learn more about your testing options, visit:   .  For help making a decision, visit:   https://bit.ly/kn84265.  Prostate cancer screening test: If you have a prostate, ask your care team if a prostate cancer screening test (PSA) at age 55 is right for you.  Lung cancer screening: If you are a current or former smoker ages 50 to 80, ask your care team if ongoing lung cancer screenings are right for you.  For informational purposes only. Not to replace the advice of your health care provider. Copyright   2023 [facility] [employer]. All rights reserved. Clinically reviewed by the [facility] Transitions Program. [employer] 800035 - REV 01/24.  Bladder Training: Care Instructions  Your Care Instructions     Bladder training is used to treat urge incontinence and stress incontinence. Urge incontinence means that the need to urinate comes on so fast  that you can't get to a toilet in time. Stress incontinence means that you leak urine because of pressure on your bladder. For example, it may happen when you laugh, cough, or lift something heavy.  Bladder training can increase how long you can wait before you have to urinate. It can also help your bladder hold more urine. And it can give you better control over the urge to urinate.  It is important to remember that bladder training takes a few weeks to a few months to make a difference. You may not see results right away, but don't give up.  Follow-up care is a key part of your treatment and safety. Be sure to make and go to all appointments, and call your doctor if you are having problems. It's also a good idea to know your test results and keep a list of the medicines you take.  How can you care for yourself at home?  Work with your doctor to come up with a bladder training program that is right for you. You may use one or more of the following methods.  Delayed urination  In the beginning, try to keep from urinating for 5 minutes after you first feel the need to go.  While you wait, take deep, slow breaths to relax. Kegel exercises can also help you delay the need to go to the bathroom.  After some practice, when you can easily wait 5 minutes to urinate, try to wait 10 minutes before you urinate.  Slowly increase the waiting period until you are able to control when you have to urinate.  Scheduled urination  Empty your bladder when you first wake up in the morning.  Schedule times throughout the day when you will urinate.  Start by going to the bathroom every hour, even if you don't need to go.  Slowly increase the time between trips to the bathroom.  When you have found a schedule that works well for you, keep doing it.  If you wake up during the night and have to urinate, do it. Apply your schedule to waking hours only.  Kegel exercises  These tighten and strengthen pelvic muscles, which can help you control  "the flow of urine. (If doing these exercises causes pain, stop doing them and talk with your doctor.) To do Kegel exercises:  Squeeze your muscles as if you were trying not to pass gas. Or squeeze your muscles as if you were stopping the flow of urine. Your belly, legs, and buttocks shouldn't move.  Hold the squeeze for 3 seconds, then relax for 5 to 10 seconds.  Start with 3 seconds, then add 1 second each week until you are able to squeeze for 10 seconds.  Repeat the exercise 10 times a session. Do 3 to 8 sessions a day.  When should you call for help?  Watch closely for changes in your health, and be sure to contact your doctor if:    Your incontinence is getting worse.     You do not get better as expected.   Where can you learn more?  Go to https://www.[employer].net/patiented  Enter V684 in the search box to learn more about \"Bladder Training: Care Instructions.\"  Current as of: November 15, 2023               Content Version: 14.0    7609-8514 [employer].   Care instructions adapted under license by your healthcare professional. If you have questions about a medical condition or this instruction, always ask your healthcare professional. [employer] disclaims any warranty or liability for your use of this information.         "

## 2024-07-22 NOTE — PROGRESS NOTES
Preventive Care Visit  [facility]  Irena Levi MD, Family Medicine  Jul 22, 2024      Assessment & Plan   Problem List Items Addressed This Visit       Gastroesophageal reflux disease without esophagitis    Relevant Medications    omeprazole (PRILOSEC) 20 MG DR capsule    Acquired hypothyroidism    Relevant Orders    TSH    Type 2 diabetes mellitus without complication, without long-term current use of insulin (H) - Primary    Relevant Orders    HEMOGLOBIN A1C    Adult Diabetes Education  Referral    Nonalcoholic steatohepatitis (ROMERO)    Relevant Orders    US Elastography Only     Other Visit Diagnoses       Encounter for Medicare annual wellness exam        Need for prophylactic vaccination against hepatitis B virus        Osteopenia of necks of both femurs        Relevant Orders    DX Bone Density         Fasting labs      Diabetes mellitus well-controlled now with diet and exercise, will have patient also schedule follow-up with diabetes educator.    Fatty liver disease with an indeterminate fib 4 we will get a FibroScan, recommend the patient try to get to a healthier weight.  If there is evidence of fibrosis then we can revisit possibly adding some Ozempic.    Osteopenia patient's due for an updated bone mineral density study.  She will send me a message in [place] [facility] when she has had it completed at the [facility]    GERD well-controlled with omeprazole this is renewed    Hypothyroidism getting an updated TSH today.    Sarcoidosis patient will reach out to their rheumatologist to see if they recommend holding methotrexate this week following their immunizations to help patient develop a more profound immune response.  We are also getting her an updated COVID-19 vaccine.  She has managed to have COVID 3 times despite being adequately vaccinated.    FIB-4 Calculation: 1.87 at 5/2/2024  9:39 AM  Calculated from:  SGOT/AST: 40 U/L at 5/2/2024  9:39  "AM  SGPT/ALT: 43 U/L at 5/2/2024  9:39 AM  Platelets: 215 10e3/uL at 5/2/2024  9:39 AM  Age: 68 years             BMI  Estimated body mass index is 29.71 kg/m  as calculated from the following:    Height as of this encounter: 1.638 m (5' 4.5\").    Weight as of this encounter: 79.7 kg (175 lb 12.8 oz).   Weight management plan: Discussed healthy diet and exercise guidelines    Counseling  Appropriate preventive services were addressed with this patient via screening, questionnaire, or discussion as appropriate for fall prevention, nutrition, physical activity, Tobacco-use cessation, weight loss and cognition.  Checklist reviewing preventive services available has been given to the patient.  Reviewed patient's diet, addressing concerns and/or questions.   She is at risk for lack of exercise and has been provided with information to increase physical activity for the benefit of her well-being.   She is at risk for psychosocial distress and has been provided with information to reduce risk.   Information on urinary incontinence and treatment options given to patient.           Lori Alanis is a 68 year old, presenting for the following:  Physical (AWV, she is fasting)        7/22/2024    12:47 PM   Additional Questions   Roomed by [facility] Care Directive  Patient has a Health Care Directive on file  Advance care planning document is on file and is current.    HPI  Here for wellness  visit             7/17/2024   General Health   How would you rate your overall physical health? Good   Feel stress (tense, anxious, or unable to sleep) Only a little      (!) STRESS CONCERN      7/17/2024   Nutrition   Diet: Low salt            7/17/2024   Exercise   Days per week of moderate/strenous exercise 2 days   Average minutes spent exercising at this level 30 min      (!) EXERCISE CONCERN      7/17/2024   Social Factors   Frequency of gathering with friends or relatives Twice a week   Worry food won't last until get " money to buy more No   Food not last or not have enough money for food? No   Do you have housing? (Housing is defined as stable permanent housing and does not include staying ouside in a car, in a tent, in an abandoned building, in an overnight shelter, or couch-surfing.) Yes   Are you worried about losing your housing? No   Lack of transportation? No   Unable to get utilities (heat,electricity)? No            7/17/2024   Fall Risk   Fallen 2 or more times in the past year? No   Trouble with walking or balance? No             7/17/2024   Activities of Daily Living- Home Safety   Needs help with the following daily activites None of the above   Safety concerns in the home None of the above            7/17/2024   Dental   Dentist two times every year? Yes            7/17/2024   Hearing Screening   Hearing concerns? None of the above            7/17/2024   Driving Risk Screening   Patient/family members have concerns about driving No            7/17/2024   General Alertness/Fatigue Screening   Have you been more tired than usual lately? No            7/17/2024   Urinary Incontinence Screening   Bothered by leaking urine in past 6 months Yes               Today's PHQ-2 Score:       7/21/2024    10:24 AM   PHQ-2 ( 1999 [employer])   Q1: Little interest or pleasure in doing things 0   Q2: Feeling down, depressed or hopeless 0   PHQ-2 Score 0   Q1: Little interest or pleasure in doing things Not at all   Q2: Feeling down, depressed or hopeless Not at all   PHQ-2 Score 0           7/17/2024   Substance Use   Alcohol more than 3/day or more than 7/wk No   Do you have a current opioid prescription? No   How severe/bad is pain from 1 to 10? 0/10 (No Pain)   Do you use any other substances recreationally? No        Social History     Tobacco Use    Smoking status: Former     Current packs/day: 0.00     Average packs/day: 0.5 packs/day for 0.7 years (0.3 ttl pk-yrs)     Types: Cigarettes     Start date: 10/1/1975     Quit date:  1976     Years since quittin.1     Passive exposure: Past    Smokeless tobacco: Never   Vaping Use    Vaping status: Never Used   Substance Use Topics    Alcohol use: Yes     Comment: 1-2 beers, 4-5 days/week    Drug use: Never                History of abnormal Pap smear: No - age 65 or older with adequate negative prior screening test results (3 consecutive negative cytology results, 2 consecutive negative cotesting results, or 2 consecutive negative HrHPV test results within 10 years, with the most recent test occurring within the recommended screening interval for the test used)       ASCVD Risk   The 10-year ASCVD risk score (Libby MARTINEZ, et al., 2019) is: 9.7%    Values used to calculate the score:      Age: 68 years      Sex: Female      Is Non- : No      Diabetic: Yes      Tobacco smoker: No      Systolic Blood Pressure: 118 mmHg      Is BP treated: No      HDL Cholesterol: 38 mg/dL      Total Cholesterol: 141 mg/dL            Reviewed and updated as needed this visit by Provider   Tobacco  Allergies  Meds  Problems  Med Hx  Surg Hx  Fam Hx     Sexual Activity            Current providers sharing in care for this patient include:  Patient Care Team:  Irena Levi MD as PCP - General (Family Medicine)  Irena Levi MD as Assigned PCP  Lyudmila Figueroa MSW as Assigned Behavioral Health Provider  Autumn Padilla MD as Assigned Surgical Provider  Haley Grewal RP as Pharmacist (Pharmacist)  Elvia Ko [facility] as Pharmacist  Haley Grewal [facility] as Assigned MTM Pharmacist  Nataliia Sprague MD as MD (Rheumatology)  Nataliia Sprague MD as Assigned Rheumatology Provider    The following health maintenance items are reviewed in Epic and correct as of today:  Health Maintenance   Topic Date Due    DIABETIC FOOT EXAM  2023    A1C  2024    TSH W/FREE T4 REFLEX  2024    COVID-19 Vaccine ( season) 2025  "(Originally 3/2/2024)    INFLUENZA VACCINE (1) 09/01/2024    EYE EXAM  11/06/2024    MICROALBUMIN  12/29/2024    ANNUAL REVIEW OF HM ORDERS  04/03/2025    LIPID  04/16/2025    BMP  05/02/2025    MAMMO SCREENING  07/13/2025    MEDICARE ANNUAL WELLNESS VISIT  07/22/2025    FALL RISK ASSESSMENT  07/22/2025    COLORECTAL CANCER SCREENING  08/30/2027    ADVANCE CARE PLANNING  07/13/2028    DTAP/TDAP/TD IMMUNIZATION (4 - Td or Tdap) 04/24/2033    DEXA  08/25/2035    HEPATITIS C SCREENING  Completed    PHQ-2 (once per calendar year)  Completed    Pneumococcal Vaccine: 65+ Years  Completed    ZOSTER IMMUNIZATION  Completed    RSV VACCINE (Pregnancy & 60+)  Completed    IPV IMMUNIZATION  Aged Out    HPV IMMUNIZATION  Aged Out    MENINGITIS IMMUNIZATION  Aged Out    RSV MONOCLONAL ANTIBODY  Aged Out    PAP  Discontinued    CYSTIC FIBROSIS: COLONOSCOPY  Discontinued            Objective    Exam  /64 (BP Location: Right arm, Patient Position: Sitting)   Pulse 65   Temp 97.9  F (36.6  C) (Tympanic)   Resp 16   Ht 1.638 m (5' 4.5\")   Wt 79.7 kg (175 lb 12.8 oz)   LMP  (LMP Unknown)   SpO2 98%   BMI 29.71 kg/m     Estimated body mass index is 29.71 kg/m  as calculated from the following:    Height as of this encounter: 1.638 m (5' 4.5\").    Weight as of this encounter: 79.7 kg (175 lb 12.8 oz).    Physical Exam           7/22/2024   Mini Cog   Clock Draw Score 2 Normal   3 Item Recall 3 objects recalled   Mini Cog Total Score 5                 Signed Electronically by: Irena Levi MD    "

## 2024-07-24 ENCOUNTER — OFFICE VISIT (OUTPATIENT)
Dept: FAMILY MEDICINE | Facility: CLINIC | Age: 69
End: 2024-07-24
Payer: MEDICARE

## 2024-07-24 VITALS
HEART RATE: 66 BPM | DIASTOLIC BLOOD PRESSURE: 78 MMHG | WEIGHT: 175 LBS | RESPIRATION RATE: 16 BRPM | SYSTOLIC BLOOD PRESSURE: 123 MMHG | OXYGEN SATURATION: 100 % | HEIGHT: 65 IN | TEMPERATURE: 97.4 F | BODY MASS INDEX: 29.16 KG/M2

## 2024-07-24 DIAGNOSIS — D50.0 IRON DEFICIENCY ANEMIA DUE TO CHRONIC BLOOD LOSS: ICD-10-CM

## 2024-07-24 DIAGNOSIS — D86.9 SARCOIDOSIS: ICD-10-CM

## 2024-07-24 DIAGNOSIS — Z01.818 PREOP GENERAL PHYSICAL EXAM: ICD-10-CM

## 2024-07-24 DIAGNOSIS — H02.403 ACQUIRED INVOLUTIONAL PTOSIS OF BOTH EYELIDS: Primary | ICD-10-CM

## 2024-07-24 DIAGNOSIS — K21.9 GASTROESOPHAGEAL REFLUX DISEASE WITHOUT ESOPHAGITIS: ICD-10-CM

## 2024-07-24 DIAGNOSIS — E11.9 TYPE 2 DIABETES MELLITUS WITHOUT COMPLICATION, WITHOUT LONG-TERM CURRENT USE OF INSULIN (H): ICD-10-CM

## 2024-07-24 DIAGNOSIS — E03.9 ACQUIRED HYPOTHYROIDISM: ICD-10-CM

## 2024-07-24 DIAGNOSIS — H02.831 DERMATOCHALASIS OF BOTH UPPER EYELIDS: ICD-10-CM

## 2024-07-24 DIAGNOSIS — Z86.718 PERSONAL HISTORY OF DVT (DEEP VEIN THROMBOSIS): ICD-10-CM

## 2024-07-24 DIAGNOSIS — H02.834 DERMATOCHALASIS OF BOTH UPPER EYELIDS: ICD-10-CM

## 2024-07-24 LAB
FERRITIN SERPL-MCNC: 514 NG/ML (ref 11–328)
IRON BINDING CAPACITY (ROCHE): 316 UG/DL (ref 240–430)
IRON SATN MFR SERPL: 23 % (ref 15–46)
IRON SERPL-MCNC: 74 UG/DL (ref 37–145)
TRANSFERRIN SERPL-MCNC: 256 MG/DL (ref 200–360)

## 2024-07-24 PROCEDURE — G2211 COMPLEX E/M VISIT ADD ON: HCPCS | Performed by: FAMILY MEDICINE

## 2024-07-24 PROCEDURE — 99214 OFFICE O/P EST MOD 30 MIN: CPT | Performed by: FAMILY MEDICINE

## 2024-07-24 RX ORDER — LEVOTHYROXINE SODIUM 125 UG/1
125 TABLET ORAL DAILY
Qty: 90 TABLET | Refills: 0 | Status: SHIPPED | OUTPATIENT
Start: 2024-07-24 | End: 2024-09-23

## 2024-07-24 NOTE — PATIENT INSTRUCTIONS

## 2024-07-24 NOTE — PROGRESS NOTES
Preoperative Evaluation  [facility]  [address] [address]  Phone: [phone]  Fax: [phone]  Primary Provider: Irena Levi MD  Pre-op Performing Provider: Irena Levi MD  Jul 24, 2024 7/21/2024   Surgical Information   What procedure is being done? Both upper eyelid blepharoplasty, left eye ptosis repair, excision of left lateral canthus cyst   Fac[facility] procedure[facility]surgery [facility]   Who is doing the procedure / surgery? Dr Autumn Padilla   Date of surgery / procedure: August 8, 2024   Time of surgery / procedure: Don t know yet   Where do you plan to recover after surgery? at home with family        Fax number for surgical facility: Note does not need to be faxed, will be available electronically in Epic.    Assessment & Plan     The proposed surgical procedure is considered LOW risk.    Problem List Items Addressed This Visit       Gastroesophageal reflux disease without esophagitis    Acquired hypothyroidism    Relevant Medications    levothyroxine (SYNTHROID/LEVOTHROID) 125 MCG tablet    Other Relevant Orders    TSH    Type 2 diabetes mellitus without complication, without long-term current use of insulin (H)    Relevant Medications    levothyroxine (SYNTHROID/LEVOTHROID) 125 MCG tablet    Personal history of DVT (deep vein thrombosis)    Sarcoidosis    Dermatochalasis of both upper eyelids     Other Visit Diagnoses       Acquired involutional ptosis of both eyelids    -  Primary    Preop general physical exam        Iron deficiency anemia due to chronic blood loss        Relevant Orders    Iron & Iron Binding Capacity    Ferritin    Transferrin              Gerd on omeprazole    DM well controlled with diet and exercise    ROMERO fibroscan is scheduled for this weekend    Sarcoid pt is holding methotrexate x 2 weeks due to covid vaccine, hoping  she will mount a better response    His of DVT following injury to right ankle, provoked    Remote his iron def anemia, had been donating blood frequently to the red cross, has been on iron supplement for the past 2 years and not donated during that time, ordering iron studies, suspect pt can discontinue the iron supplement    Hypothyroid, elevated tsh on 112 mcg levo daily, increasing to 125 and recheck tsh in 2 months    The longitudinal plan of care for the diagnosis(es)/condition(s) as documented were addressed during this visit. Due to the added complexity in care, I will continue to support Annabelle in the subsequent management and with ongoing continuity of care.        FIB-4 Calculation: 1.87 at 5/2/2024  9:39 AM  Calculated from:  SGOT/AST: 40 U/L at 5/2/2024  9:39 AM  SGPT/ALT: 43 U/L at 5/2/2024  9:39 AM  Platelets: 215 10e3/uL at 5/2/2024  9:39 AM  Age: 68 years            - No identified additional risk factors other than previously addressed    Preoperative Medication Instructions  Antiplatelet or Anticoagulation Medication Instructions   - Patient is on no antiplatelet or anticoagulation medications.    Additional Medication Instructions  Take all scheduled medications on the day of surgery    Recommendation  Approval given to proceed with proposed procedure, without further diagnostic evaluation.    Subjective   Annabelle is a 68 year old, presenting for the following:  Pre-Op Exam (08/08/2024, upper eyelid blepharoplasty )          7/24/2024     9:26 AM   Additional Questions   Roomed by AMAYA Hu related to upcoming procedure:         7/21/2024   Pre-Op Questionnaire   Have you ever had a heart attack or stroke? No   Have you ever had surgery on your heart or blood vessels, such as a stent placement, a coronary artery bypass, or surgery on an artery in your head, neck, heart, or legs? No   Do you have chest pain with activity? No   Do you have a history of heart failure? No   Do you currently  have a cold, bronchitis or symptoms of other infection? No   Do you have a cough, shortness of breath, or wheezing? No   Do you or anyone in your family have previous history of blood clots? (!) YES    Do you or does anyone in your family have a serious bleeding problem such as prolonged bleeding following surgeries or cuts? No   Have you ever had problems with anemia or been told to take iron pills? (!) YES had been donating too much blood to the red cross   Have you had any abnormal blood loss such as black, tarry or bloody stools, or abnormal vaginal bleeding? No   Have you ever had a blood transfusion? No   Are you willing to have a blood transfusion if it is medically needed before, during, or after your surgery? Yes   Have you or any of your relatives ever had problems with anesthesia? No   Do you have sleep apnea, excessive snoring or daytime drowsiness? No   Do you have any artifical heart valves or other implanted medical devices like a pacemaker, defibrillator, or continuous glucose monitor? No   Do you have artificial joints? No   Are you allergic to latex? No        Health Care Directive  Patient has a Health Care Directive on file      Preoperative Review of    reviewed - she has had prescriptions for both hydrocodone and oxycodone for previous surgeries, both are tolerated and effective, does not have any at home          Patient Active Problem List    Diagnosis Date Noted    Involutional ptosis, acquired, bilateral 06/04/2024     Priority: Medium    Dermatochalasis of both upper eyelids 06/04/2024     Priority: Medium    Non-caseating granuloma 06/04/2024     Priority: Medium    Parathyroid abnormality (H24) 04/03/2024     Priority: Medium    Facial mass 09/28/2023     Priority: Medium    Sarcoidosis 09/14/2023     Priority: Medium    Lacrimal gland inflammation, left 04/20/2023     Priority: Medium     Added automatically from request for surgery [id number]      Nonalcoholic steatohepatitis (ROMERO)  08/29/2022     Priority: Medium    Fatty liver 04/14/2022     Priority: Medium    Gastroesophageal reflux disease without esophagitis 04/14/2022     Priority: Medium    Hx of NSAID-associated gastropathy 04/14/2022     Priority: Medium    Hypertriglyceridemia 04/14/2022     Priority: Medium    Acquired hypothyroidism 04/14/2022     Priority: Medium    Type 2 diabetes mellitus without complication, without long-term current use of insulin (H) 04/14/2022     Priority: Medium    Personal history of DVT (deep vein thrombosis) 04/14/2022     Priority: Medium     Formatting of this note might be different from the original.   provoked from ankle fracture and wearing cam walking boot   provoked from ankle fracture and wearing cam walking boot      Hx of meniscectomy of right knee 04/14/2022     Priority: Medium      Past Medical History:   Diagnosis Date    Diabetes (H) 07/2021    Infection due to 2019 novel coronavirus 09/2022    Infection due to 2019 novel coronavirus 09/14/2023    MVP (mitral valve prolapse)     Premenopausal menorrhagia 11/18/2010    Sarcoidosis     Thyroid disease     Hypothyroidism     Past Surgical History:   Procedure Laterality Date    ABDOMEN SURGERY  1988    Tubal    CATARACT IOL, RT/LT      CHOLECYSTECTOMY      1987    COLONOSCOPY  ????    7/26/2007, 8/30/2017    DILATION AND CURETTAGE, HYSTEROSCOPY DIAGNOSTIC, COMBINED      4/23/2010    EYE SURGERY  2021, 2022    Left eye-Vitrectomy, Cataract    GALLBLADDER SURGERY  02/11/1987    GYN SURGERY  ????    Partial Hysterectomy    LAPAROSCOPIC HYSTERECTOMY SUPRACERVICAL  11/19/2010    MENISCECTOMY  09/27/2016    partial    ORBITOTOMY, RESECT TUMOR, COMBINED Left 05/04/2023    Procedure: Left lacrimal gland biopsy;  Surgeon: Autumn Padilla MD;  Location: [facility] [place]    ORTHOPEDIC SURGERY  2023    Tendon repair right thumb    SOFT TISSUE SURGERY  ????    Right knee meniscus repair    VAGINAL DELIVERY      x3 no date     Current Outpatient Medications    Medication Sig Dispense Refill    acetaminophen (TYLENOL) 500 MG tablet Take 500-1,000 mg by mouth every 6 hours as needed for mild pain      amitriptyline (ELAVIL) 50 MG tablet TAKE ONE TABLET BY MOUTH EVERY NIGHT AT BEDTIME 90 tablet 3    Ascorbic Acid (VITAMIN C) 100 MG CHEW Gummy, 282mg      atorvastatin (LIPITOR) 40 MG tablet TAKE 1 TABLET(40 MG) BY MOUTH DAILY 90 tablet 3    calcium citrate-vitamin D (CITRACAL) 315-250 MG-UNIT TABS per tablet       diphenhydrAMINE (BENADRYL) 25 MG tablet Take 25 mg by mouth every 6 hours as needed for itching or allergies      Ferrous Sulfate (IRON) 325 (65 Fe) MG tablet Take 2 tablets by mouth daily      finasteride (PROSCAR) 5 MG tablet Take 5 mg by mouth daily      folic acid (FOLVITE) 1 MG tablet Take 3 tablets (3 mg) by mouth daily 60 tablet 3    Krill Oil (OMEGA-3) 500 MG CAPS Take 500 mg by mouth      levothyroxine (SYNTHROID/LEVOTHROID) 125 MCG tablet Take 1 tablet (125 mcg) by mouth daily 90 tablet 0    MAGNESIUM GLYCINATE PO Take 500 tablets by mouth daily      methotrexate 2.5 MG tablet Take 8 tablets (20 mg) by mouth every 7 days Labs every 8 - 12 weeks for refills. 32 tablet 2    Misc Natural Products (NEURIVA) CAPS       Multiple Vitamin (ONE-A-DAY ESSENTIAL) TABS Take 1 tablet by mouth daily      omeprazole (PRILOSEC) 20 MG DR capsule Take 1 capsule (20 mg) by mouth daily 90 capsule 3    potassium 99 MG TABS Take 1 tablet by mouth      potassium gluconate 2.5 MEQ tablet       pseudoePHEDrine (SUDAFED) 120 MG 12 hr tablet Take 120 mg by mouth         No Known Allergies     Social History     Tobacco Use    Smoking status: Former     Current packs/day: 0.00     Average packs/day: 0.5 packs/day for 0.7 years (0.3 ttl pk-yrs)     Types: Cigarettes     Start date: 10/1/1975     Quit date: 1976     Years since quittin.1     Passive exposure: Past    Smokeless tobacco: Never   Substance Use Topics    Alcohol use: Yes     Comment: 1-2 beers, 4-5 days/week  "      History   Drug Use Unknown               Objective    /78 (BP Location: Right arm, Patient Position: Sitting, Cuff Size: Adult Large)   Pulse 66   Temp 97.4  F (36.3  C) (Tympanic)   Resp 16   Ht 1.638 m (5' 4.5\")   Wt 79.4 kg (175 lb)   LMP  (LMP Unknown)   SpO2 100%   BMI 29.57 kg/m     Estimated body mass index is 29.57 kg/m  as calculated from the following:    Height as of this encounter: 1.638 m (5' 4.5\").    Weight as of this encounter: 79.4 kg (175 lb).  Physical Exam      General: alert and oriented ×3 no acute distress.    HEENT: pupils are equal round and reactive to light extraocular motion is intact. Normocephalic and atraumatic.     Hearing is grossly normal and there is no otorrhea.     Chest: has bilateral rise with no increased work of breathing. ctab    Cardiovascular: normal perfusion and brisk capillary refill. s1s2    Musculoskeletal: no gross focal abnormalities and normal gait.    Neuro: no gross focal abnormalities and memory seems intact.    Psychiatric: speech is clear and coherent and fluent. Patient dressed appropriately for the weather. Mood is appropriate and affect is full.          Recent Labs   Lab Test 07/22/24  1417 05/02/24  0939 04/16/24  1030 04/15/24  1540 03/29/24  0934   HGB  --  13.4  --   --  12.7   PLT  --  215  --   --  193   NA  --  141  --  142 140   POTASSIUM  --  4.3  --  4.3 4.1   CR  --  0.77  --  0.78 0.74   A1C 5.6  --  5.6  --   --         Diagnostics  No labs were ordered during this visit.   No EKG required for low risk surgery (cataract, skin procedure, breast biopsy, etc).    Revised Cardiac Risk Index (RCRI)  The patient has the following serious cardiovascular risks for perioperative complications:   - No serious cardiac risks = 0 points     RCRI Interpretation: 0 points: Class I (very low risk - 0.4% complication rate)         Signed Electronically by: Irena Levi MD  A copy of this evaluation report is provided to the " requesting physician.

## 2024-07-24 NOTE — PROGRESS NOTES
Preoperative Evaluation  [facility]  [address] [address]  Phone: [phone]  Fax: [phone]  Primary Provider: Irena Levi MD  Pre-op Performing Provider: Irena Levi MD  Jul 24, 2024   {Provider  Link to PREOP SmartSet  REQUIRED to apply standard patient instructions and medication directions to the AVS :826715}  {ROOMER review and update patient entered surgical information if needed :713761}        7/21/2024   Surgical Information   What procedure is being done? Both upper eyelid blepharoplasty, left eye ptosis repair, excision of left lateral canthus cyst   [facility]   Who is doing the procedure / surgery? Dr Autumn Padilla   Date of surgery / procedure: August 8, 2024   Time of surgery / procedure: Don t know yet   Where do you plan to recover after surgery? at home with family        Fax number for surgical facility: [phone]}    {Provider Charting Preference for Preop :206099}    Lori Alanis is a 68 year old, presenting for the following:  Pre-Op Exam (08/08/2024, upper eyelid blepharoplasty )          7/24/2024     9:26 AM   Additional Questions   Roomed by AMAYA Hu related to upcoming procedure: ***        7/21/2024   Pre-Op Questionnaire   Have you ever had a heart attack or stroke? No   Have you ever had surgery on your heart or blood vessels, such as a stent placement, a coronary artery bypass, or surgery on an artery in your head, neck, heart, or legs? No   Do you have chest pain with activity? No   Do you have a history of heart failure? No   Do you currently have a cold, bronchitis or symptoms of other infection? No   Do you have a cough, shortness of breath, or wheezing? No   Do you or anyone in your family have previous history of blood clots? (!) YES ***   Do you or does anyone in your family  have a serious bleeding problem such as prolonged bleeding following surgeries or cuts? No   Have you ever had problems with anemia or been told to take iron pills? (!) YES ***   Have you had any abnormal blood loss such as black, tarry or bloody stools, or abnormal vaginal bleeding? No   Have you ever had a blood transfusion? No   Are you willing to have a blood transfusion if it is medically needed before, during, or after your surgery? Yes   Have you or any of your relatives ever had problems with anesthesia? No   Do you have sleep apnea, excessive snoring or daytime drowsiness? No   Do you have any artifical heart valves or other implanted medical devices like a pacemaker, defibrillator, or continuous glucose monitor? No   Do you have artificial joints? No   Are you allergic to latex? No        Health Care Directive  Patient has a Health Care Directive on file      Preoperative Review of   {Mnpmpreport:837927}  {Review MNPMP for all patients per [facility]CSI MNPMP Profile:319432}    {Chronic problem details (Optional) :744150}    Patient Active Problem List    Diagnosis Date Noted    Involutional ptosis, acquired, bilateral 06/04/2024     Priority: Medium    Dermatochalasis of both upper eyelids 06/04/2024     Priority: Medium    Non-caseating granuloma 06/04/2024     Priority: Medium    Parathyroid abnormality (H24) 04/03/2024     Priority: Medium    Facial mass 09/28/2023     Priority: Medium    Sarcoidosis 09/14/2023     Priority: Medium    Lacrimal gland inflammation, left 04/20/2023     Priority: Medium     Added automatically from request for surgery [id number]      Nonalcoholic steatohepatitis (ROMERO) 08/29/2022     Priority: Medium    Fatty liver 04/14/2022     Priority: Medium    Gastroesophageal reflux disease without esophagitis 04/14/2022     Priority: Medium    Hx of NSAID-associated gastropathy 04/14/2022     Priority: Medium    Hypertriglyceridemia 04/14/2022     Priority: Medium    Acquired hypothyroidism  04/14/2022     Priority: Medium    Type 2 diabetes mellitus without complication, without long-term current use of insulin (H) 04/14/2022     Priority: Medium    Personal history of DVT (deep vein thrombosis) 04/14/2022     Priority: Medium     Formatting of this note might be different from the original.   provoked from ankle fracture and wearing cam walking boot   provoked from ankle fracture and wearing cam walking boot      Hx of meniscectomy of right knee 04/14/2022     Priority: Medium      Past Medical History:   Diagnosis Date    Diabetes (H) 07/2021    Infection due to 2019 novel coronavirus 09/2022    Infection due to 2019 novel coronavirus 09/14/2023    MVP (mitral valve prolapse)     Premenopausal menorrhagia 11/18/2010    Sarcoidosis     Thyroid disease     Hypothyroidism     Past Surgical History:   Procedure Laterality Date    ABDOMEN SURGERY  1988    Tubal    CATARACT IOL, RT/LT      CHOLECYSTECTOMY      1987    COLONOSCOPY  ????    7/26/2007, 8/30/2017    DILATION AND CURETTAGE, HYSTEROSCOPY DIAGNOSTIC, COMBINED      4/23/2010    EYE SURGERY  2021, 2022    Left eye-Vitrectomy, Cataract    GALLBLADDER SURGERY  02/11/1987    GYN SURGERY  ????    Partial Hysterectomy    LAPAROSCOPIC HYSTERECTOMY SUPRACERVICAL  11/19/2010    MENISCECTOMY  09/27/2016    partial    ORBITOTOMY, RESECT TUMOR, COMBINED Left 05/04/2023    Procedure: Left lacrimal gland biopsy;  Surgeon: Autumn Padilla MD;  [place] [facility] [place]    ORTHOPEDIC SURGERY  2023    Tendon repair right thumb    SOFT TISSUE SURGERY  ????    Right knee meniscus repair    VAGINAL DELIVERY      x3 no date     Current Outpatient Medications   Medication Sig Dispense Refill    acetaminophen (TYLENOL) 500 MG tablet Take 500-1,000 mg by mouth every 6 hours as needed for mild pain      amitriptyline (ELAVIL) 50 MG tablet TAKE ONE TABLET BY MOUTH EVERY NIGHT AT BEDTIME 90 tablet 3    Ascorbic Acid (VITAMIN C) 100 MG CHEW Gummy, 282mg      atorvastatin  "(LIPITOR) 40 MG tablet TAKE 1 TABLET(40 MG) BY MOUTH DAILY 90 tablet 3    calcium citrate-vitamin D (CITRACAL) 315-250 MG-UNIT TABS per tablet       diphenhydrAMINE (BENADRYL) 25 MG tablet Take 25 mg by mouth every 6 hours as needed for itching or allergies      Ferrous Sulfate (IRON) 325 (65 Fe) MG tablet Take 2 tablets by mouth daily      finasteride (PROSCAR) 5 MG tablet Take 5 mg by mouth daily      folic acid (FOLVITE) 1 MG tablet Take 3 tablets (3 mg) by mouth daily 60 tablet 3    Krill Oil (OMEGA-3) 500 MG CAPS Take 500 mg by mouth      levothyroxine (SYNTHROID/LEVOTHROID) 112 MCG tablet Take 1 tablet (112 mcg) by mouth daily 90 tablet 3    MAGNESIUM GLYCINATE PO Take 500 tablets by mouth daily      methotrexate 2.5 MG tablet Take 8 tablets (20 mg) by mouth every 7 days Labs every 8 - 12 weeks for refills. 32 tablet 2    Misc Natural Products (NEURIVA) CAPS       Multiple Vitamin (ONE-A-DAY ESSENTIAL) TABS Take 1 tablet by mouth daily      omeprazole (PRILOSEC) 20 MG DR capsule Take 1 capsule (20 mg) by mouth daily 90 capsule 3    potassium 99 MG TABS Take 1 tablet by mouth      potassium gluconate 2.5 MEQ tablet       pseudoePHEDrine (SUDAFED) 120 MG 12 hr tablet Take 120 mg by mouth         No Known Allergies     Social History     Tobacco Use    Smoking status: Former     Current packs/day: 0.00     Average packs/day: 0.5 packs/day for 0.7 years (0.3 ttl pk-yrs)     Types: Cigarettes     Start date: 10/1/1975     Quit date: 1976     Years since quittin.1     Passive exposure: Past    Smokeless tobacco: Never   Substance Use Topics    Alcohol use: Yes     Comment: 1-2 beers, 4-5 days/week     {FAMILY HISTORY (Optional):902269120}  History   Drug Use Unknown           {ROS Picklists (Optional):275047}    Objective    /78 (BP Location: Right arm, Patient Position: Sitting, Cuff Size: Adult Large)   Pulse 66   Temp 97.4  F (36.3  C) (Tympanic)   Resp 16   Ht 1.638 m (5' 4.5\")   Wt 79.4 kg " "(175 lb)   LMP  (LMP Unknown)   SpO2 100%   BMI 29.57 kg/m     Estimated body mass index is 29.57 kg/m  as calculated from the following:    Height as of this encounter: 1.638 m (5' 4.5\").    Weight as of this encounter: 79.4 kg (175 lb).  "

## 2024-07-27 ENCOUNTER — ANCILLARY PROCEDURE (OUTPATIENT)
Dept: ULTRASOUND IMAGING | Facility: CLINIC | Age: 69
End: 2024-07-27
Attending: FAMILY MEDICINE
Payer: MEDICARE

## 2024-07-27 DIAGNOSIS — K75.81 NONALCOHOLIC STEATOHEPATITIS (NASH): ICD-10-CM

## 2024-07-27 PROCEDURE — 76981 USE PARENCHYMA: CPT | Mod: GC | Performed by: RADIOLOGY

## 2024-07-30 ENCOUNTER — LAB (OUTPATIENT)
Dept: LAB | Facility: CLINIC | Age: 69
End: 2024-07-30
Payer: MEDICARE

## 2024-07-30 ENCOUNTER — OFFICE VISIT (OUTPATIENT)
Dept: RHEUMATOLOGY | Facility: CLINIC | Age: 69
End: 2024-07-30
Attending: INTERNAL MEDICINE
Payer: MEDICARE

## 2024-07-30 VITALS
DIASTOLIC BLOOD PRESSURE: 66 MMHG | BODY MASS INDEX: 29.74 KG/M2 | OXYGEN SATURATION: 97 % | HEART RATE: 68 BPM | SYSTOLIC BLOOD PRESSURE: 112 MMHG | WEIGHT: 176 LBS

## 2024-07-30 DIAGNOSIS — Z79.631 LONG TERM METHOTREXATE USER: ICD-10-CM

## 2024-07-30 DIAGNOSIS — D86.9 SARCOIDOSIS: Primary | ICD-10-CM

## 2024-07-30 DIAGNOSIS — D86.9 SARCOIDOSIS: ICD-10-CM

## 2024-07-30 LAB
ALBUMIN SERPL BCG-MCNC: 4.4 G/DL (ref 3.5–5.2)
ALP SERPL-CCNC: 83 U/L (ref 40–150)
ALT SERPL W P-5'-P-CCNC: 47 U/L (ref 0–50)
ANION GAP SERPL CALCULATED.3IONS-SCNC: 8 MMOL/L (ref 7–15)
AST SERPL W P-5'-P-CCNC: 48 U/L (ref 0–45)
BASOPHILS # BLD AUTO: 0 10E3/UL (ref 0–0.2)
BASOPHILS NFR BLD AUTO: 1 %
BILIRUB SERPL-MCNC: 0.7 MG/DL
BUN SERPL-MCNC: 13.2 MG/DL (ref 8–23)
CALCIUM SERPL-MCNC: 9.5 MG/DL (ref 8.8–10.4)
CHLORIDE SERPL-SCNC: 105 MMOL/L (ref 98–107)
CREAT SERPL-MCNC: 0.79 MG/DL (ref 0.51–0.95)
CRP SERPL-MCNC: <3 MG/L
EGFRCR SERPLBLD CKD-EPI 2021: 82 ML/MIN/1.73M2
EOSINOPHIL # BLD AUTO: 0.1 10E3/UL (ref 0–0.7)
EOSINOPHIL NFR BLD AUTO: 2 %
ERYTHROCYTE [DISTWIDTH] IN BLOOD BY AUTOMATED COUNT: 12.7 % (ref 10–15)
GLUCOSE SERPL-MCNC: 156 MG/DL (ref 70–99)
HCO3 SERPL-SCNC: 27 MMOL/L (ref 22–29)
HCT VFR BLD AUTO: 37.3 % (ref 35–47)
HGB BLD-MCNC: 12.4 G/DL (ref 11.7–15.7)
IMM GRANULOCYTES # BLD: 0 10E3/UL
IMM GRANULOCYTES NFR BLD: 0 %
LYMPHOCYTES # BLD AUTO: 1 10E3/UL (ref 0.8–5.3)
LYMPHOCYTES NFR BLD AUTO: 18 %
MCH RBC QN AUTO: 33.9 PG (ref 26.5–33)
MCHC RBC AUTO-ENTMCNC: 33.2 G/DL (ref 31.5–36.5)
MCV RBC AUTO: 102 FL (ref 78–100)
MONOCYTES # BLD AUTO: 0.6 10E3/UL (ref 0–1.3)
MONOCYTES NFR BLD AUTO: 11 %
NEUTROPHILS # BLD AUTO: 3.8 10E3/UL (ref 1.6–8.3)
NEUTROPHILS NFR BLD AUTO: 69 %
PLATELET # BLD AUTO: 164 10E3/UL (ref 150–450)
POTASSIUM SERPL-SCNC: 4.4 MMOL/L (ref 3.4–5.3)
PROT SERPL-MCNC: 7 G/DL (ref 6.4–8.3)
RBC # BLD AUTO: 3.66 10E6/UL (ref 3.8–5.2)
SODIUM SERPL-SCNC: 140 MMOL/L (ref 135–145)
WBC # BLD AUTO: 5.6 10E3/UL (ref 4–11)

## 2024-07-30 PROCEDURE — 85025 COMPLETE CBC W/AUTO DIFF WBC: CPT | Mod: QW

## 2024-07-30 PROCEDURE — 80053 COMPREHEN METABOLIC PANEL: CPT

## 2024-07-30 PROCEDURE — 86140 C-REACTIVE PROTEIN: CPT

## 2024-07-30 PROCEDURE — G0463 HOSPITAL OUTPT CLINIC VISIT: HCPCS | Performed by: INTERNAL MEDICINE

## 2024-07-30 PROCEDURE — 99214 OFFICE O/P EST MOD 30 MIN: CPT | Mod: GC | Performed by: INTERNAL MEDICINE

## 2024-07-30 PROCEDURE — G2211 COMPLEX E/M VISIT ADD ON: HCPCS | Performed by: INTERNAL MEDICINE

## 2024-07-30 PROCEDURE — 36415 COLL VENOUS BLD VENIPUNCTURE: CPT

## 2024-07-30 ASSESSMENT — PAIN SCALES - GENERAL: PAINLEVEL: NO PAIN (0)

## 2024-07-30 NOTE — LETTER
7/30/2024       RE: Maritza Hitchcock  [address] [facility] [address]     Dear Colleague,    Thank you for referring your patient, Maritza Hitchcock, to the [facility] RHEUMATOLOGY at [facility]. Please see a copy of my visit note below.                         Chief Complaint/Reason for Visit: sarcoidosis      HPI:    Maritza Hitchcock is a 68 year old White female with past medical history listed below. She continues to be on methotrexate 20 mg oral once per week. She is scheduled for bilateral upper eyelid blepharoplasty and left lateral canthus cyst on 8/8/2024.     Has been feeling well overall. Continues to have drooping of bilateral upper eyelids, worse on the left. Left eyelid drooping worsens throughout the day.  Also has intermittent pain over left upper eyelid cyst with intermittent swelling, denies any redness or drainage. No new vision changes, has wrong prescription in left glasses lens which she is working to correct.     Denies any joint pain and morning stiffness. Does have intermittent pain over left upper lateral chest wall, which is reproducible.  Reports it has been present for a few years. Had recent mammogram that was normal. Reports that hair loss has been improved, sees dermatology for hair loss which is attributed to methotrexate use.     REVIEW OF SYSTEMS    General -negative for fever, chills; pos for intermittent tension headache   HEENT -negative for dry eyes or redness, positive for dry mouth  Cardiovascular -negative for chest pain   Respiratory - negative for shortness of breath; pos for left chest wall pain  Skin -negative for skin rashes  Neuro -negative for stroke or seizures      Past Medical History:   Diagnosis Date     Diabetes (H) 07/2021     Infection due to 2019 novel coronavirus 09/2022     Infection due to 2019 novel coronavirus 09/14/2023     MVP (mitral valve prolapse)      Premenopausal menorrhagia 11/18/2010      Sarcoidosis      Thyroid disease     Hypothyroidism     Past Surgical History:   Procedure Laterality Date     ABDOMEN SURGERY      Tubal     CATARACT IOL, RT/LT       CHOLECYSTECTOMY           COLONOSCOPY  ????    2007, 2017     DILATION AND CURETTAGE, HYSTEROSCOPY DIAGNOSTIC, COMBINED      2010     EYE SURGERY  ,     Left eye-Vitrectomy, Cataract     GALLBLADDER SURGERY  1987     GYN SURGERY  ????    Partial Hysterectomy     LAPAROSCOPIC HYSTERECTOMY SUPRACERVICAL  2010     MENISCECTOMY  2016    partial     ORBITOTOMY, RESECT TUMOR, COMBINED Left 2023    Procedure: Left lacrimal gland biopsy;  Surgeon: Autumn Padilla MD;  Location: [facility] OR     ORTHOPEDIC SURGERY      Tendon repair right thumb     SOFT TISSUE SURGERY  ????    Right knee meniscus repair     VAGINAL DELIVERY      x3 no date     Family History   Problem Relation Age of Onset     Hypertension Mother              Hyperlipidemia Mother              Osteoporosis Mother              Thyroid Disease Mother              Hearing Loss Mother      Rheumatoid Arthritis Mother      Coronary Artery Disease Mother         -     Coronary Artery Disease Father         -     Obesity Father              Hypertension Father              Glasses (<7 y/o) Father      Heart Disease Father      Obesity Paternal Grandmother              Glasses (<7 y/o) Paternal Grandmother          at age: Unknown     Anxiety Disorder Daughter         +Chicken pox     Glasses (<7 y/o) Daughter      Anxiety Disorder Son         Chicken pox     Glasses (<7 y/o) Son      Social History     Socioeconomic History     Marital status:    Tobacco Use     Smoking status: Former     Packs/day: 0.50     Years: 0.00     Additional pack years: 0.00     Total pack years: 0.00     Types: Cigarettes     Start date: 10/1/1975     Quit date:  1976     Years since quittin.7     Passive exposure: Never     Smokeless tobacco: Never   Vaping Use     Vaping Use: Never used   Substance and Sexual Activity     Alcohol use: Yes     Drug use: Never     Sexual activity: Yes     Partners: Male     Birth control/protection: Post-menopausal, Female Surgical   Other Topics Concern     Parent/sibling w/ CABG, MI or angioplasty before 65F 55M? Yes     Comment: Dad age 56     Social Determinants of Health     Financial Resource Strain: Low Risk  (2023)    Financial Resource Strain      Within the past 12 months, have you or your family members you live with been unable to get utilities (heat, electricity) when it was really needed?: No   Food Insecurity: Low Risk  (2023)    Food Insecurity      Within the past 12 months, did you worry that your food would run out before you got money to buy more?: No      Within the past 12 months, did the food you bought just not last and you didn t have money to get more?: No   Transportation Needs: Low Risk  (2023)    Transportation Needs      Within the past 12 months, has lack of transportation kept you from medical appointments, getting your medicines, non-medical meetings or appointments, work, or from getting things that you need?: No   Interpersonal Safety: Low Risk  (2023)    Interpersonal Safety      Do you feel physically and emotionally safe where you currently live?: Yes      Within the past 12 months, have you been hit, slapped, kicked or otherwise physically hurt by someone?: No      Within the past 12 months, have you been humiliated or emotionally abused in other ways by your partner or ex-partner?: No   Housing Stability: Low Risk  (2023)    Housing Stability      Do you have housing? : Yes      Are you worried about losing your housing?: No       No Known Allergies    Current Outpatient Medications   Medication Sig Dispense Refill     acetaminophen (TYLENOL) 500 MG tablet Take  500-1,000 mg by mouth every 6 hours as needed for mild pain       amitriptyline (ELAVIL) 50 MG tablet TAKE ONE TABLET BY MOUTH EVERY NIGHT AT BEDTIME 90 tablet 3     Ascorbic Acid (VITAMIN C) 100 MG CHEW Gummy, 282mg       atorvastatin (LIPITOR) 40 MG tablet TAKE 1 TABLET(40 MG) BY MOUTH DAILY 90 tablet 3     calcium citrate-vitamin D (CITRACAL) 315-250 MG-UNIT TABS per tablet        diphenhydrAMINE (BENADRYL) 25 MG tablet Take 25 mg by mouth every 6 hours as needed for itching or allergies       Ferrous Sulfate (IRON) 325 (65 Fe) MG tablet Take 2 tablets by mouth daily       finasteride (PROSCAR) 5 MG tablet Take 5 mg by mouth daily       folic acid (FOLVITE) 1 MG tablet Take 3 tablets (3 mg) by mouth daily 60 tablet 3     Krill Oil (OMEGA-3) 500 MG CAPS Take 500 mg by mouth       levothyroxine (SYNTHROID/LEVOTHROID) 125 MCG tablet Take 1 tablet (125 mcg) by mouth daily 90 tablet 0     MAGNESIUM GLYCINATE PO Take 500 tablets by mouth daily       methotrexate 2.5 MG tablet Take 8 tablets (20 mg) by mouth every 7 days Labs every 8 - 12 weeks for refills. 32 tablet 2     Misc Natural Products (NEURIVA) CAPS        Multiple Vitamin (ONE-A-DAY ESSENTIAL) TABS Take 1 tablet by mouth daily       omeprazole (PRILOSEC) 20 MG DR capsule Take 1 capsule (20 mg) by mouth daily 90 capsule 3     potassium 99 MG TABS Take 1 tablet by mouth       potassium gluconate 2.5 MEQ tablet        pseudoePHEDrine (SUDAFED) 120 MG 12 hr tablet Take 120 mg by mouth       No current facility-administered medications for this visit.       PHYSICAL EXAM    LMP  (LMP Unknown)       General: Alert, No apparent distress, pleasant.  Psych: Affect euthymic  Eyes: Sclera non-injected. Ptosis bilaterally, L>R. Scar from prior biopsy at upper lateral edge of left eyelid, small cyst present near scar. No swelling or redness currently.   Ears, Nose, Throat, Mouth: Oral mucosa moist with normal salivary pool  Skin: No rashes noted  Cardiovascular: Regular  rate and rhythm, Normal S1 and S2 and No murmurs, rubs or gallops  Respiratory: Clear to auscultation with no wheezing or crackles  Neuro: Normal gait and Able to arise from seated position unassisted  Musculoskeletal: Hands:  Normal.  Ankles ; normal, area of tenderness on left medial ankle jut above malleolus, no redness, warmth or swelling noted  Wrists:  Normal.  Elbows:  Normal.  Shoulders:  Normal.  Knees: Tenderness to palpation along bilateral lateral joint lines, no effusion or crepitus     LABS   Reviewed as below.     May 2024  CBC - MCV elevated at 102, normal wbc and plt   CMP - creatinine and LFT normal  CRP normal      April 2023  SSA, SSB, sm neg   ANCA neg   MARILEE pos, speckled, 1:320  C3, C4 normal   IgG4 normal   QTB NEG   HCV neg   HIV neg     2022  TSH normal     Flow Interpretation   A. Eye, Left, :  -Polytypic B cells  No aberrant immunophenotype on T cells  See comment   Electronically signed by Bobby Brennan MD on 5/8/2023 at 12:29 PM   Comment    There is no immunophenotypic evidence of non-Hodgkin lymphoma. Hodgkin lymphoma cannot be excluded by flow cytometry. T cell lymphomas cannot always be detected by this flow cytometry assay. Neoplastic cells, including large cells, may not survive specimen processing. This sample may not be representative. Final interpretation requires correlation with morphologic and clinical features.      Final Diagnosis   A-B.  SOFT TISSUE, LEFT LACRIMAL GLAND, BIOPSY:  -Nonnecrotizing granulomatous inflammation, see comment.  -GMS and AFB special stains are negative for microorganisms (performed on blocks A1 and B1 with appropriate controls).  -Negative for malignancy.     Narrative & Impression   EXAM: MR ORBIT W/O and W CONTRAST  LOCATION: [facility]  DATE: 3/6/2024     INDICATION: History of left dacryoadenitis path consistent with sarcoid. Palpable nodule just lateral to lacrimal gland left eye.  COMPARISON: CT neck 11/09/2023,  MRI brain 04/06/2023  CONTRAST: 8mL Gadavist  TECHNIQUE: Multiplanar multisequence dedicated orbit MRI without and with intravenous contrast.     FINDINGS:   RIGHT ORBIT: Normal periorbital soft tissues, bony orbit, globe, extraocular muscles, optic nerve/sheath, periorbital fat and lacrimal gland.      LEFT ORBIT: When compared to 04/06/2023, there has been significant interval decrease in size of the previously present enlarged left lacrimal gland. The gland now appears symmetric to the contralateral side. There is a 0.3 x 0.7 cm area of enhancement   lateral to the left lacrimal gland (series 8, image 9) which involves the skin surface. This area was previously substantially thickened on the 04/06/2023 study. It is overall similar in size compared to the 11/09/2023 CT neck study.     SINUSES: No paranasal sinus mucosal disease.     VISUALIZED INTRACRANIAL CONTENTS: No abnormality.                                                                       IMPRESSION:   1.  When compared to 04/06/2023, there has been significant interval decrease in size of the previously present enlarged left lacrimal gland. The gland now appears symmetric to the contralateral side.     2.  There has also been interval decrease in previously present skin thickening and enhancement lateral to the left lacrimal gland. There is a 0.3 x 0.7 cm nodular enhancing lesion in this area, however, it is unclear whether this lesion is new compared   to the prior study, or simply represents a small amount of residual skin thickening/enhancement. This lesion was likely present on the 11/09/2023 CT neck study and is overall similar to that examination.       ASSESSMENT    (D86.9) Sarcoidosis  (primary encounter diagnosis)  Comment: Her symptoms started in May 2023 with drooping of left eyelid and headache. CXR - calcified granuloma. MRI orbit - asymmetric enlargement and enhancement of the left lacrimal gland. CT - : Unilateral left-sided lacrimal  gland slight enlargement with homogeneous enhancement. Left lacrimal gland bx - non necrotizing granulomatous inflammation. Subcutaneous  methotrexate (started Oct 2023)  subcutaneous 20 mg/week along with folic acid 1 mg daily - discontinued subcutaneous as this caused LFT elevation likely due to more systemic absorption. Switched back to PO methotrexate 20 mg/week.     Recent MRI in March 2024 - significant interval decrease in size of the previously present enlarged left lacrimal gland. The gland now appears symmetric to the contralateral side. There has also been interval decrease in previously present skin thickening and enhancement lateral to the left lacrimal gland. There is a 0.3 x 0.7 cm nodular enhancing lesion in this area, however, it is unclear whether this lesion is new compared  to the prior study, or simply represents a small amount of residual skin thickening/enhancement. This lesion was likely present on the 11/09/2023 CT neck study and is overall similar to that examination.    Per 's note in Feb 2024, no imaging or change in Rx planned.     Plan:   Continue methotrexate PO 20 mg/week.   Continue folic acid 2mg daily.   Recheck labs   Orders Placed This Encounter   Procedures     CBC with platelets     Comprehensive metabolic panel     CRP, inflammation     (Z79.899) Long-term use of high-risk medication  Comment: Methotrexate  Plan: The adverse reactions of MTX was discussed which includes cytopenia, immunosuppression, infection, oversedation, pneumonitis, dizziness, nausea, stomach upset, risk of falls, seizures, and derangements in LFTs. Recommended against use of alcohol while on MTX. pt counseled on the adverse effects of Methotrexate including teratogenicity and need for reliable contraception, Alopecia, infection, liver dysfunction and myelosuppression, including the importance of taking daily Folic acid. Patient verbalized understanding and agrees to proceed.    RTC - 3 months      Patient seen and discussed with attending Dr. Baca.     Karolina Figueroa MD  Internal Medicine-Pediatrics, PGY-3     NATALIIA BACA MD    Division of Rheumatic & Autoimmune Diseases  [facility]       Attestation signed by Nataliia Baca MD at 7/30/2024  5:59 PM:  I saw this patient with  and agree with the resident/fellow's findings and plan of care as documented in the note with the following changes/additions.       The patient was seen and discussed with the resident/fellow. I reviewed and confirmed the history, physical examination and personally reviewed the vital signs, family, surgical, medical, allergy and medication history. I have personally reviewed the labs and imaging reports. I was directly involved in the medical decision making and management of this patient.         Again, thank you for allowing me to participate in the care of your patient.      Sincerely,    Nataliia Baca MD

## 2024-07-30 NOTE — PROGRESS NOTES
Chief Complaint/Reason for Visit: sarcoidosis      HPI:    Maritza Hitchcock is a 68 year old White female with past medical history listed below. She continues to be on methotrexate 20 mg oral once per week. She is scheduled for bilateral upper eyelid blepharoplasty and left lateral canthus cyst on 8/8/2024.     Has been feeling well overall. Continues to have drooping of bilateral upper eyelids, worse on the left. Left eyelid drooping worsens throughout the day.  Also has intermittent pain over left upper eyelid cyst with intermittent swelling, denies any redness or drainage. No new vision changes, has wrong prescription in left glasses lens which she is working to correct.     Denies any joint pain and morning stiffness. Does have intermittent pain over left upper lateral chest wall, which is reproducible.  Reports it has been present for a few years. Had recent mammogram that was normal. Reports that hair loss has been improved, sees dermatology for hair loss which is attributed to methotrexate use.     REVIEW OF SYSTEMS    General -negative for fever, chills; pos for intermittent tension headache   HEENT -negative for dry eyes or redness, positive for dry mouth  Cardiovascular -negative for chest pain   Respiratory - negative for shortness of breath; pos for left chest wall pain  Skin -negative for skin rashes  Neuro -negative for stroke or seizures      Past Medical History:   Diagnosis Date    Diabetes (H) 07/2021    Infection due to 2019 novel coronavirus 09/2022    Infection due to 2019 novel coronavirus 09/14/2023    MVP (mitral valve prolapse)     Premenopausal menorrhagia 11/18/2010    Sarcoidosis     Thyroid disease     Hypothyroidism     Past Surgical History:   Procedure Laterality Date    ABDOMEN SURGERY  1988    Tubal    CATARACT IOL, RT/LT      CHOLECYSTECTOMY      1987    COLONOSCOPY  ????    7/26/2007, 8/30/2017    DILATION AND CURETTAGE, HYSTEROSCOPY DIAGNOSTIC, COMBINED       2010    EYE SURGERY  ,     Left eye-Vitrectomy, Cataract    GALLBLADDER SURGERY  1987    GYN SURGERY  ????    Partial Hysterectomy    LAPAROSCOPIC HYSTERECTOMY SUPRACERVICAL  2010    MENISCECTOMY  2016    partial    ORBITOTOMY, RESECT TUMOR, COMBINED Left 2023    Procedure: Left lacrimal gland biopsy;  Surgeon: Autumn Padilla MD;  Location: [facility] OR    ORTHOPEDIC SURGERY      Tendon repair right thumb    SOFT TISSUE SURGERY  ????    Right knee meniscus repair    VAGINAL DELIVERY      x3 no date     Family History   Problem Relation Age of Onset    Hypertension Mother             Hyperlipidemia Mother             Osteoporosis Mother             Thyroid Disease Mother             Hearing Loss Mother     Rheumatoid Arthritis Mother     Coronary Artery Disease Mother         -    Coronary Artery Disease Father         -    Obesity Father             Hypertension Father             Glasses (<9 y/o) Father     Heart Disease Father     Obesity Paternal Grandmother             Glasses (<9 y/o) Paternal Grandmother          at age: Unknown    Anxiety Disorder Daughter         +Chicken pox    Glasses (<9 y/o) Daughter     Anxiety Disorder Son         Chicken pox    Glasses (<9 y/o) Son      Social History     Socioeconomic History    Marital status:    Tobacco Use    Smoking status: Former     Packs/day: 0.50     Years: 0.00     Additional pack years: 0.00     Total pack years: 0.00     Types: Cigarettes     Start date: 10/1/1975     Quit date: 1976     Years since quittin.7     Passive exposure: Never    Smokeless tobacco: Never   Vaping Use    Vaping Use: Never used   Substance and Sexual Activity    Alcohol use: Yes    Drug use: Never    Sexual activity: Yes     Partners: Male     Birth control/protection: Post-menopausal, Female Surgical   Other Topics Concern     Parent/sibling w/ CABG, MI or angioplasty before 65F 55M? Yes     Comment: Dad age 56     Social Determinants of Health     Financial Resource Strain: Low Risk  (12/27/2023)    Financial Resource Strain     Within the past 12 months, have you or your family members you live with been unable to get utilities (heat, electricity) when it was really needed?: No   Food Insecurity: Low Risk  (12/27/2023)    Food Insecurity     Within the past 12 months, did you worry that your food would run out before you got money to buy more?: No     Within the past 12 months, did the food you bought just not last and you didn t have money to get more?: No   Transportation Needs: Low Risk  (12/27/2023)    Transportation Needs     Within the past 12 months, has lack of transportation kept you from medical appointments, getting your medicines, non-medical meetings or appointments, work, or from getting things that you need?: No   Interpersonal Safety: Low Risk  (11/8/2023)    Interpersonal Safety     Do you feel physically and emotionally safe where you currently live?: Yes     Within the past 12 months, have you been hit, slapped, kicked or otherwise physically hurt by someone?: No     Within the past 12 months, have you been humiliated or emotionally abused in other ways by your partner or ex-partner?: No   Housing Stability: Low Risk  (12/27/2023)    Housing Stability     Do you have housing? : Yes     Are you worried about losing your housing?: No       No Known Allergies    Current Outpatient Medications   Medication Sig Dispense Refill    acetaminophen (TYLENOL) 500 MG tablet Take 500-1,000 mg by mouth every 6 hours as needed for mild pain      amitriptyline (ELAVIL) 50 MG tablet TAKE ONE TABLET BY MOUTH EVERY NIGHT AT BEDTIME 90 tablet 3    Ascorbic Acid (VITAMIN C) 100 MG CHEW Gummy, 282mg      atorvastatin (LIPITOR) 40 MG tablet TAKE 1 TABLET(40 MG) BY MOUTH DAILY 90 tablet 3    calcium citrate-vitamin D (CITRACAL) 315-250  MG-UNIT TABS per tablet       diphenhydrAMINE (BENADRYL) 25 MG tablet Take 25 mg by mouth every 6 hours as needed for itching or allergies      Ferrous Sulfate (IRON) 325 (65 Fe) MG tablet Take 2 tablets by mouth daily      finasteride (PROSCAR) 5 MG tablet Take 5 mg by mouth daily      folic acid (FOLVITE) 1 MG tablet Take 3 tablets (3 mg) by mouth daily 60 tablet 3    Krill Oil (OMEGA-3) 500 MG CAPS Take 500 mg by mouth      levothyroxine (SYNTHROID/LEVOTHROID) 125 MCG tablet Take 1 tablet (125 mcg) by mouth daily 90 tablet 0    MAGNESIUM GLYCINATE PO Take 500 tablets by mouth daily      methotrexate 2.5 MG tablet Take 8 tablets (20 mg) by mouth every 7 days Labs every 8 - 12 weeks for refills. 32 tablet 2    Misc Natural Products (NEURIVA) CAPS       Multiple Vitamin (ONE-A-DAY ESSENTIAL) TABS Take 1 tablet by mouth daily      omeprazole (PRILOSEC) 20 MG DR capsule Take 1 capsule (20 mg) by mouth daily 90 capsule 3    potassium 99 MG TABS Take 1 tablet by mouth      potassium gluconate 2.5 MEQ tablet       pseudoePHEDrine (SUDAFED) 120 MG 12 hr tablet Take 120 mg by mouth       No current facility-administered medications for this visit.       PHYSICAL EXAM    LMP  (LMP Unknown)       General: Alert, No apparent distress, pleasant.  Psych: Affect euthymic  Eyes: Sclera non-injected. Ptosis bilaterally, L>R. Scar from prior biopsy at upper lateral edge of left eyelid, small cyst present near scar. No swelling or redness currently.   Ears, Nose, Throat, Mouth: Oral mucosa moist with normal salivary pool  Skin: No rashes noted  Cardiovascular: Regular rate and rhythm, Normal S1 and S2 and No murmurs, rubs or gallops  Respiratory: Clear to auscultation with no wheezing or crackles  Neuro: Normal gait and Able to arise from seated position unassisted  Musculoskeletal: Hands:  Normal.  Ankles ; normal, area of tenderness on left medial ankle jut above malleolus, no redness, warmth or swelling noted  Wrists:   Normal.  Elbows:  Normal.  Shoulders:  Normal.  Knees: Tenderness to palpation along bilateral lateral joint lines, no effusion or crepitus     LABS   Reviewed as below.     May 2024  CBC - MCV elevated at 102, normal wbc and plt   CMP - creatinine and LFT normal  CRP normal      April 2023  SSA, SSB, sm neg   ANCA neg   MARILEE pos, speckled, 1:320  C3, C4 normal   IgG4 normal   QTB NEG   HCV neg   HIV neg     2022  TSH normal     Flow Interpretation   A. Eye, Left, :  -Polytypic B cells  No aberrant immunophenotype on T cells  See comment   Electronically signed by Bobby Brennan MD on 5/8/2023 at 12:29 PM   Comment    There is no immunophenotypic evidence of non-Hodgkin lymphoma. Hodgkin lymphoma cannot be excluded by flow cytometry. T cell lymphomas cannot always be detected by this flow cytometry assay. Neoplastic cells, including large cells, may not survive specimen processing. This sample may not be representative. Final interpretation requires correlation with morphologic and clinical features.      Final Diagnosis   A-B.  SOFT TISSUE, LEFT LACRIMAL GLAND, BIOPSY:  -Nonnecrotizing granulomatous inflammation, see comment.  -GMS and AFB special stains are negative for microorganisms (performed on blocks A1 and B1 with appropriate controls).  -Negative for malignancy.     Narrative & Impression   EXAM: MR ORBIT W/O and W CONTRAST  LOCATION: [facility]  DATE: 3/6/2024     INDICATION: History of left dacryoadenitis path consistent with sarcoid. Palpable nodule just lateral to lacrimal gland left eye.  COMPARISON: CT neck 11/09/2023, MRI brain 04/06/2023  CONTRAST: 8mL Gadavist  TECHNIQUE: Multiplanar multisequence dedicated orbit MRI without and with intravenous contrast.     FINDINGS:   RIGHT ORBIT: Normal periorbital soft tissues, bony orbit, globe, extraocular muscles, optic nerve/sheath, periorbital fat and lacrimal gland.      LEFT ORBIT: When compared to 04/06/2023, there has been  significant interval decrease in size of the previously present enlarged left lacrimal gland. The gland now appears symmetric to the contralateral side. There is a 0.3 x 0.7 cm area of enhancement   lateral to the left lacrimal gland (series 8, image 9) which involves the skin surface. This area was previously substantially thickened on the 04/06/2023 study. It is overall similar in size compared to the 11/09/2023 CT neck study.     SINUSES: No paranasal sinus mucosal disease.     VISUALIZED INTRACRANIAL CONTENTS: No abnormality.                                                                       IMPRESSION:   1.  When compared to 04/06/2023, there has been significant interval decrease in size of the previously present enlarged left lacrimal gland. The gland now appears symmetric to the contralateral side.     2.  There has also been interval decrease in previously present skin thickening and enhancement lateral to the left lacrimal gland. There is a 0.3 x 0.7 cm nodular enhancing lesion in this area, however, it is unclear whether this lesion is new compared   to the prior study, or simply represents a small amount of residual skin thickening/enhancement. This lesion was likely present on the 11/09/2023 CT neck study and is overall similar to that examination.       ASSESSMENT    (D86.9) Sarcoidosis  (primary encounter diagnosis)  Comment: Her symptoms started in May 2023 with drooping of left eyelid and headache. CXR - calcified granuloma. MRI orbit - asymmetric enlargement and enhancement of the left lacrimal gland. CT - : Unilateral left-sided lacrimal gland slight enlargement with homogeneous enhancement. Left lacrimal gland bx - non necrotizing granulomatous inflammation. Subcutaneous  methotrexate (started Oct 2023)  subcutaneous 20 mg/week along with folic acid 1 mg daily - discontinued subcutaneous as this caused LFT elevation likely due to more systemic absorption. Switched back to PO methotrexate 20  mg/week.     Recent MRI in March 2024 - significant interval decrease in size of the previously present enlarged left lacrimal gland. The gland now appears symmetric to the contralateral side. There has also been interval decrease in previously present skin thickening and enhancement lateral to the left lacrimal gland. There is a 0.3 x 0.7 cm nodular enhancing lesion in this area, however, it is unclear whether this lesion is new compared  to the prior study, or simply represents a small amount of residual skin thickening/enhancement. This lesion was likely present on the 11/09/2023 CT neck study and is overall similar to that examination.    Per 's note in Feb 2024, no imaging or change in Rx planned.     Plan:   Continue methotrexate PO 20 mg/week.   Continue folic acid 2mg daily.   Recheck labs   Orders Placed This Encounter   Procedures    CBC with platelets    Comprehensive metabolic panel    CRP, inflammation     (Z79.899) Long-term use of high-risk medication  Comment: Methotrexate  Plan: The adverse reactions of MTX was discussed which includes cytopenia, immunosuppression, infection, oversedation, pneumonitis, dizziness, nausea, stomach upset, risk of falls, seizures, and derangements in LFTs. Recommended against use of alcohol while on MTX. pt counseled on the adverse effects of Methotrexate including teratogenicity and need for reliable contraception, Alopecia, infection, liver dysfunction and myelosuppression, including the importance of taking daily Folic acid. Patient verbalized understanding and agrees to proceed.    RTC - 3 months     Patient seen and discussed with attending Dr. Baca.     Karolina Figueroa MD  Internal Medicine-Pediatrics, PGY-3     SHARAN BACA MD    Division of Rheumatic & Autoimmune Diseases  [facility]

## 2024-07-30 NOTE — NURSING NOTE
Chief Complaint   Patient presents with    RECHECK     /66 (BP Location: Left arm, Patient Position: Sitting, Cuff Size: Adult Regular)   Pulse 68   Wt 79.8 kg (176 lb)   LMP  (LMP Unknown)   SpO2 97%   BMI 29.74 kg/m

## 2024-08-05 ENCOUNTER — MYC MEDICAL ADVICE (OUTPATIENT)
Dept: RHEUMATOLOGY | Facility: CLINIC | Age: 69
End: 2024-08-05
Payer: MEDICARE

## 2024-08-05 DIAGNOSIS — D86.9 SARCOIDOSIS: Primary | ICD-10-CM

## 2024-08-05 RX ORDER — METHOTREXATE 2.5 MG/1
17.5 TABLET ORAL
Qty: 32 TABLET | Refills: 2 | Status: SHIPPED | OUTPATIENT
Start: 2024-08-05

## 2024-08-07 ENCOUNTER — ANESTHESIA EVENT (OUTPATIENT)
Dept: SURGERY | Facility: AMBULATORY SURGERY CENTER | Age: 69
End: 2024-08-07
Payer: MEDICARE

## 2024-08-08 ENCOUNTER — ANESTHESIA (OUTPATIENT)
Dept: SURGERY | Facility: AMBULATORY SURGERY CENTER | Age: 69
End: 2024-08-08
Payer: MEDICARE

## 2024-08-08 ENCOUNTER — HOSPITAL ENCOUNTER (OUTPATIENT)
Facility: AMBULATORY SURGERY CENTER | Age: 69
Discharge: HOME | End: 2024-08-08
Attending: OPHTHALMOLOGY
Payer: MEDICARE

## 2024-08-08 VITALS
RESPIRATION RATE: 14 BRPM | HEIGHT: 64 IN | WEIGHT: 170 LBS | TEMPERATURE: 97 F | BODY MASS INDEX: 29.02 KG/M2 | SYSTOLIC BLOOD PRESSURE: 153 MMHG | OXYGEN SATURATION: 99 % | DIASTOLIC BLOOD PRESSURE: 84 MMHG | HEART RATE: 59 BPM

## 2024-08-08 DIAGNOSIS — H02.831 DERMATOCHALASIS OF BOTH UPPER EYELIDS: ICD-10-CM

## 2024-08-08 DIAGNOSIS — H02.834 DERMATOCHALASIS OF BOTH UPPER EYELIDS: ICD-10-CM

## 2024-08-08 DIAGNOSIS — H02.403 INVOLUTIONAL PTOSIS, ACQUIRED, BILATERAL: ICD-10-CM

## 2024-08-08 DIAGNOSIS — L92.9 NON-CASEATING GRANULOMA: ICD-10-CM

## 2024-08-08 DIAGNOSIS — Z98.890 POSTOPERATIVE EYE STATE: Primary | ICD-10-CM

## 2024-08-08 PROCEDURE — 15820 BLEPHAROPLASTY LOWER EYELID: CPT | Performed by: NURSE ANESTHETIST, CERTIFIED REGISTERED

## 2024-08-08 PROCEDURE — 15820 BLEPHAROPLASTY LOWER EYELID: CPT | Performed by: STUDENT IN AN ORGANIZED HEALTH CARE EDUCATION/TRAINING PROGRAM

## 2024-08-08 PROCEDURE — 15823 BLEPHARP UPR EYELID XCSV SKN: CPT | Mod: RT

## 2024-08-08 PROCEDURE — 67400 EXPLORE/BIOPSY EYE SOCKET: CPT | Mod: LT | Performed by: OPHTHALMOLOGY

## 2024-08-08 PROCEDURE — 88304 TISSUE EXAM BY PATHOLOGIST: CPT | Mod: TC | Performed by: OPHTHALMOLOGY

## 2024-08-08 PROCEDURE — 68540 REMOVE TEAR GLAND LESION: CPT | Mod: LT | Performed by: OPHTHALMOLOGY

## 2024-08-08 PROCEDURE — 67400 EXPLORE/BIOPSY EYE SOCKET: CPT | Mod: LT

## 2024-08-08 PROCEDURE — 68540 REMOVE TEAR GLAND LESION: CPT | Mod: LT

## 2024-08-08 PROCEDURE — 88304 TISSUE EXAM BY PATHOLOGIST: CPT | Mod: 26 | Performed by: OPHTHALMOLOGY

## 2024-08-08 PROCEDURE — 15823 BLEPHARP UPR EYELID XCSV SKN: CPT | Mod: 50 | Performed by: OPHTHALMOLOGY

## 2024-08-08 PROCEDURE — 88312 SPECIAL STAINS GROUP 1: CPT | Mod: 26 | Performed by: OPHTHALMOLOGY

## 2024-08-08 RX ORDER — PROPOFOL 10 MG/ML
INJECTION, EMULSION INTRAVENOUS PRN
Status: DISCONTINUED | OUTPATIENT
Start: 2024-08-08 | End: 2024-08-08

## 2024-08-08 RX ORDER — NALOXONE HYDROCHLORIDE 0.4 MG/ML
0.1 INJECTION, SOLUTION INTRAMUSCULAR; INTRAVENOUS; SUBCUTANEOUS
Status: DISCONTINUED | OUTPATIENT
Start: 2024-08-08 | End: 2024-08-09 | Stop reason: HOSPADM

## 2024-08-08 RX ORDER — DEXAMETHASONE SODIUM PHOSPHATE 10 MG/ML
4 INJECTION, SOLUTION INTRAMUSCULAR; INTRAVENOUS
Status: DISCONTINUED | OUTPATIENT
Start: 2024-08-08 | End: 2024-08-09 | Stop reason: HOSPADM

## 2024-08-08 RX ORDER — ERYTHROMYCIN 5 MG/G
OINTMENT OPHTHALMIC PRN
Status: DISCONTINUED | OUTPATIENT
Start: 2024-08-08 | End: 2024-08-08 | Stop reason: HOSPADM

## 2024-08-08 RX ORDER — OXYCODONE HYDROCHLORIDE 5 MG/1
10 TABLET ORAL
Status: DISCONTINUED | OUTPATIENT
Start: 2024-08-08 | End: 2024-08-09 | Stop reason: HOSPADM

## 2024-08-08 RX ORDER — ONDANSETRON 2 MG/ML
4 INJECTION INTRAMUSCULAR; INTRAVENOUS EVERY 30 MIN PRN
Status: DISCONTINUED | OUTPATIENT
Start: 2024-08-08 | End: 2024-08-09 | Stop reason: HOSPADM

## 2024-08-08 RX ORDER — TRIAMCINOLONE ACETONIDE 40 MG/ML
INJECTION, SUSPENSION INTRA-ARTICULAR; INTRAMUSCULAR PRN
Status: DISCONTINUED | OUTPATIENT
Start: 2024-08-08 | End: 2024-08-08 | Stop reason: HOSPADM

## 2024-08-08 RX ORDER — NEOMYCIN POLYMYXIN B SULFATES AND DEXAMETHASONE 3.5; 10000; 1 MG/ML; [USP'U]/ML; MG/ML
SUSPENSION/ DROPS OPHTHALMIC
Qty: 5 ML | Refills: 0 | Status: SHIPPED | OUTPATIENT
Start: 2024-08-08 | End: 2024-08-20

## 2024-08-08 RX ORDER — ONDANSETRON 4 MG/1
4 TABLET, ORALLY DISINTEGRATING ORAL EVERY 30 MIN PRN
Status: DISCONTINUED | OUTPATIENT
Start: 2024-08-08 | End: 2024-08-09 | Stop reason: HOSPADM

## 2024-08-08 RX ORDER — PROPOFOL 10 MG/ML
INJECTION, EMULSION INTRAVENOUS CONTINUOUS PRN
Status: DISCONTINUED | OUTPATIENT
Start: 2024-08-08 | End: 2024-08-08

## 2024-08-08 RX ORDER — ERYTHROMYCIN 5 MG/G
OINTMENT OPHTHALMIC
Qty: 3.5 G | Refills: 1 | Status: SHIPPED | OUTPATIENT
Start: 2024-08-08 | End: 2024-08-20

## 2024-08-08 RX ORDER — TETRACAINE HYDROCHLORIDE 5 MG/ML
SOLUTION OPHTHALMIC PRN
Status: DISCONTINUED | OUTPATIENT
Start: 2024-08-08 | End: 2024-08-08 | Stop reason: HOSPADM

## 2024-08-08 RX ORDER — HYDROMORPHONE HYDROCHLORIDE 1 MG/ML
0.4 INJECTION, SOLUTION INTRAMUSCULAR; INTRAVENOUS; SUBCUTANEOUS EVERY 5 MIN PRN
Status: DISCONTINUED | OUTPATIENT
Start: 2024-08-08 | End: 2024-08-09 | Stop reason: HOSPADM

## 2024-08-08 RX ORDER — OXYCODONE HYDROCHLORIDE 5 MG/1
5 TABLET ORAL
Status: COMPLETED | OUTPATIENT
Start: 2024-08-08 | End: 2024-08-08

## 2024-08-08 RX ORDER — TRANEXAMIC ACID 10 MG/ML
1 INJECTION, SOLUTION INTRAVENOUS ONCE
Status: COMPLETED | OUTPATIENT
Start: 2024-08-08 | End: 2024-08-08

## 2024-08-08 RX ORDER — FENTANYL CITRATE 50 UG/ML
50 INJECTION, SOLUTION INTRAMUSCULAR; INTRAVENOUS EVERY 5 MIN PRN
Status: DISCONTINUED | OUTPATIENT
Start: 2024-08-08 | End: 2024-08-09 | Stop reason: HOSPADM

## 2024-08-08 RX ORDER — SODIUM CHLORIDE, SODIUM LACTATE, POTASSIUM CHLORIDE, CALCIUM CHLORIDE 600; 310; 30; 20 MG/100ML; MG/100ML; MG/100ML; MG/100ML
INJECTION, SOLUTION INTRAVENOUS CONTINUOUS
Status: DISCONTINUED | OUTPATIENT
Start: 2024-08-08 | End: 2024-08-09 | Stop reason: HOSPADM

## 2024-08-08 RX ORDER — LIDOCAINE 40 MG/G
CREAM TOPICAL
Status: DISCONTINUED | OUTPATIENT
Start: 2024-08-08 | End: 2024-08-08 | Stop reason: HOSPADM

## 2024-08-08 RX ORDER — HYDROMORPHONE HYDROCHLORIDE 1 MG/ML
0.2 INJECTION, SOLUTION INTRAMUSCULAR; INTRAVENOUS; SUBCUTANEOUS EVERY 5 MIN PRN
Status: DISCONTINUED | OUTPATIENT
Start: 2024-08-08 | End: 2024-08-09 | Stop reason: HOSPADM

## 2024-08-08 RX ORDER — FENTANYL CITRATE 50 UG/ML
25 INJECTION, SOLUTION INTRAMUSCULAR; INTRAVENOUS EVERY 5 MIN PRN
Status: DISCONTINUED | OUTPATIENT
Start: 2024-08-08 | End: 2024-08-09 | Stop reason: HOSPADM

## 2024-08-08 RX ORDER — LIDOCAINE HYDROCHLORIDE 20 MG/ML
INJECTION, SOLUTION INFILTRATION; PERINEURAL PRN
Status: DISCONTINUED | OUTPATIENT
Start: 2024-08-08 | End: 2024-08-08

## 2024-08-08 RX ORDER — OXYCODONE HYDROCHLORIDE 5 MG/1
5 TABLET ORAL EVERY 6 HOURS PRN
Qty: 12 TABLET | Refills: 0 | Status: SHIPPED | OUTPATIENT
Start: 2024-08-08 | End: 2024-08-11

## 2024-08-08 RX ORDER — ACETAMINOPHEN 325 MG/1
975 TABLET ORAL ONCE
Status: COMPLETED | OUTPATIENT
Start: 2024-08-08 | End: 2024-08-08

## 2024-08-08 RX ORDER — LABETALOL HYDROCHLORIDE 5 MG/ML
10 INJECTION, SOLUTION INTRAVENOUS
Status: DISCONTINUED | OUTPATIENT
Start: 2024-08-08 | End: 2024-08-09 | Stop reason: HOSPADM

## 2024-08-08 RX ORDER — SODIUM CHLORIDE, SODIUM LACTATE, POTASSIUM CHLORIDE, CALCIUM CHLORIDE 600; 310; 30; 20 MG/100ML; MG/100ML; MG/100ML; MG/100ML
INJECTION, SOLUTION INTRAVENOUS CONTINUOUS
Status: DISCONTINUED | OUTPATIENT
Start: 2024-08-08 | End: 2024-08-08 | Stop reason: HOSPADM

## 2024-08-08 RX ADMIN — OXYCODONE HYDROCHLORIDE 5 MG: 5 TABLET ORAL at 11:11

## 2024-08-08 RX ADMIN — PROPOFOL 40 MG: 10 INJECTION, EMULSION INTRAVENOUS at 10:55

## 2024-08-08 RX ADMIN — SODIUM CHLORIDE, SODIUM LACTATE, POTASSIUM CHLORIDE, CALCIUM CHLORIDE: 600; 310; 30; 20 INJECTION, SOLUTION INTRAVENOUS at 09:52

## 2024-08-08 RX ADMIN — ACETAMINOPHEN 975 MG: 325 TABLET ORAL at 09:37

## 2024-08-08 RX ADMIN — TRANEXAMIC ACID 1 G: 10 INJECTION, SOLUTION INTRAVENOUS at 10:08

## 2024-08-08 RX ADMIN — PROPOFOL 75 MCG/KG/MIN: 10 INJECTION, EMULSION INTRAVENOUS at 10:35

## 2024-08-08 RX ADMIN — PROPOFOL 30 MG: 10 INJECTION, EMULSION INTRAVENOUS at 10:34

## 2024-08-08 RX ADMIN — SODIUM CHLORIDE, SODIUM LACTATE, POTASSIUM CHLORIDE, CALCIUM CHLORIDE: 600; 310; 30; 20 INJECTION, SOLUTION INTRAVENOUS at 10:54

## 2024-08-08 RX ADMIN — LIDOCAINE HYDROCHLORIDE 40 MG: 20 INJECTION, SOLUTION INFILTRATION; PERINEURAL at 10:09

## 2024-08-08 RX ADMIN — PROPOFOL 50 MG: 10 INJECTION, EMULSION INTRAVENOUS at 10:09

## 2024-08-08 RX ADMIN — PROPOFOL 50 MCG/KG/MIN: 10 INJECTION, EMULSION INTRAVENOUS at 10:09

## 2024-08-08 ASSESSMENT — COPD QUESTIONNAIRES: COPD: 0

## 2024-08-08 ASSESSMENT — LIFESTYLE VARIABLES: TOBACCO_USE: 0

## 2024-08-08 ASSESSMENT — ENCOUNTER SYMPTOMS: ORTHOPNEA: 0

## 2024-08-08 NOTE — ANESTHESIA POSTPROCEDURE EVALUATION
Patient: Maritza Hitchcock    Procedure: Procedure(s):  Both upper eyelid blepharoplasty. Left upper eyelid ptosis repair. Excision of left lateral canthus cyst.       Anesthesia Type:  MAC    Note:  Disposition: Outpatient   Postop Pain Control: Uneventful            Sign Out: Well controlled pain   PONV: No   Neuro/Psych: Uneventful            Sign Out: Acceptable/Baseline neuro status   Airway/Respiratory: Uneventful            Sign Out: Acceptable/Baseline resp. status   CV/Hemodynamics: Uneventful            Sign Out: Acceptable CV status; No obvious hypovolemia; No obvious fluid overload   Other NRE: NONE   DID A NON-ROUTINE EVENT OCCUR? No           Last vitals:  Vitals Value Taken Time   BP     Temp     Pulse     Resp     SpO2         Electronically Signed By: Audie Oropeza MD  August 8, 2024  11:02 AM

## 2024-08-08 NOTE — ANESTHESIA PREPROCEDURE EVALUATION
Anesthesia Pre-Procedure Evaluation    Patient: Maritza Hitchcock   MRN: [id number] : 1955        Procedure : Procedure(s):  Both upper eyelid blepharoplasty. Left upper eyelid ptosis repair. Excision of left lateral canthus cyst.          Past Medical History:   Diagnosis Date    Diabetes (H) 2021    Infection due to  novel coronavirus 2022    Infection due to  novel coronavirus 2023    MVP (mitral valve prolapse)     Premenopausal menorrhagia 2010    Sarcoidosis     Thyroid disease     Hypothyroidism      Past Surgical History:   Procedure Laterality Date    ABDOMEN SURGERY      Tubal    CATARACT IOL, RT/LT      CHOLECYSTECTOMY          COLONOSCOPY  ????    2007, 2017    DILATION AND CURETTAGE, HYSTEROSCOPY DIAGNOSTIC, COMBINED      2010    EYE SURGERY  ,     Left eye-Vitrectomy, Cataract    GALLBLADDER SURGERY  1987    GYN SURGERY  ????    Partial Hysterectomy    LAPAROSCOPIC HYSTERECTOMY SUPRACERVICAL  2010    MENISCECTOMY  2016    partial    ORBITOTOMY, RESECT TUMOR, COMBINED Left 2023    Procedure: Left lacrimal gland biopsy;  Surgeon: Autumn Padilla MD;  [place] [facility] [place]    ORTHOPEDIC SURGERY      Tendon repair right thumb    SOFT TISSUE SURGERY  ????    Right knee meniscus repair    VAGINAL DELIVERY      x3 no date      No Known Allergies   Social History     Tobacco Use    Smoking status: Former     Current packs/day: 0.00     Average packs/day: 0.5 packs/day for 0.7 years (0.3 ttl pk-yrs)     Types: Cigarettes     Start date: 10/1/1975     Quit date: 1976     Years since quittin.2     Passive exposure: Past    Smokeless tobacco: Never   Substance Use Topics    Alcohol use: Yes     Comment: 1-2 beers, 4-5 days/week      Wt Readings from Last 1 Encounters:   24 77.1 kg (170 lb)        Anesthesia Evaluation   Pt has had prior anesthetic. Type: General.    No history of anesthetic complications  "      ROS/MED HX  ENT/Pulmonary:    (-) tobacco use, asthma, COPD and recent URI   Neurologic:       Cardiovascular:     (+)  - -   -  - -                                 Previous cardiac testing   Echo: Date: 2019 Results:  Scanned, notes normal LVEF, normal RV function, trace MR, no MV prolapse  Stress Test:  Date: Results:    ECG Reviewed:  Date: Results:    Cath:  Date: Results:   (-) ANDREW, orthopnea/PND and syncope   METS/Exercise Tolerance: >4 METS Comment: Mow lawn, up flights of stairs without concerning cardiopulmonary symptoms   Hematologic:       Musculoskeletal:       GI/Hepatic:     (+) GERD, Asymptomatic on medication,                  Renal/Genitourinary:       Endo:     (+)          thyroid problem, hypothyroidism,           Psychiatric/Substance Use:       Infectious Disease:       Malignancy:       Other:            Physical Exam    Airway        Mallampati: II   TM distance: > 3 FB   Neck ROM: full   Mouth opening: > 3 cm    Respiratory Devices and Support         Dental       (+) Completely normal teeth      Cardiovascular   cardiovascular exam normal          Pulmonary   pulmonary exam normal                OUTSIDE LABS:  CBC:   Lab Results   Component Value Date    WBC 5.6 07/30/2024    WBC 6.6 05/02/2024    HGB 12.4 07/30/2024    HGB 13.4 05/02/2024    HCT 37.3 07/30/2024    HCT 40.2 05/02/2024     07/30/2024     05/02/2024     BMP:   Lab Results   Component Value Date     07/30/2024     05/02/2024    POTASSIUM 4.4 07/30/2024    POTASSIUM 4.3 05/02/2024    CHLORIDE 105 07/30/2024    CHLORIDE 103 05/02/2024    CO2 27 07/30/2024    CO2 27 05/02/2024    BUN 13.2 07/30/2024    BUN 14.8 05/02/2024    CR 0.79 07/30/2024    CR 0.77 05/02/2024     (H) 07/30/2024     (H) 05/02/2024     COAGS: No results found for: \"PTT\", \"INR\", \"FIBR\"  POC: No results found for: \"BGM\", \"HCG\", \"HCGS\"  HEPATIC:   Lab Results   Component Value Date    ALBUMIN 4.4 07/30/2024    " "PROTTOTAL 7.0 07/30/2024    ALT 47 07/30/2024    AST 48 (H) 07/30/2024    ALKPHOS 83 07/30/2024    BILITOTAL 0.7 07/30/2024     OTHER:   Lab Results   Component Value Date    A1C 5.6 07/22/2024    DIANA 9.5 07/30/2024    MAG 2.1 06/29/2021    TSH 4.39 (H) 07/22/2024       Anesthesia Plan    ASA Status:  2    NPO Status:  NPO Appropriate    Anesthesia Type: MAC.     - Reason for MAC: immobility needed, straight local not clinically adequate              Consents    Anesthesia Plan(s) and associated risks, benefits, and realistic alternatives discussed. Questions answered and patient/representative(s) expressed understanding.     - Discussed:     - Discussed with:  Patient            Postoperative Care       PONV prophylaxis: Background Propofol Infusion, Ondansetron (or other 5HT-3)     Comments:               Audie Oropeza MD    I have reviewed the pertinent notes and labs in the chart from the past 30 days and (re)examined the patient.  Any updates or changes from those notes are reflected in this note.              # Overweight: Estimated body mass index is 28.74 kg/m  as calculated from the following:    Height as of this encounter: 1.638 m (5' 4.49\").    Weight as of this encounter: 77.1 kg (170 lb).      "

## 2024-08-08 NOTE — ANESTHESIA CARE TRANSFER NOTE
Patient: Maritza Hitchcock    Procedure: Procedure(s):  Both upper eyelid blepharoplasty. Left upper eyelid ptosis repair. Excision of left lateral canthus cyst.       Diagnosis: Involutional ptosis, acquired, bilateral [H02.403]  Dermatochalasis of both upper eyelids [H02.831, H02.834]  Non-caseating granuloma [L92.9]  Sarcoidosis [D86.9]  Diagnosis Additional Information: No value filed.    Anesthesia Type:   MAC     Note:    Oropharynx: oropharynx clear of all foreign objects and spontaneously breathing  Level of Consciousness: drowsy  Oxygen Supplementation: room air    Independent Airway: airway patency satisfactory and stable  Dentition: dentition unchanged  Vital Signs Stable: post-procedure vital signs reviewed and stable  Report to RN Given: handoff report given  Patient transferred to: Phase II    Handoff Report: Identifed the Patient, Identified the Reponsible Provider, Reviewed the pertinent medical history, Discussed the surgical course, Reviewed Intra-OP anesthesia mangement and issues during anesthesia, Set expectations for post-procedure period and Allowed opportunity for questions and acknowledgement of understanding      Vitals:  Vitals Value Taken Time   /78 08/08/24 1100   Temp 36.1  C (97  F) 08/08/24 1100   Pulse 62 08/08/24 1100   Resp 12 08/08/24 1100   SpO2 97 % 08/08/24 1100       Electronically Signed By: REMY Prather CRNA  August 8, 2024  11:06 AM   Patient wheeled out to curb and placed in POV with brother in law who is taking patient directly to ED as instructed by MD.

## 2024-08-08 NOTE — BRIEF OP NOTE
[facility]    Brief Operative Note    Pre-operative diagnosis: Involutional ptosis, acquired, bilateral [H02.403]  Dermatochalasis of both upper eyelids [H02.831, H02.834]  Non-caseating granuloma [L92.9]  Sarcoidosis [D86.9]  Post-operative diagnosis Same as pre-operative diagnosis    Procedure: Both upper eyelid blepharoplasty. Left upper eyelid ptosis repair. Excision of left lateral canthus cyst., Bilateral - Eye    Surgeon: Surgeons and Role:     * Autumn Padilla MD - Primary     * Aime Patino MD - Resident - Assisting  Anesthesia: MAC with Local   Estimated Blood Loss: Minimal    Drains: None  Specimens:   ID Type Source Tests Collected by Time Destination   1 : Left anterior orbit cyst Tissue Eye, Left SURGICAL PATHOLOGY EXAM Autumn Padilla MD 8/8/2024 10:00 AM      Findings:   None.  Complications: None.  Implants: * No implants in log *        Aime Patino MD  Resident Physician, PGY-2  Department of Ophthalmology  August 8, 2024

## 2024-08-08 NOTE — DISCHARGE INSTRUCTIONS
Post-operative Instructions  Ophthalmic Plastic and Reconstructive Surgery    Autumn Padilla M.D.     All instructions apply to the operated eye(s) or eyelid(s).    Wound care and personal care  ? Apply ice compresses and gentle pressure 15 minutes on, 15 minutes off, for 2 days. If you are sleeping, you don't need to wake up to ice. As long as there is no further bleeding, after two days, switch to warm water compresses for five minutes, four times a day until seen by your physician.   ? You may shower or wash your hair the day after surgery. Do not go swimming for at least 2 weeks to prevent contamination of your wounds.  ? You may go for walks and light activity is ok, but no heavy (over 15 pounds) lifting, bending or excessive straining for one week.   ? Do not apply make-up to the eyes or eyelids for 2 weeks after surgery.  ? Expect some swelling, bruising, black eye (even into the lower eyelids and cheeks). Also expect serum caking, crusting and discharge from the eye and/or incisions. A small amount of surface bleeding, and depending on the type of surgery, bleeding from the inside of the eyelid, is normal for the first 48 hours.  ? Avoid straining, bending at the waist, or lifting more than 15 pounds for 1 week. Sleeping with your head elevated, such as in a recliner, for the first several days can help swelling resolve more quickly.   ? Do continue to ambulate (walk) as you normally would - being sedentary after surgery can cause blood clots.   ? Your eye(s) and eyelid(s) may be painful and tender. This is normal after surgery.      Contact information and follow-up  ? Return to the Eye [facility] for a follow-up appointment with your physician as scheduled. If no appointment has been scheduled:   - [facility]: [phone] for an appointment with Dr. Padilla within 2 to 3 weeks from your date of surgery.    After hours or on weekends and holidays, call [phone] and ask to  speak with the ophthalmologist on call.    An on call person can be reached after hours for concerns. The on call doctor should not call in medication refill requests after hours or on weekends, so please plan accordingly. An effort has been made to provide adequate pain medications following every surgery, and refills will not be provided in most instances.     Medications  ? Restart all regular home medications and eye drops. If you take Plavix or Aspirin on a regular basis, wait for 72 hours after your surgery before restarting these in order to decrease the risk of bleeding complications.  ? Avoid aspirin and aspirin-like medications (Motrin, Aleve, Ibuprofen, Denise-Wasola etc) for 72 hours to reduce the risk of bleeding. You may take Tylenol (acetaminophen) for pain.  ? In addition to your home medications, take the following post-operative medications as prescribed by your physician.    ? Apply antibiotic ointment to all sutures three times a day, and into the operated eye(s) at night.   Once you run out, you can apply Vaseline or Aquaphor (over the counter) to the incisions. Don't put the Vaseline or Aquaphor into your eyes.   ? Instill prescribed eye drops 3 times a day for 10 days.   ? If you have ocular irritation, you can use over the counter artificial tears such as Refresh, Systane, or Blink. Do not use Visine, Clear Eyes, or any other drop that gets the red out.   ? Postoperative discomfort can be managed with Tylenol alone.     [facility]  Home Care Following Anesthesia  For 24 hours after surgery:  Get plenty of rest.  A responsible adult must stay with you for at least 24 hours after you leave the surgery center.  Do not drive or use heavy equipment.  If you have weakness or tingling, don't drive or use heavy equipment until this feeling goes away.   Do not drink alcohol.   Avoid strenuous or risky activities.  Ask for help when climbing stairs.  You may feel  lightheaded.  IF so, sit for a few minutes before standing.  Have someone help you get up.   If you have nausea (feel sick to your stomach): Drink only clear liquids such as apple juice, ginger ale, broth or 7-Up.  Rest may also help.  Be sure to drink enough fluids.  Move to a regular diet as you feel able.   You may have a slight fever.  Call the doctor if your fever is over 100 F (37.7 C) (taken under the tongue) or lasts longer than 24 hours.  You may have a dry mouth, a sore throat, muscle aches or trouble sleeping. These should go away after 24 hours.  Do not make important or legal decisions.   It is recommended to avoid smoking.               Tips for taking pain medications  To get the best pain relief possible, remember these points:  Take pain medications as directed, before pain becomes severe.  Pain medication can upset your stomach: taking it with food may help.  Constipation is a common side effect of pain medication. Drink plenty of  fluids.  Eat foods high in fiber. Take a stool softener if recommended by your doctor or pharmacist.  Do not drink alcohol, drive or operate machinery while taking pain medications.  Ask about other ways to control pain, such as with heat, ice or relaxation.    Tylenol/Acetaminophen Consumption    If you feel your pain relief is insufficient, you may take Tylenol/Acetaminophen in addition to your narcotic pain medication.   Be careful not to exceed 4,000 mg of Tylenol/Acetaminophen in a 24 hour period from all sources.  If you are taking extra strength Tylenol/acetaminophen (500 mg), the maximum dose is 8 tablets in 24 hours.  If you are taking regular strength acetaminophen (325 mg), the maximum dose is 12 tablets in 24 hours.    Call a doctor for any of the following:  Signs of infection (fever, growing tenderness at the surgery site, a large amount of drainage or bleeding, severe pain, foul-smelling drainage, redness, swelling).  It has been over 8 to 10 hours since  surgery and you are still not able to urinate (pass water).  Headache for over 24 hours.  Numbness, tingling or weakness the day after surgery (if you had spinal anesthesia).  Signs of Covid-19 infection (temperature over 100 degrees, shortness of breath, cough, loss of taste/smell, generalized body aches, persistent headache, chills, sore throat, nausea/vomiting/diarrhea)  Your doctor is:       Dr. Autumn Padilla, Ophthalmology: [phone]               Or dial [phone] and ask for the resident on call for:  Ophthalmology  For emergency care, call the:  [facility] [place] [facility]:  [phone] (TTY for hearing impaired: [phone])  Tylenol 975 mg given at 0940.   Ok to take more after 240 pm.

## 2024-08-08 NOTE — OP NOTE
Oculoplastic Surgery Operative Note    PREOPERATIVE DIAGNOSIS:    History of sarcoid orbital inflammation left orbit  Both upper eyelid dermatochalasis  Left upper eyelid blepharoptosis  Left anterior lateral orbit mass     POSTOPERATIVE DIAGNOSIS:    History of sarcoid orbital inflammation left orbit  Both upper eyelid dermatochalasis  Left upper eyelid blepharoptosis  Left anterior lateral orbit mass    PROCEDURE:   Both upper eyelid blepharoplasty  Left upper eyelid ptosis repair  Debulking of left lateral canthus and anterior orbit mass    ANESTHESIA: Monitored anesthesia care with local infiltration of 2% Lidocaine with epinephrine and 0.5% Marcaine in 50:50 mixture    SURGEON:  Autumn Padilla MD    ASSISTANT: Aime Patino MD    ESTIMATED BLOOD LOSS: 5 mL    SPECIMEN:   ID Type Source Tests Collected by Time Destination   1 : Left anterior orbit cyst Tissue Eye, Left SURGICAL PATHOLOGY EXAM Autumn Padilla MD 8/8/2024 10:00 AM        INDICATIONS:  Maritza Hitchcock presented with a history of an orbital inflammatory process involving her left orbit.  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  She has undergone immunosuppression and is under the care of multiple specialists.  She has developed blepharoptosis on that side and has dramatically on both sides causing obscuration of her peripheral vision.  Additionally she has had a small area along the lateral left orbit which has failed to respond to immunosuppressive therapy and causes irritation and pain. Risks, benefits, and alternatives to the proposed procedure were discussed, and she elected to proceed.    PROCEDURE: Maritza Hitchcock was brought to the operating room and placed supine on the operating table. Under monitored anesthesia care the upper eyelid patient Nexocin was marked on both sides.  In the preoperative area with her sitting up the area of the palpable mass along the lateral anterior orbit was marked  out as well.  She was prepped and draped in typical sterile fashion.  Attention was directed to the right side with 15 blade was used to incise skin and skin flap was excised with thermal cautery.  Orbital septum was opened and nasal and central fat was conservatively debulked.  The fat was placed in saline soaked gauze.  The fat was minced.  Attention was then directed to the left side where the same was performed.  A 4-0 silk suture was then passed through the upper eyelid margin on the left and the lid was everted over Desmarres retractor.  A 6-0 silk suture was threaded 4 mm from the superior tarsal plate and uses a traction suture and a Putterman clamp was placed over this.  A double-armed 6-0 plain gut suture was passed starting laterally going nasally and back laterally about a millimeter below the clamp.  15 blade was used to excise this excess tissue then the suture was externalized and tied down effectively advancing levator aponeurosis, Mendez's muscle, and conjunctiva.  The area of the mass laterally was again identified dissection was carried through the orbital septum laterally and entered the anterior orbital space.  In the area of the lacrimal gland fossa a very firm mass was encountered.  This was excised with Kanika scissors going into the superior lateral orbit.  Hemostasis was assured with cautery.  40 of Kenalog was injected.  The nasal and central fat from the right and left upper eyelids were then again minced and then laid out over the area of the concavity along the lateral aspect of her orbit is free fat graft.  The area was copiously irrigated both upper eyelid incisions were closed with 6-0 plain sutures. Maritza BRIDGET Hitchcock tolerated the procedure well and left the operating room in stable condition.       Autumn Padilla MD   This report was dictated using Dragon voice recognition software.

## 2024-08-12 ENCOUNTER — DOCUMENTATION ONLY (OUTPATIENT)
Dept: OTHER | Facility: CLINIC | Age: 69
End: 2024-08-12
Payer: MEDICARE

## 2024-08-15 LAB
PATH REPORT.COMMENTS IMP SPEC: NORMAL
PATH REPORT.FINAL DX SPEC: NORMAL
PATH REPORT.GROSS SPEC: NORMAL
PATH REPORT.MICROSCOPIC SPEC OTHER STN: NORMAL
PATH REPORT.RELEVANT HX SPEC: NORMAL
PHOTO IMAGE: NORMAL

## 2024-08-19 ENCOUNTER — ALLIED HEALTH/NURSE VISIT (OUTPATIENT)
Dept: EDUCATION SERVICES | Facility: CLINIC | Age: 69
End: 2024-08-19
Payer: MEDICARE

## 2024-08-19 VITALS — HEIGHT: 64 IN | BODY MASS INDEX: 29.78 KG/M2 | WEIGHT: 174.4 LBS

## 2024-08-19 DIAGNOSIS — E78.1 HYPERTRIGLYCERIDEMIA: ICD-10-CM

## 2024-08-19 DIAGNOSIS — E11.9 TYPE 2 DIABETES MELLITUS WITHOUT COMPLICATION, WITHOUT LONG-TERM CURRENT USE OF INSULIN (H): Primary | ICD-10-CM

## 2024-08-19 PROCEDURE — G0108 DIAB MANAGE TRN  PER INDIV: HCPCS | Performed by: DIETITIAN, REGISTERED

## 2024-08-19 NOTE — PATIENT INSTRUCTIONS
1000- 1100 Calories/ day   Mediterranean/carbohydrate controlled meal planning    Potential to start Ozempic after MD discussion         Goals:  Practice healthy stress management and mindful eating - think are you physically hungry or are you bored, stressed, emotional etc, make of list of things to do besides eat.    Try to get good quality sleep with a goal of 7-8 hours per night.  Stay physically active daily.  Recommend working up to a total of 30 minutes on 5 days/ week.  Recommend a fitness tracker.     Eat in a healthy way- eliminate trans fats, limit saturated fats and added sugars; follow the plate method - picture above.  Keep a food record (MyFitnessPal, Loseit).    A meal is 3 or more food groups; make it colorful for better nutrition.    Total Carbohydrates (in grams) = Breakfast  30    Lunch  30    Supper  30    If desired snacks 15

## 2024-08-19 NOTE — PROGRESS NOTES
Diabetes Self-Management Education & Support    Presents for: Follow-up type 2 diabetes, obese, hypertriglyceridemia    Type of Service: In Person Visit  Accompanied by: Spouse    ASSESSMENT:  8/16/2021  Pt here with known hx of prediabetes without intervention.  Pt admits to eating more and less activity through the pandemic.    New dx of DM with nonfasting glucose >200 and fasting glucose of 136mg/dL.    9/21/2021 Follow up.  Pt is down 8.2# since last consult.  Pt brings in BG readings, asking appropriate questions.    2/15/2022 Ongoing education.  90 day BG avg is 115mg/dL will have a1c drawn today    8/19/2024 Patient here for an update in diabetes education as it has been a couple of years and patient has verbalized undesirable weight gain.      Patient's most recent   Lab Results   Component Value Date    A1C 5.6 07/22/2024     is meeting goal of <7.0    Diabetes knowledge and skills assessment:   Patient is knowledgeable in diabetes management concepts related to: Monitoring, Taking Medication, Problem Solving, and Healthy Coping    Continue education with the following diabetes management concepts: Healthy Eating, Being Active, and Reducing Risks    Based on learning assessment above, most appropriate setting for further diabetes education would be: Individual setting.      PLAN    Potential to start Ozempic -  will discuss with PCP  Titration schedule  0.25 mg weekly x 4 weeks  0.50 mg weekly x 4 weeks  1 mg weekly with potential of increasing to 2 mg weekly in future    Topics to cover at upcoming visits: As needed for ongoing diabetes self-management education support    Follow-up: Yearly    See Care Plan for co-developed, patient-state behavior change goals.  AVS provided for patient today.    Education Materials Provided:  Goals for Your Diabetes Care, Carbohydrate Counting, and Mediterranean dietary recommendations/carbohydrate controlled meal plan/ calorie deficit diet 1000 - 1100 calories per  "day      SUBJECTIVE/OBJECTIVE:  Presents for: Follow-up  Accompanied by: Self, Spouse  Diabetes education in the past 24mo: No  Diabetes type: Type 2  Disease course: Improving  How confident are you filling out medical forms by yourself:: Extremely  Diabetes management related comments/concerns: No  Transportation concerns: No  Difficulty affording diabetes medication?: No  Difficulty affording diabetes testing supplies?: No  Other concerns:: None  Cultural Influences/Ethnic Background:  Not  or     Diabetes Symptoms & Complications:  Diabetes Related Symptoms: None  Weight trend: Stable  Symptom course: Stable  Disease course: Improving  Complications assessed today?: Yes  Autonomic neuropathy: No  CVA: No  Heart disease: No  Nephropathy: No  Peripheral neuropathy: No  Peripheral Vascular Disease: No  Retinopathy: No  Sexual dysfunction: No    Patient Problem List and Family Medical History reviewed for relevant medical history, current medical status, and diabetes risk factors.    Vitals:  Ht 1.638 m (5' 4.49\")   Wt 79.1 kg (174 lb 6.4 oz)   LMP  (LMP Unknown)   BMI 29.48 kg/m    Estimated body mass index is 29.48 kg/m  as calculated from the following:    Height as of this encounter: 1.638 m (5' 4.49\").    Weight as of this encounter: 79.1 kg (174 lb 6.4 oz).   Last 3 BP:   BP Readings from Last 3 Encounters:   08/08/24 (!) 153/84   07/30/24 112/66   07/24/24 123/78       History   Smoking Status    Former    Types: Cigarettes   Smokeless Tobacco    Never       Labs:  Lab Results   Component Value Date    A1C 5.6 07/22/2024     Lab Results   Component Value Date     07/30/2024     04/14/2022     Lab Results   Component Value Date    LDL 61 07/22/2024     07/12/2016     Direct Measure HDL   Date Value Ref Range Status   07/22/2024 42 (L) >=50 mg/dL Final   ]  GFR Estimate   Date Value Ref Range Status   07/30/2024 82 >60 mL/min/1.73m2 Final     Comment:     eGFR calculated " "using 2021 CKD-EPI equation.     No results found for: \"GFRESTBLACK\"  Lab Results   Component Value Date    CR 0.79 07/30/2024     No results found for: \"MICROALBUMIN\"    Healthy Eating:  Healthy Eating Assessed Today: Yes  Cultural/Taoist diet restrictions?: No  Do you have any food allergies or intolerances?: No  Meal planning/habits: Low carb, Heart healthy, Low salt, Plate planning method  Who cooks/prepares meals for you?: Self, Spouse  Who purchases food in  your home?: Self, Spouse  How many times a week on average do you eat food made away from home (restaurant/take-out)?: 2  Meals include: Dinner, Afternoon Snack  Dinner: Using low-carb wraps or low-carb bread consume vegetables and fruit daily, lean protein,  is a good cook  Other: Consumes fair life milk  Beverages: Water, Tea, Alcohol  Has patient met with a dietitian in the past?: Yes    Being Active:  Being Active Assessed Today: Yes  Exercise:: Currently not exercising  Barrier to exercise: Other    Monitoring:  Monitoring Assessed Today: Yes  Times checking blood sugar at home (number): Never    Taking Medications:  Reviewing MD notes potential for Ozempic for glycemic, CV and weight loss benefit.  Patient will be having a MD visit in a few days and will discuss.   If patient does start on Ozempic provided education today.  Ozempic: proper use, mechanism of action, indications, dosing, injection schedule, safety and side effects.  Pt is shown a demonstration of the use of the pen and was able to return demonstration without difficulty.     Taking Medication Assessed Today: Yes  Current Treatments: Diet    Problem Solving:  Problem Solving Assessed Today: Yes  Is the patient at risk for hypoglycemia?: No  Is the patient at risk for DKA?: No  Does patient have severe weather/disaster plan for diabetes management?: Not Needed  Does patient have sick day plan for diabetes management?: Not Needed              Reducing Risks:  Reducing Risks " Assessed Today: Yes  Diabetes Risks: Age over 45 years, Hypertriglyceridemia, Sedentary Lifestyle, Hyperlipidemia  CAD Risks: Diabetes Mellitus, Post-menopausal, Sedentary lifestyle  Has dilated eye exam at least once a year?: Yes  Sees dentist every 6 months?: Yes  Feet checked by healthcare provider in the last year?: Yes    Healthy Coping:  Healthy Coping Assessed Today: Yes  Emotional response to diabetes: Ready to learn  Informal Support system:: Family, Friends, Significant other  Stage of change: ACTION (Actively working towards change)  Support resources: Weight Management, Exercise, Websites  Patient Activation Measure Survey Score:       No data to display                  Care Plan and Education Provided:  GLP-1 administration technique taught today. Patient verbalized understanding and was able to perform an accurate return demonstration of administration technique. Side effects were discussed, if patient has any abdominal pain, with or without nausea and/or vomiting, stop medication, call provider, clinic or go to the emergency room.  Care Plan: Diabetes   Updates made by Kamini Ramirez RD since 8/19/2024 12:00 AM        Problem: HbA1C Not In Goal         Goal: Establish Regular Follow-Ups with PCP         Task: Discuss with PCP the recommended timing for patient's next follow up visit(s)    Responsible User: Kamini Ramirez RD        Task: Discuss schedule for PCP visits with patient    Responsible User: Kamini Ramirez RD        Goal: Get HbA1C Level in Goal         Task: Educate patient on diabetes education self-management topics Completed 8/19/2024   Responsible User: Kamini Ramirez RD        Task: Educate patient on benefits of regular glucose monitoring    Responsible User: Kamini Ramirez RD        Task: Refer patient to appropriate extended care team member, as needed (Medication Therapy Management, Behavioral Health, Physical Therapy, etc.)    Responsible User: Kamini Ramirez RD         Task: Discuss diabetes treatment plan with patient Completed 8/19/2024   Responsible User: Kamini Ramirez RD        Problem: Diabetes Self-Management Education Needed to Optimize Self-Care Behaviors         Goal: Understand diabetes pathophysiology and disease progression         Task: Provide education on diabetes pathophysiology and disease progression specfic to patient's diabetes type Completed 8/19/2024   Responsible User: Kamini Ramirez RD        Goal: Healthy Eating - follow a healthy eating pattern for diabetes    This Visit's Progress: 30%   Note:    Patient will follow a carbohydrate controlled/ calorie deficit meal plan to promote a 7 to 10% weight loss target of  156 - 161# by 2/2025       Task: Provide education on portion control and consistency in amount, composition and timing of food intake    Responsible User: Kamini Ramirez RD        Task: Provide education on managing carbohydrate intake (carbohydrate counting, plate planning method, etc.)    Responsible User: Kamini Ramirez RD        Task: Provide education on weight management Completed 8/19/2024   Responsible User: Kamini Ramirez RD        Task: Provide education on heart healthy eating Completed 8/19/2024   Responsible User: Kamini Ramirez RD        Task: Provide education on eating out    Responsible User: Kamini Ramirez RD        Task: Develop individualized healthy eating plan with patient Completed 8/19/2024   Responsible User: Kamini Ramirez RD        Goal: Being Active - get regular physical activity, working up to at least 150 minutes per week         Task: Provide education on relationship of activity to glucose and precautions to take if at risk for low glucose Completed 8/19/2024   Responsible User: Kamini Ramirez RD        Task: Discuss barriers to physical activity with patient    Responsible User: Kamini Ramirez RD        Task: Develop physical activity plan with patient    Responsible User: Kamini Ramirez RD         Task: Explore community resources including walking groups, assistance programs, and home videos    Responsible User: Kamini Ramirez RD        Goal: Monitoring - monitor glucose and ketones as directed         Task: Provide education on blood glucose monitoring (purpose, proper technique, frequency, glucose targets, interpreting results, when to use glucose control solution, sharps disposal)    Responsible User: Kamini Ramirez RD        Task: Provide education on continuous glucose monitoring (sensor placement, use of michelle or /reader, understanding glucose trends, alerts and alarms, differences between sensor glucose and blood glucose)    Responsible User: Kamini Ramirez RD        Task: Provide education on ketone monitoring (when to monitor, frequency, etc.)    Responsible User: Kamini Ramirez RD        Goal: Taking Medication - patient is consistently taking medications as directed    Note:    Potential to start on Ozempic -will discuss with PCP       Task: Provide education on action of prescribed medication, including when to take and possible side effects Completed 8/19/2024   Responsible User: Kamini Ramirez RD        Task: Provide education on insulin and injectable diabetes medications, including administration, storage, site selection and rotation for injection sites Completed 8/19/2024   Responsible User: Kamini Ramirez RD        Task: Discuss barriers to medication adherence with patient and provide management technique ideas as appropriate    Responsible User: Kamini Ramirez RD        Task: Provide education on frequency and refill details of medications    Responsible User: Kamini Ramirez RD        Goal: Problem Solving - know how to prevent and manage short-term diabetes complications         Task: Provide education on high blood glucose - causes, signs/symptoms, prevention and treatment Completed 8/19/2024   Responsible User: Kamini Ramirez RD        Task: Provide education on low  blood glucose - causes, signs/symptoms, prevention, treatment, carrying a carbohydrate source at all times, and medical identification    Responsible User: Kamini Ramirez RD        Task: Provide education on safe travel with diabetes    Responsible User: Kamini Ramirez RD        Task: Provide education on how to care for diabetes on sick days    Responsible User: Kamini Ramirez RD        Task: Provide education on when to call a health care provider    Responsible User: Kamini Ramirez RD        Goal: Reducing Risks - know how to prevent and treat long-term diabetes complications         Task: Provide education on major complications of diabetes, prevention, early diagnostic measures and treatment of complications    Responsible User: Kamini Ramirez RD        Task: Provide education on recommended care for dental, eye and foot health Completed 8/19/2024   Responsible User: Kamini Ramirez RD        Task: Provide education on Hemoglobin A1c - goals and relationship to blood glucose levels Completed 8/19/2024   Responsible User: Kamini Ramirez RD        Task: Provide education on recommendations for heart health - lipid levels and goals, blood pressure and goals, and aspirin therapy, if indicated    Responsible User: Kamini Ramirez RD        Task: Provide education on tobacco cessation    Responsible User: Kamini Ramirez RD        Goal: Healthy Coping - use available resources to cope with the challenges of managing diabetes         Task: Discuss recognizing feelings about having diabetes    Responsible User: Kamini Ramirez RD        Task: Provide education on the benefits of making appropriate lifestyle changes Completed 8/19/2024   Responsible User: Kamini Ramirez RD        Task: Provide education on benefits of utilizing support systems    Responsible User: Kamini Ramirez RD        Task: Discuss methods for coping with stress    Responsible User: Kamini Ramirez RD        Task: Provide education on when  to seek professional counseling    Responsible User: Kamini Ramirez RD          Time Spent: 60 minutes  Encounter Type: Individual    Any diabetes medication dose changes were made via the CDE Protocol per the patient's referring provider. A copy of this encounter was shared with the provider.

## 2024-08-19 NOTE — LETTER
8/19/2024         RE: Maritza Hitchcock  [address] [facility] [address]        Dear Colleague,    Thank you for referring your patient, Maritza Hitchcock, to the [facility]. Please see a copy of my visit note below.    Diabetes Self-Management Education & Support    Presents for: Follow-up type 2 diabetes, obese, hypertriglyceridemia    Type of Service: In Person Visit  Accompanied by: Spouse    ASSESSMENT:  8/16/2021  Pt here with known hx of prediabetes without intervention.  Pt admits to eating more and less activity through the pandemic.    New dx of DM with nonfasting glucose >200 and fasting glucose of 136mg/dL.    9/21/2021 Follow up.  Pt is down 8.2# since last consult.  Pt brings in BG readings, asking appropriate questions.    2/15/2022 Ongoing education.  90 day BG avg is 115mg/dL will have a1c drawn today    8/19/2024 Patient here for an update in diabetes education as it has been a couple of years and patient has verbalized undesirable weight gain.      Patient's most recent   Lab Results   Component Value Date    A1C 5.6 07/22/2024     is meeting goal of <7.0    Diabetes knowledge and skills assessment:   Patient is knowledgeable in diabetes management concepts related to: Monitoring, Taking Medication, Problem Solving, and Healthy Coping    Continue education with the following diabetes management concepts: Healthy Eating, Being Active, and Reducing Risks    Based on learning assessment above, most appropriate setting for further diabetes education would be: Individual setting.      PLAN    Potential to start Ozempic -  will discuss with PCP  Titration schedule  0.25 mg weekly x 4 weeks  0.50 mg weekly x 4 weeks  1 mg weekly with potential of increasing to 2 mg weekly in future    Topics to cover at upcoming visits: As needed for ongoing diabetes self-management education support    Follow-up: Yearly    See Care Plan for co-developed, patient-state behavior change  "goals.  AVS provided for patient today.    Education Materials Provided:  Goals for Your Diabetes Care, Carbohydrate Counting, and Mediterranean dietary recommendations/carbohydrate controlled meal plan/ calorie deficit diet 1000 - 1100 calories per day      SUBJECTIVE/OBJECTIVE:  Presents for: Follow-up  Accompanied by: Self, Spouse  Diabetes education in the past 24mo: No  Diabetes type: Type 2  Disease course: Improving  How confident are you filling out medical forms by yourself:: Extremely  Diabetes management related comments/concerns: No  Transportation concerns: No  Difficulty affording diabetes medication?: No  Difficulty affording diabetes testing supplies?: No  Other concerns:: None  Cultural Influences/Ethnic Background:  Not  or     Diabetes Symptoms & Complications:  Diabetes Related Symptoms: None  Weight trend: Stable  Symptom course: Stable  Disease course: Improving  Complications assessed today?: Yes  Autonomic neuropathy: No  CVA: No  Heart disease: No  Nephropathy: No  Peripheral neuropathy: No  Peripheral Vascular Disease: No  Retinopathy: No  Sexual dysfunction: No    Patient Problem List and Family Medical History reviewed for relevant medical history, current medical status, and diabetes risk factors.    Vitals:  Ht 1.638 m (5' 4.49\")   Wt 79.1 kg (174 lb 6.4 oz)   LMP  (LMP Unknown)   BMI 29.48 kg/m    Estimated body mass index is 29.48 kg/m  as calculated from the following:    Height as of this encounter: 1.638 m (5' 4.49\").    Weight as of this encounter: 79.1 kg (174 lb 6.4 oz).   Last 3 BP:   BP Readings from Last 3 Encounters:   08/08/24 (!) 153/84   07/30/24 112/66   07/24/24 123/78       History   Smoking Status     Former     Types: Cigarettes   Smokeless Tobacco     Never       Labs:  Lab Results   Component Value Date    A1C 5.6 07/22/2024     Lab Results   Component Value Date     07/30/2024     04/14/2022     Lab Results   Component Value Date    " "LDL 61 07/22/2024     07/12/2016     Direct Measure HDL   Date Value Ref Range Status   07/22/2024 42 (L) >=50 mg/dL Final   ]  GFR Estimate   Date Value Ref Range Status   07/30/2024 82 >60 mL/min/1.73m2 Final     Comment:     eGFR calculated using 2021 CKD-EPI equation.     No results found for: \"GFRESTBLACK\"  Lab Results   Component Value Date    CR 0.79 07/30/2024     No results found for: \"MICROALBUMIN\"    Healthy Eating:  Healthy Eating Assessed Today: Yes  Cultural/Zoroastrian diet restrictions?: No  Do you have any food allergies or intolerances?: No  Meal planning/habits: Low carb, Heart healthy, Low salt, Plate planning method  Who cooks/prepares meals for you?: Self, Spouse  Who purchases food in  your home?: Self, Spouse  How many times a week on average do you eat food made away from home (restaurant/take-out)?: 2  Meals include: Dinner, Afternoon Snack  Dinner: Using low-carb wraps or low-carb bread consume vegetables and fruit daily, lean protein,  is a good cook  Other: Consumes fair life milk  Beverages: Water, Tea, Alcohol  Has patient met with a dietitian in the past?: Yes    Being Active:  Being Active Assessed Today: Yes  Exercise:: Currently not exercising  Barrier to exercise: Other    Monitoring:  Monitoring Assessed Today: Yes  Times checking blood sugar at home (number): Never    Taking Medications:  Reviewing MD notes potential for Ozempic for glycemic, CV and weight loss benefit.  Patient will be having a MD visit in a few days and will discuss.   If patient does start on Ozempic provided education today.  Ozempic: proper use, mechanism of action, indications, dosing, injection schedule, safety and side effects.  Pt is shown a demonstration of the use of the pen and was able to return demonstration without difficulty.     Taking Medication Assessed Today: Yes  Current Treatments: Diet    Problem Solving:  Problem Solving Assessed Today: Yes  Is the patient at risk for " hypoglycemia?: No  Is the patient at risk for DKA?: No  Does patient have severe weather/disaster plan for diabetes management?: Not Needed  Does patient have sick day plan for diabetes management?: Not Needed              Reducing Risks:  Reducing Risks Assessed Today: Yes  Diabetes Risks: Age over 45 years, Hypertriglyceridemia, Sedentary Lifestyle, Hyperlipidemia  CAD Risks: Diabetes Mellitus, Post-menopausal, Sedentary lifestyle  Has dilated eye exam at least once a year?: Yes  Sees dentist every 6 months?: Yes  Feet checked by healthcare provider in the last year?: Yes    Healthy Coping:  Healthy Coping Assessed Today: Yes  Emotional response to diabetes: Ready to learn  Informal Support system:: Family, Friends, Significant other  Stage of change: ACTION (Actively working towards change)  Support resources: Weight Management, Exercise, Websites  Patient Activation Measure Survey Score:       No data to display                  Care Plan and Education Provided:  GLP-1 administration technique taught today. Patient verbalized understanding and was able to perform an accurate return demonstration of administration technique. Side effects were discussed, if patient has any abdominal pain, with or without nausea and/or vomiting, stop medication, call provider, clinic or go to the emergency room.  Care Plan: Diabetes   Updates made by Kamini Ramirez RD since 8/19/2024 12:00 AM        Problem: HbA1C Not In Goal         Goal: Establish Regular Follow-Ups with PCP         Task: Discuss with PCP the recommended timing for patient's next follow up visit(s)    Responsible User: Kamini Ramirez RD        Task: Discuss schedule for PCP visits with patient    Responsible User: Kamini Ramirez RD        Goal: Get HbA1C Level in Goal         Task: Educate patient on diabetes education self-management topics Completed 8/19/2024   Responsible User: Kamini Ramirez RD        Task: Educate patient on benefits of regular glucose  monitoring    Responsible User: Kamini Ramirez RD        Task: Refer patient to appropriate extended care team member, as needed (Medication Therapy Management, Behavioral Health, Physical Therapy, etc.)    Responsible User: Kamini Ramirez RD        Task: Discuss diabetes treatment plan with patient Completed 8/19/2024   Responsible User: Kamini Ramirez RD        Problem: Diabetes Self-Management Education Needed to Optimize Self-Care Behaviors         Goal: Understand diabetes pathophysiology and disease progression         Task: Provide education on diabetes pathophysiology and disease progression specfic to patient's diabetes type Completed 8/19/2024   Responsible User: Kamini Ramirez RD        Goal: Healthy Eating - follow a healthy eating pattern for diabetes    This Visit's Progress: 30%   Note:    Patient will follow a carbohydrate controlled/ calorie deficit meal plan to promote a 7 to 10% weight loss target of  156 - 161# by 2/2025       Task: Provide education on portion control and consistency in amount, composition and timing of food intake    Responsible User: Kamini Ramirez RD        Task: Provide education on managing carbohydrate intake (carbohydrate counting, plate planning method, etc.)    Responsible User: Kamini Ramirez RD        Task: Provide education on weight management Completed 8/19/2024   Responsible User: Kamini Ramirez RD        Task: Provide education on heart healthy eating Completed 8/19/2024   Responsible User: Kamini Ramirez RD        Task: Provide education on eating out    Responsible User: Kamini Ramirez RD        Task: Develop individualized healthy eating plan with patient Completed 8/19/2024   Responsible User: Kamini Ramirez RD        Goal: Being Active - get regular physical activity, working up to at least 150 minutes per week         Task: Provide education on relationship of activity to glucose and precautions to take if at risk for low glucose Completed  8/19/2024   Responsible User: Kamini Ramirez RD        Task: Discuss barriers to physical activity with patient    Responsible User: Kamini Ramirez RD        Task: Develop physical activity plan with patient    Responsible User: Kamini Ramirez RD        Task: Explore community resources including walking groups, assistance programs, and home videos    Responsible User: Kamini Ramirez RD        Goal: Monitoring - monitor glucose and ketones as directed         Task: Provide education on blood glucose monitoring (purpose, proper technique, frequency, glucose targets, interpreting results, when to use glucose control solution, sharps disposal)    Responsible User: Kamini Ramirez RD        Task: Provide education on continuous glucose monitoring (sensor placement, use of michelle or /reader, understanding glucose trends, alerts and alarms, differences between sensor glucose and blood glucose)    Responsible User: Kamini Ramirez RD        Task: Provide education on ketone monitoring (when to monitor, frequency, etc.)    Responsible User: Kamini Ramirez RD        Goal: Taking Medication - patient is consistently taking medications as directed    Note:    Potential to start on Ozempic -will discuss with PCP       Task: Provide education on action of prescribed medication, including when to take and possible side effects Completed 8/19/2024   Responsible User: Kamini Ramirez RD        Task: Provide education on insulin and injectable diabetes medications, including administration, storage, site selection and rotation for injection sites Completed 8/19/2024   Responsible User: Kamini Ramirez RD        Task: Discuss barriers to medication adherence with patient and provide management technique ideas as appropriate    Responsible User: Kamini Ramirez RD        Task: Provide education on frequency and refill details of medications    Responsible User: Kamini Ramirez RD        Goal: Problem Solving - know how to  prevent and manage short-term diabetes complications         Task: Provide education on high blood glucose - causes, signs/symptoms, prevention and treatment Completed 8/19/2024   Responsible User: Kamini Ramirez RD        Task: Provide education on low blood glucose - causes, signs/symptoms, prevention, treatment, carrying a carbohydrate source at all times, and medical identification    Responsible User: Kamini Ramirez RD        Task: Provide education on safe travel with diabetes    Responsible User: Kamini Ramirez RD        Task: Provide education on how to care for diabetes on sick days    Responsible User: Kamini Ramirez RD        Task: Provide education on when to call a health care provider    Responsible User: Kamini Ramirez RD        Goal: Reducing Risks - know how to prevent and treat long-term diabetes complications         Task: Provide education on major complications of diabetes, prevention, early diagnostic measures and treatment of complications    Responsible User: Kamini Ramirez RD        Task: Provide education on recommended care for dental, eye and foot health Completed 8/19/2024   Responsible User: Kamini Ramirez RD        Task: Provide education on Hemoglobin A1c - goals and relationship to blood glucose levels Completed 8/19/2024   Responsible User: Kamini Ramirez RD        Task: Provide education on recommendations for heart health - lipid levels and goals, blood pressure and goals, and aspirin therapy, if indicated    Responsible User: Kamini Ramirez RD        Task: Provide education on tobacco cessation    Responsible User: Kamini Ramirez RD        Goal: Healthy Coping - use available resources to cope with the challenges of managing diabetes         Task: Discuss recognizing feelings about having diabetes    Responsible User: Kamini Raimrez RD        Task: Provide education on the benefits of making appropriate lifestyle changes Completed 8/19/2024   Responsible User: James  JUS Suárez        Task: Provide education on benefits of utilizing support systems    Responsible User: Kamini Ramirez RD        Task: Discuss methods for coping with stress    Responsible User: Kamini Ramirez RD        Task: Provide education on when to seek professional counseling    Responsible User: Kamini Ramirez RD          Time Spent: 60 minutes  Encounter Type: Individual    Any diabetes medication dose changes were made via the CDE Protocol per the patient's referring provider. A copy of this encounter was shared with the provider.

## 2024-08-20 ENCOUNTER — OFFICE VISIT (OUTPATIENT)
Dept: OPHTHALMOLOGY | Facility: CLINIC | Age: 69
End: 2024-08-20
Payer: MEDICARE

## 2024-08-20 DIAGNOSIS — D86.9 SARCOIDOSIS: Primary | ICD-10-CM

## 2024-08-20 PROCEDURE — 99024 POSTOP FOLLOW-UP VISIT: CPT | Mod: GC | Performed by: OPHTHALMOLOGY

## 2024-08-20 ASSESSMENT — VISUAL ACUITY
CORRECTION_TYPE: GLASSES
OS_CC: 20/20
METHOD: SNELLEN - LINEAR
OD_CC: 20/25

## 2024-08-20 ASSESSMENT — EXTERNAL EXAM - RIGHT EYE: OD_EXAM: NORMAL

## 2024-08-20 ASSESSMENT — TONOMETRY
OS_IOP_MMHG: 22
OD_IOP_MMHG: 22
IOP_METHOD: ICARE

## 2024-08-20 ASSESSMENT — EXTERNAL EXAM - LEFT EYE: OS_EXAM: NORMAL

## 2024-08-20 NOTE — PROGRESS NOTES
"Chief Complaint(s) and History of Present Illness(es)     Post Op (Ophthalmology) Both Eyes            Laterality: both eyes    Associated symptoms: Negative for double vision, eye pain, discharge and   burning    Pain scale: 0/10    Comments: POD#12 s/p BULB, LESTER ptosis repair, Debulking of left lateral   canthus and anterior orbit mass          Comments    Pt notes that the OD is healing well, she states OS is still a little   \"sore\",     Pt is using:   Maxitrol TID OU  EES TID OU, occasionally in the eyes at bedtime.    Batsheva Adler COT August 20, 2024 10:26 AM        POD#12 S/P:  Both upper eyelid blepharoplasty  Left upper eyelid ptosis repair  Debulking of left lateral canthus and anterior orbit mass     She has a history of sarcoidosis with previous biopsy of left lacrimal gland showing non-necrotizing granulomatous inflammation. Surgical pathology of left anterior orbital mass showed numerous non-necrotizing granulomas.     Doing well. Happy with outcome. She notices a significant increase of her superior visual field. Mild asymmetry noted, she thinks because of postop bruising and edema. Discussed that the appearance of her scar, especially on the right, may change as the tissues heal and swelling resolves.      Nigel STORM Oph Res PGY2    Looks very good! So far good take of fat transfer to the left sub brow region. Few sutures removed.     Path from lateral left upper eyelid/anterior orbit is sarcoid.     Patient Instructions   - Apply warm compresses for 1 minute two to three times a day until your bruising has resolved. You can continue this for one more month.   - Cool compresses can help with swelling and itching, you can alternate cool compresses with warm compresses if you find it helpful.  - Apply Aquaphor or Vaseline to the incision at bedtime until it is smooth.    - The neomycin/polymixin/dexamethasone drop should be stopped at day 10.   - If you have symptoms of eye irritation, it is good to use " "over the counter artificial tears. Good brands include Refresh, Blink, and Systane. Do NOT get drops that are for \"red eyes.\"   - It is normal for the incision to appear raised, red, itch and have small lumps. You can gently massage any small bumps along the incision line. These can take up to six months to resolve.    Return in about 2 months (around 10/20/2024) for Postop .      Attending Physician Attestation: Complete documentation of historical and exam elements from today's encounter can be found in the full encounter summary report (not reduplicated in this progress note). I personally obtained the chief complaint(s) and history of present illness. I confirmed and edited as necessary the review of systems, past medical/surgical history, family history, social history, and examination findings as documented by others; and I examined the patient myself. I personally reviewed the relevant tests, images, and reports as documented above. I formulated and edited as necessary the assessment and plan and discussed the findings and management plan with the patient and family. I personally reviewed the ophthalmic test(s) associated with this encounter, agree with the interpretation(s) as documented by the resident/fellow, and have edited the corresponding report(s) as necessary. Autumn Padilla MD      "

## 2024-08-20 NOTE — NURSING NOTE
"Chief Complaints and History of Present Illnesses   Patient presents with    Post Op (Ophthalmology) Both Eyes     POD#12 s/p BULB, LESTER ptosis repair, Debulking of left lateral canthus and anterior orbit mass     Chief Complaint(s) and History of Present Illness(es)       Post Op (Ophthalmology) Both Eyes              Laterality: both eyes    Associated symptoms: Negative for double vision, eye pain, discharge and burning    Pain scale: 0/10    Comments: POD#12 s/p BULB, LESTER ptosis repair, Debulking of left lateral canthus and anterior orbit mass              Comments    Pt notes that the OD is healing well, she states OS is still a little \"sore\",     Pt is using:   Maxitrol TID OU  EES TID OU, occasionally in the eyes at bedtime.    Batsheva Adler COT August 20, 2024 10:26 AM                       "

## 2024-08-21 ENCOUNTER — VIRTUAL VISIT (OUTPATIENT)
Dept: FAMILY MEDICINE | Facility: CLINIC | Age: 69
End: 2024-08-21
Payer: MEDICARE

## 2024-08-21 DIAGNOSIS — M85.852 OSTEOPENIA OF NECKS OF BOTH FEMURS: ICD-10-CM

## 2024-08-21 DIAGNOSIS — M85.88 OSTEOPENIA OF LUMBAR SPINE: Primary | ICD-10-CM

## 2024-08-21 DIAGNOSIS — M85.851 OSTEOPENIA OF NECKS OF BOTH FEMURS: ICD-10-CM

## 2024-08-21 DIAGNOSIS — K76.0 FATTY LIVER: ICD-10-CM

## 2024-08-21 PROCEDURE — G2211 COMPLEX E/M VISIT ADD ON: HCPCS | Mod: 95 | Performed by: FAMILY MEDICINE

## 2024-08-21 PROCEDURE — 99214 OFFICE O/P EST MOD 30 MIN: CPT | Mod: 95 | Performed by: FAMILY MEDICINE

## 2024-08-21 RX ORDER — ALENDRONATE SODIUM 70 MG/1
70 TABLET ORAL
Qty: 12 TABLET | Refills: 3 | Status: SHIPPED | OUTPATIENT
Start: 2024-08-21

## 2024-08-21 NOTE — PATIENT INSTRUCTIONS
Addendum         Take extra phytoestrogens food to strengthen your bone. (Soy, tofu, brocoli)     CALCIUM Recommendation 1500 mg/day in divided dose of no more than 500 mg/dose. This includes what is in your food and also what is in any supplements you are taking.       Dietary sources of calcium: These also contain vitamin D  Milk / buttermilk              8 oz            300 mg  Dry milk powder       5 Tbsp             300 mg  Yogurt                          1 cup           400 mg  Ice cream                    1/2 cup          100 mg  Hard cheese                     1.5 oz          300 mg  Cottage cheese                1/2 cup        65 mg  Spinach, cooked          1 cup              240 mg  Tofu, soft regular           4 oz          120 to 390 mg  Sardines                       3 oz               370 mg  Mackerel canned         3 oz                250 mg  Canned salmon with bones 3 oz        170-210 mg  Calcium fortified cereals are available  SILK soy and almond milk products are calcium fortified  Orange juice  Fortified with Calcium       8 oz            300 mg  Low fat dairy sources are recommended        Recommended exercise goals:     30 minutes of moderate exercise on most days of the week .  Weight bearing exercise (ie, walking, jogging, hiking, dancing)     Strength training 2 or more times/week in addition to other weight -being exercise.  Strength training -- uses weight or resistance beyond that seen in everyday activities -(pilates, weight training with free weights, weight machines or resistance bands)     OSTEOPOROSIS of the spine: avoid exercise machines that incorporate spinal flexion, trunk rotation, or forward bending   Specifically avoid abdominal exercisers, stationary bikes with moving handles, cross-country ski machines, rowing machines, and bicep curl machines  Spinal fractures: AVOID activities that place significant force on the spine such as horse back riding,  tennis, golf or  bowling

## 2024-08-21 NOTE — PROGRESS NOTES
Annabelle is a 68 year old who is being evaluated via a billable video visit.    How would you like to obtain your AVS? MyChart  If the video visit is dropped, the invitation should be resent by: Text to cell phone: [phone]  Will anyone else be joining your video visit? No      Assessment & Plan   Problem List Items Addressed This Visit       Fatty liver    Osteopenia of lumbar spine - Primary    Relevant Medications    alendronate (FOSAMAX) 70 MG tablet    Osteopenia of necks of both femurs    Relevant Medications    alendronate (FOSAMAX) 70 MG tablet      Fatty liver disease patient's FibroScan shows that she is at low risk of having fibrosis.  Will have her continue to work on therapeutic lifestyle changes.    Osteopenia in her lumbar spine and bilateral femoral necks and hips.  Patient has an appointment coming up in a couple of weeks with her dentist.  Would like her to get dental clearance to start biphosphonate.  She does have GERD but reports it is well-controlled and will be able to write out of bed for at least an hour after taking her alendronate once a week with a full glass of water.  Would like her to make sure she is getting in 1200 mg of calcium divided throughout the day.  Referred to the International osteoporosis foundation website so that she can use a calcium calculator.  Also at least 1000 international units of vitamin D daily, and that we can repeat her bone density study in 2 years.  Doing weightbearing exercises with a weighted vest is also suggested.        The longitudinal plan of care for the diagnosis(es)/condition(s) as documented were addressed during this visit. Due to the added complexity in care, I will continue to support Annabelle in the subsequent management and with ongoing continuity of care.        FIB-4 Calculation: 2.82 at 7/30/2024  2:42 PM  Calculated from:  SGOT/AST: 48 U/L at 7/30/2024  2:42 PM  SGPT/ALT: 47 U/L at 7/30/2024  2:42 PM  Platelets: 164 10e3/uL at 7/30/2024   2:42 PM  Age: 68 years             Subjective   Annabelle is a 68 year old, presenting for the following health issues:  No chief complaint on file.      Video Start Time: 3:40 PM    History of Present Illness       Reason for visit:  Talk about osteopenia    She eats 2-3 servings of fruits and vegetables daily.She consumes 0 sweetened beverage(s) daily.She exercises with enough effort to increase her heart rate 9 or less minutes per day.  She exercises with enough effort to increase her heart rate 3 or less days per week.   She is taking medications regularly.                   Objective    Vitals - Patient Reported  Systolic (Patient Reported): 118  Diastolic (Patient Reported): 60      Vitals:  No vitals were obtained today due to virtual visit.    Physical Exam   GENERAL: alert and no distress  EYES: Eyes grossly normal to inspection.  No discharge or erythema, or obvious scleral/conjunctival abnormalities.  RESP: No audible wheeze, cough, or visible cyanosis.    SKIN: Visible skin clear. No significant rash, abnormal pigmentation or lesions.  NEURO: Cranial nerves grossly intact.  Mentation and speech appropriate for age.  PSYCH: Appropriate affect, tone, and pace of words          Video-Visit Details    Type of service:  Video Visit   Video End Time:4:13 PM  Originating Location (pt. Location): Home    Distant Location (provider location):  On-site  Platform used for Video Visit: Lucio  Signed Electronically by: Irena Levi MD

## 2024-08-24 DIAGNOSIS — K21.9 GASTROESOPHAGEAL REFLUX DISEASE WITHOUT ESOPHAGITIS: ICD-10-CM

## 2024-09-03 ENCOUNTER — VIRTUAL VISIT (OUTPATIENT)
Dept: RHEUMATOLOGY | Facility: CLINIC | Age: 69
End: 2024-09-03
Attending: INTERNAL MEDICINE
Payer: MEDICARE

## 2024-09-03 DIAGNOSIS — M85.851 OSTEOPENIA OF NECKS OF BOTH FEMURS: ICD-10-CM

## 2024-09-03 DIAGNOSIS — M85.88 OSTEOPENIA OF LUMBAR SPINE: ICD-10-CM

## 2024-09-03 DIAGNOSIS — M85.852 OSTEOPENIA OF NECKS OF BOTH FEMURS: ICD-10-CM

## 2024-09-03 DIAGNOSIS — D86.9 SARCOIDOSIS: Primary | ICD-10-CM

## 2024-09-03 NOTE — Clinical Note
No flares or other changes on lower methotrexate dose, I asked her to update LFTs and will review results! Sees you in November. Thanks!

## 2024-09-03 NOTE — PROGRESS NOTES
Medication Therapy Management (MTM) Encounter    ASSESSMENT:                            Medication Adherence/Access: No issues reported.    Sarcoidosis: Patient's symptoms are stable following methotrexate dose reduction to 17.5 mg weekly. Would benefit from continuing current therapy, pending results of updated LFTs to ensure AST and ALT have normalized.    Osteopenia: Reassured patient alendronate will not interact with her current medications. Reviewed administration instructions including taking the alendronate first thing upon waking, remaining upright afterwards, and  from other medications/food/drink by at least 30 minutes. Regarding levothyroxine, recommended taking the alendronate first thing and then resuming her usual process with levothyroxine 30 minutes later on the days she takes the levothyroxine. It is okay to take the alendronate on the same day as methotrexate if tolerated and  as above.    PLAN:                            Please have your CMP updated in the next 1-2 weeks. You will be notified if further methotrexate dose changes are required. Otherwise, continue methotrexate 17.5 mg weekly.  Once you have received the okay from your dentist, start alendronate 70 mg weekly. It is okay to take this on the same day as methotrexate as long as you take the alendronate first thing in the morning and separate by 30 minutes from other medications, food, and drink.    Follow-up: Review lab results, then 3 months routine. Sees rheumatologist 11/25/24.    SUBJECTIVE/OBJECTIVE:                          Annabelle Hitchcock is a 68 year old female called for a follow-up visit from 5/3/24.       Reason for visit: Methotrexate dose adjustment monitoring.    Allergies/ADRs: Reviewed in chart  Past Medical History: Reviewed in chart  Tobacco: She reports that she quit smoking about 48 years ago. Her smoking use included cigarettes. She started smoking about 48 years ago. She has a 0.3 pack-year smoking  history. She has been exposed to tobacco smoke. She has never used smokeless tobacco.  Alcohol: not assessed today    Medication Adherence/Access: No issues reported.    Sarcoidosis:  Methotrexate 17.5 mg under the skin every 7 days  Folic acid 3 mg daily  Acetaminophen 500-1000 mg every 6 hours as needed    Patient experienced significant improvement on methotrexate, however has had to reduce dose at last several visits due to LFT elevations. Last dose reduction was 8/5 to 17.5 mg (7 tablets) weekly. Fortunately she has not noticed any changes since reducing the dose and overall is feeling well. Had lid surgery 8/8 and no complications with healing. 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 She is open to having labs updated in the next few weeks.    Osteopenia:   Alendronate 70 mg weekly    Patient will be starting alendronate following a dental appointment in several weeks due to results of recent DEXA scan. She wonders today whether this will impact any of her other medications and what time of day she should take the alendronate especially in regards to levothyroxine and methotrexate.    DEXA 7/25/24 via Care Everywhere:      Today's Vitals: no vitals taken today  ----------------    I spent 20 minutes with this patient today. All changes were made via collaborative practice agreement with Nataliia Sprague MD. A copy of the visit note was provided to the patient's provider(s).    A summary of these recommendations was discussed with patient.    Elvia Ko, PharmD  Medication Therapy Management Pharmacist  [facility]    Telemedicine Visit Details  Type of service:  Telephone visit  Start Time: 0900  End Time: 0920     Medication Therapy Recommendations  No medication therapy recommendations to display

## 2024-09-03 NOTE — PATIENT INSTRUCTIONS
"Recommendations from today's MTM visit:                                                      Please have your CMP updated in the next 1-2 weeks. You will be notified if further methotrexate dose changes are required. Otherwise, continue methotrexate 17.5 mg weekly.  Once you have received the okay from your dentist, start alendronate 70 mg weekly. It is okay to take this on the same day as methotrexate as long as you take the alendronate first thing in the morning and separate by 30 minutes from other medications, food, and drink.    Follow-up: Review lab results, then 3 months routine. Sees rheumatologist 11/25/24.    It was great speaking with you today.  I value your experience and would be very thankful for your time in providing feedback in our clinic survey. In the next few days, you may receive an email or text message from [employer] with a link to a survey related to your  clinical pharmacist.\"     To schedule another MTM appointment, please call the clinic directly or you may call the MTM scheduling line at [phone] or toll-free at [phone].     My Clinical Pharmacist's contact information:                                                      Please feel free to contact me with any questions or concerns you have.      Elvia Ko, PharmD  Medication Therapy Management Pharmacist  [facility]    "

## 2024-09-03 NOTE — Clinical Note
9/3/2024       RE: Maritza Hitchcock  [address] [place] [address]     Dear Colleague,    Thank you for referring your patient, Maritza Hitchcock, to the [facility] [place] [facility]. Please see a copy of my visit note below.    Medication Therapy Management (MTM) Encounter    ASSESSMENT:                            Medication Adherence/Access: No issues reported.    Sarcoidosis: ***    PLAN:                            ***    Follow-up: *** Sees rheumatologist 11/25/24.    SUBJECTIVE/OBJECTIVE:                          Annabelle Hitchcock is a 68 year old female called for a follow-up visit from 5/3/24.       Reason for visit: Methotrexate dose adjustment monitoring.    Allergies/ADRs: Reviewed in chart  Past Medical History: Reviewed in chart  Tobacco: She reports that she quit smoking about 48 years ago. Her smoking use included cigarettes. She started smoking about 48 years ago. She has a 0.3 pack-year smoking history. She has been exposed to tobacco smoke. She has never used smokeless tobacco.  Alcohol: not assessed today    Medication Adherence/Access: No issues reported.    Sarcoidosis:  Methotrexate 17.5 mg under the skin every 7 days  Folic acid 2 mg daily  Acetaminophen 500-1000 mg every 6 hours as needed    Patient had experienced significant improvement on methotrexate, however has had to reduce dose at last several visits due to LFT elevations. Last dose reduction was 8/5 to 17.5 mg (7 tablets) weekly. Had surgery scheduled 8/8 to improve lid drooping, how did this go? Last saw Dr. Leyva in ophthalmology in February and left orbit mass had improved significantly on MRI. ***    Feels no different than she did on the 20 mg dose. She did have the surgery completed and is healing nicely. She is open to having labs done in the next week or so. She is starting alendronate following a teeth cleaning in a few weeks.    Today's Vitals: no  vitals taken today  ----------------    I spent 15 minutes with this patient today. All changes were made via collaborative practice agreement with Nataliia Sprague MD. A copy of the visit note was provided to the patient's provider(s).    A summary of these recommendations was discussed with patient.    Elvia Ko, PharmD  Medication Therapy Management Pharmacist  [facility]    Telemedicine Visit Details  Type of service:  Telephone visit  Start Time: 0900  End Time: ***     Medication Therapy Recommendations  No medication therapy recommendations to display    Medication Therapy Management (MTM) Encounter    ASSESSMENT:                            Medication Adherence/Access: No issues reported.    Sarcoidosis: ***    Osteopenia: Reassured patient alendronate will not interact with her current medications. Reviewed administration instructions including taking the alendronate first thing upon waking, remaining upright afterwards, and  from other medications/food/drink by at least 30 minutes. Regarding levothyroxine, recommended taking the alendronate first thing and then resuming her usual process with levothyroxine 30 minutes later on the days she takes the levothyroxine. It is okay to take the alendronate on the same day as methotrexate if tolerated and  as above.     PLAN:                            ***    Follow-up: *** Sees rheumatologist 11/25/24.    SUBJECTIVE/OBJECTIVE:                          Annabelle Hitchcock is a 68 year old female called for a follow-up visit from 5/3/24.       Reason for visit: Methotrexate dose adjustment monitoring.    Allergies/ADRs: Reviewed in chart  Past Medical History: Reviewed in chart  Tobacco: She reports that she quit smoking about 48 years ago. Her smoking use included cigarettes. She started smoking about 48 years ago. She has a 0.3 pack-year smoking history. She has been exposed to tobacco smoke. She has never used smokeless  tobacco.  Alcohol: not assessed today    Medication Adherence/Access: No issues reported.    Sarcoidosis:  Methotrexate 17.5 mg under the skin every 7 days  Folic acid 2 mg daily  Acetaminophen 500-1000 mg every 6 hours as needed    Patient experienced significant improvement on methotrexate, however has had to reduce dose at last several visits due to LFT elevations. Last dose reduction was 8/5 to 17.5 mg (7 tablets) weekly. Fortunately she has not noticed any changes since reducing the dose and overall is feeling well. Had lid surgery 8/8 and no complications with healing. 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 She is open to having labs updated in the next few weeks.    Osteopenia:   Alendronate 70 mg weekly    Patient will be starting alendronate following a dental appointment in several weeks due to results of recent DEXA scan. She wonders today whether this will impact any of her other medications and what time of day she should take the alendronate.    DEXA 7/25/24 via Care Everywhere:      Today's Vitals: no vitals taken today  ----------------    I spent 20 minutes with this patient today. All changes were made via collaborative practice agreement with Nataliia Sprague MD. A copy of the visit note was provided to the patient's provider(s).    A summary of these recommendations was discussed with patient.    Elvia Ko, PharmD  Medication Therapy Management Pharmacist  [facility]    Telemedicine Visit Details  Type of service:  Telephone visit  Start Time: 0900  End Time: 0920     Medication Therapy Recommendations  No medication therapy recommendations to display      Again, thank you for allowing me to participate in the care of your patient.      Sincerely,    Elvia Ko, [facility]

## 2024-09-09 ENCOUNTER — LAB (OUTPATIENT)
Dept: LAB | Facility: CLINIC | Age: 69
End: 2024-09-09
Attending: INTERNAL MEDICINE
Payer: MEDICARE

## 2024-09-09 DIAGNOSIS — E03.9 ACQUIRED HYPOTHYROIDISM: ICD-10-CM

## 2024-09-09 DIAGNOSIS — D86.9 SARCOIDOSIS: ICD-10-CM

## 2024-09-09 LAB
ALBUMIN SERPL BCG-MCNC: 4.6 G/DL (ref 3.5–5.2)
ALP SERPL-CCNC: 81 U/L (ref 40–150)
ALT SERPL W P-5'-P-CCNC: 45 U/L (ref 0–50)
ANION GAP SERPL CALCULATED.3IONS-SCNC: 11 MMOL/L (ref 7–15)
AST SERPL W P-5'-P-CCNC: 37 U/L (ref 0–45)
BASOPHILS # BLD AUTO: 0 10E3/UL (ref 0–0.2)
BASOPHILS NFR BLD AUTO: 1 %
BILIRUB SERPL-MCNC: 0.4 MG/DL
BUN SERPL-MCNC: 16.4 MG/DL (ref 8–23)
CALCIUM SERPL-MCNC: 9.7 MG/DL (ref 8.8–10.4)
CHLORIDE SERPL-SCNC: 104 MMOL/L (ref 98–107)
CREAT SERPL-MCNC: 0.72 MG/DL (ref 0.51–0.95)
EGFRCR SERPLBLD CKD-EPI 2021: >90 ML/MIN/1.73M2
EOSINOPHIL # BLD AUTO: 0.2 10E3/UL (ref 0–0.7)
EOSINOPHIL NFR BLD AUTO: 2 %
ERYTHROCYTE [DISTWIDTH] IN BLOOD BY AUTOMATED COUNT: 12.8 % (ref 10–15)
GLUCOSE SERPL-MCNC: 135 MG/DL (ref 70–99)
HCO3 SERPL-SCNC: 25 MMOL/L (ref 22–29)
HCT VFR BLD AUTO: 37.8 % (ref 35–47)
HGB BLD-MCNC: 12.5 G/DL (ref 11.7–15.7)
IMM GRANULOCYTES # BLD: 0 10E3/UL
IMM GRANULOCYTES NFR BLD: 0 %
LYMPHOCYTES # BLD AUTO: 1.2 10E3/UL (ref 0.8–5.3)
LYMPHOCYTES NFR BLD AUTO: 19 %
MCH RBC QN AUTO: 33.4 PG (ref 26.5–33)
MCHC RBC AUTO-ENTMCNC: 33.1 G/DL (ref 31.5–36.5)
MCV RBC AUTO: 101 FL (ref 78–100)
MONOCYTES # BLD AUTO: 0.6 10E3/UL (ref 0–1.3)
MONOCYTES NFR BLD AUTO: 10 %
NEUTROPHILS # BLD AUTO: 4.2 10E3/UL (ref 1.6–8.3)
NEUTROPHILS NFR BLD AUTO: 67 %
PLATELET # BLD AUTO: 188 10E3/UL (ref 150–450)
POTASSIUM SERPL-SCNC: 4.7 MMOL/L (ref 3.4–5.3)
PROT SERPL-MCNC: 7.2 G/DL (ref 6.4–8.3)
RBC # BLD AUTO: 3.74 10E6/UL (ref 3.8–5.2)
SODIUM SERPL-SCNC: 140 MMOL/L (ref 135–145)
TSH SERPL DL<=0.005 MIU/L-ACNC: 5.45 UIU/ML (ref 0.3–4.2)
WBC # BLD AUTO: 6.3 10E3/UL (ref 4–11)

## 2024-09-09 PROCEDURE — 36415 COLL VENOUS BLD VENIPUNCTURE: CPT

## 2024-09-09 PROCEDURE — 84443 ASSAY THYROID STIM HORMONE: CPT

## 2024-09-09 PROCEDURE — 85025 COMPLETE CBC W/AUTO DIFF WBC: CPT | Mod: QW

## 2024-09-09 PROCEDURE — 80053 COMPREHEN METABOLIC PANEL: CPT

## 2024-09-12 ENCOUNTER — MYC MEDICAL ADVICE (OUTPATIENT)
Dept: FAMILY MEDICINE | Facility: CLINIC | Age: 69
End: 2024-09-12
Payer: MEDICARE

## 2024-09-12 DIAGNOSIS — E03.9 ACQUIRED HYPOTHYROIDISM: Primary | ICD-10-CM

## 2024-09-20 ENCOUNTER — TRANSFERRED RECORDS (OUTPATIENT)
Dept: MULTI SPECIALTY CLINIC | Facility: CLINIC | Age: 69
End: 2024-09-20

## 2024-09-20 LAB — RETINOPATHY: NORMAL

## 2024-09-23 RX ORDER — LEVOTHYROXINE SODIUM 137 UG/1
137 TABLET ORAL DAILY
Qty: 90 TABLET | Refills: 0 | Status: SHIPPED | OUTPATIENT
Start: 2024-09-23

## 2024-10-05 DIAGNOSIS — E03.9 ACQUIRED HYPOTHYROIDISM: ICD-10-CM

## 2024-10-06 DIAGNOSIS — D86.9 SARCOIDOSIS: ICD-10-CM

## 2024-10-07 RX ORDER — LEVOTHYROXINE SODIUM 112 UG/1
112 TABLET ORAL DAILY
Qty: 90 TABLET | Refills: 3 | OUTPATIENT
Start: 2024-10-07

## 2024-10-10 NOTE — TELEPHONE ENCOUNTER
folic acid (FOLVITE) 1 MG tablet       Take 3 tablets (3 mg) by mouth daily   Last Written Prescription Date:  7-12-24  Last Fill Quantity: 60,   # refills: 3  Last Office Visit : 7-30-24  Future Office visit:  11-25-24    Routing refill request to provider for review/approval because:  Failed protocol: diagnosis

## 2024-10-11 RX ORDER — FOLIC ACID 1 MG/1
3 TABLET ORAL DAILY
Qty: 270 TABLET | Refills: 0 | Status: SHIPPED | OUTPATIENT
Start: 2024-10-11

## 2024-10-22 ENCOUNTER — OFFICE VISIT (OUTPATIENT)
Dept: OPHTHALMOLOGY | Facility: CLINIC | Age: 69
End: 2024-10-22
Payer: MEDICARE

## 2024-10-22 ENCOUNTER — OFFICE VISIT (OUTPATIENT)
Dept: RHEUMATOLOGY | Facility: CLINIC | Age: 69
End: 2024-10-22
Attending: INTERNAL MEDICINE
Payer: MEDICARE

## 2024-10-22 VITALS
OXYGEN SATURATION: 98 % | DIASTOLIC BLOOD PRESSURE: 72 MMHG | SYSTOLIC BLOOD PRESSURE: 126 MMHG | BODY MASS INDEX: 28.4 KG/M2 | HEART RATE: 76 BPM | WEIGHT: 168 LBS

## 2024-10-22 DIAGNOSIS — Z79.899 LONG-TERM USE OF HIGH-RISK MEDICATION: ICD-10-CM

## 2024-10-22 DIAGNOSIS — D86.9 SARCOIDOSIS: Primary | ICD-10-CM

## 2024-10-22 DIAGNOSIS — M85.88 OSTEOPENIA OF LUMBAR SPINE: ICD-10-CM

## 2024-10-22 DIAGNOSIS — H02.403 INVOLUTIONAL PTOSIS, ACQUIRED, BILATERAL: ICD-10-CM

## 2024-10-22 DIAGNOSIS — R76.8 POSITIVE ANA (ANTINUCLEAR ANTIBODY): ICD-10-CM

## 2024-10-22 PROCEDURE — G0463 HOSPITAL OUTPT CLINIC VISIT: HCPCS | Performed by: INTERNAL MEDICINE

## 2024-10-22 PROCEDURE — 99024 POSTOP FOLLOW-UP VISIT: CPT | Performed by: OPHTHALMOLOGY

## 2024-10-22 PROCEDURE — 99214 OFFICE O/P EST MOD 30 MIN: CPT | Mod: 24 | Performed by: INTERNAL MEDICINE

## 2024-10-22 PROCEDURE — G2211 COMPLEX E/M VISIT ADD ON: HCPCS | Performed by: INTERNAL MEDICINE

## 2024-10-22 ASSESSMENT — PAIN SCALES - GENERAL: PAINLEVEL: NO PAIN (0)

## 2024-10-22 ASSESSMENT — VISUAL ACUITY
OD_CC+: -2
OD_CC: 20/20
METHOD: SNELLEN - LINEAR
OS_CC: 20/20

## 2024-10-22 ASSESSMENT — TONOMETRY
IOP_METHOD: ICARE
OD_IOP_MMHG: 19
OS_IOP_MMHG: 19

## 2024-10-22 NOTE — PROGRESS NOTES
Chief Complaint(s) and History of Present Illness(es)     Post Op (Ophthalmology) Both Eyes            Laterality: both eyes    Onset: 2.5 months ago    Comments: Status post bulb, left upper eyelid ptosis repair, and   debulking of left lateral canthus and anterior orbit mass 8/8/24          Comments    Healing well.  Denies pain.  Denies diplopia or change in vision.     Hien Mesa on 10/22/2024 at 10:08 AM         Assessment & Plan     Maritza Hitchcock is a 68 year old female with the following diagnoses:   Encounter Diagnoses   Name Primary?    Sarcoidosis Yes    Involutional ptosis, acquired, bilateral      Doing very well post both upper eyelid blepharoplasty and left upper eyelid ptosis repai, with debulking of left lateral canthal lesion and small fat transfer.  Good take of fat to the left sub brow region! Symmetry looks good.   She sees Dr. Zhang for routine eye care.   She si followed by Rheumatology and continues to be on methotrexate.     Given stability I have asked her to follow up on an as needed basis.         Patient disposition:   No follow-ups on file.        Attending Physician Attestation: Complete documentation of historical and exam elements from today's encounter can be found in the full encounter summary report (not reduplicated in this progress note). I personally obtained the chief complaint(s) and history of present illness. I confirmed and edited as necessary the review of systems, past medical/surgical history, family history, social history, and examination findings as documented by others; and I examined the patient myself. I personally reviewed the relevant tests, images, and reports as documented above. I formulated and edited as necessary the assessment and plan and discussed the findings and management plan with the patient.  -Autumn Padilla MD

## 2024-10-22 NOTE — PROGRESS NOTES
Chief Complaint/Reason for Visit: sarcoidosis      HPI:    Maritza Hitchcock is a 68 year old White female with past medical history listed below. She is currently on methotrexate 17.5 mg weekly along with folic acid 3 mg daily. She saw  this am. Per notes, she is doing well and stable after both upper eyelid blepharoplasty and left upper eyelid ptosis repair, with debulking of left lateral canthal lesion and small fat transfer. Denied joint pain or swelling. She said she is happy with the results of the procedure and that her eyes look even now.       REVIEW OF SYSTEMS    General -negative for fever  Cardiovascular -negative for chest pain   Respiratory - negative for shortness of breath  Skin -negative for skin rashes  Neuro -negative for stroke or seizures      Past Medical History:   Diagnosis Date    Diabetes (H) 07/2021    Infection due to 2019 novel coronavirus 09/2022    Infection due to 2019 novel coronavirus 09/14/2023    MVP (mitral valve prolapse)     Premenopausal menorrhagia 11/18/2010    Sarcoidosis     Thyroid disease     Hypothyroidism     Past Surgical History:   Procedure Laterality Date    ABDOMEN SURGERY  1988    Tubal    CATARACT IOL, RT/LT      CHOLECYSTECTOMY      1987    COLONOSCOPY  ????    7/26/2007, 8/30/2017    COMBINED REPAIR PTOSIS WITH BLEPHAROPLASTY BILATERAL Bilateral 8/8/2024    Procedure: Both upper eyelid blepharoplasty. Left upper eyelid ptosis repair. Excision of left lateral canthus cyst.;  Surgeon: Autumn Padilla MD;  Location: [facility] OR    DILATION AND CURETTAGE, HYSTEROSCOPY DIAGNOSTIC, COMBINED      4/23/2010    EYE SURGERY  2021, 2022    Left eye-Vitrectomy, Cataract    GALLBLADDER SURGERY  02/11/1987    GYN SURGERY  ????    Partial Hysterectomy    LAPAROSCOPIC HYSTERECTOMY SUPRACERVICAL  11/19/2010    MENISCECTOMY  09/27/2016    partial    ORBITOTOMY, RESECT TUMOR, COMBINED Left 05/04/2023    Procedure: Left lacrimal gland biopsy;  Surgeon:  Autumn Padilla MD;  Location: [facility] OR    ORTHOPEDIC SURGERY      Tendon repair right thumb    SOFT TISSUE SURGERY  ????    Right knee meniscus repair    VAGINAL DELIVERY      x3 no date     Family History   Problem Relation Age of Onset    Hypertension Mother             Hyperlipidemia Mother             Osteoporosis Mother             Thyroid Disease Mother             Hearing Loss Mother     Rheumatoid Arthritis Mother     Coronary Artery Disease Mother         -    Coronary Artery Disease Father         -    Obesity Father             Hypertension Father             Glasses (<7 y/o) Father     Heart Disease Father     Obesity Paternal Grandmother             Glasses (<7 y/o) Paternal Grandmother          at age: Unknown    Anxiety Disorder Daughter         +Chicken pox    Glasses (<7 y/o) Daughter     Anxiety Disorder Son         Chicken pox    Glasses (<7 y/o) Son      Social History     Socioeconomic History    Marital status:    Tobacco Use    Smoking status: Former     Packs/day: 0.50     Years: 0.00     Additional pack years: 0.00     Total pack years: 0.00     Types: Cigarettes     Start date: 10/1/1975     Quit date: 1976     Years since quittin.7     Passive exposure: Never    Smokeless tobacco: Never   Vaping Use    Vaping Use: Never used   Substance and Sexual Activity    Alcohol use: Yes    Drug use: Never    Sexual activity: Yes     Partners: Male     Birth control/protection: Post-menopausal, Female Surgical   Other Topics Concern    Parent/sibling w/ CABG, MI or angioplasty before 65F 55M? Yes     Comment: Dad age 56     Social Determinants of Health     Financial Resource Strain: Low Risk  (2023)    Financial Resource Strain     Within the past 12 months, have you or your family members you live with been unable to get utilities (heat, electricity) when it was really needed?: No    Food Insecurity: Low Risk  (12/27/2023)    Food Insecurity     Within the past 12 months, did you worry that your food would run out before you got money to buy more?: No     Within the past 12 months, did the food you bought just not last and you didn t have money to get more?: No   Transportation Needs: Low Risk  (12/27/2023)    Transportation Needs     Within the past 12 months, has lack of transportation kept you from medical appointments, getting your medicines, non-medical meetings or appointments, work, or from getting things that you need?: No   Interpersonal Safety: Low Risk  (11/8/2023)    Interpersonal Safety     Do you feel physically and emotionally safe where you currently live?: Yes     Within the past 12 months, have you been hit, slapped, kicked or otherwise physically hurt by someone?: No     Within the past 12 months, have you been humiliated or emotionally abused in other ways by your partner or ex-partner?: No   Housing Stability: Low Risk  (12/27/2023)    Housing Stability     Do you have housing? : Yes     Are you worried about losing your housing?: No       No Known Allergies    Current Outpatient Medications   Medication Sig Dispense Refill    acetaminophen (TYLENOL) 500 MG tablet Take 500-1,000 mg by mouth every 6 hours as needed for mild pain      alendronate (FOSAMAX) 70 MG tablet Take 1 tablet (70 mg) by mouth every 7 days. 12 tablet 3    amitriptyline (ELAVIL) 50 MG tablet TAKE ONE TABLET BY MOUTH EVERY NIGHT AT BEDTIME 90 tablet 3    Ascorbic Acid (VITAMIN C) 100 MG CHEW Gummy, 282mg      atorvastatin (LIPITOR) 40 MG tablet TAKE 1 TABLET(40 MG) BY MOUTH DAILY 90 tablet 3    calcium citrate-vitamin D (CITRACAL) 315-250 MG-UNIT TABS per tablet       diphenhydrAMINE (BENADRYL) 25 MG tablet Take 25 mg by mouth every 6 hours as needed for itching or allergies      Ferrous Sulfate (IRON) 325 (65 Fe) MG tablet Take 2 tablets by mouth daily      finasteride (PROSCAR) 5 MG tablet Take 5 mg by  mouth daily      folic acid (FOLVITE) 1 MG tablet Take 3 tablets (3 mg) by mouth daily. 270 tablet 0    Krill Oil (OMEGA-3) 500 MG CAPS Take 500 mg by mouth      levothyroxine (SYNTHROID/LEVOTHROID) 137 MCG tablet Take 1 tablet (137 mcg) by mouth daily. 90 tablet 0    MAGNESIUM GLYCINATE PO Take 500 tablets by mouth daily      methotrexate 2.5 MG tablet Take 7 tablets (17.5 mg) by mouth every 7 days Labs every 8 - 12 weeks for refills. 32 tablet 2    Misc Natural Products (NEURIVA) CAPS       Multiple Vitamin (ONE-A-DAY ESSENTIAL) TABS Take 1 tablet by mouth daily      omeprazole (PRILOSEC) 20 MG DR capsule Take 1 capsule (20 mg) by mouth daily 90 capsule 3    potassium 99 MG TABS Take 1 tablet by mouth      potassium gluconate 2.5 MEQ tablet       pseudoePHEDrine (SUDAFED) 120 MG 12 hr tablet Take 120 mg by mouth       No current facility-administered medications for this visit.       PHYSICAL EXAM    LMP  (LMP Unknown)       General: Alert, No apparent distress   Psych: Affect euthymic  Eyes: Sclera noninjected  Ears, Nose, Throat, Mouth: Oral sore + hard palate  Skin: No rashes noted  Cardiovascular: Regular rate and rhythm, Normal S1 and S2 and No murmurs, rubs or gallops  Respiratory: Clear to auscultation with no wheezing or crackles  Neuro: Normal gait and Able to arise from seated position unassisted  Musculoskeletal: Hands:  Normal. Able to make a full fist bilaterally    LABS   Reviewed as below.     Sept 2024  CBC - normal wbc,Hb and plt   CMP - normal LFT and creatinine     April 2023  SSA, SSB, sm neg   ANCA neg   MARILEE pos, speckled, 1:320  C3, C4 normal   IgG4 normal   QTB NEG   HCV neg   HIV neg     2022  TSH normal     Final Diagnosis   Anterior orbit, left, lesion excision: Presence of numerous non-necrotizing granulomas, no fungal or acid fast organisms are identified (see comment).   Electronically signed by Mirian Grande MD on 8/15/2024 at 11:29 AM       Flow Interpretation   A. Eye, Left,  :  -Polytypic B cells  No aberrant immunophenotype on T cells  See comment   Electronically signed by Bobby Brennan MD on 5/8/2023 at 12:29 PM   Comment    There is no immunophenotypic evidence of non-Hodgkin lymphoma. Hodgkin lymphoma cannot be excluded by flow cytometry. T cell lymphomas cannot always be detected by this flow cytometry assay. Neoplastic cells, including large cells, may not survive specimen processing. This sample may not be representative. Final interpretation requires correlation with morphologic and clinical features.      Final Diagnosis   A-B.  SOFT TISSUE, LEFT LACRIMAL GLAND, BIOPSY:  -Nonnecrotizing granulomatous inflammation, see comment.  -GMS and AFB special stains are negative for microorganisms (performed on blocks A1 and B1 with appropriate controls).  -Negative for malignancy.           ASSESSMENT      1. Sarcoidosis    2. Long-term use of high-risk medication    3. Positive MARILEE (antinuclear antibody)    4. Osteopenia of lumbar spine          (D86.9) Sarcoidosis  (primary encounter diagnosis)  Comment: Her symptoms started in May 2023 with drooping of left eyelid and headache. CXR - calcified granuloma. MRI orbit - asymmetric enlargement and enhancement of the left lacrimal gland. CT - : Unilateral left-sided lacrimal gland slight enlargement with homogeneous enhancement. Left lacrimal gland bx - non necrotizing granulomatous inflammation. Subcutaneous  methotrexate (started Oct 2023)  subcutaneous 20 mg/week along with folic acid 1 mg daily - discontinued subcutaneous as this caused LFT elevation likely due to more systemic absorption. Switched back to PO methotrexate.     Recent MRI in March 2024 - significant interval decrease in size of the previously present enlarged left lacrimal gland. The gland now appears symmetric to the contralateral side. There has also been interval decrease in previously present skin thickening and enhancement lateral to the left lacrimal gland.  There is a 0.3 x 0.7 cm nodular enhancing lesion in this area, however, it is unclear whether this lesion is new compared  to the prior study, or simply represents a small amount of residual skin thickening/enhancement. This lesion was likely present on the 11/09/2023 CT neck study and is overall similar to that examination.        She saw Dr.Ali espinoza am. Per notes, she is doing well and stable after both upper eyelid blepharoplasty and left upper eyelid ptosis repai, with debulking of left lateral canthal lesion and small fat transfer.     Plan:   Continue methotrexate PO 17.5 mg/week  Continue folic acid 3 mg daily.   Labs at next visit. Standing orders in for labs every 3 months.  Continue to follow up with ophthalmology as needed.     (Z79.899) Long-term use of high-risk medication  Comment: Methotrexate  Plan: The adverse reactions of MTX was discussed which includes cytopenia, immunosuppression, infection, oversedation, pneumonitis, dizziness, nausea, stomach upset, risk of falls, seizures, and derangements in LFTs. Recommended against use of alcohol while on MTX. pt counseled on the adverse effects of Methotrexate including teratogenicity and need for reliable contraception, Alopecia, infection, liver dysfunction and myelosuppression, including the importance of taking daily Folic acid. Patient verbalized understanding and agrees to proceed.    Pos MARILEE  Comment : MARILEE pos, speckled, 1:320, normal C3,C4. SSA, SSB, Sm neg. No clinical features of lupus.   Plan : monitor     Osteopenia  Comment : osteopenia of L spine and hips  Plan : per pcp    RTC - 6 months     SHARAN BACA MD    Division of Rheumatic & Autoimmune Diseases  [facility]

## 2024-10-22 NOTE — NURSING NOTE
Chief Complaint   Patient presents with    RECHECK     rescheduled per pt, 3 month f/u       /72 (BP Location: Right arm, Patient Position: Sitting, Cuff Size: Adult Regular)   Pulse 76   Wt 76.2 kg (168 lb)   LMP  (LMP Unknown)   SpO2 98%   BMI 28.40 kg/m    Odette RODRIGUEZ

## 2024-10-22 NOTE — NURSING NOTE
Chief Complaints and History of Present Illnesses   Patient presents with    Post Op (Ophthalmology) Both Eyes     Status post bulb, left upper eyelid ptosis repair, and debulking of left lateral canthus and anterior orbit mass 8/8/24     Chief Complaint(s) and History of Present Illness(es)       Post Op (Ophthalmology) Both Eyes              Laterality: both eyes    Onset: 2.5 months ago    Comments: Status post bulb, left upper eyelid ptosis repair, and debulking of left lateral canthus and anterior orbit mass 8/8/24              Comments    Healing well.  Denies pain.  Denies diplopia or change in vision.     Hien Mesa on 10/22/2024 at 10:08 AM

## 2024-10-22 NOTE — LETTER
10/22/2024       RE: Maritza Hitchcock  [address] [facility] [facility] [address]     Dear Colleague,    Thank you for referring your patient, Maritza Hitchcock, to the [facility] RHEUMATOLOGY at [facility]. Please see a copy of my visit note below.                         Chief Complaint/Reason for Visit: sarcoidosis      HPI:    Maritza Hitchcock is a 68 year old White female with past medical history listed below. She is currently on methotrexate 17.5 mg weekly along with folic acid 3 mg daily. She saw  this am. Per notes, she is doing well and stable after both upper eyelid blepharoplasty and left upper eyelid ptosis repair, with debulking of left lateral canthal lesion and small fat transfer. Denied joint pain or swelling. She said she is happy with the results of the procedure and that her eyes look even now.       REVIEW OF SYSTEMS    General -negative for fever  Cardiovascular -negative for chest pain   Respiratory - negative for shortness of breath  Skin -negative for skin rashes  Neuro -negative for stroke or seizures      Past Medical History:   Diagnosis Date     Diabetes (H) 07/2021     Infection due to 2019 novel coronavirus 09/2022     Infection due to 2019 novel coronavirus 09/14/2023     MVP (mitral valve prolapse)      Premenopausal menorrhagia 11/18/2010     Sarcoidosis      Thyroid disease     Hypothyroidism     Past Surgical History:   Procedure Laterality Date     ABDOMEN SURGERY  1988    Tubal     CATARACT IOL, RT/LT       CHOLECYSTECTOMY      1987     COLONOSCOPY  ????    7/26/2007, 8/30/2017     COMBINED REPAIR PTOSIS WITH BLEPHAROPLASTY BILATERAL Bilateral 8/8/2024    Procedure: Both upper eyelid blepharoplasty. Left upper eyelid ptosis repair. Excision of left lateral canthus cyst.;  Surgeon: Autumn Padilla MD;  Location: [facility] OR     DILATION AND CURETTAGE, HYSTEROSCOPY DIAGNOSTIC, COMBINED      4/23/2010     EYE SURGERY  2021, 2022     Left eye-Vitrectomy, Cataract     GALLBLADDER SURGERY  1987     GYN SURGERY  ????    Partial Hysterectomy     LAPAROSCOPIC HYSTERECTOMY SUPRACERVICAL  2010     MENISCECTOMY  2016    partial     ORBITOTOMY, RESECT TUMOR, COMBINED Left 2023    Procedure: Left lacrimal gland biopsy;  Surgeon: Autumn Padilla MD;  Location: [facility] OR     ORTHOPEDIC SURGERY      Tendon repair right thumb     SOFT TISSUE SURGERY  ????    Right knee meniscus repair     VAGINAL DELIVERY      x3 no date     Family History   Problem Relation Age of Onset     Hypertension Mother              Hyperlipidemia Mother              Osteoporosis Mother              Thyroid Disease Mother              Hearing Loss Mother      Rheumatoid Arthritis Mother      Coronary Artery Disease Mother         -     Coronary Artery Disease Father         -     Obesity Father              Hypertension Father              Glasses (<9 y/o) Father      Heart Disease Father      Obesity Paternal Grandmother              Glasses (<9 y/o) Paternal Grandmother          at age: Unknown     Anxiety Disorder Daughter         +Chicken pox     Glasses (<9 y/o) Daughter      Anxiety Disorder Son         Chicken pox     Glasses (<9 y/o) Son      Social History     Socioeconomic History     Marital status:    Tobacco Use     Smoking status: Former     Packs/day: 0.50     Years: 0.00     Additional pack years: 0.00     Total pack years: 0.00     Types: Cigarettes     Start date: 10/1/1975     Quit date: 1976     Years since quittin.7     Passive exposure: Never     Smokeless tobacco: Never   Vaping Use     Vaping Use: Never used   Substance and Sexual Activity     Alcohol use: Yes     Drug use: Never     Sexual activity: Yes     Partners: Male     Birth control/protection: Post-menopausal, Female Surgical   Other Topics Concern     Parent/sibling  w/ CABG, MI or angioplasty before 65F 55M? Yes     Comment: Dad age 56     Social Determinants of Health     Financial Resource Strain: Low Risk  (12/27/2023)    Financial Resource Strain      Within the past 12 months, have you or your family members you live with been unable to get utilities (heat, electricity) when it was really needed?: No   Food Insecurity: Low Risk  (12/27/2023)    Food Insecurity      Within the past 12 months, did you worry that your food would run out before you got money to buy more?: No      Within the past 12 months, did the food you bought just not last and you didn t have money to get more?: No   Transportation Needs: Low Risk  (12/27/2023)    Transportation Needs      Within the past 12 months, has lack of transportation kept you from medical appointments, getting your medicines, non-medical meetings or appointments, work, or from getting things that you need?: No   Interpersonal Safety: Low Risk  (11/8/2023)    Interpersonal Safety      Do you feel physically and emotionally safe where you currently live?: Yes      Within the past 12 months, have you been hit, slapped, kicked or otherwise physically hurt by someone?: No      Within the past 12 months, have you been humiliated or emotionally abused in other ways by your partner or ex-partner?: No   Housing Stability: Low Risk  (12/27/2023)    Housing Stability      Do you have housing? : Yes      Are you worried about losing your housing?: No       No Known Allergies    Current Outpatient Medications   Medication Sig Dispense Refill     acetaminophen (TYLENOL) 500 MG tablet Take 500-1,000 mg by mouth every 6 hours as needed for mild pain       alendronate (FOSAMAX) 70 MG tablet Take 1 tablet (70 mg) by mouth every 7 days. 12 tablet 3     amitriptyline (ELAVIL) 50 MG tablet TAKE ONE TABLET BY MOUTH EVERY NIGHT AT BEDTIME 90 tablet 3     Ascorbic Acid (VITAMIN C) 100 MG CHEW Gummy, 282mg       atorvastatin (LIPITOR) 40 MG tablet TAKE 1  TABLET(40 MG) BY MOUTH DAILY 90 tablet 3     calcium citrate-vitamin D (CITRACAL) 315-250 MG-UNIT TABS per tablet        diphenhydrAMINE (BENADRYL) 25 MG tablet Take 25 mg by mouth every 6 hours as needed for itching or allergies       Ferrous Sulfate (IRON) 325 (65 Fe) MG tablet Take 2 tablets by mouth daily       finasteride (PROSCAR) 5 MG tablet Take 5 mg by mouth daily       folic acid (FOLVITE) 1 MG tablet Take 3 tablets (3 mg) by mouth daily. 270 tablet 0     Krill Oil (OMEGA-3) 500 MG CAPS Take 500 mg by mouth       levothyroxine (SYNTHROID/LEVOTHROID) 137 MCG tablet Take 1 tablet (137 mcg) by mouth daily. 90 tablet 0     MAGNESIUM GLYCINATE PO Take 500 tablets by mouth daily       methotrexate 2.5 MG tablet Take 7 tablets (17.5 mg) by mouth every 7 days Labs every 8 - 12 weeks for refills. 32 tablet 2     Misc Natural Products (NEURIVA) CAPS        Multiple Vitamin (ONE-A-DAY ESSENTIAL) TABS Take 1 tablet by mouth daily       omeprazole (PRILOSEC) 20 MG DR capsule Take 1 capsule (20 mg) by mouth daily 90 capsule 3     potassium 99 MG TABS Take 1 tablet by mouth       potassium gluconate 2.5 MEQ tablet        pseudoePHEDrine (SUDAFED) 120 MG 12 hr tablet Take 120 mg by mouth       No current facility-administered medications for this visit.       PHYSICAL EXAM    LMP  (LMP Unknown)       General: Alert, No apparent distress   Psych: Affect euthymic  Eyes: Sclera noninjected  Ears, Nose, Throat, Mouth: Oral sore + hard palate  Skin: No rashes noted  Cardiovascular: Regular rate and rhythm, Normal S1 and S2 and No murmurs, rubs or gallops  Respiratory: Clear to auscultation with no wheezing or crackles  Neuro: Normal gait and Able to arise from seated position unassisted  Musculoskeletal: Hands:  Normal. Able to make a full fist bilaterally    LABS   Reviewed as below.     Sept 2024  CBC - normal wbc,Hb and plt   CMP - normal LFT and creatinine     April 2023  SSA, SSB, sm neg   ANCA neg   MARILEE pos, speckled,  1:320  C3, C4 normal   IgG4 normal   QTB NEG   HCV neg   HIV neg     2022  TSH normal     Final Diagnosis   Anterior orbit, left, lesion excision: Presence of numerous non-necrotizing granulomas, no fungal or acid fast organisms are identified (see comment).   Electronically signed by Mirian Grande MD on 8/15/2024 at 11:29 AM       Flow Interpretation   A. Eye, Left, :  -Polytypic B cells  No aberrant immunophenotype on T cells  See comment   Electronically signed by Bobby Brennan MD on 5/8/2023 at 12:29 PM   Comment    There is no immunophenotypic evidence of non-Hodgkin lymphoma. Hodgkin lymphoma cannot be excluded by flow cytometry. T cell lymphomas cannot always be detected by this flow cytometry assay. Neoplastic cells, including large cells, may not survive specimen processing. This sample may not be representative. Final interpretation requires correlation with morphologic and clinical features.      Final Diagnosis   A-B.  SOFT TISSUE, LEFT LACRIMAL GLAND, BIOPSY:  -Nonnecrotizing granulomatous inflammation, see comment.  -GMS and AFB special stains are negative for microorganisms (performed on blocks A1 and B1 with appropriate controls).  -Negative for malignancy.           ASSESSMENT      1. Sarcoidosis    2. Long-term use of high-risk medication    3. Positive MARILEE (antinuclear antibody)    4. Osteopenia of lumbar spine          (D86.9) Sarcoidosis  (primary encounter diagnosis)  Comment: Her symptoms started in May 2023 with drooping of left eyelid and headache. CXR - calcified granuloma. MRI orbit - asymmetric enlargement and enhancement of the left lacrimal gland. CT - : Unilateral left-sided lacrimal gland slight enlargement with homogeneous enhancement. Left lacrimal gland bx - non necrotizing granulomatous inflammation. Subcutaneous  methotrexate (started Oct 2023)  subcutaneous 20 mg/week along with folic acid 1 mg daily - discontinued subcutaneous as this caused LFT elevation likely due to  more systemic absorption. Switched back to PO methotrexate.     Recent MRI in March 2024 - significant interval decrease in size of the previously present enlarged left lacrimal gland. The gland now appears symmetric to the contralateral side. There has also been interval decrease in previously present skin thickening and enhancement lateral to the left lacrimal gland. There is a 0.3 x 0.7 cm nodular enhancing lesion in this area, however, it is unclear whether this lesion is new compared  to the prior study, or simply represents a small amount of residual skin thickening/enhancement. This lesion was likely present on the 11/09/2023 CT neck study and is overall similar to that examination.        She saw  this am. Per notes, she is doing well and stable after both upper eyelid blepharoplasty and left upper eyelid ptosis repai, with debulking of left lateral canthal lesion and small fat transfer.     Plan:   Continue methotrexate PO 17.5 mg/week  Continue folic acid 3 mg daily.   Labs at next visit. Standing orders in for labs every 3 months.  Continue to follow up with ophthalmology as needed.     (Z79.103) Long-term use of high-risk medication  Comment: Methotrexate  Plan: The adverse reactions of MTX was discussed which includes cytopenia, immunosuppression, infection, oversedation, pneumonitis, dizziness, nausea, stomach upset, risk of falls, seizures, and derangements in LFTs. Recommended against use of alcohol while on MTX. pt counseled on the adverse effects of Methotrexate including teratogenicity and need for reliable contraception, Alopecia, infection, liver dysfunction and myelosuppression, including the importance of taking daily Folic acid. Patient verbalized understanding and agrees to proceed.    Pos MARILEE  Comment : MARILEE pos, speckled, 1:320, normal C3,C4. SSA, SSB, Sm neg. No clinical features of lupus.   Plan : monitor     Osteopenia  Comment : osteopenia of L spine and hips  Plan : per  pcp    RTC - 6 months     NATALIIA BACA MD    Division of Rheumatic & Autoimmune Diseases  [facility]       Again, thank you for allowing me to participate in the care of your patient.      Sincerely,    Nataliia Baca MD

## 2024-11-17 DIAGNOSIS — D86.9 SARCOIDOSIS: ICD-10-CM

## 2024-11-20 RX ORDER — METHOTREXATE 2.5 MG/1
TABLET ORAL
Qty: 84 TABLET | Refills: 0 | Status: SHIPPED | OUTPATIENT
Start: 2024-11-20

## 2024-11-20 NOTE — TELEPHONE ENCOUNTER
methotrexate 2.5 MG tablet       Last Written Prescription Date:  8/5/24  Last Fill Quantity: 32,   # refills: 2  Last Office Visit: 10/22/24 Sprague  Future Office visit:  4/22/25    CBC RESULTS:   Recent Labs   Lab Test 09/09/24  1021   WBC 6.3   RBC 3.74*   HGB 12.5   HCT 37.8   *   MCH 33.4*   MCHC 33.1   RDW 12.8          Creatinine   Date Value Ref Range Status   09/09/2024 0.72 0.51 - 0.95 mg/dL Final   ]    Liver Function Studies -   Recent Labs   Lab Test 09/09/24  1021   PROTTOTAL 7.2   ALBUMIN 4.6   BILITOTAL 0.4   ALKPHOS 81   AST 37   ALT 45       Routing refill request to provider for review/approval because:  DMARD not on protocol/Provider to authorize  Rosetta BRISENO RN  P Central Nursing/Red Flag Triage & Med Refill Team

## 2024-11-25 DIAGNOSIS — E03.9 ACQUIRED HYPOTHYROIDISM: ICD-10-CM

## 2024-11-26 ENCOUNTER — MYC MEDICAL ADVICE (OUTPATIENT)
Dept: FAMILY MEDICINE | Facility: CLINIC | Age: 69
End: 2024-11-26
Payer: MEDICARE

## 2024-11-26 RX ORDER — LEVOTHYROXINE SODIUM 137 UG/1
137 TABLET ORAL DAILY
Qty: 30 TABLET | Refills: 0 | Status: SHIPPED | OUTPATIENT
Start: 2024-11-26

## 2024-12-14 ENCOUNTER — MYC MEDICAL ADVICE (OUTPATIENT)
Dept: FAMILY MEDICINE | Facility: CLINIC | Age: 69
End: 2024-12-14
Payer: MEDICARE

## 2024-12-14 ENCOUNTER — HEALTH MAINTENANCE LETTER (OUTPATIENT)
Age: 69
End: 2024-12-14

## 2024-12-14 DIAGNOSIS — E11.9 TYPE 2 DIABETES MELLITUS WITHOUT COMPLICATION, WITHOUT LONG-TERM CURRENT USE OF INSULIN (H): Primary | ICD-10-CM

## 2024-12-20 DIAGNOSIS — E03.9 ACQUIRED HYPOTHYROIDISM: ICD-10-CM

## 2024-12-23 RX ORDER — LEVOTHYROXINE SODIUM 137 UG/1
137 TABLET ORAL
Qty: 90 TABLET | Refills: 3 | Status: SHIPPED | OUTPATIENT
Start: 2024-12-23

## 2024-12-29 DIAGNOSIS — D86.9 SARCOIDOSIS: ICD-10-CM

## 2024-12-29 RX ORDER — FOLIC ACID 1 MG/1
TABLET ORAL
Qty: 270 TABLET | Refills: 1 | Status: SHIPPED | OUTPATIENT
Start: 2024-12-29

## 2024-12-29 NOTE — TELEPHONE ENCOUNTER
LVD:  10/22/2024  [facility] Rheumatology     Nataliia Sprague MD  Rheumatology     Refilled per protocol.

## 2025-02-23 ENCOUNTER — MYC MEDICAL ADVICE (OUTPATIENT)
Dept: RHEUMATOLOGY | Facility: CLINIC | Age: 70
End: 2025-02-23
Payer: MEDICARE

## 2025-02-23 DIAGNOSIS — D86.9 SARCOIDOSIS: Primary | ICD-10-CM

## 2025-02-24 ENCOUNTER — DOCUMENTATION ONLY (OUTPATIENT)
Dept: RHEUMATOLOGY | Facility: CLINIC | Age: 70
End: 2025-02-24
Payer: MEDICARE

## 2025-02-24 NOTE — PROGRESS NOTES
Our plan is to monitor off of Rx now as discussed with .   ===View-only below this line===  ----- Message -----  From: Lashay Kolb, PERLA  Sent: 2/24/2025   2:06 PM CST  To: Nataliia Sprague MD; Elvia Ko, [facility]    PEDROI patient is leaving state for 1 month on 3/1. She said she can get labs out of state but if starting new med, this may be a problem. I sent an encounter but wanted to follow up here too.  ----- Message -----  From: Nataliia Sprague MD  Sent: 2/24/2025  10:44 AM CST  To: Elvia Ko [facility]; Lashay Kolb RN; *    Sounds good. Thank you!  ----- Message -----  From: Autumn Padilla MD  Sent: 2/24/2025  10:19 AM CST  To: Nataliia Sprague MD; Elvia Ko [facility]; *    Sure we can try  ----- Message -----  From: Nataliia Sprague MD  Sent: 2/24/2025   9:45 AM CST  To: Elvia Ko [facility]; Lashay Kolb RN; *    So is it ok to go off of immunosuppressants for now?. Thank you.  ----- Message -----  From: Autumn Padilla MD  Sent: 2/24/2025   7:25 AM CST  To: Nataliia Sprague MD; Elvia Ko [facility]; *    Thank you for the note, that sounds good.   If she needs anything extra for orbital manifestations, I can try orbital steroid injections if needed. Last time I saw her she looked great so hopefully it will just stay that way.  ----- Message -----  From: Nataliia Sprague MD  Sent: 2/23/2025   8:30 PM CST  To: Elvia Ko [facility]; Lashay Kolb RN; *    : Given elevation of LFT, will discontinue methotrexate. Once her Lfts are normal, would you be ok with trying cellcept? Thanks.

## 2025-02-24 NOTE — TELEPHONE ENCOUNTER
Call to patient to advise to hold methotrexate per Dr. Sprague. Patient reports she will do so. She asked if it is okay to continue to take folic acid- message routed to provider to advise. She also reports she is leaving state for 1 month on 3/1/25. She states her insurance will be able to allow her to get labs elsewhere but she wanted to make sure this was not a problem. Writer explained that labs could be faxed to lab of her choice and to let us know where to send. Patient verbalized understanding. Patient also reports she is seeing PCP tomorrow and will follow up with them.    Lashay Kolb RN

## 2025-02-25 ENCOUNTER — OFFICE VISIT (OUTPATIENT)
Dept: FAMILY MEDICINE | Facility: CLINIC | Age: 70
End: 2025-02-25
Payer: MEDICARE

## 2025-02-25 VITALS
RESPIRATION RATE: 16 BRPM | SYSTOLIC BLOOD PRESSURE: 118 MMHG | HEIGHT: 65 IN | BODY MASS INDEX: 29.32 KG/M2 | DIASTOLIC BLOOD PRESSURE: 74 MMHG | OXYGEN SATURATION: 97 % | HEART RATE: 65 BPM | WEIGHT: 176 LBS | TEMPERATURE: 98.4 F

## 2025-02-25 DIAGNOSIS — K76.0 FATTY LIVER: ICD-10-CM

## 2025-02-25 DIAGNOSIS — J10.1 INFLUENZA A: Primary | ICD-10-CM

## 2025-02-25 DIAGNOSIS — N39.3 STRESS INCONTINENCE: ICD-10-CM

## 2025-02-25 PROCEDURE — 3078F DIAST BP <80 MM HG: CPT | Performed by: FAMILY MEDICINE

## 2025-02-25 PROCEDURE — 99213 OFFICE O/P EST LOW 20 MIN: CPT | Performed by: FAMILY MEDICINE

## 2025-02-25 PROCEDURE — G2211 COMPLEX E/M VISIT ADD ON: HCPCS | Performed by: FAMILY MEDICINE

## 2025-02-25 PROCEDURE — 3074F SYST BP LT 130 MM HG: CPT | Performed by: FAMILY MEDICINE

## 2025-02-25 RX ORDER — BENZONATATE 100 MG/1
100-200 CAPSULE ORAL 3 TIMES DAILY PRN
Qty: 30 CAPSULE | Refills: 1 | Status: SHIPPED | OUTPATIENT
Start: 2025-02-25

## 2025-02-25 ASSESSMENT — ENCOUNTER SYMPTOMS: COUGH: 1

## 2025-02-25 NOTE — PROGRESS NOTES
Assessment & Plan   Problem List Items Addressed This Visit       Fatty liver    Relevant Orders    Adult GI  Referral - Consult Only    CBC with Platelets & Differential     Other Visit Diagnoses       Influenza A    -  Primary    Relevant Medications    benzonatate (TESSALON) 100 MG capsule    Stress incontinence        Relevant Orders    Adult Uro/Gyn  Referral        Patient diagnosed with influenza A last week.  While she was sick she had her LFTs checked and there was a significant bump in her liver enzymes.  It has brought her fib 4 calculation up from 2 to a 7.76.  He has not had a change in her methotrexate or other medications.  Unsure if this is because of being sick with influenza A affected this.  She is planning to get LFTs rechecked in 2 to 4 weeks.  Will add on a CBC so we can recalculate her fib 4 and also placing referral to gastroenterology for her.    Sarcoidosis she is staying off of her methotrexate until she feels back to 100%.    Influenza A is causing her to cough a lot which is worsening her urinary stress incontinence.  She does feel like the urinary stress incontinence effects her quality of life enough that she would like to discuss this with urogynecology.  Referral placed.  Did let her know that they recommend pelvic floor PT there is a physical therapist at [facility] that does a good job with female pelvic floor physical therapy.  Will also get her some Tessalon Perles to see if this helps her with her coughing to reduce the unpleasantness of the stress incontinence associated with this.    The longitudinal plan of care for the diagnosis(es)/condition(s) as documented were addressed during this visit. Due to the added complexity in care, I will continue to support Annabelle in the subsequent management and with ongoing continuity of care.             FIB-4 Calculation: 7.76 at 2/21/2025  9:14 AM  Calculated from:  SGOT/AST: 156 U/L at 2/21/2025  9:14  "AM  SGPT/ALT: 101 U/L at 2/21/2025  9:14 AM  Platelets: 134 10e3/uL at 2/21/2025  9:14 AM  Age: 69 years    BMI  Estimated body mass index is 29.74 kg/m  as calculated from the following:    Height as of this encounter: 1.638 m (5' 4.5\").    Weight as of this encounter: 79.8 kg (176 lb).             Lori Alanis is a 69 year old, presenting for the following health issues:  Cough (Pt c/o coughing up green phlegm. She was seen 2/21.) and Sinus Problem (She is leaving for a trip and wants to make sure she is well enough or if she needs an antibiotic. )        2/25/2025     8:43 AM   Additional Questions   Roomed by Faviola     History of Present Illness       Reason for visit:  Sore throat  Symptom onset:  1-3 days ago   She is taking medications regularly.                     Objective    /74 (BP Location: Right arm, Patient Position: Sitting)   Pulse 65   Temp 98.4  F (36.9  C) (Tympanic)   Resp 16   Ht 1.638 m (5' 4.5\")   Wt 79.8 kg (176 lb)   LMP  (LMP Unknown)   SpO2 97%   BMI 29.74 kg/m    Body mass index is 29.74 kg/m .  Physical Exam               Signed Electronically by: Irena Levi MD    "

## 2025-02-27 NOTE — TELEPHONE ENCOUNTER
MEDICAL RECORDS REQUEST   [facility]  Urology Clinic  [address] [place] [address]  PHONE: [phone]  Fax: [phone]        FUTURE VISIT INFORMATION                                                   Maritza Hitchcock, : 1955 scheduled for future visit at [facility]    APPOINTMENT INFORMATION:  Date: 2025  Provider:  Maritza Martins MD  Reason for Visit/Diagnosis: Stress incontinence    REFERRAL INFORMATION:  Referring provider:  Irena Levi MD in [facility] FP/IM/PEDS      RECORDS REQUESTED FOR VISIT                                                     NOTES  STATUS/DETAILS   OFFICE NOTE from referring provider  YES, 2025 -- Irena Levi MD in [facility] FP/IM/PEDS   MEDICATION LIST  yes   LABS     URINALYSIS (UA)  yes, 2023     PRE-VISIT CHECKLIST      Joint diagnostic appointment coordinated correctly          (ensure right order & amount of time) Yes   RECORD COLLECTION COMPLETE Yes

## 2025-03-04 NOTE — CONFIDENTIAL NOTE
DIAGNOSIS:  Fatty liver    Appt Date:  05.09.2025    NOTES STATUS DETAILS   OFFICE NOTE from referring provider Internal 02.25.2025 Irena Levi MD    OFFICE NOTES from other specialists     DISCHARGE SUMMARY from hospital     MEDICATION LIST     LIVER BIOSPY (IF APPLICABLE)      PATHOLOGY REPORTS      IMAGING     ENDOSCOPY (IF AVAILABLE)     COLONOSCOPY (IF AVAILABLE)     ULTRASOUND LIVER     CT OF ABDOMEN     MRI OF LIVER     FIBROSCAN, US ELASTOGRAPHY, FIBROSIS SCAN, MR ELASTOGRAPHY     LABS     HEPATIC PANEL (LIVER PANEL)     BASIC METABOLIC PANEL     COMPLETE METABOLIC PANEL     COMPLETE BLOOD COUNT (CBC)     INTERNATIONAL NORMALIZED RATIO (INR)     HEPATITIS C ANTIBODY     HEPATITIS C VIRAL LOAD/PCR     HEPATITIS C GENOTYPE     HEPATITIS B SURFACE ANTIGEN     HEPATITIS B SURFACE ANTIBODY     HEPATITIS B DNA QUANT LEVEL     HEPATITIS B CORE ANTIBODY

## 2025-03-10 ENCOUNTER — MYC MEDICAL ADVICE (OUTPATIENT)
Dept: RHEUMATOLOGY | Facility: CLINIC | Age: 70
End: 2025-03-10
Payer: MEDICARE

## 2025-03-10 NOTE — TELEPHONE ENCOUNTER
Lab orders printed and faxed to [facility] at [phone]. Confirmed successful via right fax.      ATIF Pollard  Rheumatology/Infectious disease  [facility]   Rheumatology ph:[phone]  Infectious Disease ph:[phone]

## 2025-03-16 ENCOUNTER — HEALTH MAINTENANCE LETTER (OUTPATIENT)
Age: 70
End: 2025-03-16

## 2025-04-10 ENCOUNTER — OFFICE VISIT (OUTPATIENT)
Dept: UROLOGY | Facility: CLINIC | Age: 70
End: 2025-04-10
Attending: FAMILY MEDICINE
Payer: MEDICARE

## 2025-04-10 VITALS
OXYGEN SATURATION: 97 % | SYSTOLIC BLOOD PRESSURE: 123 MMHG | HEIGHT: 65 IN | HEART RATE: 66 BPM | DIASTOLIC BLOOD PRESSURE: 77 MMHG | BODY MASS INDEX: 29.32 KG/M2 | WEIGHT: 176 LBS

## 2025-04-10 DIAGNOSIS — N95.2 ATROPHIC VAGINITIS: Primary | ICD-10-CM

## 2025-04-10 DIAGNOSIS — M62.89 PELVIC FLOOR DYSFUNCTION: ICD-10-CM

## 2025-04-10 DIAGNOSIS — M79.18 MYALGIA OF PELVIC FLOOR: ICD-10-CM

## 2025-04-10 DIAGNOSIS — N39.3 STRESS INCONTINENCE: ICD-10-CM

## 2025-04-10 RX ORDER — ESTRADIOL 10 UG/1
10 TABLET, FILM COATED VAGINAL
Qty: 24 TABLET | Refills: 5 | Status: SHIPPED | OUTPATIENT
Start: 2025-04-10

## 2025-04-10 ASSESSMENT — PAIN SCALES - GENERAL: PAINLEVEL_OUTOF10: NO PAIN (0)

## 2025-04-10 NOTE — LETTER
Urology  [facility]      HPI:  Maritza Hitchcock is a 69 year old female who presents for evaluation of her pelvic floor symptoms.  She is here today for stress incontinence, referred by Dr Levi, laura from 2/25/25 reviewed in [facility].  Patient has some stress incontinence, denies urgency or urgency incontinence.  Has not tried treatment for this.  She has some symptomatic vaginal atrophy but did not like the vaginal cream.  She denies gross hematuria, UTI, vaginal bleeding, vaginal bulge, constipation.    She has had a supracervical hysterectomy.  Denies any urologic surgery.    Past Medical History:   Diagnosis Date     Diabetes (H) 07/2021     Infection due to 2019 novel coronavirus 09/2022     Infection due to 2019 novel coronavirus 09/14/2023     Mumps     [id number] or [id number]     MVP (mitral valve prolapse)      Premenopausal menorrhagia 11/18/2010     Sarcoidosis      Thyroid disease     Hypothyroidism     Past Surgical History:   Procedure Laterality Date     ABDOMEN SURGERY  1988    Tubal     CATARACT IOL, RT/LT       CHOLECYSTECTOMY      1987     COLONOSCOPY  ????    7/26/2007, 8/30/2017     COMBINED REPAIR PTOSIS WITH BLEPHAROPLASTY BILATERAL Bilateral 8/8/2024    Procedure: Both upper eyelid blepharoplasty. Left upper eyelid ptosis repair. Excision of left lateral canthus cyst.;  Surgeon: Autumn Padilla MD;  [place] [facility] [place]     DILATION AND CURETTAGE, HYSTEROSCOPY DIAGNOSTIC, COMBINED      4/23/2010     EYE SURGERY  2021, 2022    Left eye-Vitrectomy, Cataract     GALLBLADDER SURGERY  02/11/1987     GYN SURGERY  ????    Partial Hysterectomy     LAPAROSCOPIC HYSTERECTOMY SUPRACERVICAL  11/19/2010     MENISCECTOMY  09/27/2016    partial     ORBITOTOMY, RESECT TUMOR, COMBINED Left 05/04/2023    Procedure: Left lacrimal gland biopsy;  Surgeon: Autumn Padilla MD;  Location[place] [facility] [place]     ORTHOPEDIC SURGERY  2023    Tendon repair right thumb     SOFT TISSUE SURGERY  ????    Right knee  meniscus repair     VAGINAL DELIVERY      x3 no date     Social History     Socioeconomic History     Marital status:      Spouse name: Not on file     Number of children: Not on file     Years of education: Not on file     Highest education level: Not on file   Occupational History     Not on file   Tobacco Use     Smoking status: Former     Current packs/day: 0.00     Average packs/day: 0.5 packs/day for 0.8 years (0.4 ttl pk-yrs)     Types: Cigarettes     Start date: 10/1/1975     Quit date: 1976     Years since quittin.8     Passive exposure: Past     Smokeless tobacco: Never   Vaping Use     Vaping status: Never Used   Substance and Sexual Activity     Alcohol use: Yes     Comment: 1-2 beers, 4-5 days/week     Drug use: Never     Sexual activity: Yes     Partners: Male     Birth control/protection: Post-menopausal   Other Topics Concern     Parent/sibling w/ CABG, MI or angioplasty before 65F 55M? Yes     Comment: Dad age 56   Social History Narrative     Not on file     Social Drivers of Health     Financial Resource Strain: Low Risk  (2024)    Financial Resource Strain      Within the past 12 months, have you or your family members you live with been unable to get utilities (heat, electricity) when it was really needed?: No   Food Insecurity: Low Risk  (2024)    Food Insecurity      Within the past 12 months, did you worry that your food would run out before you got money to buy more?: No      Within the past 12 months, did the food you bought just not last and you didn t have money to get more?: No   Transportation Needs: Low Risk  (2024)    Transportation Needs      Within the past 12 months, has lack of transportation kept you from medical appointments, getting your medicines, non-medical meetings or appointments, work, or from getting things that you need?: No   Physical Activity: Insufficiently Active (2024)    Exercise Vital Sign      Days of Exercise per Week: 2 days       Minutes of Exercise per Session: 30 min   Stress: No Stress Concern Present (2024)    [employer] - Occupational Stress Questionnaire      Feeling of Stress : Only a little   Social Connections: Unknown (2024)    Received from [employer] [facility]    Social Connections      Frequency of Communication with Friends and Family: Not on file   Interpersonal Safety: Low Risk  (2025)    Interpersonal Safety      Do you feel physically and emotionally safe where you currently live?: Yes      Within the past 12 months, have you been hit, slapped, kicked or otherwise physically hurt by someone?: No      Within the past 12 months, have you been humiliated or emotionally abused in other ways by your partner or ex-partner?: No   Housing Stability: Low Risk  (2024)    Housing Stability      Do you have housing? : Yes      Are you worried about losing your housing?: No       Family History   Problem Relation Age of Onset     Hypertension Mother              Hyperlipidemia Mother              Osteoporosis Mother              Thyroid Disease Mother              Hearing Loss Mother         Wore hearing aids     Rheumatoid Arthritis Mother      Coronary Artery Disease Mother         -     Heart Disease Mother              Coronary Artery Disease Father         -     Obesity Father              Hypertension Father              Glasses (<9 y/o) Father      Heart Disease Father              Early Death Father          age 56     Obesity Paternal Grandmother              Glasses (<9 y/o) Paternal Grandmother          at age: Unknown     Anxiety Disorder Daughter         +Chicken pox     Glasses (<9 y/o) Daughter      Anxiety Disorder Son         Chicken pox     Glasses (<9 y/o) Son      ROS    No Known Allergies    Current Outpatient Medications    Medication Sig Dispense Refill     alendronate (FOSAMAX) 70 MG tablet Take 1 tablet (70 mg) by mouth every 7 days. 12 tablet 3     amitriptyline (ELAVIL) 50 MG tablet TAKE ONE TABLET BY MOUTH EVERY NIGHT AT BEDTIME 90 tablet 3     atorvastatin (LIPITOR) 40 MG tablet TAKE 1 TABLET(40 MG) BY MOUTH DAILY 90 tablet 3     calcium citrate-vitamin D (CITRACAL) 315-250 MG-UNIT TABS per tablet        diphenhydrAMINE (BENADRYL) 25 MG tablet Take 25 mg by mouth every 6 hours as needed for itching or allergies       Ferrous Sulfate (IRON) 325 (65 Fe) MG tablet Take 2 tablets by mouth daily       finasteride (PROSCAR) 5 MG tablet Take 5 mg by mouth daily       folic acid (FOLVITE) 1 MG tablet TAKE 3 TABLETS(3 MG) BY MOUTH DAILY 270 tablet 1     Krill Oil (OMEGA-3) 500 MG CAPS Take 500 mg by mouth       levothyroxine (SYNTHROID/LEVOTHROID) 137 MCG tablet Take 1 tablet (137 mcg) by mouth every morning (before breakfast). 90 tablet 3     Misc Natural Products (NEURIVA) CAPS        Multiple Vitamin (ONE-A-DAY ESSENTIAL) TABS Take 1 tablet by mouth daily       omeprazole (PRILOSEC) 20 MG DR capsule Take 1 capsule (20 mg) by mouth daily 90 capsule 3     acetaminophen (TYLENOL) 500 MG tablet Take 500-1,000 mg by mouth every 6 hours as needed for mild pain       Ascorbic Acid (VITAMIN C) 100 MG CHEW Gummy, 282mg       benzonatate (TESSALON) 100 MG capsule Take 1-2 capsules (100-200 mg) by mouth 3 times daily as needed for cough. 30 capsule 1     MAGNESIUM GLYCINATE PO Take 500 tablets by mouth daily       methotrexate 2.5 MG tablet TAKE 7 TABLETS BY MOUTH EVERY 7 DAYS (Patient not taking: Reported on 4/10/2025) 84 tablet 0     potassium gluconate 2.5 MEQ tablet        pseudoePHEDrine (SUDAFED) 120 MG 12 hr tablet Take 120 mg by mouth every 12 hours. PRN (Patient not taking: Reported on 4/10/2025)       No current facility-administered medications for this visit.     /77 (BP Location: Right arm, Patient Position: Sitting, Cuff  "Size: Adult Large)   Pulse 66   Ht 1.638 m (5' 4.5\")   Wt 79.8 kg (176 lb)   LMP  (LMP Unknown)   SpO2 97%   BMI 29.74 kg/m    GENERAL: healthy, alert and no distress  EYES: Eyes grossly normal to inspection, conjunctivae and sclerae normal  HENT: normal cephalic/atraumatic.  External ears, nose and mouth without ulcers or lesions.  RESP: no audible wheeze, cough, or visible cyanosis.  No visible retractions or increased work of breathing.  Able to speak fully in complete sentences.  NEURO: Cranial nerves grossly intact, mentation intact and speech normal  PSYCH: mentation appears normal, affect normal/bright, judgement and insight intact, normal speech and appearance well-groomed  ABD: soft, nontender, nondistended  : atrophic tissues, normal external genitalia.  Pelvic exam remarkable for myofascial tenderness of the pelvic floor and pelvic floor dysfunction    PVR 1 mL by bladder scan    CC  Patient Care Team:  Irena Her MD as PCP - General (Family Medicine)  Irena Her MD as Assigned PCP  Autumn Padilla MD as Assigned Surgical Provider  [facility] as Pharmacist (Pharmacist)  Elvia Ko [facility] as Pharmacist  Nataliia Sprague MD as MD (Rheumatology)  Nataliia Sprague MD as Assigned Rheumatology Provider  Kamini Ramirez RD as Diabetes Educator (Dietitian, Registered)  [facility] as Assigned [place] Pharmacist  Maritza Martins MD as MD (Urology)  Astrid Flores PA-C as Physician Assistant (Gastroenterology)  IRENA HER              Again, thank you for allowing me to participate in the care of your patient.      Sincerely,    Maritza Martins MD    "

## 2025-04-10 NOTE — PROGRESS NOTES
April 10, 2025    Referring Provider: Irena Levi MD  [address] [place] [address]    Primary Care Provider: Irena Levi    {PROVIDER CHARTING PREFERENCE:244427}    *** minutes were spent on this day of the encounter in reviewing the EMR including ***, direct patient care, coordination of care and documentation    Maritza Martins MD MPH  (she/her/hers)   of Urology  [facility]      HPI:  Maritza Hitchcock is a 69 year old female who presents for evaluation of her pelvic floor symptoms.  She has ***.  with ***    Health maintenance screening:  Pap smear ***  Colonoscopy ***  Mammogram ***    Past Medical History:   Diagnosis Date    Diabetes (H) 07/2021    Infection due to 2019 novel coronavirus 09/2022    Infection due to 2019 novel coronavirus 09/14/2023    Mumps     [id number] or [id number]    MVP (mitral valve prolapse)     Premenopausal menorrhagia 11/18/2010    Sarcoidosis     Thyroid disease     Hypothyroidism     Past Surgical History:   Procedure Laterality Date    ABDOMEN SURGERY  1988    Tubal    CATARACT IOL, RT/LT      CHOLECYSTECTOMY      1987    COLONOSCOPY  ????    7/26/2007, 8/30/2017    COMBINED REPAIR PTOSIS WITH BLEPHAROPLASTY BILATERAL Bilateral 8/8/2024    Procedure: Both upper eyelid blepharoplasty. Left upper eyelid ptosis repair. Excision of left lateral canthus cyst.;  Surgeon: Autumn Padilla MD;  Location: [facility] [place]    DILATION AND CURETTAGE, HYSTEROSCOPY DIAGNOSTIC, COMBINED      4/23/2010    EYE SURGERY  2021, 2022    Left eye-Vitrectomy, Cataract    GALLBLADDER SURGERY  02/11/1987    GYN SURGERY  ????    Partial Hysterectomy    LAPAROSCOPIC HYSTERECTOMY SUPRACERVICAL  11/19/2010    MENISCECTOMY  09/27/2016    partial    ORBITOTOMY, RESECT TUMOR, COMBINED Left 05/04/2023    Procedure: Left lacrimal gland biopsy;  Surgeon: Autumn Padilla MD;  Location: [facility] [place]    ORTHOPEDIC SURGERY  2023    Tendon repair right thumb    SOFT TISSUE  SURGERY  ????    Right knee meniscus repair    VAGINAL DELIVERY      x3 no date     Social History     Socioeconomic History    Marital status:      Spouse name: Not on file    Number of children: Not on file    Years of education: Not on file    Highest education level: Not on file   Occupational History    Not on file   Tobacco Use    Smoking status: Former     Current packs/day: 0.00     Average packs/day: 0.5 packs/day for 0.8 years (0.4 ttl pk-yrs)     Types: Cigarettes     Start date: 10/1/1975     Quit date: 1976     Years since quittin.8     Passive exposure: Past    Smokeless tobacco: Never   Vaping Use    Vaping status: Never Used   Substance and Sexual Activity    Alcohol use: Yes     Comment: 1-2 beers, 4-5 days/week    Drug use: Never    Sexual activity: Yes     Partners: Male     Birth control/protection: Post-menopausal   Other Topics Concern    Parent/sibling w/ CABG, MI or angioplasty before 65F 55M? Yes     Comment: Dad age 56   Social History Narrative    Not on file     Social Drivers of Health     Financial Resource Strain: Low Risk  (2024)    Financial Resource Strain     Within the past 12 months, have you or your family members you live with been unable to get utilities (heat, electricity) when it was really needed?: No   Food Insecurity: Low Risk  (2024)    Food Insecurity     Within the past 12 months, did you worry that your food would run out before you got money to buy more?: No     Within the past 12 months, did the food you bought just not last and you didn t have money to get more?: No   Transportation Needs: Low Risk  (2024)    Transportation Needs     Within the past 12 months, has lack of transportation kept you from medical appointments, getting your medicines, non-medical meetings or appointments, work, or from getting things that you need?: No   Physical Activity: Insufficiently Active (2024)    Exercise Vital Sign     Days of Exercise per  Week: 2 days     Minutes of Exercise per Session: 30 min   Stress: No Stress Concern Present (2024)    [employer] - Occupational Stress Questionnaire     Feeling of Stress : Only a little   Social Connections: Unknown (2024)    Received from [employer] [facility]    Social Connections     Frequency of Communication with Friends and Family: Not on file   Interpersonal Safety: Low Risk  (2025)    Interpersonal Safety     Do you feel physically and emotionally safe where you currently live?: Yes     Within the past 12 months, have you been hit, slapped, kicked or otherwise physically hurt by someone?: No     Within the past 12 months, have you been humiliated or emotionally abused in other ways by your partner or ex-partner?: No   Housing Stability: Low Risk  (2024)    Housing Stability     Do you have housing? : Yes     Are you worried about losing your housing?: No       Family History   Problem Relation Age of Onset    Hypertension Mother             Hyperlipidemia Mother             Osteoporosis Mother             Thyroid Disease Mother             Hearing Loss Mother         Wore hearing aids    Rheumatoid Arthritis Mother     Coronary Artery Disease Mother         -    Heart Disease Mother             Coronary Artery Disease Father         -    Obesity Father             Hypertension Father             Glasses (<9 y/o) Father     Heart Disease Father             Early Death Father          age 56    Obesity Paternal Grandmother             Glasses (<9 y/o) Paternal Grandmother          at age: Unknown    Anxiety Disorder Daughter         +Chicken pox    Glasses (<9 y/o) Daughter     Anxiety Disorder Son         Chicken pox    Glasses (<9 y/o) Son      ROS    No Known Allergies    Current Outpatient Medications   Medication Sig Dispense  Refill    alendronate (FOSAMAX) 70 MG tablet Take 1 tablet (70 mg) by mouth every 7 days. 12 tablet 3    amitriptyline (ELAVIL) 50 MG tablet TAKE ONE TABLET BY MOUTH EVERY NIGHT AT BEDTIME 90 tablet 3    atorvastatin (LIPITOR) 40 MG tablet TAKE 1 TABLET(40 MG) BY MOUTH DAILY 90 tablet 3    calcium citrate-vitamin D (CITRACAL) 315-250 MG-UNIT TABS per tablet       diphenhydrAMINE (BENADRYL) 25 MG tablet Take 25 mg by mouth every 6 hours as needed for itching or allergies      Ferrous Sulfate (IRON) 325 (65 Fe) MG tablet Take 2 tablets by mouth daily      finasteride (PROSCAR) 5 MG tablet Take 5 mg by mouth daily      folic acid (FOLVITE) 1 MG tablet TAKE 3 TABLETS(3 MG) BY MOUTH DAILY 270 tablet 1    Krill Oil (OMEGA-3) 500 MG CAPS Take 500 mg by mouth      levothyroxine (SYNTHROID/LEVOTHROID) 137 MCG tablet Take 1 tablet (137 mcg) by mouth every morning (before breakfast). 90 tablet 3    Misc Natural Products (NEURIVA) CAPS       Multiple Vitamin (ONE-A-DAY ESSENTIAL) TABS Take 1 tablet by mouth daily      omeprazole (PRILOSEC) 20 MG DR capsule Take 1 capsule (20 mg) by mouth daily 90 capsule 3    acetaminophen (TYLENOL) 500 MG tablet Take 500-1,000 mg by mouth every 6 hours as needed for mild pain      Ascorbic Acid (VITAMIN C) 100 MG CHEW Gummy, 282mg      benzonatate (TESSALON) 100 MG capsule Take 1-2 capsules (100-200 mg) by mouth 3 times daily as needed for cough. 30 capsule 1    MAGNESIUM GLYCINATE PO Take 500 tablets by mouth daily      methotrexate 2.5 MG tablet TAKE 7 TABLETS BY MOUTH EVERY 7 DAYS (Patient not taking: Reported on 4/10/2025) 84 tablet 0    potassium gluconate 2.5 MEQ tablet       pseudoePHEDrine (SUDAFED) 120 MG 12 hr tablet Take 120 mg by mouth every 12 hours. PRN (Patient not taking: Reported on 4/10/2025)       No current facility-administered medications for this visit.     /77 (BP Location: Right arm, Patient Position: Sitting, Cuff Size: Adult Large)   Pulse 66   Ht 1.638 m (5'  "4.5\")   Wt 79.8 kg (176 lb)   LMP  (LMP Unknown)   SpO2 97%   BMI 29.74 kg/m    GENERAL: healthy, alert and no distress  EYES: Eyes grossly normal to inspection, conjunctivae and sclerae normal  HENT: normal cephalic/atraumatic.  External ears, nose and mouth without ulcers or lesions.  RESP: no audible wheeze, cough, or visible cyanosis.  No visible retractions or increased work of breathing.  Able to speak fully in complete sentences.  NEURO: Cranial nerves grossly intact, mentation intact and speech normal  PSYCH: mentation appears normal, affect normal/bright, judgement and insight intact, normal speech and appearance well-groomed    {Exam List (Optional):793636}    {Diagnostic Test Results (Optional):052753}    {AMBULATORY ATTESTATION (Optional):535903}    Urine dip ***    PVR *** mL by bladder scan      CC  Patient Care Team:  Irena Her MD as PCP - General (Family Medicine)  Irena Her MD as Assigned PCP  Autumn Padilla MD as Assigned Surgical Provider  Haley Grewal [facility] as Pharmacist (Pharmacist)  Elvia Ko [facility] as Pharmacist  Nataliia Sprague MD as MD (Rheumatology)  Nataliia Sprague MD as Assigned Rheumatology Provider  Kamini Ramirez RD as Diabetes Educator (Dietitian, Registered)  Elvia Ko [facility] as Assigned MTM Pharmacist  Maritza Martins MD as MD (Urology)  Astrid Flores PA-C as Physician Assistant (Gastroenterology)  IRENA HER              " work of breathing.  Able to speak fully in complete sentences.  NEURO: Cranial nerves grossly intact, mentation intact and speech normal  PSYCH: mentation appears normal, affect normal/bright, judgement and insight intact, normal speech and appearance well-groomed  ABD: soft, nontender, nondistended  : atrophic tissues, normal external genitalia.  Pelvic exam remarkable for myofascial tenderness of the pelvic floor and pelvic floor dysfunction    PVR 1 mL by bladder scan    CC  Patient Care Team:  Irena Her MD as PCP - General (Family Medicine)  Irena Hre MD as Assigned PCP  Autumn Padilla MD as Assigned Surgical Provider  Haley rGewal [facility] as Pharmacist (Pharmacist)  Elvia Ko [facility] as Pharmacist  Nataliia Sprague MD as MD (Rheumatology)  Nataliia Sprague MD as Assigned Rheumatology Provider  Kamini Ramirez RD as Diabetes Educator (Dietitian, Registered)  Elvia Ko [facility] as Assigned [facility] Pharmacist  Maritza Martins MD as MD (Urology)  Astrid Flores PA-C as Physician Assistant (Gastroenterology)  IRENA HER

## 2025-04-10 NOTE — PATIENT INSTRUCTIONS
Websites with free information:    [employer] patient website: www.[email or web address]    Total Control Program: www.totalcontrolprogram.com    Please see one of the dedicated pelvic floor physical therapist. If you have not heard from the scheduling office within 2 business days, please call [phone] for [email or web address].  [facility] also has a place if that is easier, they need to be able to do internal myofascial release    Please return to see us in 6 months, sooner if needed    It was a pleasure meeting with you today.  Thank you for allowing me and my team the privilege of caring for you today.  YOU are the reason we are here, and I truly hope we provided you with the excellent service you deserve.  Please let us know if there is anything else we can do for you so that we can be sure you are leaving completely satisfied with your care experience.

## 2025-04-10 NOTE — NURSING NOTE
"Chief Complaint   Patient presents with    Incontinence     Noticing large leaks with strong coughs and sneezing. No fevers, recent infections, urinary tract infection symptoms. Patient consents to student in the room with provider today.          Blood pressure 123/77, pulse 66, height 1.638 m (5' 4.5\"), weight 79.8 kg (176 lb), SpO2 97%. Body mass index is 29.74 kg/m .    Patient Active Problem List   Diagnosis    Fatty liver    Gastroesophageal reflux disease without esophagitis    Hx of NSAID-associated gastropathy    Hypertriglyceridemia    Acquired hypothyroidism    Type 2 diabetes mellitus without complication, without long-term current use of insulin (H)    Personal history of DVT (deep vein thrombosis)    Hx of meniscectomy of right knee    Nonalcoholic steatohepatitis (ROMERO)    Lacrimal gland inflammation, left    Sarcoidosis    Facial mass    Parathyroid abnormality    Involutional ptosis, acquired, bilateral    Dermatochalasis of both upper eyelids    Non-caseating granuloma    Osteopenia of lumbar spine    Osteopenia of necks of both femurs       No Known Allergies    Current Outpatient Medications   Medication Sig Dispense Refill    alendronate (FOSAMAX) 70 MG tablet Take 1 tablet (70 mg) by mouth every 7 days. 12 tablet 3    amitriptyline (ELAVIL) 50 MG tablet TAKE ONE TABLET BY MOUTH EVERY NIGHT AT BEDTIME 90 tablet 3    atorvastatin (LIPITOR) 40 MG tablet TAKE 1 TABLET(40 MG) BY MOUTH DAILY 90 tablet 3    calcium citrate-vitamin D (CITRACAL) 315-250 MG-UNIT TABS per tablet       diphenhydrAMINE (BENADRYL) 25 MG tablet Take 25 mg by mouth every 6 hours as needed for itching or allergies      Ferrous Sulfate (IRON) 325 (65 Fe) MG tablet Take 2 tablets by mouth daily      finasteride (PROSCAR) 5 MG tablet Take 5 mg by mouth daily      folic acid (FOLVITE) 1 MG tablet TAKE 3 TABLETS(3 MG) BY MOUTH DAILY 270 tablet 1    Krill Oil (OMEGA-3) 500 MG CAPS Take 500 mg by mouth      levothyroxine " (SYNTHROID/LEVOTHROID) 137 MCG tablet Take 1 tablet (137 mcg) by mouth every morning (before breakfast). 90 tablet 3    Misc Natural Products (NEURIVA) CAPS       Multiple Vitamin (ONE-A-DAY ESSENTIAL) TABS Take 1 tablet by mouth daily      omeprazole (PRILOSEC) 20 MG DR capsule Take 1 capsule (20 mg) by mouth daily 90 capsule 3    acetaminophen (TYLENOL) 500 MG tablet Take 500-1,000 mg by mouth every 6 hours as needed for mild pain      Ascorbic Acid (VITAMIN C) 100 MG CHEW Gummy, 282mg      benzonatate (TESSALON) 100 MG capsule Take 1-2 capsules (100-200 mg) by mouth 3 times daily as needed for cough. 30 capsule 1    MAGNESIUM GLYCINATE PO Take 500 tablets by mouth daily      methotrexate 2.5 MG tablet TAKE 7 TABLETS BY MOUTH EVERY 7 DAYS (Patient not taking: Reported on 4/10/2025) 84 tablet 0    potassium gluconate 2.5 MEQ tablet       pseudoePHEDrine (SUDAFED) 120 MG 12 hr tablet Take 120 mg by mouth every 12 hours. PRN (Patient not taking: Reported on 4/10/2025)         Social History     Tobacco Use    Smoking status: Former     Current packs/day: 0.00     Average packs/day: 0.5 packs/day for 0.8 years (0.4 ttl pk-yrs)     Types: Cigarettes     Start date: 10/1/1975     Quit date: 1976     Years since quittin.8     Passive exposure: Past    Smokeless tobacco: Never   Vaping Use    Vaping status: Never Used   Substance Use Topics    Alcohol use: Yes     Comment: 1-2 beers, 4-5 days/week    Drug use: Never       Ernestine Lema  4/10/2025  1:28 PM

## 2025-04-10 NOTE — NURSING NOTE
Chief Complaint   Patient presents with    Incontinence       There were no vitals taken for this visit. There is no height or weight on file to calculate BMI.    Patient Active Problem List   Diagnosis    Fatty liver    Gastroesophageal reflux disease without esophagitis    Hx of NSAID-associated gastropathy    Hypertriglyceridemia    Acquired hypothyroidism    Type 2 diabetes mellitus without complication, without long-term current use of insulin (H)    Personal history of DVT (deep vein thrombosis)    Hx of meniscectomy of right knee    Nonalcoholic steatohepatitis (ROMERO)    Lacrimal gland inflammation, left    Sarcoidosis    Facial mass    Parathyroid abnormality    Involutional ptosis, acquired, bilateral    Dermatochalasis of both upper eyelids    Non-caseating granuloma    Osteopenia of lumbar spine    Osteopenia of necks of both femurs       No Known Allergies    Current Outpatient Medications   Medication Sig Dispense Refill    alendronate (FOSAMAX) 70 MG tablet Take 1 tablet (70 mg) by mouth every 7 days. 12 tablet 3    amitriptyline (ELAVIL) 50 MG tablet TAKE ONE TABLET BY MOUTH EVERY NIGHT AT BEDTIME 90 tablet 3    atorvastatin (LIPITOR) 40 MG tablet TAKE 1 TABLET(40 MG) BY MOUTH DAILY 90 tablet 3    calcium citrate-vitamin D (CITRACAL) 315-250 MG-UNIT TABS per tablet       diphenhydrAMINE (BENADRYL) 25 MG tablet Take 25 mg by mouth every 6 hours as needed for itching or allergies      Ferrous Sulfate (IRON) 325 (65 Fe) MG tablet Take 2 tablets by mouth daily      finasteride (PROSCAR) 5 MG tablet Take 5 mg by mouth daily      folic acid (FOLVITE) 1 MG tablet TAKE 3 TABLETS(3 MG) BY MOUTH DAILY 270 tablet 1    Krill Oil (OMEGA-3) 500 MG CAPS Take 500 mg by mouth      levothyroxine (SYNTHROID/LEVOTHROID) 137 MCG tablet Take 1 tablet (137 mcg) by mouth every morning (before breakfast). 90 tablet 3    methotrexate 2.5 MG tablet TAKE 7 TABLETS BY MOUTH EVERY 7 DAYS 84 tablet 0    Misc Natural Products  (NEURIVA) CAPS       Multiple Vitamin (ONE-A-DAY ESSENTIAL) TABS Take 1 tablet by mouth daily      omeprazole (PRILOSEC) 20 MG DR capsule Take 1 capsule (20 mg) by mouth daily 90 capsule 3    pseudoePHEDrine (SUDAFED) 120 MG 12 hr tablet Take 120 mg by mouth every 12 hours. PRN      acetaminophen (TYLENOL) 500 MG tablet Take 500-1,000 mg by mouth every 6 hours as needed for mild pain      Ascorbic Acid (VITAMIN C) 100 MG CHEW Gummy, 282mg      benzonatate (TESSALON) 100 MG capsule Take 1-2 capsules (100-200 mg) by mouth 3 times daily as needed for cough. 30 capsule 1    MAGNESIUM GLYCINATE PO Take 500 tablets by mouth daily      potassium gluconate 2.5 MEQ tablet          Social History     Tobacco Use    Smoking status: Former     Current packs/day: 0.00     Average packs/day: 0.5 packs/day for 0.8 years (0.4 ttl pk-yrs)     Types: Cigarettes     Start date: 10/1/1975     Quit date: 1976     Years since quittin.8     Passive exposure: Past    Smokeless tobacco: Never   Vaping Use    Vaping status: Never Used   Substance Use Topics    Alcohol use: Yes     Comment: 1-2 beers, 4-5 days/week    Drug use: Never       Ana Rosa Wallace  4/10/2025  1:17 PM

## 2025-04-10 NOTE — Clinical Note
4/10/2025       RE: Maritza GILL Coretta  [address] [facility] [address]     Dear Colleague,    Thank you for referring your patient, Maritza Hitchcock, to the [facility]. Please see a copy of my visit note below.    No notes on file    Again, thank you for allowing me to participate in the care of your patient.      Sincerely,    Maritza Martins MD

## 2025-04-10 NOTE — NURSING NOTE
Patient was escorted to the restroom and asked to void per provider request.     PVR by bladder scanner x3 scans: 1mL    Ernestine Lema  April 10, 2025  1:49 PM

## 2025-04-28 DIAGNOSIS — R79.89 ABNORMAL LFTS: Primary | ICD-10-CM

## 2025-04-28 DIAGNOSIS — R94.5 ABNORMAL RESULTS OF LIVER FUNCTION STUDIES: ICD-10-CM

## 2025-05-05 ENCOUNTER — MYC MEDICAL ADVICE (OUTPATIENT)
Dept: PHARMACY | Facility: CLINIC | Age: 70
End: 2025-05-05
Payer: MEDICARE

## 2025-05-05 DIAGNOSIS — D86.9 SARCOIDOSIS: Primary | ICD-10-CM

## 2025-05-06 ENCOUNTER — OFFICE VISIT (OUTPATIENT)
Dept: FAMILY MEDICINE | Facility: CLINIC | Age: 70
End: 2025-05-06
Payer: MEDICARE

## 2025-05-06 VITALS
WEIGHT: 172.4 LBS | HEIGHT: 65 IN | BODY MASS INDEX: 28.72 KG/M2 | HEART RATE: 65 BPM | OXYGEN SATURATION: 98 % | DIASTOLIC BLOOD PRESSURE: 60 MMHG | TEMPERATURE: 98.8 F | SYSTOLIC BLOOD PRESSURE: 120 MMHG | RESPIRATION RATE: 16 BRPM

## 2025-05-06 DIAGNOSIS — D86.9 SARCOIDOSIS: ICD-10-CM

## 2025-05-06 DIAGNOSIS — E11.9 TYPE 2 DIABETES MELLITUS WITHOUT COMPLICATION, WITHOUT LONG-TERM CURRENT USE OF INSULIN (H): Primary | ICD-10-CM

## 2025-05-06 DIAGNOSIS — K75.81 NONALCOHOLIC STEATOHEPATITIS (NASH): ICD-10-CM

## 2025-05-06 PROCEDURE — 99215 OFFICE O/P EST HI 40 MIN: CPT | Performed by: FAMILY MEDICINE

## 2025-05-06 PROCEDURE — 3074F SYST BP LT 130 MM HG: CPT | Performed by: FAMILY MEDICINE

## 2025-05-06 PROCEDURE — G2211 COMPLEX E/M VISIT ADD ON: HCPCS | Performed by: FAMILY MEDICINE

## 2025-05-06 PROCEDURE — 3078F DIAST BP <80 MM HG: CPT | Performed by: FAMILY MEDICINE

## 2025-05-06 ASSESSMENT — ANXIETY QUESTIONNAIRES
5. BEING SO RESTLESS THAT IT IS HARD TO SIT STILL: NOT AT ALL
7. FEELING AFRAID AS IF SOMETHING AWFUL MIGHT HAPPEN: NOT AT ALL
1. FEELING NERVOUS, ANXIOUS, OR ON EDGE: SEVERAL DAYS
GAD7 TOTAL SCORE: 2
IF YOU CHECKED OFF ANY PROBLEMS ON THIS QUESTIONNAIRE, HOW DIFFICULT HAVE THESE PROBLEMS MADE IT FOR YOU TO DO YOUR WORK, TAKE CARE OF THINGS AT HOME, OR GET ALONG WITH OTHER PEOPLE: NOT DIFFICULT AT ALL
GAD7 TOTAL SCORE: 2
3. WORRYING TOO MUCH ABOUT DIFFERENT THINGS: NOT AT ALL
2. NOT BEING ABLE TO STOP OR CONTROL WORRYING: NOT AT ALL
6. BECOMING EASILY ANNOYED OR IRRITABLE: SEVERAL DAYS

## 2025-05-06 ASSESSMENT — PATIENT HEALTH QUESTIONNAIRE - PHQ9
SUM OF ALL RESPONSES TO PHQ QUESTIONS 1-9: 1
5. POOR APPETITE OR OVEREATING: NOT AT ALL

## 2025-05-06 NOTE — PROGRESS NOTES
ICD-10-CM    1. Type 2 diabetes mellitus without complication, without long-term current use of insulin (H)  E11.9 Med Therapy Management Referral      2. Nonalcoholic steatohepatitis (ROMERO)  K75.81 Med Therapy Management Referral      3. Sarcoidosis  D86.9 Med Therapy Management Referral      Assessment & Plan  Finasteride use:  - Finasteride was prescribed by a dermatologist for hair loss. The patient is currently out of finasteride.    Type 2 diabetes and steatohepatitis and sarcoidosis are all noted and taken into account for medical decision making.    Amitriptyline use  It was initially prescribed for stomach pain and to aid sleep.  - Continue current medication regimen. Referral to Dr. Odette Rosario, medication therapy management pharmacist, for further evaluation.  - Risks and side effects: Potential risk of triggering a psychotic break if the patient had bipolar disorder, but this is not a concern as bipolar disorder has been ruled out.    Hepatitis B immunization  - Two doses of Hepatitis B vaccine received; a third dose is needed.  - Obtain the third dose of Hepatitis B vaccine from the pharmacy, as it is not available through the clinic due to insurance coverage issues.     Total time spent reviewing chart and preparing for appointment, with patient for appointment, and time spent charting and coordinating care on the day of the appointment in minutes was:  40        4/19/2023    10:53 AM 5/22/2023    11:55 AM 5/6/2025     4:16 PM   PHQ   PHQ-9 Total Score 0 0 1   Q9: Thoughts of better off dead/self-harm past 2 weeks Not at all Not at all Not at all          3/7/2023     2:42 PM 5/22/2023    11:56 AM 5/6/2025     4:16 PM   THERESA-7 SCORE   Total Score 0 (minimal anxiety) 0 (minimal anxiety)    Total Score 0 0 2            Consent was obtained from the patient to use an AI documentation tool in the creation of  this note.  Voice recognition software was also used.  There may be associated  From: Gm Jackson  To: Dmitry Grant MD  Sent: 5/4/2021 9:00 AM CDT  Subject: Prescription Question    I got my prescriptions in the mail. However, I was sent 50mg losartan potassium pills and I actually take 100mg I can double them obviously but I wanted to let you know so that it can be changed in the system and since I will need the refill sooner by taking 2 at a time. transcribing errors.     The longitudinal plan of care for the diagnosis(es)/condition(s) as documented were addressed during this visit. Due to the added complexity in care, I will continue to support Annabelle in the subsequent management and with ongoing continuity of care.     Total time spent reviewing chart and preparing for appointment, with patient for appointment, and time spent charting and coordinating care on the day of the appointment in minutes was: 40    Subjective   Annabelle is a 69 year old, presenting for the following health issues:  Recheck Medication        5/6/2025     3:01 PM   Additional Questions   Roomed by Faviola     History of Present Illness       Reason for visit:  Med check    She eats 0-1 servings of fruits and vegetables daily.She consumes 0 sweetened beverage(s) daily.She exercises with enough effort to increase her heart rate 10 to 19 minutes per day.  She exercises with enough effort to increase her heart rate 3 or less days per week. She is missing 1 dose(s) of medications per week.  She is not taking prescribed medications regularly due to remembering to take.   Annabelle A Wiant, 69-year-old female    - Experiencing tension with  for nearly three years due to online chatting with other men, leading to romantic texts and financial scam.  - Left  on April 11, 2025, due to ongoing conflict and feeling controlled.  - Traveled to [place] and [place], returned after [facility].  -  concerned about medication side effects influencing decisions; medications include finasteride, methotrexate, levothyroxine, and amitriptyline.  - Off methotrexate due to elevated liver numbers; ran out of finasteride.  - No sexual activity with others; no STDs reported.  - Seeing a therapist for some time;  believes therapist influenced behavior.  - No signs of bipolar disorder based on mood disorder questionnaire.                      Objective    /60 (BP Location: Right arm, Patient  "Position: Sitting)   Pulse 65   Temp 98.8  F (37.1  C) (Tympanic)   Resp 16   Ht 1.638 m (5' 4.5\")   Wt 78.2 kg (172 lb 6.4 oz)   LMP  (LMP Unknown)   SpO2 98%   BMI 29.14 kg/m    Body mass index is 29.14 kg/m .  Physical Exam               Signed Electronically by: Irena Levi MD    "

## 2025-05-08 ENCOUNTER — PRE VISIT (OUTPATIENT)
Dept: UROLOGY | Facility: CLINIC | Age: 70
End: 2025-05-08

## 2025-05-09 ENCOUNTER — OFFICE VISIT (OUTPATIENT)
Dept: GASTROENTEROLOGY | Facility: CLINIC | Age: 70
End: 2025-05-09
Attending: PHYSICIAN ASSISTANT
Payer: MEDICARE

## 2025-05-09 ENCOUNTER — RESULTS FOLLOW-UP (OUTPATIENT)
Dept: GASTROENTEROLOGY | Facility: CLINIC | Age: 70
End: 2025-05-09

## 2025-05-09 ENCOUNTER — PRE VISIT (OUTPATIENT)
Dept: GASTROENTEROLOGY | Facility: CLINIC | Age: 70
End: 2025-05-09

## 2025-05-09 VITALS
HEART RATE: 75 BPM | WEIGHT: 170.6 LBS | OXYGEN SATURATION: 98 % | BODY MASS INDEX: 28.83 KG/M2 | DIASTOLIC BLOOD PRESSURE: 63 MMHG | SYSTOLIC BLOOD PRESSURE: 117 MMHG

## 2025-05-09 DIAGNOSIS — K76.0 FATTY LIVER: ICD-10-CM

## 2025-05-09 PROCEDURE — 82784 ASSAY IGA/IGD/IGG/IGM EACH: CPT | Performed by: PHYSICIAN ASSISTANT

## 2025-05-09 PROCEDURE — 99000 SPECIMEN HANDLING OFFICE-LAB: CPT | Performed by: PATHOLOGY

## 2025-05-09 NOTE — PROGRESS NOTES
Hepatology Clinic note  Maritza Hitchcock   Date of Birth 1955    REASON FOR CONSULTATION: ***  REFERRING PROVIDER:***         Assessment/plan:   Maritza Hitchcock is a 69 year old female with History of elevated ALT/AST and imaging findings of hepatic steatosis. Risk factors for fatty liver disease includes: ***obesity, HTN, hyperlipidemia, hypothryoidism, diabetes, ASHUTOSH. Transaminases have been historically ***. Liver function also normal without stigmata of advanced liver disease. Given historically normal transaminases, do not feel that further hepatobiliary work-up indicated at this time.***    #Metabolic associated fatty liver disease (MALFD):   - Patient has multiple risk factors making them high risk for on-going fibrosis. We discussed the pathophysiology and natural history of nonalcoholic fatty liver disease and cirrhosis.   - Will obtain a fibrosis scan to evaluate any fibrosis, which is important in risk stratification of likelihood of on-going fibrosis without weight loss.   - Recommend FIB-4 yearly with PCP. FIB-4 Calculation: 2.99 at 4/4/2025  9:40 AM  - Recommend slow gradual weight loss. Discussed how a weight loss of 3-5% can show improvement on steatosis and weight loss of 7-10% can show improvement on fibrosis on histology.   - Maintain good control of cholesterol (No contraindication to starting a statin with LFT elevations)   - Optimize blood glucose as needed. Could consider GLP-1 inhibitor for both insulin resistance and help with weight loss  - Improve nutrition: continue limiting carbohydrates, especially simple carbohydrates, and following Mediterranean eating patten  - Increase physical activity as able, ideally 150 minutes weekly  - Limit alcohol intake to not more than 3 drinks a week    # Will also rule out genetic and autoimmune causes of hepatobiliary disease.   Labs: Hep B, Hep C, Iron panel, alpha-1-antitrypsin deficiency, MARILEE, F-actin, TTG/IgA       - Consider/recommend follow up  with non-invasive elastography or FibroScan in 3-5 years     - Follow up with Hepatology in six months *** in one year *** or if above work-up       Follow-up in clinic *** or sooner as needed    Astrid Flores PA-C   [facility]     -----------------------------------------------------       HPI:   Maritza Hitchcock is a 69 year old female  presenting for the evaluation of elevated LFT's.         PMH:    has a past medical history of Diabetes (H) (07/2021), Infection due to 2019 novel coronavirus (09/2022), Infection due to 2019 novel coronavirus (09/14/2023), Mumps, MVP (mitral valve prolapse), Premenopausal menorrhagia (11/18/2010), Sarcoidosis, and Thyroid disease.     SMH:    has a past surgical history that includes Abdomen surgery (1988); Cholecystectomy; colonoscopy (????); Eye surgery (2021, 2022); GYN surgery (????); Soft tissue surgery (????); Laparoscopic hysterectomy supracervical (11/19/2010); Dilation and curettage, hysteroscopy diagnostic, combined; Gallbladder surgery (02/11/1987); Vaginal Delivery; Meniscectomy (09/27/2016); cataract iol, rt/lt; Orbitotomy, resect tumor, combined (Left, 05/04/2023); orthopedic surgery (2023); and Combined repair ptosis with blepharoplasty bilateral (Bilateral, 8/8/2024).     Medications:   alendronate  amitriptyline  atorvastatin  calcium citrate-vitamin D Tabs  diphenhydrAMINE  finasteride  folic acid  iron  levothyroxine  MAGNESIUM GLYCINATE PO  methotrexate  Neuriva Caps  Omega-3 Caps  omeprazole  One-A-Day Essential Tabs     No previous tobacco use. ETOH includes ***. No previous IV/IN drug use.        Family history***      Previous work-up:   Lab Results   Component Value Date    HEPBANG Nonreactive 05/25/2023    HBCAB NON-REACTIVE 08/25/2020    AUSAB 1.95 05/25/2023    HCVAB Nonreactive 03/06/2023    AVEL 514 (H) 07/22/2024    IRONSAT 23 07/22/2024    ELIJAH Positive (A) 04/13/2023    ANAT1 1:320 04/13/2023    TSH 2.55 12/20/2024    CHOL 134  "2024    HDL 42 (L) 2024    LDL 61 2024    TRIG 157 (H) 2024    A1C 6.1 (H) 2025      No results found for: \"SPECDES\", \"LDRESULTS\"     Recent Labs   Lab Test 25  0940 25  0914 24  1144 24  1021 24  1442 24  0939 04/15/24  1540 24  0934 23  0955 23  1531 22  0944   ALKPHOS 83 78 70 81 83 71 74 68 65 65 70   ALT 55* 101* 51* 45 47 43 55* 82* 41 21 75*   AST 58* 156* 52* 37 48* 40 45 72* 36 27 54*   BILITOTAL 0.7 0.6 0.6 0.4 0.7 0.5 0.4 0.4  --  0.4 0.7             Allergies:   No Known Allergies         Social History:     Social History     Socioeconomic History    Marital status:      Spouse name: Not on file    Number of children: Not on file    Years of education: Not on file    Highest education level: Not on file   Occupational History    Not on file   Tobacco Use    Smoking status: Former     Current packs/day: 0.00     Average packs/day: 0.5 packs/day for 0.8 years (0.4 ttl pk-yrs)     Types: Cigarettes     Start date: 10/1/1975     Quit date: 1976     Years since quittin.9     Passive exposure: Past    Smokeless tobacco: Never   Vaping Use    Vaping status: Never Used   Substance and Sexual Activity    Alcohol use: Yes     Comment: 1-2 beers, 4-5 days/week    Drug use: Never    Sexual activity: Yes     Partners: Male     Birth control/protection: Post-menopausal   Other Topics Concern    Parent/sibling w/ CABG, MI or angioplasty before 65F 55M? Yes     Comment: Dad age 56   Social History Narrative    Not on file     Social Drivers of Health     Financial Resource Strain: Low Risk  (2024)    Financial Resource Strain     Within the past 12 months, have you or your family members you live with been unable to get utilities (heat, electricity) when it was really needed?: No   Food Insecurity: Low Risk  (2024)    Food Insecurity     Within the past 12 months, did you worry that your food would run " out before you got money to buy more?: No     Within the past 12 months, did the food you bought just not last and you didn t have money to get more?: No   Transportation Needs: Low Risk  (2024)    Transportation Needs     Within the past 12 months, has lack of transportation kept you from medical appointments, getting your medicines, non-medical meetings or appointments, work, or from getting things that you need?: No   Physical Activity: Insufficiently Active (2024)    Exercise Vital Sign     Days of Exercise per Week: 2 days     Minutes of Exercise per Session: 30 min   Stress: No Stress Concern Present (2024)    [employer] - Occupational Stress Questionnaire     Feeling of Stress : Only a little   Social Connections: Unknown (2024)    Received from [facility]    Social Connections     Frequency of Communication with Friends and Family: Not on file   Interpersonal Safety: Low Risk  (2025)    Interpersonal Safety     Do you feel physically and emotionally safe where you currently live?: Yes     Within the past 12 months, have you been hit, slapped, kicked or otherwise physically hurt by someone?: No     Within the past 12 months, have you been humiliated or emotionally abused in other ways by your partner or ex-partner?: No   Housing Stability: Low Risk  (2024)    Housing Stability     Do you have housing? : Yes     Are you worried about losing your housing?: No            Family History:     Family History   Problem Relation Age of Onset    Hypertension Mother             Hyperlipidemia Mother             Osteoporosis Mother             Thyroid Disease Mother             Hearing Loss Mother         Wore hearing aids    Rheumatoid Arthritis Mother     Coronary Artery Disease Mother         -    Heart Disease Mother             Coronary Artery Disease Father          -    Obesity Father             Hypertension Father             Glasses (<9 y/o) Father     Heart Disease Father             Early Death Father          age 56    Obesity Paternal Grandmother             Glasses (<9 y/o) Paternal Grandmother          at age: Unknown    Anxiety Disorder Daughter         +Chicken pox    Glasses (<9 y/o) Daughter     Anxiety Disorder Son         Chicken pox    Glasses (<9 y/o) Son             Review of Systems:   Gen: See HPI     HEENT: No change in vision or hearing, mouth sores, dysphagia, lymph nodes  Resp: No shortness of breath, coughing, hx of asthma  CV: No chest pain, palpitations, syncope   GI: See HPI  : No dysuria, history of stones, urine color    Skin: No rash; no pruritus or psoriasis  MS: No arthralgias, myalgias, joint swelling  Neuro: No memory changes, confusion, numbness    Heme: No difficulty clotting, bruising, bleeding  Psych:  No anxiety, depression, agitation          Physical Exam:   VS:  LMP  (LMP Unknown)       Gen: A&Ox3, NAD, well developed  HEENT: non-icteric ***  CV: RRR, no overt murmurs  Lung: CTA Bilatererally, no wheezing or crackles.   Lym- no palpable lymphadenopathy  Abd: soft, NT, ND *** no palpable splenomegaly, liver is not palpable.   Ext: no edema, intact pulses.   Skin: No rash***, no palmar erythema, telangiectasias or jaundice  Neuro: grossly intact, no asterixis   Psych: appropriate mood and affects         Data:   Reviewed in person and significant for:    Lab Results   Component Value Date     2025      Lab Results   Component Value Date    POTASSIUM 4.2 2025    POTASSIUM 4.5 2022     Lab Results   Component Value Date    CHLORIDE 98 2025    CHLORIDE 103 2022     Lab Results   Component Value Date    CO2 25 2025    CO2 28 2022     Lab Results   Component Value Date    BUN 17.3 2025    BUN 22 2022     Lab Results  "  Component Value Date    CR 0.79 04/04/2025       Lab Results   Component Value Date    WBC 6.0 04/04/2025     Lab Results   Component Value Date    HGB 12.8 04/04/2025     Lab Results   Component Value Date    HCT 38.6 04/04/2025     Lab Results   Component Value Date     04/04/2025     Lab Results   Component Value Date     04/04/2025       Lab Results   Component Value Date    AST 58 04/04/2025     Lab Results   Component Value Date    ALT 55 04/04/2025     No results found for: \"BILICONJ\"   Lab Results   Component Value Date    BILITOTAL 0.7 04/04/2025       Lab Results   Component Value Date    ALBUMIN 4.6 04/04/2025    ALBUMIN 4.1 04/14/2022     Lab Results   Component Value Date    PROTTOTAL 7.3 04/04/2025      Lab Results   Component Value Date    ALKPHOS 83 04/04/2025       No results found for: \"INR\"      Imaging:        EXAMINATION: Liver Ultrasound Elastography, 7/27/2024 12:02 PM     COMPARISON: None     HISTORY: Nonalcoholic steatohepatitis (ROMERO)     FINDINGS:      Ultrasound elastography of the liver was performed using Aliya point  shear wave technology.  Measurements were obtained at least 2 cm  beneath Iman's capsule, perpendicular to the liver capsule. Images  are of satisfactory quality.     The median liver stiffness is: 1.33 m/sec  The mean liver stiffness is: 1.33 m/sec  The interquartile range is: 0.15  IQR/median: 0.11.  (IQR/median value of greater than 0.15indicates  that a dataset is unreliable)                                                                      IMPRESSION:  1: The median liver stiffness is 1.33 m/sec and the IQR/median value  is 0.11 . This is a low elastography value which rules out advanced  chronic liver disease in asymptomatic patients.  .     Consensus criteria for interpreting liver stiffness values in patients  with viral hepatitis or NAFLD according to SRU consensus guidelines  (Carter et. al. Radiology: 2020. epub)     High probability of " being normal:  <1.3 m/sec  Rules out advanced chronic liver disease in asymptomatic patients:  <1.7 m/sec  Suggestive of advanced chronic liver disease: 1.7-2.1 m/sec  Indicates advanced chronic liver disease: >2.1 m/sec  Suggestive of clinically significant portal hypertension: >2.4 m/sec     I have personally reviewed the examination and initial interpretation  and I agree with the findings.     ROSETTE BACA MD

## 2025-05-13 ENCOUNTER — RESULTS FOLLOW-UP (OUTPATIENT)
Dept: GASTROENTEROLOGY | Facility: CLINIC | Age: 70
End: 2025-05-13

## 2025-05-15 ENCOUNTER — OFFICE VISIT (OUTPATIENT)
Dept: RHEUMATOLOGY | Facility: CLINIC | Age: 70
End: 2025-05-15
Attending: INTERNAL MEDICINE
Payer: MEDICARE

## 2025-05-15 ENCOUNTER — TELEPHONE (OUTPATIENT)
Dept: OPHTHALMOLOGY | Facility: CLINIC | Age: 70
End: 2025-05-15

## 2025-05-15 VITALS
OXYGEN SATURATION: 98 % | SYSTOLIC BLOOD PRESSURE: 117 MMHG | RESPIRATION RATE: 16 BRPM | WEIGHT: 169 LBS | BODY MASS INDEX: 28.85 KG/M2 | DIASTOLIC BLOOD PRESSURE: 57 MMHG | HEART RATE: 63 BPM | HEIGHT: 64 IN

## 2025-05-15 DIAGNOSIS — K76.0 FATTY LIVER: ICD-10-CM

## 2025-05-15 DIAGNOSIS — R79.89 ABNORMAL LFTS: ICD-10-CM

## 2025-05-15 DIAGNOSIS — D86.9 SARCOIDOSIS: Primary | ICD-10-CM

## 2025-05-15 DIAGNOSIS — M85.88 OSTEOPENIA OF LUMBAR SPINE: ICD-10-CM

## 2025-05-15 DIAGNOSIS — R76.8 POSITIVE ANA (ANTINUCLEAR ANTIBODY): ICD-10-CM

## 2025-05-15 DIAGNOSIS — K76.0 FATTY LIVER: Primary | ICD-10-CM

## 2025-05-15 DIAGNOSIS — Z79.899 LONG-TERM USE OF HIGH-RISK MEDICATION: ICD-10-CM

## 2025-05-15 ASSESSMENT — PAIN SCALES - GENERAL: PAINLEVEL_OUTOF10: MILD PAIN (3)

## 2025-05-15 NOTE — PROGRESS NOTES
Chief Complaint/Reason for Visit: Sarcoidosis      HPI:    Maritza GILL Wiant is a 68 year old White female with past medical history listed below. She has been off of methotrexate since Feb 2025. She said she has noticed minimal swelling of left upper eyelid that's sore to touch since stopping Rx. She saw  in Oct 2024 - per notes, she was doing well at that time.     REVIEW OF SYSTEMS    General -negative for fever  Cardiovascular -negative for chest pain   Respiratory - negative for shortness of breath  Skin -negative for skin rashes  Neuro -negative for stroke or seizures      Past Medical History:   Diagnosis Date    Diabetes (H) 07/2021    Infection due to 2019 novel coronavirus 09/2022    Infection due to 2019 novel coronavirus 09/14/2023    Mumps     [id number] or [id number]    MVP (mitral valve prolapse)     Premenopausal menorrhagia 11/18/2010    Sarcoidosis     Thyroid disease     Hypothyroidism     Past Surgical History:   Procedure Laterality Date    ABDOMEN SURGERY  1988    Tubal    CATARACT IOL, RT/LT      CHOLECYSTECTOMY      1987    COLONOSCOPY  ????    7/26/2007, 8/30/2017    COMBINED REPAIR PTOSIS WITH BLEPHAROPLASTY BILATERAL Bilateral 8/8/2024    Procedure: Both upper eyelid blepharoplasty. Left upper eyelid ptosis repair. Excision of left lateral canthus cyst.;  Surgeon: Autumn Padilla MD;  [place] [facility] [place]    DILATION AND CURETTAGE, HYSTEROSCOPY DIAGNOSTIC, COMBINED      4/23/2010    EYE SURGERY  2021, 2022    Left eye-Vitrectomy, Cataract    GALLBLADDER SURGERY  02/11/1987    GYN SURGERY  ????    Partial Hysterectomy    LAPAROSCOPIC HYSTERECTOMY SUPRACERVICAL  11/19/2010    MENISCECTOMY  09/27/2016    partial    ORBITOTOMY, RESECT TUMOR, COMBINED Left 05/04/2023    Procedure: Left lacrimal gland biopsy;  Surgeon: Autumn Padilla MD;  [place] [facility] [place]    ORTHOPEDIC SURGERY  2023    Tendon repair right thumb    SOFT TISSUE SURGERY  ????    Right knee meniscus  repair    VAGINAL DELIVERY      x3 no date     Family History   Problem Relation Age of Onset    Hypertension Mother             Hyperlipidemia Mother             Osteoporosis Mother             Thyroid Disease Mother             Hearing Loss Mother         Wore hearing aids    Rheumatoid Arthritis Mother     Coronary Artery Disease Mother         -    Heart Disease Mother             Coronary Artery Disease Father         -    Obesity Father             Hypertension Father             Glasses (<9 y/o) Father     Heart Disease Father             Early Death Father          age 56    Obesity Paternal Grandmother             Glasses (<9 y/o) Paternal Grandmother          at age: Unknown    Anxiety Disorder Daughter         +Chicken pox    Glasses (<9 y/o) Daughter     Anxiety Disorder Son         Chicken pox    Glasses (<9 y/o) Son      Social History     Socioeconomic History    Marital status:    Tobacco Use    Smoking status: Former     Packs/day: 0.50     Years: 0.00     Additional pack years: 0.00     Total pack years: 0.00     Types: Cigarettes     Start date: 10/1/1975     Quit date: 1976     Years since quittin.7     Passive exposure: Never    Smokeless tobacco: Never   Vaping Use    Vaping Use: Never used   Substance and Sexual Activity    Alcohol use: Yes    Drug use: Never    Sexual activity: Yes     Partners: Male     Birth control/protection: Post-menopausal, Female Surgical   Other Topics Concern    Parent/sibling w/ CABG, MI or angioplasty before 65F 55M? Yes     Comment: Dad age 56     Social Determinants of Health     Financial Resource Strain: Low Risk  (2023)    Financial Resource Strain     Within the past 12 months, have you or your family members you live with been unable to get utilities (heat, electricity) when it was really needed?: No   Food Insecurity: Low Risk   (12/27/2023)    Food Insecurity     Within the past 12 months, did you worry that your food would run out before you got money to buy more?: No     Within the past 12 months, did the food you bought just not last and you didn t have money to get more?: No   Transportation Needs: Low Risk  (12/27/2023)    Transportation Needs     Within the past 12 months, has lack of transportation kept you from medical appointments, getting your medicines, non-medical meetings or appointments, work, or from getting things that you need?: No   Interpersonal Safety: Low Risk  (11/8/2023)    Interpersonal Safety     Do you feel physically and emotionally safe where you currently live?: Yes     Within the past 12 months, have you been hit, slapped, kicked or otherwise physically hurt by someone?: No     Within the past 12 months, have you been humiliated or emotionally abused in other ways by your partner or ex-partner?: No   Housing Stability: Low Risk  (12/27/2023)    Housing Stability     Do you have housing? : Yes     Are you worried about losing your housing?: No       No Known Allergies    Current Outpatient Medications   Medication Sig Dispense Refill    alendronate (FOSAMAX) 70 MG tablet Take 1 tablet (70 mg) by mouth every 7 days. 12 tablet 3    amitriptyline (ELAVIL) 50 MG tablet TAKE ONE TABLET BY MOUTH EVERY NIGHT AT BEDTIME 90 tablet 3    atorvastatin (LIPITOR) 40 MG tablet TAKE 1 TABLET(40 MG) BY MOUTH DAILY 90 tablet 3    calcium citrate-vitamin D (CITRACAL) 315-250 MG-UNIT TABS per tablet       diphenhydrAMINE (BENADRYL) 25 MG tablet Take 25 mg by mouth every 6 hours as needed for itching or allergies      finasteride (PROSCAR) 5 MG tablet Take 5 mg by mouth daily      Krill Oil (OMEGA-3) 500 MG CAPS Take 500 mg by mouth      levothyroxine (SYNTHROID/LEVOTHROID) 137 MCG tablet Take 1 tablet (137 mcg) by mouth every morning (before breakfast). 90 tablet 3    MAGNESIUM GLYCINATE PO Take 500 tablets by mouth daily       "Misc Natural Products (NEURIVA) CAPS       Multiple Vitamin (ONE-A-DAY ESSENTIAL) TABS Take 1 tablet by mouth daily      omeprazole (PRILOSEC) 20 MG DR capsule Take 1 capsule (20 mg) by mouth daily 90 capsule 3    Ferrous Sulfate (IRON) 325 (65 Fe) MG tablet Take 2 tablets by mouth daily      folic acid (FOLVITE) 1 MG tablet TAKE 3 TABLETS(3 MG) BY MOUTH DAILY 270 tablet 1    methotrexate 2.5 MG tablet TAKE 7 TABLETS BY MOUTH EVERY 7 DAYS (Patient not taking: Reported on 5/15/2025) 84 tablet 0     No current facility-administered medications for this visit.       PHYSICAL EXAM    /57   Pulse 63   Resp 16   Ht 1.626 m (5' 4\")   Wt 76.7 kg (169 lb)   LMP  (LMP Unknown)   SpO2 98%   BMI 29.01 kg/m        General: Alert, No apparent distress   Psych: Affect euthymic  Eyes: Sclera non injected  Skin: No rashes noted  Cardiovascular: Regular rate and rhythm, Normal S1 and S2 and No murmurs, rubs or gallops  Respiratory: Clear to auscultation with no wheezing or crackles  Neuro: Normal gait and Able to arise from seated position unassisted  Musculoskeletal: Hands:  Normal.     LABS   Reviewed as below.     May 2025    CBC normal  Creatinine normal   AST elevated at 54, ALT normal       April 2023  SSA, SSB, sm neg   ANCA neg   MARILEE pos, speckled, 1:320  C3, C4 normal   IgG4 normal   QTB NEG   HCV neg   HIV neg     2022  TSH normal     Final Diagnosis   Anterior orbit, left, lesion excision: Presence of numerous non-necrotizing granulomas, no fungal or acid fast organisms are identified (see comment).   Electronically signed by Mirian Grande MD on 8/15/2024 at 11:29 AM       Flow Interpretation   A. Eye, Left, :  -Polytypic B cells  No aberrant immunophenotype on T cells  See comment   Electronically signed by Bobby Brennan MD on 5/8/2023 at 12:29 PM   Comment    There is no immunophenotypic evidence of non-Hodgkin lymphoma. Hodgkin lymphoma cannot be excluded by flow cytometry. T cell lymphomas cannot " always be detected by this flow cytometry assay. Neoplastic cells, including large cells, may not survive specimen processing. This sample may not be representative. Final interpretation requires correlation with morphologic and clinical features.      Final Diagnosis   A-B.  SOFT TISSUE, LEFT LACRIMAL GLAND, BIOPSY:  -Nonnecrotizing granulomatous inflammation, see comment.  -GMS and AFB special stains are negative for microorganisms (performed on blocks A1 and B1 with appropriate controls).  -Negative for malignancy.           ASSESSMENT      1. Sarcoidosis    2. Long-term use of high-risk medication    3. Positive MARILEE (antinuclear antibody)    4. Osteopenia of lumbar spine            (D86.9) Sarcoidosis  (primary encounter diagnosis)  Comment: Her symptoms started in May 2023 with drooping of left eyelid and headache. CXR - calcified granuloma. MRI orbit - asymmetric enlargement and enhancement of the left lacrimal gland. CT - : Unilateral left-sided lacrimal gland slight enlargement with homogeneous enhancement. Left lacrimal gland bx - non necrotizing granulomatous inflammation. Subcutaneous  methotrexate (started Oct 2023)  subcutaneous 20 mg/week along with folic acid 1 mg daily - discontinued subcutaneous as this caused LFT elevation likely due to more systemic absorption. Switched back to PO methotrexate. Stopped methotrexate in Feb 2025 due to elevated LFT and to monitor off of Rx.    Recent MRI in March 2024 - significant interval decrease in size of the previously present enlarged left lacrimal gland. The gland now appears symmetric to the contralateral side. There has also been interval decrease in previously present skin thickening and enhancement lateral to the left lacrimal gland. There is a 0.3 x 0.7 cm nodular enhancing lesion in this area, however, it is unclear whether this lesion is new compared  to the prior study, or simply represents a small amount of residual skin thickening/enhancement. This  lesion was likely present on the 11/09/2023 CT neck study and is overall similar to that examination.        She saw  this am. Per notes, she is doing well and stable after both upper eyelid blepharoplasty and left upper eyelid ptosis repair, with debulking of left lateral canthal lesion and small fat transfer.     Plan:   Ok to discontinue methotrexate. LFT downtrending post stopping methotrexate.   Continue to follow up with Ophthalmology.  Will send a message to .   RTC - 6 months.    Pos MARILEE  Comment : MARILEE pos, speckled, 1:320, normal C3,C4. SSA, SSB, Sm neg. No clinical features of lupus.   Plan : monitor     Osteopenia  Comment : osteopenia of L spine and hips  Plan : per pcp    RTC - 6 months     SHARAN BACA MD    Division of Rheumatic & Autoimmune Diseases  [facility]

## 2025-05-15 NOTE — Clinical Note
Klever Bernstein:   Annabelle has a sore spot and minimal swelling on left upper eyelid. Her latest CRP is normal. Her LFT's are close to normal after stopping methotrexate. Just wanted to run this by you. Please let me know your thoughts. Thank you.

## 2025-05-15 NOTE — CONSULTS
"    Outpatient IR Referral  05/15/25    Maritza Hitchcock  [id number]      Referral Request:    Outpatient Referral. Maritza Hitchcock. MRN: [id number]. Liver Biopsy, Random Native. elevated LFTs, fatty liver, assess liver disease severity, quantify amount of fibrosis. AC = No. Referring: Astrid Flores PA-C in  HEPATOLOGY. Call Back # [phone].     ===  Procedure Approved for:    Ultrasound guided liver biopsy.    Outpatient Referral. Maritza Hitchcock. MRN: [id number]. Liver Biopsy, Random Native. elevated LFTs, fatty liver, assess liver disease severity, quantify amount of fibrosis. AC = No. Referring: Astrid Flores PA-C in  HEPATOLOGY. Call Back # [phone].     ===  Plan:  Procedure order and surgical pathology order placed.    If requesting provider would like specimen sample sent for anything other than surgical pathology please use the IR order set \"IR RAD Biopsy or Fluid Aspirate Specimens\" to select your necessary diagnostic labs and pend for admission.     If there are labs you desire that are not found in this order set or you have questions regarding specific diagnostic labs please call the associated lab personnel.     Request sent to procedural scheduling work queue for appointment.    *Please note the date of procedure is tentative and that the Timing of Procedure is TBD Based on Staffing/Schedule and Triage*    No pre-op clinic visit is required.    Jorge Gonzalez PA-C  Interventional Radiology   IR on-call pager: [phone]    "

## 2025-05-15 NOTE — TELEPHONE ENCOUNTER
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 Scheduled for May appointment and patient will see appointment in [facility].

## 2025-05-15 NOTE — NURSING NOTE
"Chief Complaint   Patient presents with    RECHECK     Sarcoidosis       Vitals:    05/15/25 0823   BP: 117/57   Pulse: 63   Resp: 16   SpO2: 98%   Weight: 76.7 kg (169 lb)   Height: 1.626 m (5' 4\")       Body mass index is 29.01 kg/m .    Mike Cisneros CMA    "

## 2025-05-16 ENCOUNTER — OFFICE VISIT (OUTPATIENT)
Dept: PHARMACY | Facility: CLINIC | Age: 70
End: 2025-05-16
Attending: FAMILY MEDICINE
Payer: MEDICARE

## 2025-05-16 DIAGNOSIS — M85.88 OSTEOPENIA OF LUMBAR SPINE: ICD-10-CM

## 2025-05-16 DIAGNOSIS — E03.9 ACQUIRED HYPOTHYROIDISM: ICD-10-CM

## 2025-05-16 DIAGNOSIS — Z78.9 TAKES DIETARY SUPPLEMENTS: ICD-10-CM

## 2025-05-16 DIAGNOSIS — E78.5 DYSLIPIDEMIA: ICD-10-CM

## 2025-05-16 DIAGNOSIS — R10.13 EPIGASTRIC PAIN: Primary | ICD-10-CM

## 2025-05-16 DIAGNOSIS — K21.9 GASTROESOPHAGEAL REFLUX DISEASE WITHOUT ESOPHAGITIS: ICD-10-CM

## 2025-05-16 DIAGNOSIS — L65.9 ALOPECIA: ICD-10-CM

## 2025-05-16 PROCEDURE — 99207 PR NO CHARGE LOS: CPT | Performed by: PHARMACIST

## 2025-05-16 RX ORDER — CALCIUM CITRATE/VITAMIN D3 200MG-6.25
2 TABLET ORAL DAILY
COMMUNITY

## 2025-05-16 NOTE — Clinical Note
FYI-I am tapering her off of amitriptyline to see if this is providing any benefit for stomach pain which was the original indication, some concerns for worsening memory potentially due to anticholinergic agent.

## 2025-05-16 NOTE — PATIENT INSTRUCTIONS
"Recommendations from today's MTM visit:                                                    MTM (medication therapy management) is a service provided by a clinical pharmacist designed to help you get the most of out of your medicines.   Today we reviewed what your medicines are for, how to know if they are working, that your medicines are safe and how to make your medicine regimen as easy as possible.      Amitriptyline - decrease to 25mg daily for 2 weeks, monitor your symptoms, if you do not noticed any negative symptoms after this dose decrease then stop the medication  Potassium - you can stop this supplement   Magnesium - continue taking your current supplement, I ordered a lab for this to monitor when you get labs next     Citracal - increase from 1 to 2 tabs daily, goal calcium is 1200mg/day between diet and supplements   Vitamin D - you would benefit from an updated lab, I ordered a lab for this to monitor when you get labs next       Follow-up: Return in 4 weeks (on 6/13/2025) for Follow up, with me, in person.    It was great speaking with you today.  I value your experience and would be very thankful for your time in providing feedback in our clinic survey. In the next few days, you may receive an email or text message from [employer] with a link to a survey related to your  clinical pharmacist.\"     To schedule another MTM appointment, please call the clinic directly or you may call the MTM scheduling line at [phone].    My Clinical Pharmacist's contact information:                                                      Please feel free to contact me with any questions or concerns you have.      Odette Rosario, Ana  Medication Therapy Management (MTM) Pharmacist   [facility]     "

## 2025-05-16 NOTE — PROGRESS NOTES
Medication Therapy Management (MTM) Encounter    ASSESSMENT:                            Medication Adherence/Access: No issues identified.    Hyperlipidemia   Stable, on high intensity statin, LDL at goal of less than 100.    GERD /epigastric pain  Stable from a symptom perspective, discussed cognitive changes and memory loss as a potential side effect from long-term use of amitriptyline due to being an anticholinergic agent.  As it is unclear if this medication is providing any benefit, patient willing to try off of it to see what her responses.  Will have her decrease dose by half and monitor for symptoms for 2 weeks, if she tolerates this we will have her stop the medication.  If able, future plan to transition omeprazole to famotidine to decrease long-term risk of PPI use.   Chronic PPI use places patient at an increased risk of C. Diff, hypomagnesemia and lower bone mineral density, these risks were reviewed with the patient.     Bone Health   Osteopenia  Recently started on bisphosphonate therapy.  Patient is not meeting RDI of calcium 1200mg/day -recommend increasing Citracal from 1 tablet to 2 tablets. Patient is exceeding RDI of Vitamin D 1000 IU/day. Patient would benefit from vitamin D level.     Hypothyroidism   Stable. Last TSH is within normal limits.     Alopecia  Improved with finasteride therapy, recommend discussing with prescriber at upcoming visit continuation of this medication after stopping methotrexate.    Supplements   Potassium well within normal meds on last lab draw, recommend discontinuing potassium supplement as no indication at this time.  Patient would benefit from updated magnesium lab, recommend continuing on current supplement in the meantime.    PLAN:                            Amitriptyline - decrease to 25mg daily for 2 weeks, monitor your symptoms, if you do not noticed any negative symptoms after this dose decrease then stop the medication  Potassium - you can stop this  supplement   Magnesium - continue taking your current supplement, I ordered a lab for this to monitor when you get labs next     Citracal - increase from 1 to 2 tabs daily, goal calcium is 1200mg/day between diet and supplements   Vitamin D - you would benefit from an updated lab, I ordered a lab for this to monitor when you get labs next       Follow-up: Return in 4 weeks (on 6/13/2025) for Follow up, with me, in person.    SUBJECTIVE/OBJECTIVE:                          Annabelle Hitchcock is a 69 year old female seen for an initial visit. She was referred to me from Irena eLvi MD.      Reason for visit: medication review.    Allergies/ADRs: Reviewed in chart  Past Medical History: Reviewed in chart  Tobacco: She reports that she quit smoking about 48 years ago. Her smoking use included cigarettes. She started smoking about 49 years ago. She has a 0.4 pack-year smoking history. She has been exposed to tobacco smoke. She has never used smokeless tobacco.  Alcohol: 1-3 beverages / week    Medication Adherence/Access: No issues reported    Hyperlipidemia   Atorvastatin 40mg daily  Patient reports no significant myalgias or other side effects.  The 10-year ASCVD risk score (Libby MARTINEZ, et al., 2019) is: 10.1%    Values used to calculate the score:      Age: 69 years      Sex: Female      Is Non- : No      Diabetic: Yes      Tobacco smoker: No      Systolic Blood Pressure: 117 mmHg      Is BP treated: No      HDL Cholesterol: 42 mg/dL      Total Cholesterol: 134 mg/dL       GERD /epigastric pain  Amitriptyline 50mg at bedtime - started in 10/2013 for stomach pain though at this time she is not sure if it is providing any benefit for this, work up with GI was negative, tried making diet changes with no benefit, pain comes more in the spring and fall so there may be a seasonal reaction component to it  Omeprazole 20 mg once daily   Side effects-patient reports that she has noticed more challenges  with memory, several times during today's appointment patient forgot what she was going to say and then took her a couple minutes before remembering.  Patient reports no current symptoms.   Patient reports she does notice symptoms when she misses a dose.  Patient feels that current regimen is effective.  The patient does not have a history of GI bleed.  Medication History:  Ranitidine - was discontinued from the market       Bone Health   Osteopenia  Calcium citrate /Vitamin D 325mg/500 international unit(s): 1 tablet daily  Multivitamin 1 tab daily  - vitamin D 1000 international unit(s), calcium 288mg   alendronate (Fosamax) 70mg weekly (has been on current therapy 8/2024) - on Wednesdays   Patient is not experiencing side effects.  DEXA History: 7/25/24 -osteopenia  -Lumbar Spine: L1-L4: BMD: 1.005 g/cm2. T-score: -1.5. Z-score: 0.2.   -RIGHT Hip Total: BMD: 0.786 g/cm2. T-score: -1.8. Z-score: -0.5.   -RIGHT Hip Femoral neck: BMD: 0.777 g/cm2. T-score: -1.9. Z-score: -0.3.   -LEFT Hip Total: BMD: 0.793 g/cm2. T-score: -1.7. Z-score: -0.4.   -LEFT Hip Femoral neck: BMD: 0.734 g/cm2. T-score: -2.2. Z-score: -0.7.   INTERVAL CHANGE   -There has been a 6% decrease in lumbar spine BMD.   -No change in measured bone mineral density proximal right and left femur.  FRACTURE RISK   -FRAX Results: The 10 year probability of major osteoporotic fracture is 29.3%, and of hip fracture is 6.3%, based on left femoral neck BMD.   Patient is getting some calcium in her diet. Drinks milk daily, may have other sources such as green leafy vegetables though unsure if these are consistent.  Risk factors: post-menopausal, chronic PPI use     Hypothyroidism   Levothyroxine 137 mcg daily - in the morning, waits at least half an hour before eating something or taking other medications   Patient is having the following symptoms: none.        Alopecia  Finasteride 5mg daily   Started when she was started on methotrexate and had this as a  side effect  Has made a difference, would like to continue to maintain even when off methotrexate therapy  Has an appointment with prescriber in the near future to discuss this.    Supplements   Magnesium 125 mg daily -patient reports previously starting due to muscle cramps, has not had an issue with this since starting  Potassium 99 mg daily -patient reports previously starting due to muscle cramps, has not had an issue with this since starting  Folic acid 3mg daily -patient still taking this, previously on methotrexate therapy  Krill oil 500 mg daily  Neuriva 1 capsule daily  No reported issues at this time.          Today's Vitals: LMP  (LMP Unknown)   ----------------      I spent 90 minutes with this patient today. All changes were made via collaborative practice agreement with Irena Levi MD.     A summary of these recommendations was given to the patient.    Lito MonroyD  Medication Therapy Management (MTM) Pharmacist   [facility] [place]         Medication Therapy Recommendations  Epigastric pain   1 Current Medication: amitriptyline (ELAVIL) 50 MG tablet (Discontinued)   Current Medication Sig: TAKE ONE TABLET BY MOUTH EVERY NIGHT AT BEDTIME   Rationale: No medical indication at this time - Unnecessary medication therapy - Indication   Recommendation: Discontinue Medication   Status: Accepted per CPA   Identified Date: 5/16/2025 Completed Date: 5/16/2025         Osteopenia of lumbar spine   1 Current Medication: Multiple Minerals-Vitamins (CITRACAL MAXIMUM PLUS) TABS   Current Medication Sig: Take 2 tablets by mouth daily. Calcium citrate/vitamin D = 650mg/25mcg per 2 tabs   Rationale: Medication requires monitoring - Needs additional monitoring - Effectiveness   Recommendation: Order Lab   Status: Accepted per CPA   Identified Date: 5/16/2025 Completed Date: 5/16/2025         2 Current Medication: calcium citrate-vitamin D (CITRACAL) 315-250 MG-UNIT TABS per tablet  (Discontinued)   Rationale: Dose too low - Dosage too low - Effectiveness   Recommendation: Increase Dose   Status: Patient Agreed - Adherence/Education   Identified Date: 5/16/2025 Completed Date: 5/16/2025

## 2025-05-18 RX ORDER — AMITRIPTYLINE HYDROCHLORIDE 50 MG/1
25 TABLET ORAL AT BEDTIME
COMMUNITY

## 2025-05-20 ENCOUNTER — OFFICE VISIT (OUTPATIENT)
Dept: OPHTHALMOLOGY | Facility: CLINIC | Age: 70
End: 2025-05-20
Payer: MEDICARE

## 2025-05-20 DIAGNOSIS — H05.10 ORBITAL INFLAMMATION: ICD-10-CM

## 2025-05-20 DIAGNOSIS — D86.9 SARCOIDOSIS: Primary | ICD-10-CM

## 2025-05-20 PROCEDURE — 99213 OFFICE O/P EST LOW 20 MIN: CPT | Mod: GC | Performed by: OPHTHALMOLOGY

## 2025-05-20 ASSESSMENT — TONOMETRY
IOP_METHOD: ICARE
OD_IOP_MMHG: 19
OS_IOP_MMHG: 19

## 2025-05-20 ASSESSMENT — EXTERNAL EXAM - LEFT EYE: OS_EXAM: NORMAL

## 2025-05-20 ASSESSMENT — VISUAL ACUITY
CORRECTION_TYPE: GLASSES
METHOD: SNELLEN - LINEAR
OS_CC: 20/20
OD_CC+: -3
OD_CC: 20/20

## 2025-05-20 ASSESSMENT — EXTERNAL EXAM - RIGHT EYE: OD_EXAM: NORMAL

## 2025-05-20 NOTE — Clinical Note
Saw her, minimally inflamed. Offered steroid injection but for now she prefers to observe. Happy to see her back at any point.Thanks!

## 2025-05-20 NOTE — NURSING NOTE
Chief Complaints and History of Present Illnesses   Patient presents with    Follow Up     6 month follow up, for new concerns and discuss options     Chief Complaint(s) and History of Present Illness(es)       Follow Up              Associated symptoms: burning.  Negative for eye pain    Treatments tried: artificial tears    Pain scale: 0/10    Comments: 6 month follow up, for new concerns and discuss options              Comments    Since stopping methotrexate 3 months ago, she feels swelling LESTER.  Noticing a hard lump near left brow.   No vision changes or eye pain   Occasional burning left eye.     Carlos Amezcua 9:23 AM May 20, 2025

## 2025-05-20 NOTE — PROGRESS NOTES
Chief Complaint(s) and History of Present Illness(es)     Follow Up            Associated symptoms: burning.  Negative for eye pain    Treatments tried: artificial tears    Pain scale: 0/10    Comments: 6 month follow up, for new concerns and discuss options       Comments    Since stopping methotrexate 3 months ago, she feels swelling LESTER.  Noticing a hard lump near left brow.   No vision changes or eye pain   Occasional burning left eye.     Carlos Seven 9:23 AM May 20, 2025      Anterior orbit, left, lesion excision 8/8/2024: Presence of numerous non-necrotizing granulomas, no fungal or acid fast organisms are identified (see comment).     Assessment & Plan     Maritza Hitchcock is a 69 year old female with the following diagnoses:     ICD-10-CM    1. Sarcoidosis  D86.9       2. Orbital inflammation  H05.10         Patient says she was placed on methotrexate by rheumatology for sarcoidosis. Said that in February her liver enzymes were elevated - has been off the methotrexate since February 19th. Noticed shortly after there was increased upper eyelid puffiness which has been stable (possible improvement). Liver enzymes have improved. Saw Rheumatologist this month. Noticed soreness or lump in the left medial brow region, also notices a knot that is a little bigger in the region of left lateral canthus.     On exam there is no obvious mass in the medial upper eyelid region that the patient indicates there is an issue, trochleaitis is a slim possibility in this area though no pain with eye movements or pain with deep palpation of trochlea, the area lateral to the lateral canthal region is likely scar tissue vs epidermal cyst from the prior surgeries done rather than lesion related to sarcoidosis.      Gregory Reynoso MD  PGY-2 Ophthalmology Resident    Mild recurrent lateral left upper eyelid edema/erythema. Minimally symptomatic at this time.  She is just starting a medrol dose pack for her hand.   We discussed options  of steroid injection versus observation. She is leaning towards observation at this time.     Attending Physician Attestation: Complete documentation of historical and exam elements from today's encounter can be found in the full encounter summary report (not reduplicated in this progress note). I personally obtained the chief complaint(s) and history of present illness. I confirmed and edited as necessary the review of systems, past medical/surgical history, family history, social history, and examination findings as documented by others; and I examined the patient myself. I personally reviewed the relevant tests, images, and reports as documented above. I formulated and edited as necessary the assessment and plan and discussed the findings and management plan with the patient.  -Autumn Padilla MD

## 2025-05-23 ENCOUNTER — ANCILLARY PROCEDURE (OUTPATIENT)
Dept: RADIOLOGY | Facility: AMBULATORY SURGERY CENTER | Age: 70
End: 2025-05-23
Attending: PHYSICIAN ASSISTANT
Payer: MEDICARE

## 2025-05-23 ENCOUNTER — HOSPITAL ENCOUNTER (OUTPATIENT)
Facility: AMBULATORY SURGERY CENTER | Age: 70
Discharge: HOME | End: 2025-05-23
Attending: RADIOLOGY | Admitting: RADIOLOGY
Payer: MEDICARE

## 2025-05-23 VITALS
HEART RATE: 63 BPM | RESPIRATION RATE: 15 BRPM | HEIGHT: 64 IN | OXYGEN SATURATION: 99 % | BODY MASS INDEX: 28.85 KG/M2 | SYSTOLIC BLOOD PRESSURE: 129 MMHG | DIASTOLIC BLOOD PRESSURE: 81 MMHG | TEMPERATURE: 97.3 F | WEIGHT: 169 LBS

## 2025-05-23 DIAGNOSIS — R79.89 ABNORMAL LFTS: ICD-10-CM

## 2025-05-23 DIAGNOSIS — K76.0 FATTY LIVER: ICD-10-CM

## 2025-05-23 LAB
GLUCOSE BLDC GLUCOMTR-MCNC: 127 MG/DL (ref 70–99)
INR PPP: 0.99 (ref 0.85–1.15)
PROTHROMBIN TIME: 13.3 SECONDS (ref 11.8–14.8)

## 2025-05-23 PROCEDURE — 82962 GLUCOSE BLOOD TEST: CPT | Performed by: PATHOLOGY

## 2025-05-23 PROCEDURE — 76942 ECHO GUIDE FOR BIOPSY: CPT | Mod: 26 | Performed by: RADIOLOGY

## 2025-05-23 PROCEDURE — 47000 NEEDLE BIOPSY OF LIVER PERQ: CPT

## 2025-05-23 PROCEDURE — 88312 SPECIAL STAINS GROUP 1: CPT | Mod: TC | Performed by: PHYSICIAN ASSISTANT

## 2025-05-23 PROCEDURE — 47000 NEEDLE BIOPSY OF LIVER PERQ: CPT | Performed by: RADIOLOGY

## 2025-05-23 PROCEDURE — 85610 PROTHROMBIN TIME: CPT | Performed by: PATHOLOGY

## 2025-05-23 PROCEDURE — 99152 MOD SED SAME PHYS/QHP 5/>YRS: CPT | Performed by: RADIOLOGY

## 2025-05-23 RX ORDER — FENTANYL CITRATE 50 UG/ML
25-50 INJECTION, SOLUTION INTRAMUSCULAR; INTRAVENOUS EVERY 5 MIN PRN
Refills: 0 | Status: DISCONTINUED | OUTPATIENT
Start: 2025-05-23 | End: 2025-05-24 | Stop reason: HOSPADM

## 2025-05-23 RX ORDER — LIDOCAINE 40 MG/G
CREAM TOPICAL
Status: DISCONTINUED | OUTPATIENT
Start: 2025-05-23 | End: 2025-05-24 | Stop reason: HOSPADM

## 2025-05-23 RX ORDER — NALOXONE HYDROCHLORIDE 0.4 MG/ML
0.4 INJECTION, SOLUTION INTRAMUSCULAR; INTRAVENOUS; SUBCUTANEOUS
Status: DISCONTINUED | OUTPATIENT
Start: 2025-05-23 | End: 2025-05-24 | Stop reason: HOSPADM

## 2025-05-23 RX ORDER — NALOXONE HYDROCHLORIDE 0.4 MG/ML
0.2 INJECTION, SOLUTION INTRAMUSCULAR; INTRAVENOUS; SUBCUTANEOUS
Status: DISCONTINUED | OUTPATIENT
Start: 2025-05-23 | End: 2025-05-24 | Stop reason: HOSPADM

## 2025-05-23 RX ORDER — ACETAMINOPHEN 325 MG/1
650 TABLET ORAL ONCE
Status: COMPLETED | OUTPATIENT
Start: 2025-05-23 | End: 2025-05-23

## 2025-05-23 RX ORDER — IBUPROFEN 200 MG
400 TABLET ORAL EVERY 6 HOURS PRN
Status: DISCONTINUED | OUTPATIENT
Start: 2025-05-23 | End: 2025-05-24 | Stop reason: HOSPADM

## 2025-05-23 RX ORDER — FENTANYL CITRATE 50 UG/ML
INJECTION, SOLUTION INTRAMUSCULAR; INTRAVENOUS PRN
Status: DISCONTINUED | OUTPATIENT
Start: 2025-05-23 | End: 2025-05-23 | Stop reason: HOSPADM

## 2025-05-23 RX ORDER — FLUMAZENIL 0.1 MG/ML
0.2 INJECTION, SOLUTION INTRAVENOUS
Status: DISCONTINUED | OUTPATIENT
Start: 2025-05-23 | End: 2025-05-24 | Stop reason: HOSPADM

## 2025-05-23 RX ORDER — ACETAMINOPHEN 325 MG/1
650 TABLET ORAL EVERY 4 HOURS PRN
Status: DISCONTINUED | OUTPATIENT
Start: 2025-05-23 | End: 2025-05-24 | Stop reason: HOSPADM

## 2025-05-23 RX ADMIN — ACETAMINOPHEN 650 MG: 325 TABLET ORAL at 07:24

## 2025-05-23 NOTE — PROGRESS NOTES
Interventional Radiology Pre-Procedure Sedation Assessment   Time of Assessment: 7:32 AM    Expected Level: Moderate Sedation    Indication: Sedation is required for the following type of Procedure: Biopsy    Sedation and procedural consent: Risks, benefits and alternatives were discussed with Patient    PO Intake: Appropriately NPO for procedure    ASA Class: Class 1 - HEALTHY PATIENT    Mallampati: Grade 2:  Soft palate, base of uvula, tonsillar pillars, and portion of posterior pharyngeal wall visible    Lungs: Lungs Clear with good breath sounds bilaterally    Heart: Normal heart sounds and rate    Focused history and physical completed prior to procedure. I have reviewed the lab findings, diagnostic data, medications, and the plan for sedation. I have determined this patient to be an appropriate candidate for the planned sedation and procedure and have reassessed the patient IMMEDIATELY PRIOR to sedation and procedure.    Taco Warner MD

## 2025-05-23 NOTE — BRIEF OP NOTE
[facility]    Brief Operative Note    Pre-operative diagnosis: Fatty liver [K76.0]  Post-operative diagnosis Same as pre-operative diagnosis    Procedure: Percutaneous biopsy liver, N/A - Abdomen    Surgeon: Surgeons and Role:     * Taco Warner MD - Primary  Anesthesia: Moderate Sedation   Estimated Blood Loss: None    Drains: None  Specimens:   ID Type Source Tests Collected by Time Destination   1 : liver core biopsy x2 Tissue Liver SURGICAL PATHOLOGY EXAM Taco Warner MD 5/23/2025  8:15 AM    2 : liver biopsy core x2 Biopsy Liver SURGICAL PATHOLOGY EXAM (Canceled) Taco Warner MD 5/23/2025  8:15 AM      Findings:   Hyperechoic liver - two 18 gauge cores.  Complications: None.  Implants: * No implants in log *

## 2025-05-23 NOTE — DISCHARGE INSTRUCTIONS
A collaboration between [facility] and [facility]  Experts in minimally invasive, targeted treatments performed using imaging guidance    Liver Biopsy    Today you had a needle remove a piece of tissue from your liver.    After you go home:  Keep any applied tape/gauze dressings clean and dry.  Change tape/gauze dressings if they get wet or soiled.  You may remove the dressing 48 hours after the procedure.  Don't shower until 48 hours after the procedure.  Do not perform strenuous activities or lift greater than 10 pounds for the next three days.  If there is bleeding or oozing from the procedure site, apply firm pressure to the area for 5-10 minutes.  If the bleeding continues seek medical advice at the numbers below.    For 24 hours after any sedation used:  Relax and take it easy.  No strenuous activities.  Do not drive or operate machines at home or at work.  No alcohol consumption.  Do not make any important or legal decisions.    Call our Interventional Radiology (IR) service if:  If you start bleeding from the procedure site.  If you do start to bleed from the site, lie down and hold some pressure on the site.  Our radiology provider can help you decide if you need to return to the hospital.  If you feel dizzy or lightheaded.  If you suddenly feel short of breath.  If you have persistent pain at biopsy site.  If the skin around the biopsy site is swollen, reddened, painful, or has any discharge.  If you feel nauseated and  just not right .  If you have a fever of greater than 100.5  F and chills in the first 5 days after procedure.  Any other concerns related to your procedure.  Tylenol 975 mg given at 0724 am.   Ok to take more after 124 pm.     [facility]  Interventional Radiology (IR)  [address] [place] [address]    Contact Number:  [phone]  (IR control desk)  Monday - Friday  8:00 am - 4:30 pm    After hours for urgent concerns:  [phone]  After 4:30 pm Monday - Friday, Weekends and Holidays.   Ask for Interventional Radiology on-call.  Someone is available 24 hours a day.  [facility] toll free number:  [phone]

## 2025-05-28 LAB
PATH REPORT.ADDENDUM SPEC: NORMAL
PATH REPORT.COMMENTS IMP SPEC: NORMAL
PATH REPORT.FINAL DX SPEC: NORMAL
PATH REPORT.GROSS SPEC: NORMAL
PATH REPORT.MICROSCOPIC SPEC OTHER STN: NORMAL
PATH REPORT.RELEVANT HX SPEC: NORMAL
PHOTO IMAGE: NORMAL

## 2025-05-30 ENCOUNTER — RESULTS FOLLOW-UP (OUTPATIENT)
Dept: MULTI SPECIALTY CLINIC | Facility: CLINIC | Age: 70
End: 2025-05-30

## 2025-06-02 ENCOUNTER — TELEPHONE (OUTPATIENT)
Dept: EDUCATION SERVICES | Facility: CLINIC | Age: 70
End: 2025-06-02
Payer: MEDICARE

## 2025-06-02 NOTE — TELEPHONE ENCOUNTER
[facility] Call Center    Phone Message    May a detailed message be left on voicemail: yes     Reason for Call: Other: Pt requesting call back from Vane Ramirez, pt called and stated she recently had a liver biopsy done and pt is needing to discuss her options

## 2025-06-10 ENCOUNTER — ALLIED HEALTH/NURSE VISIT (OUTPATIENT)
Dept: EDUCATION SERVICES | Facility: CLINIC | Age: 70
End: 2025-06-10
Payer: MEDICARE

## 2025-06-10 VITALS — BODY MASS INDEX: 28.51 KG/M2 | HEIGHT: 64 IN | WEIGHT: 167 LBS

## 2025-06-10 DIAGNOSIS — K75.81 NONALCOHOLIC STEATOHEPATITIS (NASH): ICD-10-CM

## 2025-06-10 DIAGNOSIS — E11.9 TYPE 2 DIABETES MELLITUS WITHOUT COMPLICATION, WITHOUT LONG-TERM CURRENT USE OF INSULIN (H): Primary | ICD-10-CM

## 2025-06-10 PROCEDURE — G0108 DIAB MANAGE TRN  PER INDIV: HCPCS | Performed by: DIETITIAN, REGISTERED

## 2025-06-10 NOTE — LETTER
6/10/2025         RE: Maritza Hitchcock  [address]  [facility] [address]        Dear Colleague,    Thank you for referring your patient, Maritza Hitchcock, to the [facility]. Please see a copy of my visit note below.    Diabetes Self-Management Education & Support    Presents for: Follow-up type 2 diabetes, overweight Body mass index is 28.67 kg/m ., (ROMERO)    Type of Service: In Person Visit      Assessment  Patient's diabetes has been under good control with most recent being 6.1%.  Patient is currently working on implementing healthier eating and lifestyle habits to continue to improve diabetes, promote weight loss, and reduce progression of liver disease.      Patient's most recent   Lab Results   Component Value Date    A1C 6.1 04/04/2025     is meeting goal of <7.0    Diabetes knowledge and skills assessment:   Patient is knowledgeable in diabetes management concepts related to: Updated and education today    Based on learning assessment above, most appropriate setting for further diabetes education would be: Individual setting.    Care Plan and Education Provided:  Healthy Eating: Plate planning method, Portion control, Weight Management, and dietary and lifestyle education from the American Association for the study of liver disease (AASLD), Mediterranean nutrition therapy    Being Active: Amount recommended (150 minutes moderate or 75 minutes vigorous activity and 2-3 days strength training per week) and Relationship of activity to glucose,     Monitoring: Individual glucose targets,     Taking Medication: Discussed options of semaglutide in relationship to diabetes, cardiovascular benefit and improves ROMERO but patient is not interested.      Problem Solving: When to call a health care provider,     Reducing Risks: Goal for A1c, how it relates to glucose and how often to check and Preventing cardiovascular disease, including blood pressure goals, lipid goals, recommendations  for cardioprotective medications, statins, and aspirin, and     Healthy Coping: Identifying helpful resources      Patient verbalized understanding of diabetes self-management education concepts discussed, opportunities for ongoing education and support, and recommendations provided today.    Plan    Exercise encouraged > 150 minutes or more per week  Limit carbohydrate intake to 2 servings (30 grams) per meal and 0-1 serving (<15 grams) at snacks  Mediterranean or heart healthy, carbohydrate controlled meal plan    Topics to cover at upcoming visits: Any area as needed for ongoing diabetes self-management education and support/ motivational counseling.    See Care Plan for co-developed, patient-state behavior change goals.    Education Materials Provided:      Subjective/Objective  Annabelle is an 69 year old, presenting for the following diabetes education related to: Follow-up  Accompanied by: Self  Diabetes education in the past 24mo: Yes  Diabetes type: (Patient-Rptd) Other (see Comments)  Please elaborate:: (Patient-Rptd) I need clarity on diabetes diagnosis  Disease course: Stable  Please elaborate:: (Patient-Rptd) I dont know  How confident are you filling out medical forms by yourself:: (Patient-Rptd) Quite a bit  Diabetes management related comments/concerns: (Patient-Rptd) Do I ?  Transportation concerns: No  Difficulty affording diabetes medication?: No  Difficulty affording diabetes testing supplies?: No  Other concerns: None  Cultural Influences/Ethnic Background:  Not  or     Diabetes Symptoms & Complications:  Diabetes Related Symptoms: (Patient-Rptd) None  Weight trend: (Patient-Rptd) Decreasing  Symptom course: Stable  Disease course: Stable  Please elaborate:: (Patient-Rptd) I dont know  Complications assessed today?: Yes  Autonomic neuropathy: No  CVA: No  Nephropathy: No  Peripheral neuropathy: No  Peripheral Vascular Disease: No  Retinopathy: No    Patient Problem List and Family  "Medical History reviewed for relevant medical history, current medical status, and diabetes risk factors.    Vitals:  Ht 1.626 m (5' 4\")   Wt 75.8 kg (167 lb)   LMP  (LMP Unknown)   BMI 28.67 kg/m    Estimated body mass index is 28.67 kg/m  as calculated from the following:    Height as of this encounter: 1.626 m (5' 4\").    Weight as of this encounter: 75.8 kg (167 lb).   Last 3 BP:   BP Readings from Last 3 Encounters:   05/23/25 129/81   05/15/25 117/57   05/09/25 117/63       History   Smoking Status     Former     Types: Cigarettes   Smokeless Tobacco     Never       Labs:  Lab Results   Component Value Date    A1C 6.1 04/04/2025     Lab Results   Component Value Date     05/23/2025     04/14/2022     Lab Results   Component Value Date    LDL 61 07/22/2024     07/12/2016     Direct Measure HDL   Date Value Ref Range Status   07/22/2024 42 (L) >=50 mg/dL Final     GFR Estimate   Date Value Ref Range Status   05/09/2025 90 >60 mL/min/1.73m2 Final     Comment:     eGFR calculated using 2021 CKD-EPI equation.     No results found for: \"GFRESTBLACK\"  Lab Results   Component Value Date    CR 0.73 05/09/2025     Lab Results   Component Value Date    MICROL 19.7 12/20/2024    UMALCR 11.59 12/20/2024    UCRR 170.0 12/20/2024 6/5/2025   Healthy Eating   Healthy Eating Assessed Today Yes   Cultural/Pentecostalism diet restrictions? No   Do you have any food allergies or intolerances? No   Meal planning/habits Low carb;Heart healthy   Who cooks/prepares meals for you? Self   Who purchases food in  your home? Self   How many times a week on average do you eat food made away from home (restaurant/take-out)? 1   Meals include Breakfast;Dinner   Beverages Water;Tea;Milk;Diet soda       [employer] milk   Has patient met with a dietitian in the past? Yes         6/5/2025   Being Active   Being Active Assessed Today Yes   Exercise: Yes   Days per week of moderate to strenuous exercise (like a brisk " walk) 2   On average, minutes per day of exercise at this level 30   How intense was your typical exercise?  Light (like stretching or slow walking)   Exercise Minutes per Week 60   Barrier to exercise Time;Other         6/5/2025   Monitoring   Monitoring Assessed Today Yes   Did patient bring glucose meter to appointment?  No   Times checking blood sugar at home (number) Never   Blood glucose trend No change           6/5/2025   Taking Medications   Taking Medication Assessed Today Yes       No medications for diabetes   Current Treatments Diet         6/5/2025   Problem Solving   Problem Solving Assessed Today Yes   Is the patient at risk for hypoglycemia? No   Is the patient at risk for DKA? No   Does patient have severe weather/disaster plan for diabetes management? Not Needed   Does patient have sick day plan for diabetes management? Not Needed           6/5/2025   Reducing Risks   Reducing Risks Assessed Today Yes   Diabetes Risks Age over 45 years;Hypertriglyceridemia;Hyperlipidemia   Has dilated eye exam at least once a year? No   Sees dentist every 6 months? Yes   Feet checked by healthcare provider in the last year? Yes         6/5/2025   Healthy Coping: Diabetes Distress Assessment   Healthy Coping Assessed Today Yes   I feel burned out by all of the attention and effort that diabetes demands of me. 1 - Not a Problem   It bothers me that diabetes seems to control my life. 1 - Not a Problem   I am frustrated that even when I do what I am supposed to for my diabetes, it doesn't seem to make a difference. 1 - Not a Problem   No matter how hard I try with my diabetes, it feels like it will never be good enough. 1 - Not a Problem   I am so tired of having to worry about diabetes all the time. 1 - Not a Problem   When it comes to my diabetes, I often feel like a failure. 1 - Not a Problem   It depresses me when I realize that my diabetes will likely never go away. 1 - Not a Problem   Living with diabetes is  overwhelming for me. 1 - Not a Problem   T2 DDAS Total Score (0 - 1.9 Little or no DD, 2.0 - 2.9 Moderate DD,  3.0+ High DD) 1   Informal Support system: None       Kamini Ramirez RD, CD, [facility]     Time Spent: 60 minutes  Encounter Type: Individual    Any diabetes medication dose changes were made via the [facility] Standing Orders under the patient's referring provider.

## 2025-06-10 NOTE — PROGRESS NOTES
Diabetes Self-Management Education & Support    Presents for: Follow-up type 2 diabetes, overweight Body mass index is 28.67 kg/m ., (ROMERO)    Type of Service: In Person Visit      Assessment  Patient's diabetes has been under good control with most recent being 6.1%.  Patient is currently working on implementing healthier eating and lifestyle habits to continue to improve diabetes, promote weight loss, and reduce progression of liver disease.      Patient's most recent   Lab Results   Component Value Date    A1C 6.1 04/04/2025     is meeting goal of <7.0    Diabetes knowledge and skills assessment:   Patient is knowledgeable in diabetes management concepts related to: Updated and education today    Based on learning assessment above, most appropriate setting for further diabetes education would be: Individual setting.    Care Plan and Education Provided:  Healthy Eating: Plate planning method, Portion control, Weight Management, and dietary and lifestyle education from the American Association for the study of liver disease (AASLD), Mediterranean nutrition therapy    Being Active: Amount recommended (150 minutes moderate or 75 minutes vigorous activity and 2-3 days strength training per week) and Relationship of activity to glucose,     Monitoring: Individual glucose targets,     Taking Medication: Discussed options of semaglutide in relationship to diabetes, cardiovascular benefit and improves ROMERO but patient is not interested.      Problem Solving: When to call a health care provider,     Reducing Risks: Goal for A1c, how it relates to glucose and how often to check and Preventing cardiovascular disease, including blood pressure goals, lipid goals, recommendations for cardioprotective medications, statins, and aspirin, and     Healthy Coping: Identifying helpful resources      Patient verbalized understanding of diabetes self-management education concepts discussed, opportunities for ongoing education and  "support, and recommendations provided today.    Plan    Exercise encouraged > 150 minutes or more per week  Limit carbohydrate intake to 2 servings (30 grams) per meal and 0-1 serving (<15 grams) at snacks  Mediterranean or heart healthy, carbohydrate controlled meal plan    Topics to cover at upcoming visits: Any area as needed for ongoing diabetes self-management education and support/ motivational counseling.    See Care Plan for co-developed, patient-state behavior change goals.    Education Materials Provided:      Subjective/Objective  Annabelle is an 69 year old, presenting for the following diabetes education related to: Follow-up  Accompanied by: Self  Diabetes education in the past 24mo: Yes  Diabetes type: (Patient-Rptd) Other (see Comments)  Please elaborate:: (Patient-Rptd) I need clarity on diabetes diagnosis  Disease course: Stable  Please elaborate:: (Patient-Rptd) I dont know  How confident are you filling out medical forms by yourself:: (Patient-Rptd) Quite a bit  Diabetes management related comments/concerns: (Patient-Rptd) Do I ?  Transportation concerns: No  Difficulty affording diabetes medication?: No  Difficulty affording diabetes testing supplies?: No  Other concerns: None  Cultural Influences/Ethnic Background:  Not  or     Diabetes Symptoms & Complications:  Diabetes Related Symptoms: (Patient-Rptd) None  Weight trend: (Patient-Rptd) Decreasing  Symptom course: Stable  Disease course: Stable  Please elaborate:: (Patient-Rptd) I dont know  Complications assessed today?: Yes  Autonomic neuropathy: No  CVA: No  Nephropathy: No  Peripheral neuropathy: No  Peripheral Vascular Disease: No  Retinopathy: No    Patient Problem List and Family Medical History reviewed for relevant medical history, current medical status, and diabetes risk factors.    Vitals:  Ht 1.626 m (5' 4\")   Wt 75.8 kg (167 lb)   LMP  (LMP Unknown)   BMI 28.67 kg/m    Estimated body mass index is 28.67 kg/m  as " "calculated from the following:    Height as of this encounter: 1.626 m (5' 4\").    Weight as of this encounter: 75.8 kg (167 lb).   Last 3 BP:   BP Readings from Last 3 Encounters:   05/23/25 129/81   05/15/25 117/57   05/09/25 117/63       History   Smoking Status    Former    Types: Cigarettes   Smokeless Tobacco    Never       Labs:  Lab Results   Component Value Date    A1C 6.1 04/04/2025     Lab Results   Component Value Date     05/23/2025     04/14/2022     Lab Results   Component Value Date    LDL 61 07/22/2024     07/12/2016     Direct Measure HDL   Date Value Ref Range Status   07/22/2024 42 (L) >=50 mg/dL Final     GFR Estimate   Date Value Ref Range Status   05/09/2025 90 >60 mL/min/1.73m2 Final     Comment:     eGFR calculated using 2021 CKD-EPI equation.     No results found for: \"GFRESTBLACK\"  Lab Results   Component Value Date    CR 0.73 05/09/2025     Lab Results   Component Value Date    MICROL 19.7 12/20/2024    UMALCR 11.59 12/20/2024    UCRR 170.0 12/20/2024 6/5/2025   Healthy Eating   Healthy Eating Assessed Today Yes   Cultural/Anglican diet restrictions? No   Do you have any food allergies or intolerances? No   Meal planning/habits Low carb;Heart healthy   Who cooks/prepares meals for you? Self   Who purchases food in  your home? Self   How many times a week on average do you eat food made away from home (restaurant/take-out)? 1   Meals include Breakfast;Dinner   Beverages Water;Tea;Milk;Diet soda       [employer] milk   Has patient met with a dietitian in the past? Yes         6/5/2025   Being Active   Being Active Assessed Today Yes   Exercise: Yes   Days per week of moderate to strenuous exercise (like a brisk walk) 2   On average, minutes per day of exercise at this level 30   How intense was your typical exercise?  Light (like stretching or slow walking)   Exercise Minutes per Week 60   Barrier to exercise Time;Other         6/5/2025   Monitoring "   Monitoring Assessed Today Yes   Did patient bring glucose meter to appointment?  No   Times checking blood sugar at home (number) Never   Blood glucose trend No change           6/5/2025   Taking Medications   Taking Medication Assessed Today Yes       No medications for diabetes   Current Treatments Diet         6/5/2025   Problem Solving   Problem Solving Assessed Today Yes   Is the patient at risk for hypoglycemia? No   Is the patient at risk for DKA? No   Does patient have severe weather/disaster plan for diabetes management? Not Needed   Does patient have sick day plan for diabetes management? Not Needed           6/5/2025   Reducing Risks   Reducing Risks Assessed Today Yes   Diabetes Risks Age over 45 years;Hypertriglyceridemia;Hyperlipidemia   Has dilated eye exam at least once a year? No   Sees dentist every 6 months? Yes   Feet checked by healthcare provider in the last year? Yes         6/5/2025   Healthy Coping: Diabetes Distress Assessment   Healthy Coping Assessed Today Yes   I feel burned out by all of the attention and effort that diabetes demands of me. 1 - Not a Problem   It bothers me that diabetes seems to control my life. 1 - Not a Problem   I am frustrated that even when I do what I am supposed to for my diabetes, it doesn't seem to make a difference. 1 - Not a Problem   No matter how hard I try with my diabetes, it feels like it will never be good enough. 1 - Not a Problem   I am so tired of having to worry about diabetes all the time. 1 - Not a Problem   When it comes to my diabetes, I often feel like a failure. 1 - Not a Problem   It depresses me when I realize that my diabetes will likely never go away. 1 - Not a Problem   Living with diabetes is overwhelming for me. 1 - Not a Problem   T2 DDAS Total Score (0 - 1.9 Little or no DD, 2.0 - 2.9 Moderate DD,  3.0+ High DD) 1   Informal Support system: None       Kamini Ramirez RD, CD, [facility]     Time Spent: 60 minutes  Encounter Type:  Individual    Any diabetes medication dose changes were made via the [facility] Standing Orders under the patient's referring provider.

## 2025-06-10 NOTE — PATIENT INSTRUCTIONS
20. Patients with NAFLD who are overweight or  obese should be prescribed a diet that leads to a  caloric deficit. When possible, diets with limited  carbohydrates and saturated fat and enriched  with high fiber and unsaturated fats (e.g., Mediterranean diet) should be encouraged due to their additional cardiovascular benefits.    21. Patients with NAFLD should be strongly  encouraged to increase their activity level to the  extent possible. Individualized prescriptive exercise recommendations may increase sustainability and have benefits independent of weight  loss.  Key points:  * Weight loss improves hepatic steatosis, ROMERO, and hepatic fibrosis in a dose-dependent manner.  * Coffee consumption (caffeinated or not) of at  least 3 cups daily is associated with less  advanced liver disease.  Coffee consumption, independent of caffeine content, may also be beneficial. Drinking 3 or more cups  per day could be recommended in the absence of  contraindications based on the reduced risk for NAFLD  and liver fibrosis demonstrated in epidemiological  studies and meta-analyses.[[phone]]    (Patient not interested) semaglutide can be considered for its approved indications (T2DM/obesity) in patients with ROMERO, as it confers a cardiovascular benefit and improves ROMERO.    EXERCISE:  encouraged to exercise as much as possible  Walk away the pounds - go on utube or the internet and search    Exercise more  o Moderate aerobic exercise for at least 20-30 minutes/day (i.e. brisk walking or stationary bike)  o Resistance or strength training at least 2-3 days/week    Lifestyle Recommendations For Fasting TG >150 mg/dL or Nonfasting TG >175 mg/dL   Limit alcohol to < 2 drinks/day for men or < 1 drink/day for women   Restrict intake of added sugars, sugar-sweetened beverages, and desserts   Limit fruit intake to < 3-4 servings/day   Emphasize vegetables, legumes, fatty or lean fish, lean meats, low-fat dairy products, and 4-6  servings/day of fiber-rich whole grains   Limit fat intake, emphasizing unsaturated fat   Participate in > 150 minutes/week of moderate-intensity physical activity   Lose 5-10% of initial body weight    NUTRITION INTERVENTION:  Education   Weight loss  o Weight loss is the most important change you can make to reduce fat in the liver  o A 500 calorie deficit/day is recommended or a total weight loss of 7-10% of your body weight  o A healthy rate of weight loss is 1-2 pounds/week  o Avoid sugar and limit low fiber starchy foods (bread, pasta, rice, potatoes)  o Reduce your intake of saturated and trans fats   o Avoid high fructose corn syrup containing foods and beverages  o Avoid alcohol  o Increase your dietary fiber intake    Diet Basics:   Consume whole foods: meat, vegetables, fruits, nuts, seeds, legumes, and whole grains.   Avoid sugar-sweetened beverages, added sugars, processed meats, refined grains, hydrogenated oils, and other highly processed foods.   Include a balance of healthy fat, protein, and carbohydrate      Limit carbohydrate intake to 2 servings (30 grams) per meal and 0-1 serving (<15 grams) at snacks    Food recommended/ food not recommended list provided for each food group    Meat & other protein sources  Try to plan at least two servings of fish each week. Also aim to fit three servings of lean meat into your meal plan every week.    Or [employer] has a box of 4 vegetarian patties

## 2025-06-13 ENCOUNTER — OFFICE VISIT (OUTPATIENT)
Dept: PHARMACY | Facility: CLINIC | Age: 70
End: 2025-06-13
Payer: COMMERCIAL

## 2025-06-13 VITALS — SYSTOLIC BLOOD PRESSURE: 115 MMHG | OXYGEN SATURATION: 98 % | DIASTOLIC BLOOD PRESSURE: 68 MMHG | HEART RATE: 60 BPM

## 2025-06-13 DIAGNOSIS — K21.9 GASTROESOPHAGEAL REFLUX DISEASE WITHOUT ESOPHAGITIS: ICD-10-CM

## 2025-06-13 DIAGNOSIS — F39 MOOD DISORDER: ICD-10-CM

## 2025-06-13 DIAGNOSIS — R10.13 EPIGASTRIC PAIN: Primary | ICD-10-CM

## 2025-06-13 PROCEDURE — 99207 PR NO CHARGE LOS: CPT | Performed by: PHARMACIST

## 2025-06-13 RX ORDER — FLUOXETINE 10 MG/1
10 CAPSULE ORAL DAILY
Qty: 30 CAPSULE | Refills: 1 | Status: SHIPPED | OUTPATIENT
Start: 2025-06-13

## 2025-06-13 NOTE — PATIENT INSTRUCTIONS
"Recommendations from today's MTM visit:                                                       Fluoxetine - start at 10mg daily, take with food on your stomach     I will ask Dr. Levi if she would like you to schedule an appointment to talk more about mood symptoms    Follow-up: with either myself or primary care provider - will let you know after I discuss with her    It was great speaking with you today.  I value your experience and would be very thankful for your time in providing feedback in our clinic survey. In the next few days, you may receive an email or text message from [employer] with a link to a survey related to your  clinical pharmacist.\"     To schedule another MTM appointment, please call the clinic directly or you may call the MTM scheduling line at [phone].    My Clinical Pharmacist's contact information:                                                      Please feel free to contact me with any questions or concerns you have.      Odette Rosario, Ana  Medication Therapy Management (MTM) Pharmacist   [facility]     "

## 2025-06-13 NOTE — Clinical Note
Would you like to see the patient for further evaluation of labile mood?  I asked if her therapist did a diagnostic evaluation but she said she does not think so.

## 2025-06-13 NOTE — PROGRESS NOTES
Medication Therapy Management (MTM) Encounter    ASSESSMENT:                            Medication Adherence/Access: No issues identified.    GERD /epigastric pain/labile mood  Stable from a GI symptom perspective.  If able, future plan to transition omeprazole to famotidine to decrease long-term risk of PPI use. Patient did not notice a change in GI symptoms following taper off amitriptyline, potentially noticing some improvement in memory/mental clarity.  Concern for more labile mood off TCA therapy, patient also with acute stressor of divorce from spouse.  Will start patient on an SSRI which is a safer serotonergic agent, will discuss need for further workup of mood symptoms with primary care provider.    PLAN:                            Fluoxetine - start at 10mg daily, take with food on your stomach     I will ask Dr. Levi if she would like you to schedule an appointment to talk more about mood symptoms    Follow-up: with either myself or primary care provider - will let you know after I discuss with her     SUBJECTIVE/OBJECTIVE:                          Annabelle Hitchcock is a 69 year old female seen for a follow-up visit.       Reason for visit: epigastric pain -amitriptyline taper.    Allergies/ADRs: Reviewed in chart  Past Medical History: Reviewed in chart  Tobacco: She reports that she quit smoking about 49 years ago. Her smoking use included cigarettes. She started smoking about 49 years ago. She has a 0.4 pack-year smoking history. She has been exposed to tobacco smoke. She has never used smokeless tobacco.  Alcohol:  1-3 beverages / week     Medication Adherence/Access: no issues reported.    GERD  /epigastric pain/labile mood  Amitriptyline -tapered off as directed, denies noticing return of epigastric pain, has noticed more labile mood, talked with her therapist about potential need for replacement with another medication to help with mental health symptoms, patient also going through a divorce which  has been challenging  Omeprazole 20 mg once daily - Patient reports she does notice symptoms when she misses a dose, still interested in transitioning to famotidine therapy as discussed at last MTM visit  Side effects-patient may have noticed some improvement in memory/mental clarity off amitriptyline  The patient does not have a history of GI bleed.  Medication History:  Ranitidine - was discontinued from the market     Today's Vitals: /68   Pulse 60   LMP  (LMP Unknown)   SpO2 98%   ----------------      I spent 30 minutes with this patient today. All changes were made via collaborative practice agreement with Irena Levi MD.     A summary of these recommendations was given to the patient.    Odette Rosario, PharmD  Medication Therapy Management (MTM) Pharmacist   [facility]         Medication Therapy Recommendations  Mood disorder   1 Rationale: Untreated condition - Needs additional medication therapy - Indication   Recommendation: Start Medication - FLUoxetine 10 MG capsule   Status: Accepted per CPA   Identified Date: 6/13/2025 Completed Date: 6/13/2025

## 2025-06-13 NOTE — Clinical Note
Demetri GREGORY met with Annabelle to follow-up on amitriptyline taper.  She did not notice any change in her GI symptoms though she did notice more labile mood off TCA therapy.  I started her on low-dose fluoxetine to replace the amitriptyline though

## 2025-06-18 ENCOUNTER — VIRTUAL VISIT (OUTPATIENT)
Dept: GASTROENTEROLOGY | Facility: CLINIC | Age: 70
End: 2025-06-18
Attending: PHYSICIAN ASSISTANT
Payer: MEDICARE

## 2025-06-18 DIAGNOSIS — D86.89 GRANULOMA OF LIVER ASSOCIATED WITH SARCOIDOSIS: ICD-10-CM

## 2025-06-18 DIAGNOSIS — D86.9 SARCOIDOSIS: Primary | ICD-10-CM

## 2025-06-18 DIAGNOSIS — K74.02 HEPATIC FIBROSIS, STAGE 3: ICD-10-CM

## 2025-06-18 DIAGNOSIS — K75.81 METABOLIC DYSFUNCTION-ASSOCIATED STEATOHEPATITIS (MASH): ICD-10-CM

## 2025-06-18 PROCEDURE — 98007 SYNCH AUDIO-VIDEO EST HI 40: CPT | Performed by: PHYSICIAN ASSISTANT

## 2025-06-18 PROCEDURE — 1125F AMNT PAIN NOTED PAIN PRSNT: CPT | Performed by: PHYSICIAN ASSISTANT

## 2025-06-18 PROCEDURE — G2211 COMPLEX E/M VISIT ADD ON: HCPCS | Performed by: PHYSICIAN ASSISTANT

## 2025-06-18 NOTE — LETTER
6/18/2025      Maritza Hitchcock  [address]  [place] [address]      Dear Colleague,    Thank you for referring your patient, Maritza Hitchcock, to the [facility] [place]. Please see a copy of my visit note below.    Hepatology Follow-up Clinic note  Maritza Hitchcock   Date of Birth 1955    Virtual Visit Details  Type of service:  Video Visit   Video Start Time:   2:35 to 3:10 pm   Originating Location (pt. 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  Since biopsy she has made changes to her diet and increased activity leading to weight loss of 7 to 8 pounds over the past few months.  Transaminases in early May were mildly elevated.  She has low normal platelets otherwise, normal synthetic liver function.  Reviewed her liver biopsy findings that showed both inactive steatohepatitis as well as involvement of sarcoidosis.    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 Liver biopsy results reviewed with staff Hepatologist. Liver biopsy showing fair amount of sarcoid granulomas, this would would suggest that methrotrexate was not managing sarcoid process and recommendation would be to start alternative therapy. Will reach out to Rheumatology/Ophthalmology about start azathioprine.   - Hepatic panel in the near future and TPMT    # Metabolic associated steatohepatitis:  # Obesity, hypothyroidism, DM type II  - Reviewed natural history, impact of weight loss and aggressive optimization of risk  factors  - Recommend slow gradual weight loss.   - Maintain good control of cholesterol (No contraindication to starting a statin with LFT elevations)   - Recommend more aggressive optimization blood glucose/insulin resistance as needed. Primarily care can consider GLP-1 agonist (semliglutide or liraglutide) for both insulin resistance and help with weight loss or pioglitizone   - Improve nutrition: continue limiting carbohydrates, especially simple carbohydrates, and following Mediterranean eating patten  - Increase physical activity as able, ideally 150 minutes weekly  - Recommend avoiding alcohol given advanced fibrosis.    -Complete hepatobiliary chronic disease workup with TTG, F-actin and alpha 1 antitrypsin screening.    #Stage III fibrosis:  - Start HCC screening with US abdomen limited at next convenience    # Follow-up in clinic in six months     Astrid Flores PA-C   [facility] Hepatology clinic    Total time for E/M services performed on the date of the encounter 45 minutes.  This included review of previous: clinic visits, hospital records, lab results, imaging studies, and procedural documentation. Time also includes patient visit, documentation and discussion with other providers.  The findings from this review are summarized in the above note.     The longitudinal plan of care for the diagnosis(es)/condition(s) as documented were addressed during this visit. Due to the added complexity in care, I will continue to support Annabelle in the subsequent management and with ongoing continuity of care.    -----------------------------------------------------       HPI:   Maritza Hitchcock is a 69 year old female presenting for follow-up.     Dx: Advanced Fibrosis MASH + sarcoidosis   Metabolic risk factors overweight, hypertricglyceridemia, hypothryoidism, diabetes   Liver biopsy 5/23/2025, percutaneous:    - Mild macrovesicular steatosis with features of steatohepatitis.    - Evolving cirrhosis  showing bridging fibrosis and early nodularity (CRN/Kleiner-Brunt fibrosis stage 3-4 of 4).   - Numerous lobular and portal-based non-necrotizing granulomas.   - 7/2024: US abdomen elastography from which showed median liver stiffness of 1.33 m/sec, which is  low elastography value which rules out advanced chronic liver disease in asymptomatic patients.   - 5/9/2025: FibroScan: Stage 3, 11.5 kilopascals and Grade 3 steatosis      Patient was last seen by me on 5/5/2025.   Recent hospitalizations or ER visits: None   New medications:   - Recently started fluoxetine for anxiety related to ongoing divorce, discontinued amitriptyline with assistance from [facility] pharmacist.    - Following up early today to discuss recent liver biopsy findings from May 23, 2025 given FibroScan showing advanced fibrosis.   - Previously diagnosed with sarcoidosis, initially presented in the eye around May 2023.  - Methotrexate was used for sarcoidosis but - Experienced a increase in transaminases February and it was ultimately discontinued.   - Met with a nutritionist to discuss diet suitable for liver condition.  - Appetite has been decent, but recently experienced aversion to food, particularly Mexican enchiladas, opting for bland food like pancakes.  - Blood sugars managed with diet, aiming for low carb and low sugar intake, using stevia instead of aspartame or sucralose.  - Weight fluctuated from 177 to 163 pounds recently.  - Experiencing some swelling in legs due to prolonged standing at new job.  - Attempting to incorporate walking into routine, adjusting to new work schedule from 5:00 AM to 3:30 PM.    Previous work-up:   Lab Results   Component Value Date    HEPBANG Nonreactive 05/25/2023    HBCAB NON-REACTIVE 08/25/2020    AUSAB 1.95 05/25/2023    HCVAB Nonreactive 03/06/2023    AVEL 692 (H) 05/09/2025    IRONSAT 25 05/09/2025     05/09/2025    ELIJAH Positive (A) 04/13/2023    ANAT1 1:320 04/13/2023    TSH 2.55 12/20/2024     CHOL 134 07/22/2024    HDL 42 (L) 07/22/2024    LDL 61 07/22/2024    TRIG 157 (H) 07/22/2024    A1C 6.1 (H) 04/04/2025      Recent Labs   Lab Test 05/09/25  0956 04/04/25  0940 02/21/25  0914 12/20/24  1144 09/09/24  1021 07/30/24  1442 05/02/24  0939 04/15/24  1540 03/29/24  0934 12/29/23  0955 04/13/23  1531   ALKPHOS 82 83 78 70 81 83 71 74 68 65 65   ALT 45 55* 101* 51* 45 47 43 55* 82* 41 21   AST 54* 58* 156* 52* 37 48* 40 45 72* 36 27   BILITOTAL 0.6 0.7 0.6 0.6 0.4 0.7 0.5 0.4 0.4  --  0.4        Medical hx Surgical hx   Past Medical History:   Diagnosis Date     Diabetes (H) 07/2021     Infection due to 2019 novel coronavirus 09/2022     Infection due to 2019 novel coronavirus 09/14/2023     Mumps      MVP (mitral valve prolapse)      Premenopausal menorrhagia 11/18/2010     Sarcoidosis      Thyroid disease     Past Surgical History:   Procedure Laterality Date     ABDOMEN SURGERY  1988    Tubal     CATARACT IOL, RT/LT       CHOLECYSTECTOMY      1987     COLONOSCOPY  ????    7/26/2007, 8/30/2017     COMBINED REPAIR PTOSIS WITH BLEPHAROPLASTY BILATERAL Bilateral 8/8/2024    Procedure: Both upper eyelid blepharoplasty. Left upper eyelid ptosis repair. Excision of left lateral canthus cyst.;  Surgeon: Autumn Padilla MD;  Location: [facility] [place] [place] OR     DILATION AND CURETTAGE, HYSTEROSCOPY DIAGNOSTIC, COMBINED      4/23/2010     EYE SURGERY  2021, 2022    Left eye-Vitrectomy, Cataract     GALLBLADDER SURGERY  02/11/1987     GYN SURGERY  ????    Partial Hysterectomy     IR LIVER BIOPSY PERCUTANEOUS  5/23/2025     LAPAROSCOPIC HYSTERECTOMY SUPRACERVICAL  11/19/2010     MENISCECTOMY  09/27/2016    partial     ORBITOTOMY, RESECT TUMOR, COMBINED Left 05/04/2023    Procedure: Left lacrimal gland biopsy;  Surgeon: Autumn Padilla MD;  Location: [facility] [place] [place] OR     ORTHOPEDIC SURGERY  2023    Tendon repair right thumb     PERCUTANEOUS BIOPSY LIVER N/A 5/23/2025    Procedure: Percutaneous biopsy liver;  Surgeon: Alana  MD Taco;  Location: [facility] OR     SOFT TISSUE SURGERY  ????    Right knee meniscus repair     VAGINAL DELIVERY      x3 no date                 Medications:     Current Outpatient Medications   Medication Sig Dispense Refill     alendronate (FOSAMAX) 70 MG tablet Take 1 tablet (70 mg) by mouth every 7 days. 12 tablet 3     atorvastatin (LIPITOR) 40 MG tablet TAKE 1 TABLET(40 MG) BY MOUTH DAILY 90 tablet 3     finasteride (PROSCAR) 5 MG tablet Take 5 mg by mouth daily       FLUoxetine (PROZAC) 10 MG capsule Take 1 capsule (10 mg) by mouth daily. 30 capsule 1     folic acid (FOLVITE) 1 MG tablet TAKE 3 TABLETS(3 MG) BY MOUTH DAILY 270 tablet 1     Krill Oil (OMEGA-3) 500 MG CAPS Take 500 mg by mouth daily.       levothyroxine (SYNTHROID/LEVOTHROID) 137 MCG tablet Take 1 tablet (137 mcg) by mouth every morning (before breakfast). 90 tablet 3     Magnesium 125 MG CAPS Take 1 capsule by mouth daily.       Misc Natural Products (NEURIVA) CAPS Take 1 capsule by mouth daily.       Multiple Minerals-Vitamins (CITRACAL MAXIMUM PLUS) TABS Take 2 tablets by mouth daily. Calcium citrate/vitamin D = 650mg/25mcg per 2 tabs       Multiple Vitamin (ONE-A-DAY ESSENTIAL) TABS Take 1 tablet by mouth daily       omeprazole (PRILOSEC) 20 MG DR capsule Take 1 capsule (20 mg) by mouth daily 90 capsule 3     No current facility-administered medications for this visit.            Allergies:   No Known Allergies         Review of Systems:   10 points ROS was obtained and highlighted in the HPI, otherwise negative.          Physical Exam:   GENERAL: healthy, alert and no distress  EYES: Eyes grossly normal to inspection, conjunctivae and sclerae normal  RESP: no audible wheeze, cough, or visible cyanosis.  No visible retractions or increased work of breathing.  Able to speak fully in complete sentences  NEURO: Cranial nerves grossly intact, mentation intact and speech normal  PSYCH: mentation appears normal, affect normal/bright, judgement  "and insight intact, normal speech and appearance well-groomed         Data:   Reviewed in person and significant for:    Lab Results   Component Value Date     05/09/2025      Lab Results   Component Value Date    POTASSIUM 4.2 05/09/2025    POTASSIUM 4.5 04/14/2022     Lab Results   Component Value Date    CHLORIDE 103 05/09/2025    CHLORIDE 103 04/14/2022     Lab Results   Component Value Date    CO2 26 05/09/2025    CO2 28 04/14/2022     Lab Results   Component Value Date    BUN 13.9 05/09/2025    BUN 22 04/14/2022     Lab Results   Component Value Date    CR 0.73 05/09/2025       Lab Results   Component Value Date    WBC 5.7 05/09/2025     Lab Results   Component Value Date    HGB 12.6 05/09/2025     Lab Results   Component Value Date    HCT 37.5 05/09/2025     Lab Results   Component Value Date    MCV 96 05/09/2025     Lab Results   Component Value Date     05/09/2025       Lab Results   Component Value Date    AST 54 05/09/2025     Lab Results   Component Value Date    ALT 45 05/09/2025     No results found for: \"BILICONJ\"   Lab Results   Component Value Date    BILITOTAL 0.6 05/09/2025       Lab Results   Component Value Date    ALBUMIN 4.3 05/09/2025    ALBUMIN 4.1 04/14/2022     Lab Results   Component Value Date    PROTTOTAL 7.0 05/09/2025      Lab Results   Component Value Date    ALKPHOS 82 05/09/2025       Lab Results   Component Value Date    INR 0.99 05/23/2025       A(1). LIVER, NATIVE, BIOPSY:   - Mild macrovesicular steatosis with features of steatohepatitis.    - Evolving cirrhosis showing bridging fibrosis and early nodularity (CRN/Kleiner-Brunt fibrosis stage 3-4 of 4).   - Numerous lobular and portal-based non-necrotizing granulomas.   - See Microscopic description and Comment.      Electronically signed by Priti Hairston MD on 5/27/2025 at 1028 CDT   Comment UUMAYO   The liver biopsy shows mild macrovesicular steatosis with associated features of steatohepatitis, resulting in a " NAFLD Activity Score (RAYO) of 4 out of 8, supporting the clinical suspicion of metabolic dysfunction-associated steatohepatitis (MASH/MASLD). There is evidence of advanced fibrosis with bridging fibrosis and early nodule formation, consistent with evolving cirrhosis (stage 3-4 of 4). The pattern of fibrosis observed is mostly likely related to MASH/MASLD.     Additionally, numerous well-formed non-necrotizing granulomas are identified within portal and lobular regions. Given the patient's history, sarcoidosis is the most likely etiology. However, special stains for acid-fast bacilli (AFB) and fungal organisms (GMS) will be performed to exclude infectious causes; results will be reported in an addendum.     There are no histologic features to suggest autoimmune hepatitis or chronic biliary disease. Clinical correlation is advised to distinguish alcoholic from nonalcoholic steatohepatitis.     Again, thank you for allowing me to participate in the care of your patient.        Sincerely,        Astrid Flores PA-C    Electronically signed

## 2025-06-18 NOTE — PROGRESS NOTES
"Hepatology Follow-up Clinic note  Maritza Hitchcock   Date of Birth 1955    Virtual Visit Details    Type of service:  Video Visit   Video Start Time: {video visit start/end time for provider to select:415878}  Video End Time:{video visit start/end time for provider to select:918465}    Originating Location (pt. Location): {video visit patient location:476152::\"Home\"}    Distant Location (provider location):    Platform used for Video Visit: [employer]    Reason for follow-up:          Assessment/plan:   Maritza Hitchcock is a 69 year old female with biopsy proven     Risk factors for fatty liver disease includes: overweight, hypertricglyceridemia, hypothryoidism, diabetes.      # Labs:   -      #Metabolic associated fatty liver disease (MALFD):   - Patient also has risk factors making them high likelihood of over-lapping metabolic fatty liver disease.     - Recommend slow gradual weight loss.   - Maintain good control of cholesterol (No contraindication to starting a statin with LFT elevations)   - Recommend more aggressive optimization blood glucose/insulin resistance as needed. Primarily care can consider GLP-1 agonist (semliglutide or liraglutide) for both insulin resistance and help with weight loss or pioglitizone   - Improve nutrition: continue limiting carbohydrates, especially simple carbohydrates, and following Mediterranean eating patten  - Increase physical activity as able, ideally 150 minutes weekly  - Limit alcohol intake to not more than 3 drinks a week        # Follow-up in clinic *** or sooner as needed    Astrid Flores PA-C   [facility] Hepatology clinic    Total time for E/M services performed on the date of the encounter *** minutes.  This included review of previous: clinic visits, hospital records, lab results, imaging studies, and procedural documentation. Time also includes patient visit, documentation and discussion with other providers.  The findings from this review are " summarized in the above note.     -----------------------------------------------------       HPI:   Maritza Hitchcock is a 69 year old female with *** presenting for follow-up.     Dx:   Etiology:   Bx:     US abdomen elastography from 7/2024 which showed median liver stiffness of 1.33 m/sec, which is  low elastography value which rules out advanced chronic liver disease in asymptomatic patients.       Patient was last seen by me on 5/5/2025.   Recent hospitalizations or ER visits: None   New medications:       Weight down to 163 lbs.     Met with nutrition.     Patient denies jaundice, lower extremity edema, abdominal distension or confusion.    Patient also denies melena, hematochezia or hematemesis.  Patient denies fevers, sweats or chills.    Stands a lot at work now     Previous work-up:   Lab Results   Component Value Date    HEPBANG Nonreactive 05/25/2023    HBCAB NON-REACTIVE 08/25/2020    AUSAB 1.95 05/25/2023    HCVAB Nonreactive 03/06/2023    AVEL 692 (H) 05/09/2025    IRONSAT 25 05/09/2025     05/09/2025    ELIJAH Positive (A) 04/13/2023    ANAT1 1:320 04/13/2023    TSH 2.55 12/20/2024    CHOL 134 07/22/2024    HDL 42 (L) 07/22/2024    LDL 61 07/22/2024    TRIG 157 (H) 07/22/2024    A1C 6.1 (H) 04/04/2025      Recent Labs   Lab Test 05/09/25  0956 04/04/25  0940 02/21/25  0914 12/20/24  1144 09/09/24  1021 07/30/24  1442 05/02/24  0939 04/15/24  1540 03/29/24  0934 12/29/23  0955 04/13/23  1531   ALKPHOS 82 83 78 70 81 83 71 74 68 65 65   ALT 45 55* 101* 51* 45 47 43 55* 82* 41 21   AST 54* 58* 156* 52* 37 48* 40 45 72* 36 27   BILITOTAL 0.6 0.7 0.6 0.6 0.4 0.7 0.5 0.4 0.4  --  0.4        Medical hx Surgical hx   Past Medical History:   Diagnosis Date    Diabetes (H) 07/2021    Infection due to 2019 novel coronavirus 09/2022    Infection due to 2019 novel coronavirus 09/14/2023    Mumps     MVP (mitral valve prolapse)     Premenopausal menorrhagia 11/18/2010    Sarcoidosis     Thyroid disease     Past  Surgical History:   Procedure Laterality Date    ABDOMEN SURGERY  1988    Tubal    CATARACT IOL, RT/LT      CHOLECYSTECTOMY      1987    COLONOSCOPY  ????    7/26/2007, 8/30/2017    COMBINED REPAIR PTOSIS WITH BLEPHAROPLASTY BILATERAL Bilateral 8/8/2024    Procedure: Both upper eyelid blepharoplasty. Left upper eyelid ptosis repair. Excision of left lateral canthus cyst.;  Surgeon: Autumn Padilla MD;  Location: [facility] OR    DILATION AND CURETTAGE, HYSTEROSCOPY DIAGNOSTIC, COMBINED      4/23/2010    EYE SURGERY  2021, 2022    Left eye-Vitrectomy, Cataract    GALLBLADDER SURGERY  02/11/1987    GYN SURGERY  ????    Partial Hysterectomy    IR LIVER BIOPSY PERCUTANEOUS  5/23/2025    LAPAROSCOPIC HYSTERECTOMY SUPRACERVICAL  11/19/2010    MENISCECTOMY  09/27/2016    partial    ORBITOTOMY, RESECT TUMOR, COMBINED Left 05/04/2023    Procedure: Left lacrimal gland biopsy;  Surgeon: Autumn Padilla MD;  Location: [facility] OR    ORTHOPEDIC SURGERY  2023    Tendon repair right thumb    PERCUTANEOUS BIOPSY LIVER N/A 5/23/2025    Procedure: Percutaneous biopsy liver;  Surgeon: Taco Warner MD;  Location: [facility] OR    SOFT TISSUE SURGERY  ????    Right knee meniscus repair    VAGINAL DELIVERY      x3 no date                 Medications:     Current Outpatient Medications   Medication Sig Dispense Refill    alendronate (FOSAMAX) 70 MG tablet Take 1 tablet (70 mg) by mouth every 7 days. 12 tablet 3    atorvastatin (LIPITOR) 40 MG tablet TAKE 1 TABLET(40 MG) BY MOUTH DAILY 90 tablet 3    finasteride (PROSCAR) 5 MG tablet Take 5 mg by mouth daily      FLUoxetine (PROZAC) 10 MG capsule Take 1 capsule (10 mg) by mouth daily. 30 capsule 1    folic acid (FOLVITE) 1 MG tablet TAKE 3 TABLETS(3 MG) BY MOUTH DAILY 270 tablet 1    Krill Oil (OMEGA-3) 500 MG CAPS Take 500 mg by mouth daily.      levothyroxine (SYNTHROID/LEVOTHROID) 137 MCG tablet Take 1 tablet (137 mcg) by mouth every morning (before breakfast). 90 tablet 3    Magnesium 125  "MG CAPS Take 1 capsule by mouth daily.      Misc Natural Products (NEURIVA) CAPS Take 1 capsule by mouth daily.      Multiple Minerals-Vitamins (CITRACAL MAXIMUM PLUS) TABS Take 2 tablets by mouth daily. Calcium citrate/vitamin D = 650mg/25mcg per 2 tabs      Multiple Vitamin (ONE-A-DAY ESSENTIAL) TABS Take 1 tablet by mouth daily      omeprazole (PRILOSEC) 20 MG DR capsule Take 1 capsule (20 mg) by mouth daily 90 capsule 3     No current facility-administered medications for this visit.            Allergies:   No Known Allergies         Review of Systems:   10 points ROS was obtained and highlighted in the HPI, otherwise negative.          Physical Exam:   VS:  LMP  (LMP Unknown)     ***  Gen- well, NAD, A+Ox3, normal color  Lym- no palpable LAD  CVS- RRR  RS- CTA  Abd-   Extr- hands normal, no JOHN  Skin- no rash or jaundice  Neuro- no asterixis  Psych- normal mood           Data:   Reviewed in person and significant for:    Lab Results   Component Value Date     05/09/2025      Lab Results   Component Value Date    POTASSIUM 4.2 05/09/2025    POTASSIUM 4.5 04/14/2022     Lab Results   Component Value Date    CHLORIDE 103 05/09/2025    CHLORIDE 103 04/14/2022     Lab Results   Component Value Date    CO2 26 05/09/2025    CO2 28 04/14/2022     Lab Results   Component Value Date    BUN 13.9 05/09/2025    BUN 22 04/14/2022     Lab Results   Component Value Date    CR 0.73 05/09/2025       Lab Results   Component Value Date    WBC 5.7 05/09/2025     Lab Results   Component Value Date    HGB 12.6 05/09/2025     Lab Results   Component Value Date    HCT 37.5 05/09/2025     Lab Results   Component Value Date    MCV 96 05/09/2025     Lab Results   Component Value Date     05/09/2025       Lab Results   Component Value Date    AST 54 05/09/2025     Lab Results   Component Value Date    ALT 45 05/09/2025     No results found for: \"BILICONJ\"   Lab Results   Component Value Date    BILITOTAL 0.6 05/09/2025 "       Lab Results   Component Value Date    ALBUMIN 4.3 05/09/2025    ALBUMIN 4.1 04/14/2022     Lab Results   Component Value Date    PROTTOTAL 7.0 05/09/2025      Lab Results   Component Value Date    ALKPHOS 82 05/09/2025       Lab Results   Component Value Date    INR 0.99 05/23/2025       A(1). LIVER, NATIVE, BIOPSY:   - Mild macrovesicular steatosis with features of steatohepatitis.    - Evolving cirrhosis showing bridging fibrosis and early nodularity (CRN/Kleiner-Brunt fibrosis stage 3-4 of 4).   - Numerous lobular and portal-based non-necrotizing granulomas.   - See Microscopic description and Comment.      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 There is evidence of advanced fibrosis with bridging fibrosis and early nodule formation, consistent with evolving cirrhosis (stage 3-4 of 4). The pattern of fibrosis observed is mostly likely related to MASH/MASLD.     Additionally, numerous well-formed non-necrotizing granulomas are identified within portal and lobular regions. Given the patient's history, sarcoidosis is the most likely etiology. However, special stains for acid-fast bacilli (AFB) and fungal organisms (GMS) will be performed to exclude infectious causes; results will be reported in an addendum.     There are no histologic features to suggest autoimmune hepatitis or chronic biliary disease. Clinical correlation is advised to distinguish alcoholic from nonalcoholic steatohepatitis.    "  Component Value Date    CHLORIDE 103 05/09/2025    CHLORIDE 103 04/14/2022     Lab Results   Component Value Date    CO2 26 05/09/2025    CO2 28 04/14/2022     Lab Results   Component Value Date    BUN 13.9 05/09/2025    BUN 22 04/14/2022     Lab Results   Component Value Date    CR 0.73 05/09/2025       Lab Results   Component Value Date    WBC 5.7 05/09/2025     Lab Results   Component Value Date    HGB 12.6 05/09/2025     Lab Results   Component Value Date    HCT 37.5 05/09/2025     Lab Results   Component Value Date    MCV 96 05/09/2025     Lab Results   Component Value Date     05/09/2025       Lab Results   Component Value Date    AST 54 05/09/2025     Lab Results   Component Value Date    ALT 45 05/09/2025     No results found for: \"BILICONJ\"   Lab Results   Component Value Date    BILITOTAL 0.6 05/09/2025       Lab Results   Component Value Date    ALBUMIN 4.3 05/09/2025    ALBUMIN 4.1 04/14/2022     Lab Results   Component Value Date    PROTTOTAL 7.0 05/09/2025      Lab Results   Component Value Date    ALKPHOS 82 05/09/2025       Lab Results   Component Value Date    INR 0.99 05/23/2025       A(1). LIVER, NATIVE, BIOPSY:   - Mild macrovesicular steatosis with features of steatohepatitis.    - Evolving cirrhosis showing bridging fibrosis and early nodularity (CRN/Kleiner-Brunt fibrosis stage 3-4 of 4).   - Numerous lobular and portal-based non-necrotizing granulomas.   - See Microscopic description and Comment.      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 There is evidence of advanced fibrosis with bridging fibrosis and early nodule formation, consistent with evolving cirrhosis (stage 3-4 of 4). The " pattern of fibrosis observed is mostly likely related to MASH/MASLD.     Additionally, numerous well-formed non-necrotizing granulomas are identified within portal and lobular regions. Given the patient's history, sarcoidosis is the most likely etiology. However, special stains for acid-fast bacilli (AFB) and fungal organisms (GMS) will be performed to exclude infectious causes; results will be reported in an addendum.     There are no histologic features to suggest autoimmune hepatitis or chronic biliary disease. Clinical correlation is advised to distinguish alcoholic from nonalcoholic steatohepatitis.

## 2025-06-18 NOTE — NURSING NOTE
Current patient location: [address] [address]    Is the patient currently in the state of [place]? YES    Visit mode: VIDEO    If the visit is dropped, the patient can be reconnected by:VIDEO VISIT: Send to e-mail at: [email or web address]    Will anyone else be joining the visit? NO  (If patient encounters technical issues they should call [phone])    Are changes needed to the allergy or medication list? No    Are refills needed on medications prescribed by this physician? NO    Rooming Documentation:  Questionnaire(s) completed    Reason for visit: RECHECK    Walt ABRAHAM

## 2025-06-20 PROCEDURE — 83540 ASSAY OF IRON: CPT | Performed by: INTERNAL MEDICINE

## 2025-06-20 PROCEDURE — 84155 ASSAY OF PROTEIN SERUM: CPT | Performed by: INTERNAL MEDICINE

## 2025-06-20 PROCEDURE — 82248 BILIRUBIN DIRECT: CPT | Performed by: INTERNAL MEDICINE

## 2025-06-20 PROCEDURE — 85610 PROTHROMBIN TIME: CPT | Performed by: PHYSICIAN ASSISTANT

## 2025-06-20 PROCEDURE — 86140 C-REACTIVE PROTEIN: CPT | Performed by: INTERNAL MEDICINE

## 2025-06-20 PROCEDURE — 83735 ASSAY OF MAGNESIUM: CPT | Performed by: INTERNAL MEDICINE

## 2025-06-20 PROCEDURE — 82103 ALPHA-1-ANTITRYPSIN TOTAL: CPT | Performed by: PHYSICIAN ASSISTANT

## 2025-06-20 PROCEDURE — 86364 TISS TRNSGLTMNASE EA IG CLAS: CPT | Performed by: PHYSICIAN ASSISTANT

## 2025-06-20 PROCEDURE — 82306 VITAMIN D 25 HYDROXY: CPT | Performed by: INTERNAL MEDICINE

## 2025-06-23 ENCOUNTER — MYC MEDICAL ADVICE (OUTPATIENT)
Dept: FAMILY MEDICINE | Facility: CLINIC | Age: 70
End: 2025-06-23
Payer: MEDICARE

## 2025-06-23 ENCOUNTER — PATIENT OUTREACH (OUTPATIENT)
Dept: CARE COORDINATION | Facility: CLINIC | Age: 70
End: 2025-06-23
Payer: MEDICARE

## 2025-06-23 ENCOUNTER — RESULTS FOLLOW-UP (OUTPATIENT)
Dept: PHARMACY | Facility: CLINIC | Age: 70
End: 2025-06-23

## 2025-06-25 ENCOUNTER — DOCUMENTATION ONLY (OUTPATIENT)
Dept: RHEUMATOLOGY | Facility: CLINIC | Age: 70
End: 2025-06-25
Payer: MEDICARE

## 2025-06-25 NOTE — PROGRESS NOTES
I am ok with starting aza at 50 mg per day if you think she requires immunosuppression from a liver standpoint. Thank you.       ===View-only below this line===  ----- Message -----  From: Astrid Flores PA-C  Sent: 6/18/2025  12:03 PM CDT  To: Nataliia Sprague MD; Autumn Padilla MD  Subject: liver sarcoidosis                                Hi,     Patient had liver biopsy which should a fair amount of sarcoid granulomas.   This would suggest that methrotrexate was not managing sarcoid process.     I would recommend Azathioprine, which is what we usually do for liver involvement.  There is no standard (but likely no more than 1.5 mg/kg for the liver and I would start low), but I would defer to you all about preferred dose.     Dr. Sprague, is something you would be able to manage given multiple organs?     I have seen transaminases improve with initiation of Azathiorprine, but patient also has overlapping mild steatohepatitis so it might not be straightforward way to  response.       Thanks, Astrid Flores PA-C   Hepatology

## 2025-06-29 DIAGNOSIS — E78.1 HYPERTRIGLYCERIDEMIA: ICD-10-CM

## 2025-06-30 RX ORDER — ATORVASTATIN CALCIUM 40 MG/1
40 TABLET, FILM COATED ORAL DAILY
Qty: 90 TABLET | Refills: 2 | Status: SHIPPED | OUTPATIENT
Start: 2025-06-30

## 2025-07-02 SDOH — HEALTH STABILITY: PHYSICAL HEALTH: ON AVERAGE, HOW MANY DAYS PER WEEK DO YOU ENGAGE IN MODERATE TO STRENUOUS EXERCISE (LIKE A BRISK WALK)?: 4 DAYS

## 2025-07-02 SDOH — HEALTH STABILITY: PHYSICAL HEALTH: ON AVERAGE, HOW MANY MINUTES DO YOU ENGAGE IN EXERCISE AT THIS LEVEL?: 30 MIN

## 2025-07-02 ASSESSMENT — SOCIAL DETERMINANTS OF HEALTH (SDOH): HOW OFTEN DO YOU GET TOGETHER WITH FRIENDS OR RELATIVES?: ONCE A WEEK

## 2025-07-03 SDOH — HEALTH STABILITY: PHYSICAL HEALTH: ON AVERAGE, HOW MANY MINUTES DO YOU ENGAGE IN EXERCISE AT THIS LEVEL?: 30 MIN

## 2025-07-03 SDOH — HEALTH STABILITY: PHYSICAL HEALTH: ON AVERAGE, HOW MANY DAYS PER WEEK DO YOU ENGAGE IN MODERATE TO STRENUOUS EXERCISE (LIKE A BRISK WALK)?: 4 DAYS

## 2025-07-03 ASSESSMENT — SOCIAL DETERMINANTS OF HEALTH (SDOH): HOW OFTEN DO YOU GET TOGETHER WITH FRIENDS OR RELATIVES?: ONCE A WEEK

## 2025-07-08 ENCOUNTER — OFFICE VISIT (OUTPATIENT)
Dept: FAMILY MEDICINE | Facility: CLINIC | Age: 70
End: 2025-07-08
Payer: MEDICARE

## 2025-07-08 VITALS
DIASTOLIC BLOOD PRESSURE: 73 MMHG | RESPIRATION RATE: 16 BRPM | HEIGHT: 65 IN | OXYGEN SATURATION: 98 % | TEMPERATURE: 98.8 F | BODY MASS INDEX: 27.64 KG/M2 | SYSTOLIC BLOOD PRESSURE: 122 MMHG | HEART RATE: 58 BPM | WEIGHT: 165.9 LBS

## 2025-07-08 DIAGNOSIS — F39 MOOD DISORDER: ICD-10-CM

## 2025-07-08 DIAGNOSIS — Z73.89: ICD-10-CM

## 2025-07-08 DIAGNOSIS — F43.22 ADJUSTMENT DISORDER WITH ANXIOUS MOOD: ICD-10-CM

## 2025-07-08 DIAGNOSIS — D86.9 SARCOIDOSIS: Primary | ICD-10-CM

## 2025-07-08 DIAGNOSIS — F41.9 ANXIETY: ICD-10-CM

## 2025-07-08 PROBLEM — E11.9 TYPE 2 DIABETES MELLITUS WITHOUT COMPLICATION, WITHOUT LONG-TERM CURRENT USE OF INSULIN (H): Status: RESOLVED | Noted: 2022-04-14 | Resolved: 2025-07-08

## 2025-07-08 PROCEDURE — 99214 OFFICE O/P EST MOD 30 MIN: CPT | Performed by: STUDENT IN AN ORGANIZED HEALTH CARE EDUCATION/TRAINING PROGRAM

## 2025-07-08 PROCEDURE — 3074F SYST BP LT 130 MM HG: CPT | Performed by: STUDENT IN AN ORGANIZED HEALTH CARE EDUCATION/TRAINING PROGRAM

## 2025-07-08 PROCEDURE — 3078F DIAST BP <80 MM HG: CPT | Performed by: STUDENT IN AN ORGANIZED HEALTH CARE EDUCATION/TRAINING PROGRAM

## 2025-07-08 RX ORDER — FOLIC ACID 1 MG/1
1 TABLET ORAL DAILY
COMMUNITY
Start: 2023-08-01

## 2025-07-08 RX ORDER — FLUOXETINE 10 MG/1
20 CAPSULE ORAL DAILY
Qty: 90 CAPSULE | Refills: 3 | Status: SHIPPED | OUTPATIENT
Start: 2025-07-08

## 2025-07-08 NOTE — PROGRESS NOTES
Assessment & Plan   Problem List Items Addressed This Visit       Sarcoidosis - Primary    Relevant Orders    Adult Neuropsychology  Referral     Other Visit Diagnoses         Decisional conflict        Relevant Orders    Adult Neuropsychology  Referral      Adjustment disorder with anxious mood        Relevant Orders    Adult Neuropsychology  Referral      Anxiety        Relevant Medications    FLUoxetine (PROZAC) 10 MG capsule    Other Relevant Orders    Adult Neuropsychology  Referral      Mood disorder        Relevant Medications    FLUoxetine (PROZAC) 10 MG capsule           Sarcoidosis:  - Patient diagnosed with sarcoidosis, with recent hepatic biopsy demonstrating significant hepatic involvement. No adverse effects reported from current therapy, azathioprine.  - Continue azathioprine. Follow-up with hepatology, ophthalmology, and rheumatology as scheduled.    Decisional conflict:  - Undergoing marital dissolution, representing a major psychosocial stressor. Patient is resolute in proceeding with divorce and relocating to [place].    Adjustment disorder with anxious mood:  - Experiencing adjustment disorder with anxious mood secondary to ongoing divorce and familial discord.  - Continue psychotherapy. Referral for neuropsychological assessment.    Anxiety:  - Anxiety symptoms related to family dynamics and divorce.  - Continue fluoxetine, titrate dose to 20 mg from 10 mg for enhanced efficacy. Prescription refilled.    Mood disorder:  - Mood disorder with affective instability, currently managed with fluoxetine.  - Continue fluoxetine, titrate dose to 20 mg from 10 mg.     Follow-up with me in 2 weeks for Medicare visit        Subjective   Annabelle is a 69 year old, presenting for the following health issues:  Referral (Pt needs a referral for neuro psych testing)        7/8/2025     5:38 PM   Additional Questions   Roomed by Lucero Alanis BRIDGET Coretta, 69  years    Divorce-related emotional distress and family conflict  - In the process of divorce, with final hearing scheduled for August 27, 2025  - Reports significant emotional distress related to divorce, including estrangement from son and guarded relationship with daughter  - Unable to see grandkids unless a neuropsychological evaluation is completed, as requested by daughter  - Describes feeling isolated, with loss of friends and strained family relationships  - Reports being emotionally affected by family and social rejection, but expresses determination to move forward  - Describes history of being micromanaged and controlled by spouse, leading to decision to leave marital home in April 2025  - Reports that spouse imposed a list of demands for reconciliation, which they found unacceptable (including surrendering phone, limiting TV, enforced bedtime, and sexual expectations)  - States they will not return to marriage and feels relief after leaving controlling environment  - Reports ongoing communication with spouse for practical matters, but no intention to reconcile  - Plans to move to [place] in September 2025 to start a new life after divorce    Adjustment disorder and anxiety  - Reports emotional lability prior to starting fluoxetine, with episodes of feeling good followed by emotional breakdowns  - Describes history of anxiety, with both daughter and son also having anxiety;  reportedly has anxiety but was never diagnosed or treated until recently  - Currently feels more emotionally stable since starting fluoxetine approximately one month ago  - Reports ability to manage emotional responses better since starting medication    Sarcoidosis  - Diagnosed with sarcoidosis, initially presenting in the lacrimal gland  - Reports sarcoidosis involvement in the liver, discovered after liver biopsy  - Uncertain whether liver or eye was first site of involvement  - No reported symptoms or involvement of lymph  "nodes or lungs; lungs have always sounded clear per patient    Nonalcoholic fatty liver disease  - History of nonalcoholic fatty liver disease, diagnosed a long time ago  - Recent evaluation by liver specialist due to prior diagnosis and abnormal liver numbers in February 2025  - Liver biopsy revealed significant sarcoidosis involvement (scored 3.5 on a 0-4 scale), but not at transplant stage    Medication history for sarcoidosis  - Previously on methotrexate, discontinued in February 2025 due to elevated liver enzymes and concurrent illness  - Off all sarcoidosis medications from February 2025 until recent initiation of azathioprine (Imuran) within the last few weeks  - No side effects reported with azathioprine    Mental health medication  - Started fluoxetine (Prozac) approximately one month ago for emotional instability and anxiety  - Reports improvement in emotional regulation since starting fluoxetine    Cognitive concerns and neuropsychological testing  - No current cognitive symptoms reported; has passed cognitive testing in the past  - Family history of dementia (mother and two maternal aunts)  - Daughter has mild ADHD per her own neuropsychological testing  - Undergoing neuropsychological evaluation at daughter's request to regain access to [employer], not due to personal concern about cognitive decline    Support system  - Reports ongoing support from counselor/therapist, with whom they have shared full details of their situation  - Limited social support outside of counselor and a few friends    Misc  - No reported symptoms or concerns related to lungs, lymph nodes, or other organ systems outside of those detailed above  - No mention of other acute or chronic symptoms during this encounter          Objective    /73   Pulse 58   Temp 98.8  F (37.1  C) (Tympanic)   Resp 16   Ht 1.638 m (5' 4.5\")   Wt 75.3 kg (165 lb 14.4 oz)   LMP  (LMP Unknown)   SpO2 98%   BMI 28.04 kg/m    Body mass index " is 28.04 kg/m .  Physical Exam  Constitutional:       General: She is not in acute distress.     Appearance: Normal appearance. She is not ill-appearing.   HENT:      Nose: Nose normal.      Mouth/Throat:      Mouth: Mucous membranes are moist.   Eyes:      Conjunctiva/sclera: Conjunctivae normal.   Cardiovascular:      Rate and Rhythm: Normal rate.   Pulmonary:      Effort: Pulmonary effort is normal.   Skin:     General: Skin is warm.   Neurological:      General: No focal deficit present.      Mental Status: She is alert.   Psychiatric:         Mood and Affect: Mood normal.         Behavior: Behavior normal.                  Signed Electronically by: Delia Muir MD

## 2025-07-08 NOTE — PATIENT INSTRUCTIONS
Neuropsychology and psychology options in [facility]    - [facility] - with locations throughout the [facility] area: [phone].     - [facility] - with locations throughout the [facility] area: [phone].     - [facility] - [phone], [email or web address]     -[facility], [facility]- [phone]   [email or web address]     There are several community neuropsychologists that we might suggest, including:   Dr. Pam Hinton - [phone]   Dr. Malick Rosas - [phone]   Dr. Neelam Childs - [phone]   Dr. Aneta Florence - [phone]   Dr. [facility] - [phone]   Dr. Michelle [facility] - [phone], [email or web address]   Dr. Rick Bunch - [phone], [email or web address]   [facility] - [phone], [email or web address]   [facility], [email or web address]   [facility] - [phone], [email or web address]     Other resources for patients:   - [email or web address] - you can search for providers in your community who offer neuropsych assessments.

## 2025-07-20 SDOH — HEALTH STABILITY: PHYSICAL HEALTH: ON AVERAGE, HOW MANY MINUTES DO YOU ENGAGE IN EXERCISE AT THIS LEVEL?: PATIENT DECLINED

## 2025-07-20 SDOH — HEALTH STABILITY: PHYSICAL HEALTH
ON AVERAGE, HOW MANY DAYS PER WEEK DO YOU ENGAGE IN MODERATE TO STRENUOUS EXERCISE (LIKE A BRISK WALK)?: PATIENT DECLINED

## 2025-07-20 ASSESSMENT — SOCIAL DETERMINANTS OF HEALTH (SDOH): HOW OFTEN DO YOU GET TOGETHER WITH FRIENDS OR RELATIVES?: ONCE A WEEK

## 2025-07-23 ENCOUNTER — MYC MEDICAL ADVICE (OUTPATIENT)
Dept: RHEUMATOLOGY | Facility: CLINIC | Age: 70
End: 2025-07-23

## 2025-07-23 ENCOUNTER — OFFICE VISIT (OUTPATIENT)
Dept: FAMILY MEDICINE | Facility: CLINIC | Age: 70
End: 2025-07-23
Payer: MEDICARE

## 2025-07-23 VITALS
HEART RATE: 58 BPM | OXYGEN SATURATION: 99 % | DIASTOLIC BLOOD PRESSURE: 69 MMHG | TEMPERATURE: 98.7 F | SYSTOLIC BLOOD PRESSURE: 113 MMHG | HEIGHT: 65 IN | WEIGHT: 162.7 LBS | RESPIRATION RATE: 16 BRPM | BODY MASS INDEX: 27.11 KG/M2

## 2025-07-23 DIAGNOSIS — E78.1 HYPERTRIGLYCERIDEMIA: ICD-10-CM

## 2025-07-23 DIAGNOSIS — D86.9 SARCOIDOSIS: ICD-10-CM

## 2025-07-23 DIAGNOSIS — Z00.00 ENCOUNTER FOR MEDICARE ANNUAL WELLNESS EXAM: Primary | ICD-10-CM

## 2025-07-23 DIAGNOSIS — N95.2 VAGINAL ATROPHY: ICD-10-CM

## 2025-07-23 DIAGNOSIS — M53.3 SACROILIAC JOINT PAIN: ICD-10-CM

## 2025-07-23 DIAGNOSIS — E03.9 ACQUIRED HYPOTHYROIDISM: ICD-10-CM

## 2025-07-23 DIAGNOSIS — M85.88 OSTEOPENIA OF LUMBAR SPINE: ICD-10-CM

## 2025-07-23 DIAGNOSIS — M25.559 HIP PAIN, UNSPECIFIED LATERALITY: Primary | ICD-10-CM

## 2025-07-23 PROCEDURE — 99214 OFFICE O/P EST MOD 30 MIN: CPT | Mod: 25 | Performed by: STUDENT IN AN ORGANIZED HEALTH CARE EDUCATION/TRAINING PROGRAM

## 2025-07-23 PROCEDURE — G0438 PPPS, INITIAL VISIT: HCPCS | Performed by: STUDENT IN AN ORGANIZED HEALTH CARE EDUCATION/TRAINING PROGRAM

## 2025-07-23 PROCEDURE — 36415 COLL VENOUS BLD VENIPUNCTURE: CPT | Performed by: STUDENT IN AN ORGANIZED HEALTH CARE EDUCATION/TRAINING PROGRAM

## 2025-07-23 PROCEDURE — G2211 COMPLEX E/M VISIT ADD ON: HCPCS | Performed by: STUDENT IN AN ORGANIZED HEALTH CARE EDUCATION/TRAINING PROGRAM

## 2025-07-23 PROCEDURE — 84443 ASSAY THYROID STIM HORMONE: CPT | Performed by: STUDENT IN AN ORGANIZED HEALTH CARE EDUCATION/TRAINING PROGRAM

## 2025-07-23 PROCEDURE — 80061 LIPID PANEL: CPT | Performed by: STUDENT IN AN ORGANIZED HEALTH CARE EDUCATION/TRAINING PROGRAM

## 2025-07-23 PROCEDURE — 3078F DIAST BP <80 MM HG: CPT | Performed by: STUDENT IN AN ORGANIZED HEALTH CARE EDUCATION/TRAINING PROGRAM

## 2025-07-23 PROCEDURE — 3074F SYST BP LT 130 MM HG: CPT | Performed by: STUDENT IN AN ORGANIZED HEALTH CARE EDUCATION/TRAINING PROGRAM

## 2025-07-23 PROCEDURE — 86140 C-REACTIVE PROTEIN: CPT | Performed by: STUDENT IN AN ORGANIZED HEALTH CARE EDUCATION/TRAINING PROGRAM

## 2025-07-23 RX ORDER — PREDNISONE 20 MG/1
40 TABLET ORAL DAILY
Qty: 8 TABLET | Refills: 0 | Status: SHIPPED | OUTPATIENT
Start: 2025-07-23 | End: 2025-07-27

## 2025-07-23 RX ORDER — METHOCARBAMOL 500 MG/1
500 TABLET, FILM COATED ORAL 3 TIMES DAILY
COMMUNITY
Start: 2025-07-18

## 2025-07-23 RX ORDER — DICLOFENAC SODIUM 75 MG/1
1 TABLET, DELAYED RELEASE ORAL
COMMUNITY
Start: 2025-07-18

## 2025-07-23 RX ORDER — ALENDRONATE SODIUM 70 MG/1
70 TABLET ORAL
Qty: 12 TABLET | Refills: 3 | Status: SHIPPED | OUTPATIENT
Start: 2025-07-23

## 2025-07-23 RX ORDER — ESTRADIOL 10 UG/1
10 TABLET, FILM COATED VAGINAL
Status: SHIPPED
Start: 2025-07-24

## 2025-07-23 NOTE — TELEPHONE ENCOUNTER
Patient called regarding [employer] message sent.  She stopped methotrexate mid May.  Started imuran. For the past week, patient has had increased bilateral hip and back pain. She states the back pain is chronic for her and she sees a chiropractor regularly.  Denies any injury or new exercise program.  No changes to her normal routine. The pain was so bad last week that she went to urgent care out where she lives (we cannot see the records in Care everywhere).  They took imaging and patient states it showed degenerative changes in her hips and a possible collapsed disc.  I asked her to have the records sent here for your review.  She states pain is better now, but not gone.  She is wondering if this is her sarcoidosis?  What else can she do for the pain?  Referral to ortho?    Chrissie Guillen RN

## 2025-07-23 NOTE — PROGRESS NOTES
Preventive Care Visit  [facility]  Delia Muir MD, Family Medicine  Jul 23, 2025      Assessment & Plan     Encounter for Medicare annual wellness exam   reviewed and updated    Osteopenia of lumbar spine  Adherent to alendronate, started about 8 months ago, last DEXA 1 year ago.  Refill sent today.  Recommend repeating DEXA prior to her planned move later this year.  - alendronate (FOSAMAX) 70 MG tablet; Take 1 tablet (70 mg) by mouth every 7 days.    Hypertriglyceridemia  Due for annual lipid testing.  Adherent to Lipitor 40 mg daily.  - Lipid panel reflex to direct LDL Non-fasting; Future  - Lipid panel reflex to direct LDL Non-fasting    Acquired hypothyroidism  Recommend recheck of TSH given recent med changes, last check 7 months ago  - TSH; Future  - TSH    Sacroiliac joint pain  Chronic, acutely worsened last week.  Went to urgent care, records not available.  States there was narrowing of her lumbar spine on x-ray without fracture.  Not seeing improvement with chiro care.  Exam today suggests SI joint pain with tenderness over the SI joint and positive Chloé on the left.  Given her sarcoid, unclear if she is at risk of rheumatologic sacroiliitis.  Will check CRP today and try 4-day course of prednisone given her mobility limitations, will reach out to rheumatology to see if there is any concern or need for advanced imaging.  - CRP, inflammation; Future  - predniSONE (DELTASONE) 20 MG tablet; Take 2 tablets (40 mg) by mouth daily for 4 days.  - CRP, inflammation    Sarcoidosis  Follows with rheumatology and MTM pharmacist.  Started azathioprine in the setting of new hepatic granulomas that presumably occurred despite prior methotrexate therapy.  - CRP, inflammation; Future  - CRP, inflammation    Vaginal atrophy  Reports she has started this after it was prescribed several months ago.  Going well, no concerns  - estradiol (VAGIFEM) 10 MCG TABS vaginal tablet; Place 1  "tablet (10 mcg) vaginally twice a week.      BMI  Estimated body mass index is 27.5 kg/m  as calculated from the following:    Height as of this encounter: 1.638 m (5' 4.5\").    Weight as of this encounter: 73.8 kg (162 lb 11.2 oz).       Counseling  Appropriate preventive services were addressed with this patient via screening, questionnaire, or discussion as appropriate for fall prevention, nutrition, physical activity, Tobacco-use cessation, social engagement, weight loss and cognition.  Checklist reviewing preventive services available has been given to the patient.  Reviewed patient's diet, addressing concerns and/or questions.   Discussed possible causes of fatigue. Information on urinary incontinence and treatment options given to patient.       Follow-up prior to her planned move to [place] late fall to ensure she has adequate medication supplies.      Lori Alanis is a 69 year old, presenting for the following:  Medicare Visit (AWV)        7/23/2025     5:24 PM   Additional Questions   Roomed by [facility]          HPI     Moving to [place] - postponed to late fall    Back pain started a few months ago, nagging  No injury  Last Friday bad low back pain, needed help getting out of tub  Saw chiro - no help. Dry needling, manipulation, massage  Went to  - some lumbar narrowing. Was rx'd muscle relaxants and pain killers - diclofenac  Not much relief  Concerned about ongoing pain and wondering about sarcoid  Has to be bent over  No radiation down legs  Denies new incontinence or urinary retention  Denies significant lower extremity weakness    Starting vaginal estrogen - had never       Advance Care Planning    Advance care planning document is on file but is outdated.  Patient encouraged to update or provider to update POLST.        7/20/2025   General Health   How would you rate your overall physical health? Good   Feel stress (tense, anxious, or unable to sleep) Only a little   (!) STRESS CONCERN      " 7/20/2025   Nutrition   Diet: Carbohydrate counting         7/20/2025   Exercise   Days per week of moderate/strenous exercise Patient declined   Average minutes spent exercising at this level Patient declined         7/20/2025   Social Factors   Frequency of gathering with friends or relatives Once a week   Worry food won't last until get money to buy more No   Food not last or not have enough money for food? No   Do you have housing? (Housing is defined as stable permanent housing and does not include staying outside in a car, in a tent, in an abandoned building, in an overnight shelter, or couch-surfing.) Yes   Are you worried about losing your housing? No   Lack of transportation? No   Unable to get utilities (heat,electricity)? No         7/20/2025   Fall Risk   Fallen 2 or more times in the past year? No   Trouble with walking or balance? No          7/20/2025   Activities of Daily Living- Home Safety   Needs help with the following daily activites None of the above   Safety concerns in the home None of the above         7/20/2025   Dental   Dentist two times every year? Yes         7/20/2025   Hearing Screening   Hearing concerns? None of the above         7/20/2025   Driving Risk Screening   Patient/family members have concerns about driving No         7/20/2025   General Alertness/Fatigue Screening   Have you been more tired than usual lately? (!) YES         7/20/2025   Urinary Incontinence Screening   Bothered by leaking urine in past 6 months Yes         Today's PHQ-2 Score:       7/22/2025     9:33 PM   PHQ-2 ( 1999 [employer])   Q1: Little interest or pleasure in doing things 0   Q2: Feeling down, depressed or hopeless 0   PHQ-2 Score 0    Q1: Little interest or pleasure in doing things Not at all   Q2: Feeling down, depressed or hopeless Not at all   PHQ-2 Score 0       Patient-reported           7/20/2025   Substance Use   Alcohol more than 3/day or more than 7/wk No   Do you have a current opioid  prescription? No   How severe/bad is pain from 1 to 10? 3/10   Do you use any other substances recreationally? No     Social History     Tobacco Use    Smoking status: Former     Current packs/day: 0.00     Average packs/day: 0.5 packs/day for 0.8 years (0.4 ttl pk-yrs)     Types: Cigarettes     Start date: 10/1/1975     Quit date: 1976     Years since quittin.1     Passive exposure: Past    Smokeless tobacco: Never    Tobacco comments:     N/A   Vaping Use    Vaping status: Never Used   Substance Use Topics    Alcohol use: Not Currently     Alcohol/week: 1.0 - 3.0 standard drink of alcohol     Types: 1 - 3 Standard drinks or equivalent per week     Comment: 1-2 beers/month    Drug use: Never          Mammogram Screening - Mammogram every 1-2 years updated in Health Maintenance based on mutual decision making      History of abnormal Pap smear: No - age 65 or older with adequate negative prior screening test results (3 consecutive negative cytology results, 2 consecutive negative cotesting results, or 2 consecutive negative HrHPV test results within 10 years, with the most recent test occurring within the recommended screening interval for the test used)       ASCVD Risk   The 10-year ASCVD risk score (Libby DK, et al., 2019) is: 9.5%    Values used to calculate the score:      Age: 69 years      Sex: Female      Is Non- : No      Diabetic: Yes      Tobacco smoker: No      Systolic Blood Pressure: 113 mmHg      Is BP treated: No      HDL Cholesterol: 42 mg/dL      Total Cholesterol: 134 mg/dL            Reviewed and updated as needed this visit by Provider   Tobacco  Allergies  Meds  Problems  Med Hx  Surg Hx  Fam Hx     Sexual Activity            Current providers sharing in care for this patient include:  Patient Care Team:  Irena Levi MD as PCP - General (Family Medicine)  Irena Levi MD as Assigned PCP  Autumn Padilla MD as Assigned  "Surgical Provider  Elvia Ko [facility] as Pharmacist  Nataliia Sprague MD as MD (Rheumatology)  Nataliia Sprague MD as Assigned Rheumatology Provider  Kamini Ramirez RD as Diabetes Educator (Dietitian, Registered)  Elvia Ko [facility] as Assigned [facility] Pharmacist  Maritza Martins MD as MD (Urology)  Astrid Flores PA-C as Physician Assistant (Gastroenterology)  Odette Rosario [facility] as Pharmacist (Pharmacist)  Astrid Flores PA-C as Assigned Gastroenterology Provider    The following health maintenance items are reviewed in Epic and correct as of today:  Health Maintenance   Topic Date Due    DIABETIC FOOT EXAM  04/14/2023    COVID-19 VACCINE (8 - 2024-25 season) 06/06/2025    LIPID  07/22/2025    MEDICARE ANNUAL WELLNESS VISIT  07/22/2025    INFLUENZA VACCINE (1) 09/01/2025    A1C  09/20/2025    EYE EXAM  09/20/2025    MICROALBUMIN  12/20/2025    TSH W/FREE T4 REFLEX  12/20/2025    ANNUAL REVIEW OF HM ORDERS  02/25/2026    BMP  06/20/2026    FALL RISK ASSESSMENT  07/23/2026    MAMMO SCREENING  07/25/2026    COLORECTAL CANCER SCREENING  08/30/2027    ADVANCE CARE PLANNING  07/23/2030    DTAP/TDAP/TD VACCINE (4 - Td or Tdap) 04/24/2033    DEXA  07/25/2039    HEPATITIS C SCREENING  Completed    PHQ-2 (once per calendar year)  Completed    PNEUMOCOCCAL VACCINE 50+ YEARS  Completed    HPV VACCINE (No Doses Required) Completed    ZOSTER VACCINE  Completed    RSV VACCINE  Completed    MENINGITIS VACCINE  Aged Out    PAP  Discontinued    CYSTIC FIBROSIS: COLONOSCOPY  Discontinued            Objective    Exam  /69   Pulse 58   Temp 98.7  F (37.1  C) (Tympanic)   Resp 16   Ht 1.638 m (5' 4.5\")   Wt 73.8 kg (162 lb 11.2 oz)   LMP  (LMP Unknown)   SpO2 99%   BMI 27.50 kg/m     Estimated body mass index is 27.5 kg/m  as calculated from the following:    Height as of this encounter: 1.638 m (5' 4.5\").    Weight as of this encounter: 73.8 kg (162 lb 11.2 oz).    Physical Exam  Constitutional:       " General: She is not in acute distress.     Appearance: Normal appearance. She is not ill-appearing.   HENT:      Nose: Nose normal.      Mouth/Throat:      Mouth: Mucous membranes are moist.   Eyes:      Conjunctiva/sclera: Conjunctivae normal.   Cardiovascular:      Rate and Rhythm: Normal rate.   Pulmonary:      Effort: Pulmonary effort is normal.   Musculoskeletal:      Lumbar back: Tenderness (Over and adjacent to lumbar spine, without crepitus or step-off) present. No swelling, edema or deformity. Normal range of motion. Negative right straight leg raise test and negative left straight leg raise test.      Comments: Negative lower extremity logroll test bilaterally.  Positive Chloé on the left.  Negative Chloé on the right.  Negative FADIR bilaterally.   Skin:     General: Skin is warm.   Neurological:      General: No focal deficit present.      Mental Status: She is alert.      Motor: No weakness.      Deep Tendon Reflexes:      Reflex Scores:       Patellar reflexes are 2+ on the right side and 2+ on the left side.  Psychiatric:         Mood and Affect: Mood normal.         Behavior: Behavior normal.               7/23/2025   Mini Cog   Clock Draw Score 2 Normal   3 Item Recall 3 objects recalled   Mini Cog Total Score 5              Signed Electronically by: Delia Muir MD

## 2025-07-23 NOTE — PATIENT INSTRUCTIONS
PLAN:   - steroid for back  - continue diclofenac  - blood test to check for inflammatory bone   - will reach out to rheumatology about question  - see attached info on sacroiliac joint pain and exercises      Patient Education   Preventive Care Advice   This is general advice given by our system to help you stay healthy. However, your care team may have specific advice just for you. Please talk to your care team about your preventive care needs.  Nutrition  Eat 5 or more servings of fruits and vegetables each day.  Try wheat bread, brown rice and whole grain pasta (instead of white bread, rice, and pasta).  Get enough calcium and vitamin D. Check the label on foods and aim for 100% of the RDA (recommended daily allowance).  Lifestyle  Exercise at least 150 minutes each week  (30 minutes a day, 5 days a week).  Do muscle strengthening activities 2 days a week. These help control your weight and prevent disease.  No smoking.  Wear sunscreen to prevent skin cancer.  Have a dental exam and cleaning every 6 months.  Yearly exams  See your health care team every year to talk about:  Any changes in your health.  Any medicines your care team has prescribed.  Preventive care, family planning, and ways to prevent chronic diseases.  Shots (vaccines)   HPV shots (up to age 26), if you've never had them before.  Hepatitis B shots (up to age 59), if you've never had them before.  COVID-19 shot: Get this shot when it's due.  Flu shot: Get a flu shot every year.  Tetanus shot: Get a tetanus shot every 10 years.  Pneumococcal, hepatitis A, and RSV shots: Ask your care team if you need these based on your risk.  Shingles shot (for age 50 and up)  General health tests  Diabetes screening:  Starting at age 35, Get screened for diabetes at least every 3 years.  If you are younger than age 35, ask your care team if you should be screened for diabetes.  Cholesterol test: At age 39, start having a cholesterol test every 5 years, or more  often if advised.  Bone density scan (DEXA): At age 50, ask your care team if you should have this scan for osteoporosis (brittle bones).  Hepatitis C: Get tested at least once in your life.  STIs (sexually transmitted infections)  Before age 24: Ask your care team if you should be screened for STIs.  After age 24: Get screened for STIs if you're at risk. You are at risk for STIs (including HIV) if:  You are sexually active with more than one person.  You don't use condoms every time.  You or a partner was diagnosed with a sexually transmitted infection.  If you are at risk for HIV, ask about PrEP medicine to prevent HIV.  Get tested for HIV at least once in your life, whether you are at risk for HIV or not.  Cancer screening tests  Cervical cancer screening: If you have a cervix, begin getting regular cervical cancer screening tests starting at age 21.  Breast cancer scan (mammogram): If you've ever had breasts, begin having regular mammograms starting at age 40. This is a scan to check for breast cancer.  Colon cancer screening: It is important to start screening for colon cancer at age 45.  Have a colonoscopy test every 10 years (or more often if you're at risk) Or, ask your provider about stool tests like a FIT test every year or Cologuard test every 3 years.  To learn more about your testing options, visit:   .  For help making a decision, visit:   https://bit.ly/un24433.  Prostate cancer screening test: If you have a prostate, ask your care team if a prostate cancer screening test (PSA) at age 55 is right for you.  Lung cancer screening: If you are a current or former smoker ages 50 to 80, ask your care team if ongoing lung cancer screenings are right for you.  For informational purposes only. Not to replace the advice of your health care provider. Copyright   2023 [employer]. All rights reserved. Clinically reviewed by the [facility]. [employer]kar[employer] [id number] - REV  01/24.  Learning About Sleeping Well  What does sleeping well mean?     Sleeping well means getting enough sleep to feel good and stay healthy. How much sleep is enough varies among people.  The number of hours you sleep and how you feel when you wake up are both important. If you do not feel refreshed, you probably need more sleep. Another sign of not getting enough sleep is feeling tired during the day.  Experts recommend that adults get at least 7 or more hours of sleep per day. Children and older adults need more sleep.  Why is getting enough sleep important?  Getting enough quality sleep is a basic part of good health. When your sleep suffers, your physical health, mood, and your thoughts can suffer too. You may find yourself feeling more grumpy or stressed. Not getting enough sleep also can lead to serious problems, including injury, accidents, anxiety, and depression.  What might cause poor sleeping?  Many things can cause sleep problems, including:  Changes to your sleep schedule.  Stress. Stress can be caused by fear about a single event, such as giving a speech. Or you may have ongoing stress, such as worry about work or school.  Depression, anxiety, and other mental or emotional conditions.  Changes in your sleep habits or surroundings. This includes changes that happen where you sleep, such as noise, light, or sleeping in a different bed. It also includes changes in your sleep pattern, such as having jet lag or working a late shift.  Health problems, such as pain, breathing problems, and restless legs syndrome.  Lack of regular exercise.  Using alcohol, nicotine, or caffeine before bed.  How can you help yourself?  Here are some tips that may help you sleep more soundly and wake up feeling more refreshed.  Your sleeping area   Use your bedroom only for sleeping and sex. A bit of light reading may help you fall asleep. But if it doesn't, do your reading elsewhere in the house. Try not to use your TV,  "computer, smartphone, or tablet while you are in bed.  Be sure your bed is big enough to stretch out comfortably, especially if you have a sleep partner.  Keep your bedroom quiet, dark, and cool. Use curtains, blinds, or a sleep mask to block out light. To block out noise, use earplugs, soothing music, or a \"white noise\" machine.  Your evening and bedtime routine   Create a relaxing bedtime routine. You might want to take a warm shower or bath, or listen to soothing music.  Go to bed at the same time every night. And get up at the same time every morning, even if you feel tired.  What to avoid   Limit caffeine (coffee, tea, caffeinated sodas) during the day, and don't have any for at least 6 hours before bedtime.  Avoid drinking alcohol before bedtime. Alcohol can cause you to wake up more often during the night.  Try not to smoke or use tobacco, especially in the evening. Nicotine can keep you awake.  Limit naps during the day, especially close to bedtime.  Avoid lying in bed awake for too long. If you can't fall asleep or if you wake up in the middle of the night and can't get back to sleep within about 20 minutes, get out of bed and go to another room until you feel sleepy.  Avoid taking medicine right before bed that may keep you awake or make you feel hyper or energized. Your doctor can tell you if your medicine may do this and if you can take it earlier in the day.  If you can't sleep   Imagine yourself in a peaceful, pleasant scene. Focus on the details and feelings of being in a place that is relaxing.  Get up and do a quiet or boring activity until you feel sleepy.  Avoid drinking any liquids before going to bed to help prevent waking up often to use the bathroom.  Where can you learn more?  Go to https://www.[employer].net/patiented  Enter J942 in the search box to learn more about \"Learning About Sleeping Well.\"  Current as of: July 31, 2024  Content Version: 14.5    6101-1136 [facility] [employer]. "   Care instructions adapted under license by your healthcare professional. If you have questions about a medical condition or this instruction, always ask your healthcare professional. [employer] disclaims any warranty or liability for your use of this information.    Bladder Training: Care Instructions  Your Care Instructions     Bladder training is used to treat urge incontinence and stress incontinence. Urge incontinence means that the need to urinate comes on so fast that you can't get to a toilet in time. Stress incontinence means that you leak urine because of pressure on your bladder. For example, it may happen when you laugh, cough, or lift something heavy.  Bladder training can increase how long you can wait before you have to urinate. It can also help your bladder hold more urine. And it can give you better control over the urge to urinate.  It is important to remember that bladder training takes a few weeks to a few months to make a difference. You may not see results right away, but don't give up.  Follow-up care is a key part of your treatment and safety. Be sure to make and go to all appointments, and call your doctor if you are having problems. It's also a good idea to know your test results and keep a list of the medicines you take.  How can you care for yourself at home?  Work with your doctor to come up with a bladder training program that is right for you. You may use one or more of the following methods.  Delayed urination  In the beginning, try to keep from urinating for 5 minutes after you first feel the need to go.  While you wait, take deep, slow breaths to relax. Kegel exercises can also help you delay the need to go to the bathroom.  After some practice, when you can easily wait 5 minutes to urinate, try to wait 10 minutes before you urinate.  Slowly increase the waiting period until you are able to control when you have to urinate.  Scheduled urination  Empty your bladder when  "you first wake up in the morning.  Schedule times throughout the day when you will urinate.  Start by going to the bathroom every hour, even if you don't need to go.  Slowly increase the time between trips to the bathroom.  When you have found a schedule that works well for you, keep doing it.  If you wake up during the night and have to urinate, do it. Apply your schedule to waking hours only.  Kegel exercises  These tighten and strengthen pelvic muscles, which can help you control the flow of urine. (If doing these exercises causes pain, stop doing them and talk with your doctor.) To do Kegel exercises:  Squeeze your muscles as if you were trying not to pass gas. Or squeeze your muscles as if you were stopping the flow of urine. Your belly, legs, and buttocks shouldn't move.  Hold the squeeze for 3 seconds, then relax for 5 to 10 seconds.  Start with 3 seconds, then add 1 second each week until you are able to squeeze for 10 seconds.  Repeat the exercise 10 times a session. Do 3 to 8 sessions a day.  When should you call for help?  Watch closely for changes in your health, and be sure to contact your doctor if:    Your incontinence is getting worse.     You do not get better as expected.   Where can you learn more?  Go to https://www.[employer].com.net/patiented  Enter V684 in the search box to learn more about \"Bladder Training: Care Instructions.\"  Current as of: April 30, 2024  Content Version: 14.5 2024-2025 [employer].   Care instructions adapted under license by your healthcare professional. If you have questions about a medical condition or this instruction, always ask your healthcare professional. [employer] disclaims any warranty or liability for your use of this information.       "

## 2025-07-24 ENCOUNTER — TELEPHONE (OUTPATIENT)
Dept: PHARMACY | Facility: CLINIC | Age: 70
End: 2025-07-24
Payer: MEDICARE

## 2025-07-24 LAB
CHOLEST SERPL-MCNC: 109 MG/DL
CRP SERPL-MCNC: <3 MG/L
FASTING STATUS PATIENT QL REPORTED: NO
HDLC SERPL-MCNC: 32 MG/DL
LDLC SERPL CALC-MCNC: 44 MG/DL
NONHDLC SERPL-MCNC: 77 MG/DL
TRIGL SERPL-MCNC: 167 MG/DL
TSH SERPL DL<=0.005 MIU/L-ACNC: 0.27 UIU/ML (ref 0.3–4.2)

## 2025-07-24 NOTE — TELEPHONE ENCOUNTER
Pt returned MyC with VM to schedule MTM follow up    Called pt back to initiate scheduling on 07/24/25    Outcome: Pt is scheudled for 8/8 at 10:00am

## 2025-07-29 ENCOUNTER — PATIENT OUTREACH (OUTPATIENT)
Dept: CARE COORDINATION | Facility: CLINIC | Age: 70
End: 2025-07-29
Payer: MEDICARE

## 2025-07-31 ENCOUNTER — PATIENT OUTREACH (OUTPATIENT)
Dept: CARE COORDINATION | Facility: CLINIC | Age: 70
End: 2025-07-31
Payer: MEDICARE

## 2025-08-07 ENCOUNTER — LAB (OUTPATIENT)
Dept: LAB | Facility: CLINIC | Age: 70
End: 2025-08-07
Payer: MEDICARE

## 2025-08-07 DIAGNOSIS — R76.8 POSITIVE ANA (ANTINUCLEAR ANTIBODY): ICD-10-CM

## 2025-08-07 DIAGNOSIS — D86.9 SARCOIDOSIS: ICD-10-CM

## 2025-08-07 DIAGNOSIS — M85.88 OSTEOPENIA OF LUMBAR SPINE: ICD-10-CM

## 2025-08-07 DIAGNOSIS — Z79.899 LONG-TERM USE OF HIGH-RISK MEDICATION: ICD-10-CM

## 2025-08-07 LAB
ALBUMIN SERPL BCG-MCNC: 4.2 G/DL (ref 3.5–5.2)
ALP SERPL-CCNC: 55 U/L (ref 40–150)
ALT SERPL W P-5'-P-CCNC: 26 U/L (ref 0–50)
ANION GAP SERPL CALCULATED.3IONS-SCNC: 11 MMOL/L (ref 7–15)
AST SERPL W P-5'-P-CCNC: 31 U/L (ref 0–45)
BASOPHILS # BLD AUTO: 0 10E3/UL (ref 0–0.2)
BASOPHILS NFR BLD AUTO: 0 %
BILIRUB SERPL-MCNC: 0.5 MG/DL
BUN SERPL-MCNC: 26.6 MG/DL (ref 8–23)
CALCIUM SERPL-MCNC: 9.8 MG/DL (ref 8.8–10.4)
CHLORIDE SERPL-SCNC: 102 MMOL/L (ref 98–107)
CREAT SERPL-MCNC: 0.78 MG/DL (ref 0.51–0.95)
CRP SERPL-MCNC: <3 MG/L
EGFRCR SERPLBLD CKD-EPI 2021: 83 ML/MIN/1.73M2
EOSINOPHIL # BLD AUTO: 0.4 10E3/UL (ref 0–0.7)
EOSINOPHIL NFR BLD AUTO: 6 %
ERYTHROCYTE [DISTWIDTH] IN BLOOD BY AUTOMATED COUNT: 12.9 % (ref 10–15)
GLUCOSE SERPL-MCNC: 106 MG/DL (ref 70–99)
HCO3 SERPL-SCNC: 25 MMOL/L (ref 22–29)
HCT VFR BLD AUTO: 33.3 % (ref 35–47)
HGB BLD-MCNC: 11.2 G/DL (ref 11.7–15.7)
IMM GRANULOCYTES # BLD: 0 10E3/UL
IMM GRANULOCYTES NFR BLD: 0 %
LYMPHOCYTES # BLD AUTO: 1.1 10E3/UL (ref 0.8–5.3)
LYMPHOCYTES NFR BLD AUTO: 16 %
MCH RBC QN AUTO: 32.6 PG (ref 26.5–33)
MCHC RBC AUTO-ENTMCNC: 33.6 G/DL (ref 31.5–36.5)
MCV RBC AUTO: 97 FL (ref 78–100)
MONOCYTES # BLD AUTO: 0.9 10E3/UL (ref 0–1.3)
MONOCYTES NFR BLD AUTO: 12 %
NEUTROPHILS # BLD AUTO: 4.6 10E3/UL (ref 1.6–8.3)
NEUTROPHILS NFR BLD AUTO: 66 %
PLATELET # BLD AUTO: 148 10E3/UL (ref 150–450)
POTASSIUM SERPL-SCNC: 4.1 MMOL/L (ref 3.4–5.3)
PROT SERPL-MCNC: 6.6 G/DL (ref 6.4–8.3)
RBC # BLD AUTO: 3.44 10E6/UL (ref 3.8–5.2)
SODIUM SERPL-SCNC: 138 MMOL/L (ref 135–145)
WBC # BLD AUTO: 7 10E3/UL (ref 4–11)

## 2025-08-07 PROCEDURE — 80053 COMPREHEN METABOLIC PANEL: CPT | Performed by: INTERNAL MEDICINE

## 2025-08-07 PROCEDURE — 86140 C-REACTIVE PROTEIN: CPT | Performed by: INTERNAL MEDICINE

## 2025-08-08 ENCOUNTER — VIRTUAL VISIT (OUTPATIENT)
Dept: PHARMACY | Facility: CLINIC | Age: 70
End: 2025-08-08
Attending: FAMILY MEDICINE
Payer: MEDICARE

## 2025-08-08 DIAGNOSIS — D86.9 SARCOIDOSIS: Primary | ICD-10-CM

## 2025-08-08 DIAGNOSIS — F39 MOOD DISORDER: ICD-10-CM

## 2025-08-11 RX ORDER — AZATHIOPRINE 50 MG/1
75 TABLET ORAL DAILY
Qty: 45 TABLET | Refills: 2 | Status: SHIPPED | OUTPATIENT
Start: 2025-08-11

## 2025-08-27 ENCOUNTER — ALLIED HEALTH/NURSE VISIT (OUTPATIENT)
Dept: EDUCATION SERVICES | Facility: CLINIC | Age: 70
End: 2025-08-27
Payer: MEDICARE

## 2025-08-27 VITALS — WEIGHT: 155.8 LBS | HEIGHT: 65 IN | BODY MASS INDEX: 25.96 KG/M2

## 2025-08-27 DIAGNOSIS — K75.81 NONALCOHOLIC STEATOHEPATITIS (NASH): ICD-10-CM

## 2025-08-27 DIAGNOSIS — E11.9 TYPE 2 DIABETES MELLITUS WITHOUT COMPLICATION, WITHOUT LONG-TERM CURRENT USE OF INSULIN (H): Primary | ICD-10-CM

## 2025-08-27 DIAGNOSIS — E78.1 HYPERTRIGLYCERIDEMIA: ICD-10-CM

## (undated) DEVICE — ESU ELEC BLADE 2.75" COATED/INSULATED E1455

## (undated) DEVICE — GLOVE PROTEXIS MICRO 7.5  2D73PM75

## (undated) DEVICE — SOL WATER IRRIG 500ML BOTTLE 2F7113

## (undated) DEVICE — ESU GROUND PAD ADULT W/CORD E7507

## (undated) DEVICE — GOWN XLG DISP 9545

## (undated) DEVICE — NDL 25GA 2"  8881200441

## (undated) DEVICE — LINEN TOWEL PACK X5 5464

## (undated) DEVICE — EYE PREP BETADINE 5% SOLUTION 30ML 0065-0411-30

## (undated) DEVICE — SUCTION MANIFOLD NEPTUNE 2 SYS 1 PORT 702-025-000

## (undated) DEVICE — SOL NACL 0.9% INJ 250ML BAG 2B1322Q

## (undated) DEVICE — SOL NACL 0.9% IRRIG 500ML BOTTLE 2F7123

## (undated) DEVICE — DRSG TELFA ISLAND 4X8" 7541

## (undated) DEVICE — SU PLAIN 6-0 G-1DA 18" 770G

## (undated) DEVICE — BLADE KNIFE SURG 11 371111

## (undated) DEVICE — SOL NACL 0.9% INJ 1000ML BAG 2B1324X

## (undated) DEVICE — BLADE KNIFE SURG 10 371110

## (undated) DEVICE — ESU PENCIL W/HOLSTER

## (undated) DEVICE — BLADE KNIFE SURG 15 371115

## (undated) DEVICE — SU SILK 6-0 G-1DA 18" 780G

## (undated) DEVICE — COVER ULTRASOUND PROBE W/GEL FLEXI-FEEL 6"X58" LF  25-FF658

## (undated) DEVICE — ESU NDL COLORADO MICRO 3CM STR N103A

## (undated) DEVICE — PACK MINOR EYE CUSTOM ASC

## (undated) DEVICE — SPONGE SURGIFOAM 100 1974

## (undated) DEVICE — NDL 18GAX1.5" 305185

## (undated) DEVICE — TUBING SUCTION MEDI-VAC 1/4"X20' N620A

## (undated) DEVICE — SYR 03ML LL W/O NDL 309657

## (undated) DEVICE — LINEN GOWN XLG 5407

## (undated) DEVICE — ESU HIGH TEMP LOOP TIP AA03

## (undated) DEVICE — GLOVE BIOGEL PI ULTRATOUCH G SZ 7.5 42175

## (undated) DEVICE — SPECIMEN CONTAINER W/10% BUFFERED FORMALIN 120ML 591201

## (undated) DEVICE — BAG DECANTER STERILE WHITE DYNJDEC09

## (undated) DEVICE — SU SILK 4-0 RB-1 30" K871H

## (undated) DEVICE — NDL 30GA 0.5" 305106

## (undated) DEVICE — GLOVE BIOGEL PI MICRO SZ 7.5 48575

## (undated) DEVICE — DRSG PRIMAPORE 02X3" 7133

## (undated) DEVICE — Device

## (undated) DEVICE — EYE KNIFE CRESCENT 55DEG 373807

## (undated) DEVICE — GELFOAM 7X12MM

## (undated) DEVICE — CATH IV 16X1-1/4 PROTECTIV 326210

## (undated) DEVICE — WIRE GUIDE BENSON STR .035X50CM G00661

## (undated) DEVICE — NDL BIOPSY TEMNO 18GAX15CM ACT1815

## (undated) RX ORDER — PROPOFOL 10 MG/ML
INJECTION, EMULSION INTRAVENOUS
Status: DISPENSED
Start: 2024-08-08

## (undated) RX ORDER — PROPOFOL 10 MG/ML
INJECTION, EMULSION INTRAVENOUS
Status: DISPENSED
Start: 2023-05-04

## (undated) RX ORDER — OXYCODONE HYDROCHLORIDE 5 MG/1
TABLET ORAL
Status: DISPENSED
Start: 2024-08-08

## (undated) RX ORDER — CEFAZOLIN SODIUM 1 G/3ML
INJECTION, POWDER, FOR SOLUTION INTRAMUSCULAR; INTRAVENOUS
Status: DISPENSED
Start: 2023-05-04

## (undated) RX ORDER — FENTANYL CITRATE 50 UG/ML
INJECTION, SOLUTION INTRAMUSCULAR; INTRAVENOUS
Status: DISPENSED
Start: 2023-05-04

## (undated) RX ORDER — ONDANSETRON 2 MG/ML
INJECTION INTRAMUSCULAR; INTRAVENOUS
Status: DISPENSED
Start: 2024-08-08

## (undated) RX ORDER — HEPARIN SODIUM (PORCINE) LOCK FLUSH IV SOLN 100 UNIT/ML 100 UNIT/ML
SOLUTION INTRAVENOUS
Status: DISPENSED
Start: 2025-05-23

## (undated) RX ORDER — TRIAMCINOLONE ACETONIDE 40 MG/ML
INJECTION, SUSPENSION INTRA-ARTICULAR; INTRAMUSCULAR
Status: DISPENSED
Start: 2024-08-08

## (undated) RX ORDER — ACETAMINOPHEN 325 MG/1
TABLET ORAL
Status: DISPENSED
Start: 2023-05-04

## (undated) RX ORDER — ACETAMINOPHEN 325 MG/1
TABLET ORAL
Status: DISPENSED
Start: 2025-05-23

## (undated) RX ORDER — TRIAMCINOLONE ACETONIDE 40 MG/ML
INJECTION, SUSPENSION INTRA-ARTICULAR; INTRAMUSCULAR
Status: DISPENSED
Start: 2023-10-19

## (undated) RX ORDER — OXYCODONE HYDROCHLORIDE 5 MG/1
TABLET ORAL
Status: DISPENSED
Start: 2023-05-04

## (undated) RX ORDER — DEXAMETHASONE SODIUM PHOSPHATE 4 MG/ML
INJECTION, SOLUTION INTRA-ARTICULAR; INTRALESIONAL; INTRAMUSCULAR; INTRAVENOUS; SOFT TISSUE
Status: DISPENSED
Start: 2023-05-04

## (undated) RX ORDER — TRIAMCINOLONE ACETONIDE 40 MG/ML
INJECTION, SUSPENSION INTRA-ARTICULAR; INTRAMUSCULAR
Status: DISPENSED
Start: 2023-08-08

## (undated) RX ORDER — FENTANYL CITRATE 50 UG/ML
INJECTION, SOLUTION INTRAMUSCULAR; INTRAVENOUS
Status: DISPENSED
Start: 2025-05-23

## (undated) RX ORDER — TRANEXAMIC ACID 100 MG/ML
INJECTION, SOLUTION INTRAVENOUS
Status: DISPENSED
Start: 2024-08-08

## (undated) RX ORDER — LIDOCAINE HYDROCHLORIDE 10 MG/ML
INJECTION, SOLUTION EPIDURAL; INFILTRATION; INTRACAUDAL; PERINEURAL
Status: DISPENSED
Start: 2025-05-23

## (undated) RX ORDER — ACETAMINOPHEN 325 MG/1
TABLET ORAL
Status: DISPENSED
Start: 2024-08-08

## (undated) RX ORDER — ONDANSETRON 2 MG/ML
INJECTION INTRAMUSCULAR; INTRAVENOUS
Status: DISPENSED
Start: 2023-05-04

## (undated) RX ORDER — DEXAMETHASONE SODIUM PHOSPHATE 4 MG/ML
INJECTION, SOLUTION INTRA-ARTICULAR; INTRALESIONAL; INTRAMUSCULAR; INTRAVENOUS; SOFT TISSUE
Status: DISPENSED
Start: 2024-08-08